# Patient Record
Sex: FEMALE | Race: WHITE | NOT HISPANIC OR LATINO
[De-identification: names, ages, dates, MRNs, and addresses within clinical notes are randomized per-mention and may not be internally consistent; named-entity substitution may affect disease eponyms.]

---

## 2018-04-11 ENCOUNTER — RESULT REVIEW (OUTPATIENT)
Age: 78
End: 2018-04-11

## 2018-04-11 ENCOUNTER — APPOINTMENT (OUTPATIENT)
Dept: ULTRASOUND IMAGING | Facility: IMAGING CENTER | Age: 78
End: 2018-04-11
Payer: MEDICARE

## 2018-04-11 ENCOUNTER — OUTPATIENT (OUTPATIENT)
Dept: OUTPATIENT SERVICES | Facility: HOSPITAL | Age: 78
LOS: 1 days | End: 2018-04-11
Payer: MEDICARE

## 2018-04-11 ENCOUNTER — APPOINTMENT (OUTPATIENT)
Dept: SURGERY | Facility: CLINIC | Age: 78
End: 2018-04-11
Payer: MEDICARE

## 2018-04-11 DIAGNOSIS — Z00.8 ENCOUNTER FOR OTHER GENERAL EXAMINATION: ICD-10-CM

## 2018-04-11 PROCEDURE — 88305 TISSUE EXAM BY PATHOLOGIST: CPT | Mod: 26

## 2018-04-11 PROCEDURE — A4648: CPT

## 2018-04-11 PROCEDURE — 99205K: CUSTOM

## 2018-04-11 PROCEDURE — 77065 DX MAMMO INCL CAD UNI: CPT

## 2018-04-11 PROCEDURE — 77065 DX MAMMO INCL CAD UNI: CPT | Mod: 26,LT

## 2018-04-11 PROCEDURE — 88305 TISSUE EXAM BY PATHOLOGIST: CPT

## 2018-04-11 PROCEDURE — 19084 BX BREAST ADD LESION US IMAG: CPT

## 2018-04-11 PROCEDURE — 19084 BX BREAST ADD LESION US IMAG: CPT | Mod: LT

## 2018-04-11 PROCEDURE — 88360 TUMOR IMMUNOHISTOCHEM/MANUAL: CPT | Mod: 26

## 2018-04-11 PROCEDURE — 19083 BX BREAST 1ST LESION US IMAG: CPT

## 2018-04-11 PROCEDURE — 19083 BX BREAST 1ST LESION US IMAG: CPT | Mod: LT

## 2018-04-11 PROCEDURE — 88360 TUMOR IMMUNOHISTOCHEM/MANUAL: CPT

## 2018-04-18 ENCOUNTER — OUTPATIENT (OUTPATIENT)
Dept: OUTPATIENT SERVICES | Facility: HOSPITAL | Age: 78
LOS: 1 days | End: 2018-04-18
Payer: MEDICARE

## 2018-04-18 ENCOUNTER — APPOINTMENT (OUTPATIENT)
Dept: MRI IMAGING | Facility: IMAGING CENTER | Age: 78
End: 2018-04-18
Payer: MEDICARE

## 2018-04-18 VITALS
WEIGHT: 173.94 LBS | HEIGHT: 59 IN | SYSTOLIC BLOOD PRESSURE: 144 MMHG | RESPIRATION RATE: 16 BRPM | TEMPERATURE: 98 F | HEART RATE: 79 BPM | DIASTOLIC BLOOD PRESSURE: 82 MMHG

## 2018-04-18 DIAGNOSIS — Z96.649 PRESENCE OF UNSPECIFIED ARTIFICIAL HIP JOINT: Chronic | ICD-10-CM

## 2018-04-18 DIAGNOSIS — C50.919 MALIGNANT NEOPLASM OF UNSPECIFIED SITE OF UNSPECIFIED FEMALE BREAST: ICD-10-CM

## 2018-04-18 DIAGNOSIS — C50.912 MALIGNANT NEOPLASM OF UNSPECIFIED SITE OF LEFT FEMALE BREAST: ICD-10-CM

## 2018-04-18 DIAGNOSIS — E03.9 HYPOTHYROIDISM, UNSPECIFIED: ICD-10-CM

## 2018-04-18 DIAGNOSIS — I10 ESSENTIAL (PRIMARY) HYPERTENSION: ICD-10-CM

## 2018-04-18 DIAGNOSIS — Z98.890 OTHER SPECIFIED POSTPROCEDURAL STATES: Chronic | ICD-10-CM

## 2018-04-18 DIAGNOSIS — Z00.8 ENCOUNTER FOR OTHER GENERAL EXAMINATION: ICD-10-CM

## 2018-04-18 LAB
ALBUMIN SERPL ELPH-MCNC: 4.3 G/DL — SIGNIFICANT CHANGE UP (ref 3.3–5)
ALP SERPL-CCNC: 72 U/L — SIGNIFICANT CHANGE UP (ref 40–120)
ALT FLD-CCNC: 14 U/L — SIGNIFICANT CHANGE UP (ref 4–33)
AST SERPL-CCNC: 14 U/L — SIGNIFICANT CHANGE UP (ref 4–32)
BILIRUB SERPL-MCNC: 0.3 MG/DL — SIGNIFICANT CHANGE UP (ref 0.2–1.2)
BUN SERPL-MCNC: 21 MG/DL — SIGNIFICANT CHANGE UP (ref 7–23)
CALCIUM SERPL-MCNC: 9.4 MG/DL — SIGNIFICANT CHANGE UP (ref 8.4–10.5)
CHLORIDE SERPL-SCNC: 100 MMOL/L — SIGNIFICANT CHANGE UP (ref 98–107)
CO2 SERPL-SCNC: 27 MMOL/L — SIGNIFICANT CHANGE UP (ref 22–31)
CREAT SERPL-MCNC: 1.04 MG/DL — SIGNIFICANT CHANGE UP (ref 0.5–1.3)
GLUCOSE SERPL-MCNC: 124 MG/DL — HIGH (ref 70–99)
HCT VFR BLD CALC: 42.3 % — SIGNIFICANT CHANGE UP (ref 34.5–45)
HGB BLD-MCNC: 13.3 G/DL — SIGNIFICANT CHANGE UP (ref 11.5–15.5)
MCHC RBC-ENTMCNC: 27.8 PG — SIGNIFICANT CHANGE UP (ref 27–34)
MCHC RBC-ENTMCNC: 31.4 % — LOW (ref 32–36)
MCV RBC AUTO: 88.3 FL — SIGNIFICANT CHANGE UP (ref 80–100)
NRBC # FLD: 0 — SIGNIFICANT CHANGE UP
PLATELET # BLD AUTO: 334 K/UL — SIGNIFICANT CHANGE UP (ref 150–400)
PMV BLD: 9.9 FL — SIGNIFICANT CHANGE UP (ref 7–13)
POTASSIUM SERPL-MCNC: 4 MMOL/L — SIGNIFICANT CHANGE UP (ref 3.5–5.3)
POTASSIUM SERPL-SCNC: 4 MMOL/L — SIGNIFICANT CHANGE UP (ref 3.5–5.3)
PROT SERPL-MCNC: 7.2 G/DL — SIGNIFICANT CHANGE UP (ref 6–8.3)
RBC # BLD: 4.79 M/UL — SIGNIFICANT CHANGE UP (ref 3.8–5.2)
RBC # FLD: 12.9 % — SIGNIFICANT CHANGE UP (ref 10.3–14.5)
SODIUM SERPL-SCNC: 141 MMOL/L — SIGNIFICANT CHANGE UP (ref 135–145)
WBC # BLD: 8.62 K/UL — SIGNIFICANT CHANGE UP (ref 3.8–10.5)
WBC # FLD AUTO: 8.62 K/UL — SIGNIFICANT CHANGE UP (ref 3.8–10.5)

## 2018-04-18 PROCEDURE — A9585: CPT

## 2018-04-18 PROCEDURE — 71046 X-RAY EXAM CHEST 2 VIEWS: CPT | Mod: 26

## 2018-04-18 PROCEDURE — 77059 MRI BREAST BILATERAL: CPT | Mod: 26

## 2018-04-18 PROCEDURE — C8937: CPT

## 2018-04-18 PROCEDURE — 93010 ELECTROCARDIOGRAM REPORT: CPT

## 2018-04-18 PROCEDURE — 0159T: CPT | Mod: 26

## 2018-04-18 PROCEDURE — 82565 ASSAY OF CREATININE: CPT

## 2018-04-18 PROCEDURE — C8908: CPT

## 2018-04-18 NOTE — H&P PST ADULT - PSH
Breast Cancer- s/p left Breast Lumpectomy  with Radiation therapy( 18 years ago)    Cataract surgery- both eyes ( 2009)    H/O melanoma excision  right forearm 2011  History of hip replacement  2015 - right

## 2018-04-18 NOTE — H&P PST ADULT - RS GEN PE MLT RESP DETAILS PC
breath sounds equal/airway patent/clear to auscultation bilaterally/respirations non-labored/good air movement

## 2018-04-18 NOTE — H&P PST ADULT - HISTORY OF PRESENT ILLNESS
Pt is a 77 yr old female scheduled for Left Partial Mastectomy with Axillary Dissection with Dr Pike 5/1/18 - pt hx of right breast ca and had recent abnormal mammo 3/18 - pt now to have surgery. Pt denies palpation of cyst or pain . Hx of HTN, Hypothyroidism and HLD

## 2018-04-18 NOTE — H&P PST ADULT - NSANTHOSAYNRD_GEN_A_CORE
No. DESI screening performed.  STOP BANG Legend: 0-2 = LOW Risk; 3-4 = INTERMEDIATE Risk; 5-8 = HIGH Risk

## 2018-04-18 NOTE — H&P PST ADULT - NEUROLOGICAL DETAILS
alert and oriented x 3/responds to pain/responds to verbal commands/sensation intact/normal strength

## 2018-04-18 NOTE — H&P PST ADULT - PMH
Breast Cancer    Cataract    HTN (Hypertension)    Hyperlipidemia     (Normal Spontaneous Vaginal Delivery) x 2    Obesity    Osteoporosis    Radiation

## 2018-04-23 ENCOUNTER — APPOINTMENT (OUTPATIENT)
Dept: NUCLEAR MEDICINE | Facility: IMAGING CENTER | Age: 78
End: 2018-04-23
Payer: MEDICARE

## 2018-04-23 ENCOUNTER — OUTPATIENT (OUTPATIENT)
Dept: OUTPATIENT SERVICES | Facility: HOSPITAL | Age: 78
LOS: 1 days | End: 2018-04-23
Payer: MEDICARE

## 2018-04-23 DIAGNOSIS — Z00.8 ENCOUNTER FOR OTHER GENERAL EXAMINATION: ICD-10-CM

## 2018-04-23 DIAGNOSIS — Z98.890 OTHER SPECIFIED POSTPROCEDURAL STATES: Chronic | ICD-10-CM

## 2018-04-23 DIAGNOSIS — Z96.649 PRESENCE OF UNSPECIFIED ARTIFICIAL HIP JOINT: Chronic | ICD-10-CM

## 2018-04-23 PROCEDURE — 78815 PET IMAGE W/CT SKULL-THIGH: CPT | Mod: 26,PI

## 2018-04-23 PROCEDURE — 78815 PET IMAGE W/CT SKULL-THIGH: CPT

## 2018-04-23 PROCEDURE — A9552: CPT

## 2018-04-26 ENCOUNTER — APPOINTMENT (OUTPATIENT)
Dept: MRI IMAGING | Facility: IMAGING CENTER | Age: 78
End: 2018-04-26

## 2018-05-01 ENCOUNTER — RESULT REVIEW (OUTPATIENT)
Age: 78
End: 2018-05-01

## 2018-05-01 ENCOUNTER — INPATIENT (INPATIENT)
Facility: HOSPITAL | Age: 78
LOS: 0 days | Discharge: HOME | End: 2018-05-02
Attending: SURGERY | Admitting: SURGERY
Payer: MEDICARE

## 2018-05-01 ENCOUNTER — APPOINTMENT (OUTPATIENT)
Dept: SURGERY | Facility: HOSPITAL | Age: 78
End: 2018-05-01

## 2018-05-01 VITALS
SYSTOLIC BLOOD PRESSURE: 147 MMHG | RESPIRATION RATE: 16 BRPM | OXYGEN SATURATION: 96 % | TEMPERATURE: 98 F | HEIGHT: 59 IN | DIASTOLIC BLOOD PRESSURE: 64 MMHG | WEIGHT: 173.94 LBS | HEART RATE: 65 BPM

## 2018-05-01 DIAGNOSIS — Z96.649 PRESENCE OF UNSPECIFIED ARTIFICIAL HIP JOINT: Chronic | ICD-10-CM

## 2018-05-01 DIAGNOSIS — C50.912 MALIGNANT NEOPLASM OF UNSPECIFIED SITE OF LEFT FEMALE BREAST: ICD-10-CM

## 2018-05-01 DIAGNOSIS — Z98.890 OTHER SPECIFIED POSTPROCEDURAL STATES: Chronic | ICD-10-CM

## 2018-05-01 PROCEDURE — 19303K: CUSTOM | Mod: RT

## 2018-05-01 PROCEDURE — 88307 TISSUE EXAM BY PATHOLOGIST: CPT | Mod: 26

## 2018-05-01 PROCEDURE — 19307K: CUSTOM | Mod: LT

## 2018-05-01 PROCEDURE — 88309 TISSUE EXAM BY PATHOLOGIST: CPT | Mod: 26

## 2018-05-01 RX ORDER — LOSARTAN POTASSIUM 100 MG/1
100 TABLET, FILM COATED ORAL DAILY
Qty: 0 | Refills: 0 | Status: DISCONTINUED | OUTPATIENT
Start: 2018-05-01 | End: 2018-05-02

## 2018-05-01 RX ORDER — ONDANSETRON 8 MG/1
4 TABLET, FILM COATED ORAL ONCE
Qty: 0 | Refills: 0 | Status: COMPLETED | OUTPATIENT
Start: 2018-05-01 | End: 2018-05-01

## 2018-05-01 RX ORDER — OXYCODONE HYDROCHLORIDE 5 MG/1
5 TABLET ORAL EVERY 6 HOURS
Qty: 0 | Refills: 0 | Status: DISCONTINUED | OUTPATIENT
Start: 2018-05-01 | End: 2018-05-02

## 2018-05-01 RX ORDER — OXYCODONE HYDROCHLORIDE 5 MG/1
10 TABLET ORAL EVERY 6 HOURS
Qty: 0 | Refills: 0 | Status: DISCONTINUED | OUTPATIENT
Start: 2018-05-01 | End: 2018-05-02

## 2018-05-01 RX ORDER — TRIAMTERENE/HYDROCHLOROTHIAZID 75 MG-50MG
1 TABLET ORAL DAILY
Qty: 0 | Refills: 0 | Status: DISCONTINUED | OUTPATIENT
Start: 2018-05-01 | End: 2018-05-02

## 2018-05-01 RX ORDER — SODIUM CHLORIDE 9 MG/ML
1000 INJECTION, SOLUTION INTRAVENOUS
Qty: 0 | Refills: 0 | Status: DISCONTINUED | OUTPATIENT
Start: 2018-05-01 | End: 2018-05-02

## 2018-05-01 RX ORDER — MORPHINE SULFATE 50 MG/1
4 CAPSULE, EXTENDED RELEASE ORAL ONCE
Qty: 0 | Refills: 0 | Status: DISCONTINUED | OUTPATIENT
Start: 2018-05-01 | End: 2018-05-01

## 2018-05-01 RX ORDER — ACETAMINOPHEN 500 MG
650 TABLET ORAL EVERY 6 HOURS
Qty: 0 | Refills: 0 | Status: DISCONTINUED | OUTPATIENT
Start: 2018-05-01 | End: 2018-05-02

## 2018-05-01 RX ORDER — SIMVASTATIN 20 MG/1
20 TABLET, FILM COATED ORAL AT BEDTIME
Qty: 0 | Refills: 0 | Status: DISCONTINUED | OUTPATIENT
Start: 2018-05-01 | End: 2018-05-02

## 2018-05-01 RX ORDER — MORPHINE SULFATE 50 MG/1
2 CAPSULE, EXTENDED RELEASE ORAL
Qty: 0 | Refills: 0 | Status: DISCONTINUED | OUTPATIENT
Start: 2018-05-01 | End: 2018-05-02

## 2018-05-01 RX ORDER — ZOLPIDEM TARTRATE 10 MG/1
5 TABLET ORAL AT BEDTIME
Qty: 0 | Refills: 0 | Status: DISCONTINUED | OUTPATIENT
Start: 2018-05-01 | End: 2018-05-02

## 2018-05-01 RX ORDER — MORPHINE SULFATE 50 MG/1
4 CAPSULE, EXTENDED RELEASE ORAL
Qty: 0 | Refills: 0 | Status: DISCONTINUED | OUTPATIENT
Start: 2018-05-01 | End: 2018-05-01

## 2018-05-01 RX ORDER — BISOPROLOL FUMARATE 10 MG/1
10 TABLET, FILM COATED ORAL DAILY
Qty: 0 | Refills: 0 | Status: DISCONTINUED | OUTPATIENT
Start: 2018-05-01 | End: 2018-05-02

## 2018-05-01 RX ORDER — LEVOTHYROXINE SODIUM 125 MCG
75 TABLET ORAL DAILY
Qty: 0 | Refills: 0 | Status: DISCONTINUED | OUTPATIENT
Start: 2018-05-01 | End: 2018-05-02

## 2018-05-01 RX ORDER — ACETAMINOPHEN 500 MG
1000 TABLET ORAL ONCE
Qty: 0 | Refills: 0 | Status: COMPLETED | OUTPATIENT
Start: 2018-05-01 | End: 2018-05-01

## 2018-05-01 RX ADMIN — ONDANSETRON 4 MILLIGRAM(S): 8 TABLET, FILM COATED ORAL at 20:21

## 2018-05-01 RX ADMIN — MORPHINE SULFATE 2 MILLIGRAM(S): 50 CAPSULE, EXTENDED RELEASE ORAL at 16:45

## 2018-05-01 RX ADMIN — Medication 1000 MILLIGRAM(S): at 16:30

## 2018-05-01 RX ADMIN — MORPHINE SULFATE 4 MILLIGRAM(S): 50 CAPSULE, EXTENDED RELEASE ORAL at 16:15

## 2018-05-01 RX ADMIN — MORPHINE SULFATE 2 MILLIGRAM(S): 50 CAPSULE, EXTENDED RELEASE ORAL at 16:15

## 2018-05-01 RX ADMIN — MORPHINE SULFATE 2 MILLIGRAM(S): 50 CAPSULE, EXTENDED RELEASE ORAL at 16:30

## 2018-05-01 RX ADMIN — MORPHINE SULFATE 4 MILLIGRAM(S): 50 CAPSULE, EXTENDED RELEASE ORAL at 16:00

## 2018-05-01 RX ADMIN — Medication 400 MILLIGRAM(S): at 16:15

## 2018-05-01 NOTE — CHART NOTE - NSCHARTNOTEFT_GEN_A_CORE
Post-operative Check    SUBJECTIVE: Some nausea and vomiting in the immediate post-operative period. Now resolved. Pain well controlled.     OBJECTIVE:  T(C): 36.4 (05-01-18 @ 21:42), Max: 37 (05-01-18 @ 19:35)  HR: 67 (05-01-18 @ 21:42) (58 - 73)  BP: 123/65 (05-01-18 @ 21:42) (123/65 - 168/71)  RR: 18 (05-01-18 @ 21:42) (12 - 20)  SpO2: 97% (05-01-18 @ 21:42) (95% - 99%)      05-01-18 @ 07:01  -  05-01-18 @ 21:46  --------------------------------------------------------  IN: 270 mL / OUT: 335 mL / NET: -65 mL        Physical Exam:   - Constitutional: AOx3, NAD  - CV: normotensive, regular rate   - Respiratory: nonlabored  - Chest: mastectomy incisions covered in clean bandage with no sign of bleeding, no axillary hematoma, JPx2 serosanguinous (sanguinous > serous)   - Abdomen: soft, nontender, nondistended    ASSESSMENT:   SHIN DE LEÓN is a 77y Female POD#0 from right mastectomy and left modified-radical mastectomy. Recovering well. Post-op nausea resolved.     PLAN:  - Pain management  - Follow UOP  - Monitor LEN output quality and quantity.

## 2018-05-01 NOTE — BRIEF OPERATIVE NOTE - OPERATION/FINDINGS
Right breast tissue removed, surgical drain left. Left breast tissue and axillary contents removed, surgical drain left in place.

## 2018-05-01 NOTE — BRIEF OPERATIVE NOTE - PROCEDURE
<<-----Click on this checkbox to enter Procedure Simple mastectomy of right breast  05/01/2018    Active  KATH  Modified radical mastectomy of left breast  05/01/2018    Active  KATH

## 2018-05-02 ENCOUNTER — TRANSCRIPTION ENCOUNTER (OUTPATIENT)
Age: 78
End: 2018-05-02

## 2018-05-02 VITALS — TEMPERATURE: 98 F

## 2018-05-02 RX ORDER — ACETAMINOPHEN 500 MG
2 TABLET ORAL
Qty: 0 | Refills: 0 | COMMUNITY
Start: 2018-05-02

## 2018-05-02 RX ADMIN — Medication 75 MICROGRAM(S): at 05:35

## 2018-05-02 NOTE — DISCHARGE NOTE ADULT - PATIENT PORTAL LINK FT
You can access the [facility] Patient Portal, offered by [facility], by registering with the following website: [email or web address]

## 2018-05-02 NOTE — DISCHARGE NOTE ADULT - CARE PLAN
Principal Discharge DX:	Malignant neoplasm of female breast, unspecified estrogen receptor status, unspecified laterality, unspecified site of breast  Goal:	s/p L modified radical mastectomy, R simple mastectomy, f/u with Dr. Pike in 7-10 days  Assessment and plan of treatment:	Please follow up with Dr. Pike within 1-2 weeks after discharge from the hospital. You may call [phone] to schedule an appointment.      Call [phone] and return to the ED for chest pain, shortness of breath, significant increase in pain, fever >101F, erythema of incision site or drainage, see pus in LEN drain, or significant change in color of surgical sites.    Please call your primary care physician to make a follow up appointment regarding this hospitalization approximately one week after discharge.    You will be discharged with LEN drains. You will need to empty them and record outputs accurately. This will be taught to you by the nursing staff. Please do not remove the LEN drains. They will be removed in the office. Please bring to the office accurate records of output. You will have VNS to aide in this.

## 2018-05-02 NOTE — DISCHARGE NOTE ADULT - MEDICATION SUMMARY - MEDICATIONS TO TAKE
I will START or STAY ON the medications listed below when I get home from the hospital:    acetaminophen 325 mg oral tablet  -- 2 tab(s) by mouth every 6 hours, As needed, Mild Pain (1 - 3)  -- Indication: For pain    losartan 100 mg oral tablet  -- 1 tab(s) by mouth once a day  -- Indication: For HTN    simvastatin 20 mg oral tablet  -- 1 tab(s) by mouth once a day (at bedtime)  -- Indication: For HLD    triamterene-hydrochlorothiazide 37.5 mg-25 mg oral tablet  -- 1 tab(s) by mouth once a day  -- Indication: For HTN    bisoprolol 10 mg oral tablet  -- 1 tab(s) by mouth once a day  -- Indication: For HTN    levothyroxine 75 mcg (0.075 mg) oral tablet  -- 1 tab(s) by mouth once a day  -- Indication: For thyroid

## 2018-05-02 NOTE — DISCHARGE NOTE ADULT - HOSPITAL COURSE
77F presented on 5/1 for scheduled L modified radical mastectomy, R simple mastectomy. Pt has a hx of right breast ca and had recent abnormal mammo 3/18. Pt went to the OR where an uncomplicated L modified radical mastectomy, R simple mastectomy were performed. 2x LEN drains were left in place. Pt tolerated the procedure well and was transferred to PACU then floor. Her diet was advanced as tolerated, labs and vitals were followed, and she was given adequate analgesia. At the time of discharge, the patient was hemodynamically stable, was tolerating PO diet, was voiding urine and passing stool, was ambulating, and was comfortable with adequate pain control. The patient was instructed to follow up with Dr. Pike within 1-2 weeks after discharge from the hospital. The patient felt comfortable with discharge. The patient was discharged to home with VNS for LEN drain care. The patient had no other issues. 77F presented on 5/1 for scheduled L modified radical mastectomy, R simple mastectomy. Pt has a hx of right breast ca and had recent abnormal mammo 3/18. Pt went to the OR where an uncomplicated L modified radical mastectomy, R simple mastectomy were performed. 2x LEN drains were left in place. Pt tolerated the procedure well and was transferred to PACU then floor. Her diet was advanced as tolerated, labs and vitals were followed, and she was given adequate analgesia. At the time of discharge, the patient was hemodynamically stable, was tolerating PO diet, was voiding urine and passing stool, was ambulating, and was comfortable with adequate pain control. The patient was instructed to follow up with Dr. Pike within 1-2 weeks after discharge from the hospital. The patient felt comfortable with discharge. The patient was discharged to home. The patient had no other issues.

## 2018-05-02 NOTE — PROGRESS NOTE ADULT - ASSESSMENT
ASSESSMENT:   SHIN DE LEÓN is a 77y Female POD#1 from right mastectomy and left modified-radical mastectomy. Recovering well. Post-op nausea resolved. Patient is afebrile overnight with vital signs stable and no event.     PLAN:  - Regular diet   - Pain management  - Follow UOP  - Monitor LEN output quality and quantity.  - Need VNS for discharge, likely today SHIN DE LEÓN is a 77y Female POD#1 from right mastectomy and left modified-radical mastectomy. Recovering well. Post-op nausea resolved. Patient is afebrile overnight with vital signs stable and no event.     PLAN:  - Regular diet   - Pain management  - Follow UOP  - Monitor LEN output quality and quantity.  - Need VNS for discharge, likely today

## 2018-05-02 NOTE — DISCHARGE NOTE ADULT - CARE PROVIDER_API CALL
Dennise Pike (MD), [facility] Breast [facility]  [address]  [place], [place] [id number]  Phone: [phone]  Fax: [phone]

## 2018-05-02 NOTE — DISCHARGE NOTE ADULT - PLAN OF CARE
Please follow up with Dr. Pike within 1-2 weeks after discharge from the hospital. You may call [phone] to schedule an appointment.      Call [phone] and return to the ED for chest pain, shortness of breath, significant increase in pain, fever >101F, erythema of incision site or drainage, see pus in LEN drain, or significant change in color of surgical sites.    Please call your primary care physician to make a follow up appointment regarding this hospitalization approximately one week after discharge.    You will be discharged with LEN drains. You will need to empty them and record outputs accurately. This will be taught to you by the nursing staff. Please do not remove the LEN drains. They will be removed in the office. Please bring to the office accurate records of output. You will have VNS to aide in this. s/p L modified radical mastectomy, R simple mastectomy, f/u with Dr. Pike in 7-10 days

## 2018-05-02 NOTE — DISCHARGE NOTE ADULT - CONDITIONS AT DISCHARGE
stable Pt vital signs stable, tolerated diet well, voids without difficulty. LEN teaching given to pt and daughter, pt verbalized understanding, return demonstration given, supplies provided.

## 2018-05-02 NOTE — DISCHARGE NOTE ADULT - NS AS ACTIVITY OBS
Do not drive or operate machinery/No Heavy lifting/straining/do not drive while taking narcotic pain meds, no heavy lifting with arms/Do not make important decisions No Heavy lifting/straining/no heavy lifting with arms/Do not make important decisions/Do not drive or operate machinery

## 2018-05-02 NOTE — PROGRESS NOTE ADULT - SUBJECTIVE AND OBJECTIVE BOX
Deirdre GUTIÉRREZ Team Daily Progress Note    SUBJECTIVE: Pt seen this morning, no over night event, no acute complaint.     OBJECTIVE:   Vital Signs Last 24 Hrs  T(C): 36.6 (02 May 2018 01:52), Max: 37 (01 May 2018 19:35)  T(F): 97.8 (02 May 2018 01:52), Max: 98.6 (01 May 2018 19:35)  HR: 65 (02 May 2018 01:52) (58 - 73)  BP: 108/61 (02 May 2018 01:52) (108/61 - 168/71)  BP(mean): --  RR: 19 (02 May 2018 01:52) (12 - 20)  SpO2: 96% (02 May 2018 01:52) (95% - 99%)    PHYSICAL EXAM:  - Constitutional: AOx3, NAD  - Respiratory: nonlabored breathing on room air   - Chest: mastectomy incisions covered in clean bandage with no sign of bleeding, no axillary hematoma, JPx2 serosanguinous (sanguinous > serous)   - Abdomen: soft, nontender, nondistended

## 2018-05-02 NOTE — DISCHARGE NOTE ADULT - INSTRUCTIONS
diet as tolerated Call MD for fever greater than 101, nausea, vomiting, increased pain, pus drainage from incision, or go to ER.

## 2018-05-03 LAB — SURGICAL PATHOLOGY STUDY: SIGNIFICANT CHANGE UP

## 2018-05-07 ENCOUNTER — APPOINTMENT (OUTPATIENT)
Dept: SURGERY | Facility: CLINIC | Age: 78
End: 2018-05-07
Payer: MEDICARE

## 2018-05-07 PROCEDURE — 99024 POSTOP FOLLOW-UP VISIT: CPT

## 2018-05-10 ENCOUNTER — OUTPATIENT (OUTPATIENT)
Dept: OUTPATIENT SERVICES | Facility: HOSPITAL | Age: 78
LOS: 1 days | Discharge: HOME | End: 2018-05-10

## 2018-05-10 ENCOUNTER — APPOINTMENT (OUTPATIENT)
Dept: HEMATOLOGY ONCOLOGY | Facility: CLINIC | Age: 78
End: 2018-05-10
Payer: MEDICARE

## 2018-05-10 ENCOUNTER — OTHER (OUTPATIENT)
Age: 78
End: 2018-05-10

## 2018-05-10 VITALS
WEIGHT: 170.86 LBS | RESPIRATION RATE: 18 BRPM | DIASTOLIC BLOOD PRESSURE: 72 MMHG | SYSTOLIC BLOOD PRESSURE: 128 MMHG | HEIGHT: 62.01 IN | OXYGEN SATURATION: 98 % | TEMPERATURE: 98.4 F | BODY MASS INDEX: 31.04 KG/M2 | HEART RATE: 91 BPM

## 2018-05-10 DIAGNOSIS — Z86.39 PERSONAL HISTORY OF OTHER ENDOCRINE, NUTRITIONAL AND METABOLIC DISEASE: ICD-10-CM

## 2018-05-10 DIAGNOSIS — Z78.9 OTHER SPECIFIED HEALTH STATUS: ICD-10-CM

## 2018-05-10 DIAGNOSIS — Z96.649 PRESENCE OF UNSPECIFIED ARTIFICIAL HIP JOINT: Chronic | ICD-10-CM

## 2018-05-10 DIAGNOSIS — Z85.820 PERSONAL HISTORY OF MALIGNANT MELANOMA OF SKIN: ICD-10-CM

## 2018-05-10 DIAGNOSIS — Z80.1 FAMILY HISTORY OF MALIGNANT NEOPLASM OF TRACHEA, BRONCHUS AND LUNG: ICD-10-CM

## 2018-05-10 DIAGNOSIS — Z98.890 OTHER SPECIFIED POSTPROCEDURAL STATES: Chronic | ICD-10-CM

## 2018-05-10 DIAGNOSIS — C50.919 MALIGNANT NEOPLASM OF UNSPECIFIED SITE OF UNSPECIFIED FEMALE BREAST: ICD-10-CM

## 2018-05-10 PROCEDURE — 99205 OFFICE O/P NEW HI 60 MIN: CPT

## 2018-05-14 ENCOUNTER — APPOINTMENT (OUTPATIENT)
Dept: SURGERY | Facility: CLINIC | Age: 78
End: 2018-05-14
Payer: MEDICARE

## 2018-05-14 PROCEDURE — 99024 POSTOP FOLLOW-UP VISIT: CPT

## 2018-05-15 ENCOUNTER — OTHER (OUTPATIENT)
Age: 78
End: 2018-05-15

## 2018-05-16 ENCOUNTER — APPOINTMENT (OUTPATIENT)
Dept: SURGERY | Facility: CLINIC | Age: 78
End: 2018-05-16
Payer: MEDICARE

## 2018-05-16 ENCOUNTER — FORM ENCOUNTER (OUTPATIENT)
Age: 78
End: 2018-05-16

## 2018-05-16 PROCEDURE — 99024 POSTOP FOLLOW-UP VISIT: CPT

## 2018-05-17 ENCOUNTER — APPOINTMENT (OUTPATIENT)
Dept: ENDOVASCULAR SURGERY | Facility: CLINIC | Age: 78
End: 2018-05-17
Payer: MEDICARE

## 2018-05-17 PROCEDURE — 36561 INSERT TUNNELED CV CATH: CPT

## 2018-05-17 PROCEDURE — 76937 US GUIDE VASCULAR ACCESS: CPT

## 2018-05-17 PROCEDURE — 77001 FLUOROGUIDE FOR VEIN DEVICE: CPT

## 2018-05-21 ENCOUNTER — APPOINTMENT (OUTPATIENT)
Dept: SURGERY | Facility: CLINIC | Age: 78
End: 2018-05-21
Payer: MEDICARE

## 2018-05-21 PROCEDURE — 99024 POSTOP FOLLOW-UP VISIT: CPT

## 2018-05-22 ENCOUNTER — APPOINTMENT (OUTPATIENT)
Dept: INFUSION THERAPY | Facility: HOSPITAL | Age: 78
End: 2018-05-22

## 2018-05-24 ENCOUNTER — OTHER (OUTPATIENT)
Age: 78
End: 2018-05-24

## 2018-05-30 ENCOUNTER — APPOINTMENT (OUTPATIENT)
Dept: HEMATOLOGY ONCOLOGY | Facility: CLINIC | Age: 78
End: 2018-05-30

## 2018-06-01 ENCOUNTER — OTHER (OUTPATIENT)
Age: 78
End: 2018-06-01

## 2018-06-04 ENCOUNTER — OTHER (OUTPATIENT)
Age: 78
End: 2018-06-04

## 2018-06-04 ENCOUNTER — APPOINTMENT (OUTPATIENT)
Dept: HEMATOLOGY ONCOLOGY | Facility: CLINIC | Age: 78
End: 2018-06-04
Payer: MEDICARE

## 2018-06-04 VITALS
BODY MASS INDEX: 31.24 KG/M2 | SYSTOLIC BLOOD PRESSURE: 140 MMHG | WEIGHT: 170.86 LBS | DIASTOLIC BLOOD PRESSURE: 80 MMHG | TEMPERATURE: 98 F | OXYGEN SATURATION: 99 % | RESPIRATION RATE: 16 BRPM | HEART RATE: 91 BPM

## 2018-06-04 DIAGNOSIS — L03.90 CELLULITIS, UNSPECIFIED: ICD-10-CM

## 2018-06-04 PROCEDURE — 99213 OFFICE O/P EST LOW 20 MIN: CPT

## 2018-06-06 ENCOUNTER — APPOINTMENT (OUTPATIENT)
Dept: SURGERY | Facility: CLINIC | Age: 78
End: 2018-06-06
Payer: MEDICARE

## 2018-06-06 PROCEDURE — 99024 POSTOP FOLLOW-UP VISIT: CPT

## 2018-06-08 ENCOUNTER — RESULT REVIEW (OUTPATIENT)
Age: 78
End: 2018-06-08

## 2018-06-08 ENCOUNTER — LABORATORY RESULT (OUTPATIENT)
Age: 78
End: 2018-06-08

## 2018-06-08 ENCOUNTER — OTHER (OUTPATIENT)
Age: 78
End: 2018-06-08

## 2018-06-08 ENCOUNTER — APPOINTMENT (OUTPATIENT)
Dept: INFUSION THERAPY | Facility: HOSPITAL | Age: 78
End: 2018-06-08

## 2018-06-08 LAB
BASOPHILS # BLD AUTO: 0 K/UL — SIGNIFICANT CHANGE UP (ref 0–0.2)
BASOPHILS NFR BLD AUTO: 0.1 % — SIGNIFICANT CHANGE UP (ref 0–2)
EOSINOPHIL # BLD AUTO: 0.2 K/UL — SIGNIFICANT CHANGE UP (ref 0–0.5)
EOSINOPHIL NFR BLD AUTO: 2.4 % — SIGNIFICANT CHANGE UP (ref 0–6)
HCT VFR BLD CALC: 38.5 % — SIGNIFICANT CHANGE UP (ref 34.5–45)
HGB BLD-MCNC: 13.1 G/DL — SIGNIFICANT CHANGE UP (ref 11.5–15.5)
LYMPHOCYTES # BLD AUTO: 1.8 K/UL — SIGNIFICANT CHANGE UP (ref 1–3.3)
LYMPHOCYTES # BLD AUTO: 18.8 % — SIGNIFICANT CHANGE UP (ref 13–44)
MCHC RBC-ENTMCNC: 28.5 PG — SIGNIFICANT CHANGE UP (ref 27–34)
MCHC RBC-ENTMCNC: 34 G/DL — SIGNIFICANT CHANGE UP (ref 32–36)
MCV RBC AUTO: 84 FL — SIGNIFICANT CHANGE UP (ref 80–100)
MONOCYTES # BLD AUTO: 0.7 K/UL — SIGNIFICANT CHANGE UP (ref 0–0.9)
MONOCYTES NFR BLD AUTO: 7.5 % — SIGNIFICANT CHANGE UP (ref 2–14)
NEUTROPHILS # BLD AUTO: 7 K/UL — SIGNIFICANT CHANGE UP (ref 1.8–7.4)
NEUTROPHILS NFR BLD AUTO: 71.1 % — SIGNIFICANT CHANGE UP (ref 43–77)
PLATELET # BLD AUTO: 350 K/UL — SIGNIFICANT CHANGE UP (ref 150–400)
RBC # BLD: 4.59 M/UL — SIGNIFICANT CHANGE UP (ref 3.8–5.2)
RBC # FLD: 11.8 % — SIGNIFICANT CHANGE UP (ref 10.3–14.5)
WBC # BLD: 9.8 K/UL — SIGNIFICANT CHANGE UP (ref 3.8–10.5)
WBC # FLD AUTO: 9.8 K/UL — SIGNIFICANT CHANGE UP (ref 3.8–10.5)

## 2018-06-11 DIAGNOSIS — Z51.89 ENCOUNTER FOR OTHER SPECIFIED AFTERCARE: ICD-10-CM

## 2018-06-11 DIAGNOSIS — R11.2 NAUSEA WITH VOMITING, UNSPECIFIED: ICD-10-CM

## 2018-06-11 DIAGNOSIS — Z51.11 ENCOUNTER FOR ANTINEOPLASTIC CHEMOTHERAPY: ICD-10-CM

## 2018-06-15 ENCOUNTER — APPOINTMENT (OUTPATIENT)
Dept: INFUSION THERAPY | Facility: HOSPITAL | Age: 78
End: 2018-06-15

## 2018-06-22 ENCOUNTER — OUTPATIENT (OUTPATIENT)
Dept: OUTPATIENT SERVICES | Facility: HOSPITAL | Age: 78
LOS: 1 days | Discharge: HOME | End: 2018-06-22

## 2018-06-22 ENCOUNTER — APPOINTMENT (OUTPATIENT)
Dept: INFUSION THERAPY | Facility: HOSPITAL | Age: 78
End: 2018-06-22

## 2018-06-22 DIAGNOSIS — C50.919 MALIGNANT NEOPLASM OF UNSPECIFIED SITE OF UNSPECIFIED FEMALE BREAST: ICD-10-CM

## 2018-06-22 DIAGNOSIS — Z98.890 OTHER SPECIFIED POSTPROCEDURAL STATES: Chronic | ICD-10-CM

## 2018-06-22 DIAGNOSIS — Z96.649 PRESENCE OF UNSPECIFIED ARTIFICIAL HIP JOINT: Chronic | ICD-10-CM

## 2018-06-25 ENCOUNTER — OTHER (OUTPATIENT)
Age: 78
End: 2018-06-25

## 2018-06-26 ENCOUNTER — RESULT REVIEW (OUTPATIENT)
Age: 78
End: 2018-06-26

## 2018-06-26 ENCOUNTER — APPOINTMENT (OUTPATIENT)
Dept: HEMATOLOGY ONCOLOGY | Facility: CLINIC | Age: 78
End: 2018-06-26
Payer: MEDICARE

## 2018-06-26 VITALS
TEMPERATURE: 97.7 F | SYSTOLIC BLOOD PRESSURE: 148 MMHG | BODY MASS INDEX: 30.72 KG/M2 | OXYGEN SATURATION: 98 % | DIASTOLIC BLOOD PRESSURE: 71 MMHG | RESPIRATION RATE: 16 BRPM | HEART RATE: 53 BPM | WEIGHT: 167.99 LBS

## 2018-06-26 LAB
BASOPHILS # BLD AUTO: 0 K/UL — SIGNIFICANT CHANGE UP (ref 0–0.2)
BASOPHILS NFR BLD AUTO: 0.4 % — SIGNIFICANT CHANGE UP (ref 0–2)
EOSINOPHIL # BLD AUTO: 0 K/UL — SIGNIFICANT CHANGE UP (ref 0–0.5)
EOSINOPHIL NFR BLD AUTO: 0 % — SIGNIFICANT CHANGE UP (ref 0–6)
HCT VFR BLD CALC: 36 % — SIGNIFICANT CHANGE UP (ref 34.5–45)
HGB BLD-MCNC: 12.2 G/DL — SIGNIFICANT CHANGE UP (ref 11.5–15.5)
LYMPHOCYTES # BLD AUTO: 15 % — SIGNIFICANT CHANGE UP (ref 13–44)
LYMPHOCYTES # BLD AUTO: 2 K/UL — SIGNIFICANT CHANGE UP (ref 1–3.3)
MCHC RBC-ENTMCNC: 28.3 PG — SIGNIFICANT CHANGE UP (ref 27–34)
MCHC RBC-ENTMCNC: 33.7 G/DL — SIGNIFICANT CHANGE UP (ref 32–36)
MCV RBC AUTO: 83.8 FL — SIGNIFICANT CHANGE UP (ref 80–100)
MONOCYTES # BLD AUTO: 0.9 K/UL — SIGNIFICANT CHANGE UP (ref 0–0.9)
MONOCYTES NFR BLD AUTO: 6.8 % — SIGNIFICANT CHANGE UP (ref 2–14)
NEUTROPHILS # BLD AUTO: 10.1 K/UL — HIGH (ref 1.8–7.4)
NEUTROPHILS NFR BLD AUTO: 77.8 % — HIGH (ref 43–77)
PLATELET # BLD AUTO: 235 K/UL — SIGNIFICANT CHANGE UP (ref 150–400)
RBC # BLD: 4.3 M/UL — SIGNIFICANT CHANGE UP (ref 3.8–5.2)
RBC # FLD: 12.8 % — SIGNIFICANT CHANGE UP (ref 10.3–14.5)
WBC # BLD: 13 K/UL — HIGH (ref 3.8–10.5)
WBC # FLD AUTO: 13 K/UL — HIGH (ref 3.8–10.5)

## 2018-06-26 PROCEDURE — 99214 OFFICE O/P EST MOD 30 MIN: CPT

## 2018-06-29 ENCOUNTER — LABORATORY RESULT (OUTPATIENT)
Age: 78
End: 2018-06-29

## 2018-06-29 ENCOUNTER — APPOINTMENT (OUTPATIENT)
Dept: INFUSION THERAPY | Facility: HOSPITAL | Age: 78
End: 2018-06-29

## 2018-07-02 DIAGNOSIS — Z51.11 ENCOUNTER FOR ANTINEOPLASTIC CHEMOTHERAPY: ICD-10-CM

## 2018-07-02 DIAGNOSIS — R11.2 NAUSEA WITH VOMITING, UNSPECIFIED: ICD-10-CM

## 2018-07-02 DIAGNOSIS — Z51.89 ENCOUNTER FOR OTHER SPECIFIED AFTERCARE: ICD-10-CM

## 2018-07-03 ENCOUNTER — APPOINTMENT (OUTPATIENT)
Dept: INFUSION THERAPY | Facility: HOSPITAL | Age: 78
End: 2018-07-03

## 2018-07-05 ENCOUNTER — OTHER (OUTPATIENT)
Age: 78
End: 2018-07-05

## 2018-07-06 ENCOUNTER — APPOINTMENT (OUTPATIENT)
Dept: INFUSION THERAPY | Facility: HOSPITAL | Age: 78
End: 2018-07-06

## 2018-07-06 ENCOUNTER — OTHER (OUTPATIENT)
Age: 78
End: 2018-07-06

## 2018-07-07 ENCOUNTER — LABORATORY RESULT (OUTPATIENT)
Age: 78
End: 2018-07-07

## 2018-07-07 ENCOUNTER — APPOINTMENT (OUTPATIENT)
Dept: INFUSION THERAPY | Facility: HOSPITAL | Age: 78
End: 2018-07-07

## 2018-07-07 ENCOUNTER — RESULT REVIEW (OUTPATIENT)
Age: 78
End: 2018-07-07

## 2018-07-07 LAB
ANISOCYTOSIS BLD QL: SLIGHT — SIGNIFICANT CHANGE UP
BASOPHILS # BLD AUTO: 0 K/UL — SIGNIFICANT CHANGE UP (ref 0–0.2)
BASOPHILS NFR BLD AUTO: 2 % — SIGNIFICANT CHANGE UP (ref 0–2)
EOSINOPHIL # BLD AUTO: 0 K/UL — SIGNIFICANT CHANGE UP (ref 0–0.5)
EOSINOPHIL NFR BLD AUTO: 4 % — SIGNIFICANT CHANGE UP (ref 0–6)
HCT VFR BLD CALC: 30.5 % — LOW (ref 34.5–45)
HGB BLD-MCNC: 10.4 G/DL — LOW (ref 11.5–15.5)
LG PLATELETS BLD QL AUTO: SLIGHT — SIGNIFICANT CHANGE UP
LYMPHOCYTES # BLD AUTO: 0.7 K/UL — LOW (ref 1–3.3)
LYMPHOCYTES # BLD AUTO: 52 % — HIGH (ref 13–44)
MCHC RBC-ENTMCNC: 28.5 PG — SIGNIFICANT CHANGE UP (ref 27–34)
MCHC RBC-ENTMCNC: 34.2 G/DL — SIGNIFICANT CHANGE UP (ref 32–36)
MCV RBC AUTO: 83.4 FL — SIGNIFICANT CHANGE UP (ref 80–100)
MONOCYTES # BLD AUTO: 0.2 K/UL — SIGNIFICANT CHANGE UP (ref 0–0.9)
MONOCYTES NFR BLD AUTO: 19 % — HIGH (ref 2–14)
NEUTROPHILS # BLD AUTO: 0.2 K/UL — LOW (ref 1.8–7.4)
NEUTROPHILS NFR BLD AUTO: 23 % — LOW (ref 43–77)
PLAT MORPH BLD: NORMAL — SIGNIFICANT CHANGE UP
PLATELET # BLD AUTO: 201 K/UL — SIGNIFICANT CHANGE UP (ref 150–400)
POIKILOCYTOSIS BLD QL AUTO: SLIGHT — SIGNIFICANT CHANGE UP
RBC # BLD: 3.66 M/UL — LOW (ref 3.8–5.2)
RBC # FLD: 12.7 % — SIGNIFICANT CHANGE UP (ref 10.3–14.5)
RBC BLD AUTO: SIGNIFICANT CHANGE UP
TOXIC GRANULES BLD QL SMEAR: PRESENT — SIGNIFICANT CHANGE UP
WBC # BLD: 1.2 K/UL — LOW (ref 3.8–10.5)
WBC # FLD AUTO: 1.2 K/UL — LOW (ref 3.8–10.5)

## 2018-07-09 ENCOUNTER — OTHER (OUTPATIENT)
Age: 78
End: 2018-07-09

## 2018-07-09 ENCOUNTER — APPOINTMENT (OUTPATIENT)
Dept: HEMATOLOGY ONCOLOGY | Facility: CLINIC | Age: 78
End: 2018-07-09

## 2018-07-09 ENCOUNTER — RESULT REVIEW (OUTPATIENT)
Age: 78
End: 2018-07-09

## 2018-07-09 ENCOUNTER — APPOINTMENT (OUTPATIENT)
Dept: INFUSION THERAPY | Facility: HOSPITAL | Age: 78
End: 2018-07-09

## 2018-07-09 ENCOUNTER — INPATIENT (INPATIENT)
Facility: HOSPITAL | Age: 78
LOS: 2 days | Discharge: HOME | DRG: 871 | End: 2018-07-12
Attending: INTERNAL MEDICINE | Admitting: HOSPITALIST
Payer: MEDICARE

## 2018-07-09 VITALS
TEMPERATURE: 99 F | DIASTOLIC BLOOD PRESSURE: 74 MMHG | HEART RATE: 95 BPM | HEIGHT: 61 IN | SYSTOLIC BLOOD PRESSURE: 114 MMHG | WEIGHT: 169.98 LBS | RESPIRATION RATE: 16 BRPM | OXYGEN SATURATION: 97 %

## 2018-07-09 DIAGNOSIS — Z98.890 OTHER SPECIFIED POSTPROCEDURAL STATES: Chronic | ICD-10-CM

## 2018-07-09 DIAGNOSIS — D70.9 NEUTROPENIA, UNSPECIFIED: ICD-10-CM

## 2018-07-09 DIAGNOSIS — E78.5 HYPERLIPIDEMIA, UNSPECIFIED: ICD-10-CM

## 2018-07-09 DIAGNOSIS — E03.9 HYPOTHYROIDISM, UNSPECIFIED: ICD-10-CM

## 2018-07-09 DIAGNOSIS — C50.919 MALIGNANT NEOPLASM OF UNSPECIFIED SITE OF UNSPECIFIED FEMALE BREAST: ICD-10-CM

## 2018-07-09 DIAGNOSIS — Z96.649 PRESENCE OF UNSPECIFIED ARTIFICIAL HIP JOINT: Chronic | ICD-10-CM

## 2018-07-09 DIAGNOSIS — N17.9 ACUTE KIDNEY FAILURE, UNSPECIFIED: ICD-10-CM

## 2018-07-09 DIAGNOSIS — I10 ESSENTIAL (PRIMARY) HYPERTENSION: ICD-10-CM

## 2018-07-09 DIAGNOSIS — D64.9 ANEMIA, UNSPECIFIED: ICD-10-CM

## 2018-07-09 DIAGNOSIS — Z29.9 ENCOUNTER FOR PROPHYLACTIC MEASURES, UNSPECIFIED: ICD-10-CM

## 2018-07-09 LAB
ALBUMIN SERPL ELPH-MCNC: 4.2 G/DL — SIGNIFICANT CHANGE UP (ref 3.3–5)
ALP SERPL-CCNC: 72 U/L — SIGNIFICANT CHANGE UP (ref 40–120)
ALT FLD-CCNC: 18 U/L — SIGNIFICANT CHANGE UP (ref 10–45)
ANION GAP SERPL CALC-SCNC: 15 MMOL/L — SIGNIFICANT CHANGE UP (ref 5–17)
APPEARANCE UR: CLEAR — SIGNIFICANT CHANGE UP
AST SERPL-CCNC: 19 U/L — SIGNIFICANT CHANGE UP (ref 10–40)
BACTERIA # UR AUTO: ABNORMAL /HPF
BASOPHILS # BLD AUTO: 0 K/UL — SIGNIFICANT CHANGE UP (ref 0–0.2)
BASOPHILS # BLD AUTO: 0 K/UL — SIGNIFICANT CHANGE UP (ref 0–0.2)
BASOPHILS NFR BLD AUTO: 3 % — HIGH (ref 0–2)
BILIRUB SERPL-MCNC: 0.5 MG/DL — SIGNIFICANT CHANGE UP (ref 0.2–1.2)
BILIRUB UR-MCNC: NEGATIVE — SIGNIFICANT CHANGE UP
BUN SERPL-MCNC: 38 MG/DL — HIGH (ref 7–23)
CALCIUM SERPL-MCNC: 9.2 MG/DL — SIGNIFICANT CHANGE UP (ref 8.4–10.5)
CHLORIDE SERPL-SCNC: 98 MMOL/L — SIGNIFICANT CHANGE UP (ref 96–108)
CO2 SERPL-SCNC: 25 MMOL/L — SIGNIFICANT CHANGE UP (ref 22–31)
COLOR SPEC: YELLOW — SIGNIFICANT CHANGE UP
COMMENT - URINE: SIGNIFICANT CHANGE UP
CREAT SERPL-MCNC: 2.49 MG/DL — HIGH (ref 0.5–1.3)
DIFF PNL FLD: NEGATIVE — SIGNIFICANT CHANGE UP
EOSINOPHIL # BLD AUTO: 0 K/UL — SIGNIFICANT CHANGE UP (ref 0–0.5)
EOSINOPHIL # BLD AUTO: 0.1 K/UL — SIGNIFICANT CHANGE UP (ref 0–0.5)
EOSINOPHIL NFR BLD AUTO: 2 % — SIGNIFICANT CHANGE UP (ref 0–6)
EOSINOPHIL NFR BLD AUTO: 3 % — SIGNIFICANT CHANGE UP (ref 0–6)
EPI CELLS # UR: SIGNIFICANT CHANGE UP /HPF
GLUCOSE SERPL-MCNC: 116 MG/DL — HIGH (ref 70–99)
GLUCOSE UR QL: NEGATIVE — SIGNIFICANT CHANGE UP
HCT VFR BLD CALC: 28.5 % — LOW (ref 34.5–45)
HCT VFR BLD CALC: 31 % — LOW (ref 34.5–45)
HGB BLD-MCNC: 10.9 G/DL — LOW (ref 11.5–15.5)
HGB BLD-MCNC: 9.6 G/DL — LOW (ref 11.5–15.5)
KETONES UR-MCNC: NEGATIVE — SIGNIFICANT CHANGE UP
LEUKOCYTE ESTERASE UR-ACNC: NEGATIVE — SIGNIFICANT CHANGE UP
LYMPHOCYTES # BLD AUTO: 0.7 K/UL — LOW (ref 1–3.3)
LYMPHOCYTES # BLD AUTO: 0.9 K/UL — LOW (ref 1–3.3)
LYMPHOCYTES # BLD AUTO: 59 % — HIGH (ref 13–44)
LYMPHOCYTES # BLD AUTO: 70 % — HIGH (ref 13–44)
MCHC RBC-ENTMCNC: 28.6 PG — SIGNIFICANT CHANGE UP (ref 27–34)
MCHC RBC-ENTMCNC: 29.1 PG — SIGNIFICANT CHANGE UP (ref 27–34)
MCHC RBC-ENTMCNC: 33.8 GM/DL — SIGNIFICANT CHANGE UP (ref 32–36)
MCHC RBC-ENTMCNC: 35.2 G/DL — SIGNIFICANT CHANGE UP (ref 32–36)
MCV RBC AUTO: 82.9 FL — SIGNIFICANT CHANGE UP (ref 80–100)
MCV RBC AUTO: 84.6 FL — SIGNIFICANT CHANGE UP (ref 80–100)
MONOCYTES # BLD AUTO: 0.2 K/UL — SIGNIFICANT CHANGE UP (ref 0–0.9)
MONOCYTES # BLD AUTO: 0.4 K/UL — SIGNIFICANT CHANGE UP (ref 0–0.9)
MONOCYTES NFR BLD AUTO: 25 % — HIGH (ref 2–14)
MONOCYTES NFR BLD AUTO: 25 % — HIGH (ref 2–14)
NEUTROPHILS # BLD AUTO: 0 K/UL — LOW (ref 1.8–7.4)
NEUTROPHILS # BLD AUTO: 0.3 K/UL — LOW (ref 1.8–7.4)
NEUTROPHILS NFR BLD AUTO: 2 % — LOW (ref 43–77)
NEUTROPHILS NFR BLD AUTO: 8 % — LOW (ref 43–77)
NITRITE UR-MCNC: NEGATIVE — SIGNIFICANT CHANGE UP
PH UR: 6 — SIGNIFICANT CHANGE UP (ref 5–8)
PLAT MORPH BLD: NORMAL — SIGNIFICANT CHANGE UP
PLATELET # BLD AUTO: 174 K/UL — SIGNIFICANT CHANGE UP (ref 150–400)
PLATELET # BLD AUTO: 179 K/UL — SIGNIFICANT CHANGE UP (ref 150–400)
POTASSIUM SERPL-MCNC: 4.6 MMOL/L — SIGNIFICANT CHANGE UP (ref 3.5–5.3)
POTASSIUM SERPL-SCNC: 4.6 MMOL/L — SIGNIFICANT CHANGE UP (ref 3.5–5.3)
PROT SERPL-MCNC: 6.8 G/DL — SIGNIFICANT CHANGE UP (ref 6–8.3)
PROT UR-MCNC: NEGATIVE — SIGNIFICANT CHANGE UP
RBC # BLD: 3.37 M/UL — LOW (ref 3.8–5.2)
RBC # BLD: 3.74 M/UL — LOW (ref 3.8–5.2)
RBC # FLD: 12.7 % — SIGNIFICANT CHANGE UP (ref 10.3–14.5)
RBC # FLD: 12.8 % — SIGNIFICANT CHANGE UP (ref 10.3–14.5)
RBC BLD AUTO: SIGNIFICANT CHANGE UP
RBC CASTS # UR COMP ASSIST: SIGNIFICANT CHANGE UP /HPF (ref 0–2)
SODIUM SERPL-SCNC: 138 MMOL/L — SIGNIFICANT CHANGE UP (ref 135–145)
SP GR SPEC: 1.01 — SIGNIFICANT CHANGE UP (ref 1.01–1.02)
UROBILINOGEN FLD QL: NEGATIVE — SIGNIFICANT CHANGE UP
WBC # BLD: 1.2 K/UL — LOW (ref 3.8–10.5)
WBC # BLD: 1.5 K/UL — LOW (ref 3.8–10.5)
WBC # FLD AUTO: 1.2 K/UL — LOW (ref 3.8–10.5)
WBC # FLD AUTO: 1.5 K/UL — LOW (ref 3.8–10.5)
WBC UR QL: SIGNIFICANT CHANGE UP /HPF (ref 0–5)

## 2018-07-09 PROCEDURE — 93010 ELECTROCARDIOGRAM REPORT: CPT

## 2018-07-09 PROCEDURE — 71045 X-RAY EXAM CHEST 1 VIEW: CPT | Mod: 26

## 2018-07-09 PROCEDURE — 99285 EMERGENCY DEPT VISIT HI MDM: CPT

## 2018-07-09 RX ORDER — CEFEPIME 1 G/1
1000 INJECTION, POWDER, FOR SOLUTION INTRAMUSCULAR; INTRAVENOUS ONCE
Qty: 0 | Refills: 0 | Status: COMPLETED | OUTPATIENT
Start: 2018-07-09 | End: 2018-07-09

## 2018-07-09 RX ORDER — HEPARIN SODIUM 5000 [USP'U]/ML
5000 INJECTION INTRAVENOUS; SUBCUTANEOUS EVERY 8 HOURS
Qty: 0 | Refills: 0 | Status: DISCONTINUED | OUTPATIENT
Start: 2018-07-09 | End: 2018-07-12

## 2018-07-09 RX ORDER — SIMVASTATIN 20 MG/1
20 TABLET, FILM COATED ORAL AT BEDTIME
Qty: 0 | Refills: 0 | Status: DISCONTINUED | OUTPATIENT
Start: 2018-07-09 | End: 2018-07-12

## 2018-07-09 RX ORDER — CEFEPIME 1 G/1
2000 INJECTION, POWDER, FOR SOLUTION INTRAMUSCULAR; INTRAVENOUS EVERY 24 HOURS
Qty: 0 | Refills: 0 | Status: DISCONTINUED | OUTPATIENT
Start: 2018-07-10 | End: 2018-07-12

## 2018-07-09 RX ORDER — LEVOTHYROXINE SODIUM 125 MCG
75 TABLET ORAL DAILY
Qty: 0 | Refills: 0 | Status: DISCONTINUED | OUTPATIENT
Start: 2018-07-09 | End: 2018-07-12

## 2018-07-09 RX ORDER — SODIUM CHLORIDE 9 MG/ML
1000 INJECTION INTRAMUSCULAR; INTRAVENOUS; SUBCUTANEOUS
Qty: 0 | Refills: 0 | Status: DISCONTINUED | OUTPATIENT
Start: 2018-07-09 | End: 2018-07-11

## 2018-07-09 RX ORDER — VANCOMYCIN HCL 1 G
1000 VIAL (EA) INTRAVENOUS ONCE
Qty: 0 | Refills: 0 | Status: COMPLETED | OUTPATIENT
Start: 2018-07-09 | End: 2018-07-09

## 2018-07-09 RX ADMIN — HEPARIN SODIUM 5000 UNIT(S): 5000 INJECTION INTRAVENOUS; SUBCUTANEOUS at 21:04

## 2018-07-09 RX ADMIN — CEFEPIME 100 MILLIGRAM(S): 1 INJECTION, POWDER, FOR SOLUTION INTRAMUSCULAR; INTRAVENOUS at 18:26

## 2018-07-09 RX ADMIN — SODIUM CHLORIDE 100 MILLILITER(S): 9 INJECTION INTRAMUSCULAR; INTRAVENOUS; SUBCUTANEOUS at 21:12

## 2018-07-09 RX ADMIN — Medication 250 MILLIGRAM(S): at 18:55

## 2018-07-09 RX ADMIN — CEFEPIME 100 MILLIGRAM(S): 1 INJECTION, POWDER, FOR SOLUTION INTRAMUSCULAR; INTRAVENOUS at 21:04

## 2018-07-09 NOTE — H&P ADULT - PROBLEM SELECTOR PLAN 3
Hgb 9.6, had been 12 in June prior to iniating chemotherapy  - no active signs of bleeding  - continue to monitor Hgb 9.6, had been 12 in June prior to initiating chemotherapy  - no active signs of bleeding  - continue to monitor

## 2018-07-09 NOTE — ED ADULT NURSE NOTE - OBJECTIVE STATEMENT
77 yr old female amb to ed c/o low WBC and low grade fever this am Has had bilat breast mastectomy 4/18 Has had x2 chemo tx last one admin x 1 week ago Pt awake alert and orientedx3 Resp even and nonlab Denies chest pain or sob Pt states, "I feel wiped out."

## 2018-07-09 NOTE — H&P ADULT - PROBLEM SELECTOR PLAN 2
Creatinine elevated significantly from baseline ~1  - Likely in setting of chemotherapy and decreased PO intake  - patient urinating well  - will start IV maintenance fluids   - avoid nephrotoxic agents   - continue to monitor kidney function Creatinine elevated significantly from baseline ~1  - Likely in setting of chemotherapy, arb/diuretic use, and decreased PO intake  - patient urinating well, will get renal ultrasound to rule out obstruction   - will start IV maintenance fluids   - avoid nephrotoxic agents   - continue to monitor kidney function

## 2018-07-09 NOTE — H&P ADULT - HISTORY OF PRESENT ILLNESS
77 year old female with breast carcinoma (s/p resection in 1992, now with invasive ductal carcinoma s/p bilateral mastectomy 05/2018, on TCHP, last treatment 3 weeks ago), HLD, HTN, Hypothyroidism, coming to the hospital after she was told by her oncologist to come in due to low WBC count. Patient endorsing that she had a fever of 100.2-100.4 today, associated with chills. Patient says that since her last chemotherapy treatment 3 weeks ago, she barely has has had an appetite, has not been eating or drinking much. Patient with nausea but no vomiting. Patient also with diarrhea a couple times a day, has been taking imodium. Last episode of diarrhea was a couple of days ago. Patient also endorses some dysuria, as well as a  "rash" in the groin area. Patient denies sick contacts. Patient denies sore throat, cough, chest pain,  shortness of breath or melena. On ROS, patient notes clear rhinorrhea sometimes with pink/red hue since starting chemotherapy. 77 year old female with bilateral breast carcinoma (s/p right resection in 1992, now with invasive ductal carcinoma, ER and TN nrg, Her 3+ , s/p bilateral mastectomy 05/2018, on TCHP, last treatment 3 weeks ago), HLD, HTN, Hypothyroidism, coming to the hospital after she was told by her oncologist to come in due to low WBC count. Patient endorsing that she had a fever of 100.2-100.4 today, associated with chills. Patient says that since her last chemotherapy treatment 3 weeks ago, she barely has has had an appetite, has not been eating or drinking much. Patient with nausea but no vomiting. Patient also with diarrhea a couple times a day, has been taking imodium. Last episode of diarrhea was a couple of days ago. Patient also endorses some dysuria, as well as a  "rash" in the groin area. Patient denies sick contacts. Patient denies sore throat, cough, chest pain,  shortness of breath or melena. On ROS, patient notes clear rhinorrhea sometimes with pink/red hue since starting chemotherapy.

## 2018-07-09 NOTE — H&P ADULT - PROBLEM SELECTOR PLAN 4
S/p resection in 1992, now with invasive ductal carcinoma s/p bilateral mastectomy 05/2018  - currently on TCHP, last treatment 3 weeks ago  - Follows with Dr. Sun, Oncology team will see patient in the morning S/p resection in 1992, now with invasive ductal carcinoma s/p bilateral mastectomy 05/2018  - currently on TCHP (taxotere and carboplatin, herceptin), last treatment 3 weeks ago  - Follows with Dr. Sun, Oncology team will see patient in the morning

## 2018-07-09 NOTE — H&P ADULT - ATTENDING COMMENTS
NIGHT HOSPITALIST:  Patient UNKNOWN to me previously, assigned to me at this point via the ER to admit this 78 y/o F--followed at [facility] by Dr. Sun-- patient with a history of past RIGHT tylectomy in 1992 for breast CA but with apparent recurrence in May 2018 with invasive ductal with ER and CO negative, Her 3+ with B/L MRM in May 2018 on chemotherapy, HTN, hypothyroidism with fever.  No cough.  NO HA, no focal weakness.  No chest pain/pressure.  No dyspnea.  No abdominal pain, no red blood per rectum or melena.  No back pain, no tearing back pain.  Loose BM but with formed elements.  Mild dysuria.  Patient with anorexia and unspecified weight loss.  Remaining review of systems not contributory.  NO tobacco or ethanol history. NIGHT HOSPITALIST:  Patient UNKNOWN to me previously, assigned to me at this point via the ER to admit this 78 y/o F--followed at [facility] by Dr. Sun-- patient with a history of past RIGHT tylectomy in 1992 for breast CA but with apparent recurrence in May 2018 with invasive ductal with ER and OR negative, Her 3+ with B/L MRM in May 2018 on chemotherapy, HTN, hypothyroidism with fever.  No cough.  NO HA, no focal weakness.  No chest pain/pressure.  No dyspnea.  No abdominal pain, no red blood per rectum or melena.  No back pain, no tearing back pain.  Loose BM but with formed elements.  Mild dysuria.  Patient with anorexia and unspecified weight loss.  Remaining review of systems not contributory.  NO tobacco or ethanol history.  Physical exam with an elderly, chronically ill appearing F, diffuse sarcopenia.  TM 99.  HR  79  RR 14.  BP  100/61  98% on RA.  HEENT< PERRL< EOMI, neck supple, chest clear, cor s1 s2 RIGHT chest wall Mediport c/d/i.  Breast exam as above.  abdomen soft nontender, no rebound.  Ext no c/c/e.  Neurologic exam AxOx3.  Speech fluent.  Cognition intact.  Ue/LE 5/5.  WBC 1.2.  .  Hgb 9.6.  Platelets of 174K.  Cr 1.0>>2.4.  UA with 3 WBC.  Chest radiograph reviewed with no infiltrate or effusion-- Mediport noted.  EKG tracing reviewed with  NSR at 77 with RBBB.  Admitted to medicine.  Would continue with broad spectrum IV antibiotics as above.  Needs to be seen by her oncologist.  Patient with acute kidney injury presumably prerenal azotemia would repeat Cr with ARB and diuretic held for now.  May need to resume beta blocker in the AM as tolerated to avoid withdrawal.  Patient aware of course and agrees with plan/care as above.  Given patient's comorbidities, patient's long term prognosis is guarded.  Emotional support provided to patient.  Care reviewed with Dr. Alejandro.  Care assumed by the DAY HOSPITALIST.    Modesto Renae MD  [phone]

## 2018-07-09 NOTE — H&P ADULT - NSHPREVIEWOFSYSTEMS_GEN_ALL_CORE
Review of Systems:  Constitutional: + fever, + chills,  No weight loss, poor appetite/po intake  Eyes: No blurry vision, No diplopia  Neuro: No tremors, No muscle weakness   Cardiovascular: No chest pain, No palpitations  Respiratory: No SOB, No cough  GI: + nausea, No vomiting, + diarrhea  : + dysuria, No hematuria  Skin: No rash

## 2018-07-09 NOTE — H&P ADULT - NSHPSOCIALHISTORY_GEN_ALL_CORE
Patient lives alone with her dog. Patient's   20 years ago, she has 2 daughters and 5 grandchildren, who all live in [place]. 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 Patient denies smoking, alcohol or drug use.

## 2018-07-09 NOTE — H&P ADULT - PROBLEM SELECTOR PLAN 1
Patient with breast cancer, s/p b/l mastectomy, now on TCHP therapy  - Presenting with leukopenia WC 1.2, neutropenia  and reported fever to 100.5 at home  - CXR without consolidation  - UA only with few bacteria, but patient endorsing dysuria   - patient endorsing episodes of diarrhea, currently without active diarrhea, will order stool studies if patient with recurrent diarrhea   - will order RVP   - s/p Vancomycin 1 g and Cefepime 1 g in ED, will continue Cefepime 2 g q 24hrs for Neutropenic fever (renally dosed)  - Oncology consulted, recommending to treat for neutropenic fever, will come see patient tomorrow and monitor clinical status, will consider Neupogen if no improvement   - will f/u blood and urine cultures Patient with breast cancer, s/p b/l mastectomy, now on TCHP therapy  - Presenting with leukopenia WC 1.2, neutropenia  and reported fever to 100.2-100.4 at home  - CXR without consolidation  - UA only with few bacteria, but patient endorsing dysuria   - patient endorsing episodes of diarrhea, currently without active diarrhea, will order stool studies if patient with recurrent diarrhea   - will order RVP   - s/p Vancomycin 1 g and Cefepime 1 g in ED, will continue Cefepime 2 g q 24hrs for Neutropenic fever (renally dosed), will need to dose Vancomycin tomorrow based on renal function   - Oncology consulted, recommending to treat for neutropenic fever, will come see patient tomorrow and monitor clinical status, will consider Neupogen if no improvement   - will f/u blood and urine cultures

## 2018-07-09 NOTE — H&P ADULT - PROBLEM SELECTOR PLAN 8
-Heparin sub q -Heparin sub q      Bee Alejandro M.D.   PGY-2 | Internal Medicine   [phone] | [id number]

## 2018-07-09 NOTE — ED PROVIDER NOTE - PROGRESS NOTE DETAILS
"Requested Prescriptions   Pending Prescriptions Disp Refills     hydrochlorothiazide (HYDRODIURIL) 25 MG tablet [Pharmacy Med Name: HYDROCHLOROTHIAZIDE 25 MG TAB]  Last Written Prescription Date:  12/24/2017  Last Fill Quantity: ,  # refills: 3   Last Office Visit: 5/9/2017   Future Office Visit:      90 tablet 2     Sig: TAKE 1 TABLET (25 MG) BY MOUTH DAILY    Diuretics (Including Combos) Protocol Failed    3/20/2018  1:54 AM       Failed - Blood pressure under 140/90 in past 12 months    BP Readings from Last 3 Encounters:   02/01/18 162/89   01/25/18 178/80   12/15/17 (P) 173/87          Passed - Recent (12 mo) or future (30 days) visit within the authorizing provider's specialty    Patient had office visit in the last 12 months or has a visit in the next 30 days with authorizing provider or within the authorizing provider's specialty.  See \"Patient Info\" tab in inbasket, or \"Choose Columns\" in Meds & Orders section of the refill encounter.           Passed - Patient is age 18 or older       Passed - Normal serum creatinine on file in past 12 months    Recent Labs   Lab Test  03/14/18   0749   CR  0.73           Passed - Normal serum potassium on file in past 12 months    Recent Labs   Lab Test  03/14/18   0749   POTASSIUM  3.5           Passed - Normal serum sodium on file in past 12 months    Recent Labs   Lab Test  03/14/18   0749   NA  140                " Called placed to PCP. Spoke with answering service. Awaiting return of my call. Spoke with Onc fellow early. Asked to be called back with lab results. Spoke with him again. Instructed to call different Onc fellow. Will attempt to reach her personal Oncology attending physician. Spoke with PCP. Okay to admit to NS Hospitalist. Spoke with Onc fellow on call tonight.

## 2018-07-09 NOTE — ED PROVIDER NOTE - MEDICAL DECISION MAKING DETAILS
Sent to the ED due to neutropenia. Had T to 100.4F today. No obvious focus of infection with burning on urination and non-bloody diarrhea reportedly ongoing symptoms. Screening studies ordered. Onc consulted. Will follow her results, reassess and treat/dispo accordingly.

## 2018-07-09 NOTE — H&P ADULT - ASSESSMENT
77 year old female with breast carcinoma (s/p resection in 1992, now with invasive ductal carcinoma s/p bilateral mastectomy 05/2018, on TCHP, last treatment 3 weeks ago), HLD, HTN, Hypothyroidism admitted for neutropenic fever.

## 2018-07-09 NOTE — H&P ADULT - PROBLEM SELECTOR PLAN 5
Patient takes bisoprolol 10 mg, Losartan 100 mg and HCTZ 25 mg daily  - Patient with soft blood pressures, will hold antihypertensives at this time  - continue to monitor BP and consider adding on as necessary Patient takes bisoprolol 10 mg, Losartan 100 mg and HCTZ 25 mg daily  - Patient with soft blood pressures, will hold antihypertensives at this time  - continue to monitor BP and consider adding Bisoprolol in the morning, would hold ARB and diuretic in setting of renal failure

## 2018-07-09 NOTE — H&P ADULT - NSHPPHYSICALEXAM_GEN_ALL_CORE
Vital Signs Last 24 Hrs  T(C): 36.7 (09 Jul 2018 19:15), Max: 37.2 (09 Jul 2018 14:49)  T(F): 98.1 (09 Jul 2018 19:15), Max: 99 (09 Jul 2018 14:49)  HR: 79 (09 Jul 2018 19:15) (76 - 95)  BP: 100/61 (09 Jul 2018 19:15) (99/63 - 116/74)  BP(mean): --  RR: 18 (09 Jul 2018 19:15) (16 - 18)  SpO2: 98% (09 Jul 2018 19:15) (97% - 98%)      PHYSICAL EXAM  GENERAL: NAD, laying comfortably in bed   HEENT: EOMI b/l, PERRLA b/l, conjunctiva and sclera clear, no erythema, no pharyngeal exudate   NECK: Supple, No JVD, No LAD   CHEST/LUNG: Clear to auscultation bilaterally; No wheeze or ronchi  HEART: Regular rate and rhythm; S1 and S2 present, No murmurs, rubs, or gallops  ABDOMEN: Soft, Nontender, Nondistended; Bowel sounds present  EXTREMITIES:  2+ Peripheral Pulses, No clubbing, cyanosis, or edema  NEURO: AAOx3, non-focal, 5/5 strength UE and LE b/l   SKIN: Skin peeling on fingers noted, slight white scaly skin noted in groin area b/l, no erythema Vital Signs Last 24 Hrs  T(C): 36.7 (09 Jul 2018 19:15), Max: 37.2 (09 Jul 2018 14:49)  T(F): 98.1 (09 Jul 2018 19:15), Max: 99 (09 Jul 2018 14:49)  HR: 79 (09 Jul 2018 19:15) (76 - 95)  BP: 100/61 (09 Jul 2018 19:15) (99/63 - 116/74)  BP(mean): --  RR: 18 (09 Jul 2018 19:15) (16 - 18)  SpO2: 98% (09 Jul 2018 19:15) (97% - 98%)      PHYSICAL EXAM  GENERAL: NAD, laying comfortably in bed   HEENT: EOMI b/l, PERRLA b/l, conjunctiva and sclera clear, no erythema, no pharyngeal exudate   NECK: Supple, No JVD, No LAD   BREAST: with scars from b/l mastectomy, no massess appreciated   CHEST/LUNG: right mediport c/d/i, Clear to auscultation bilaterally; No wheeze or ronchi  HEART: Regular rate and rhythm; S1 and S2 present, No murmurs, rubs, or gallops  ABDOMEN: Soft, Nontender, Nondistended; Bowel sounds present  EXTREMITIES:  2+ Peripheral Pulses, No clubbing, cyanosis, or edema  NEURO: AAOx3, non-focal, 5/5 strength UE and LE b/l   SKIN: Skin peeling on fingers noted, slight white scaly skin noted in groin area b/l, no erythema

## 2018-07-09 NOTE — H&P ADULT - NSHPLABSRESULTS_GEN_ALL_CORE
LABS AND IMAGING PERSONALLY REVIEWED:                            9.6    1.2   )-----------( 174      ( 2018 16:50 )             28.5           138  |  98  |  38<H>  ----------------------------<  116<H>  4.6   |  25  |  2.49<H>    Ca    9.2      2018 16:50    TPro  6.8  /  Alb  4.2  /  TBili  0.5  /  DBili  x   /  AST  19  /  ALT  18  /  AlkPhos  72            Prelim CXR without consolidation     Urinalysis Basic - ( 2018 16:50 )    Color: Yellow / Appearance: Clear / S.010 / pH: x  Gluc: x / Ketone: Negative  / Bili: Negative / Urobili: Negative   Blood: x / Protein: Negative / Nitrite: Negative   Leuk Esterase: Negative / RBC: 0-2 /HPF / WBC 3-5 /HPF   Sq Epi: x / Non Sq Epi: OCC /HPF / Bacteria: Few /HPF

## 2018-07-09 NOTE — ED PROVIDER NOTE - OBJECTIVE STATEMENT
78 y/o F on chemotherapy with pmhx of obesity, , osteoporosis, breast cancer, hyperlipidemia, HTN and pshx of hip replacement, cataract surgery, and cancer w/left breast lumpectomy presents s/p abnormal lab findings of low WBC count.  Per daughter, pt had 100.4 fever today, associated chills, diarrhea, decreased appetite, and general fatigue. Also notes dysuria since being on the chemo. Denies any productive cough, sore throat, vomiting, no cp.  No other complaints.   PMD: Dr. Hernandez   Oncologist: Dr. Sun 78 y/o F on chemotherapy with pmhx of obesity, , osteoporosis, breast cancer, hyperlipidemia, HTN and pshx of hip replacement, cataract surgery, and breast cancer s/p bilateral mastectomy presents s/p abnormal lab findings of low WBC count.  Per daughter, pt had 100.4 fever today, associated chills, diarrhea, decreased appetite, and general fatigue. Also notes her diarrhea and dysuria since being on the chemo. Denies any productive cough, sore throat, vomiting, no cp.  No other complaints.   PMD: Dr. Hernandez   Oncologist: Dr. Sun

## 2018-07-10 ENCOUNTER — APPOINTMENT (OUTPATIENT)
Dept: HEMATOLOGY ONCOLOGY | Facility: CLINIC | Age: 78
End: 2018-07-10

## 2018-07-10 DIAGNOSIS — E86.0 DEHYDRATION: ICD-10-CM

## 2018-07-10 LAB
ALBUMIN SERPL ELPH-MCNC: 3.4 G/DL — SIGNIFICANT CHANGE UP (ref 3.3–5)
ALP SERPL-CCNC: 59 U/L — SIGNIFICANT CHANGE UP (ref 40–120)
ALT FLD-CCNC: 14 U/L — SIGNIFICANT CHANGE UP (ref 10–45)
ANION GAP SERPL CALC-SCNC: 14 MMOL/L — SIGNIFICANT CHANGE UP (ref 5–17)
AST SERPL-CCNC: 15 U/L — SIGNIFICANT CHANGE UP (ref 10–40)
BASOPHILS # BLD AUTO: 0.01 K/UL — SIGNIFICANT CHANGE UP (ref 0–0.2)
BASOPHILS NFR BLD AUTO: 0.9 % — SIGNIFICANT CHANGE UP (ref 0–2)
BILIRUB SERPL-MCNC: 0.4 MG/DL — SIGNIFICANT CHANGE UP (ref 0.2–1.2)
BUN SERPL-MCNC: 32 MG/DL — HIGH (ref 7–23)
CALCIUM SERPL-MCNC: 8.4 MG/DL — SIGNIFICANT CHANGE UP (ref 8.4–10.5)
CHLORIDE SERPL-SCNC: 102 MMOL/L — SIGNIFICANT CHANGE UP (ref 96–108)
CO2 SERPL-SCNC: 24 MMOL/L — SIGNIFICANT CHANGE UP (ref 22–31)
CREAT SERPL-MCNC: 2.18 MG/DL — HIGH (ref 0.5–1.3)
CULTURE RESULTS: SIGNIFICANT CHANGE UP
EOSINOPHIL # BLD AUTO: 0 K/UL — SIGNIFICANT CHANGE UP (ref 0–0.5)
EOSINOPHIL NFR BLD AUTO: 0 % — SIGNIFICANT CHANGE UP (ref 0–6)
GLUCOSE SERPL-MCNC: 98 MG/DL — SIGNIFICANT CHANGE UP (ref 70–99)
HCT VFR BLD CALC: 26.3 % — LOW (ref 34.5–45)
HGB BLD-MCNC: 8.5 G/DL — LOW (ref 11.5–15.5)
LYMPHOCYTES # BLD AUTO: 0.93 K/UL — LOW (ref 1–3.3)
LYMPHOCYTES # BLD AUTO: 69.1 % — HIGH (ref 13–44)
MAGNESIUM SERPL-MCNC: 1.6 MG/DL — SIGNIFICANT CHANGE UP (ref 1.6–2.6)
MCHC RBC-ENTMCNC: 27.2 PG — SIGNIFICANT CHANGE UP (ref 27–34)
MCHC RBC-ENTMCNC: 32.3 GM/DL — SIGNIFICANT CHANGE UP (ref 32–36)
MCV RBC AUTO: 84.3 FL — SIGNIFICANT CHANGE UP (ref 80–100)
MONOCYTES # BLD AUTO: 0.3 K/UL — SIGNIFICANT CHANGE UP (ref 0–0.9)
MONOCYTES NFR BLD AUTO: 22.7 % — HIGH (ref 2–14)
NEUTROPHILS # BLD AUTO: 0.1 K/UL — LOW (ref 1.8–7.4)
NEUTROPHILS NFR BLD AUTO: 7.3 % — LOW (ref 43–77)
PHOSPHATE SERPL-MCNC: 2.5 MG/DL — SIGNIFICANT CHANGE UP (ref 2.5–4.5)
PLATELET # BLD AUTO: 152 K/UL — SIGNIFICANT CHANGE UP (ref 150–400)
POTASSIUM SERPL-MCNC: 3.8 MMOL/L — SIGNIFICANT CHANGE UP (ref 3.5–5.3)
POTASSIUM SERPL-SCNC: 3.8 MMOL/L — SIGNIFICANT CHANGE UP (ref 3.5–5.3)
PROT SERPL-MCNC: 5.8 G/DL — LOW (ref 6–8.3)
RAPID RVP RESULT: SIGNIFICANT CHANGE UP
RBC # BLD: 3.12 M/UL — LOW (ref 3.8–5.2)
RBC # FLD: 14.4 % — SIGNIFICANT CHANGE UP (ref 10.3–14.5)
SODIUM SERPL-SCNC: 140 MMOL/L — SIGNIFICANT CHANGE UP (ref 135–145)
SPECIMEN SOURCE: SIGNIFICANT CHANGE UP
TSH SERPL-MCNC: 5.42 UIU/ML — HIGH (ref 0.27–4.2)
WBC # BLD: 1.34 K/UL — LOW (ref 3.8–10.5)
WBC # FLD AUTO: 1.34 K/UL — LOW (ref 3.8–10.5)

## 2018-07-10 PROCEDURE — 12345: CPT | Mod: NC

## 2018-07-10 PROCEDURE — 99223 1ST HOSP IP/OBS HIGH 75: CPT | Mod: GC

## 2018-07-10 PROCEDURE — 99222 1ST HOSP IP/OBS MODERATE 55: CPT | Mod: GC

## 2018-07-10 RX ORDER — VANCOMYCIN HCL 1 G
1000 VIAL (EA) INTRAVENOUS ONCE
Qty: 0 | Refills: 0 | Status: COMPLETED | OUTPATIENT
Start: 2018-07-10 | End: 2018-07-10

## 2018-07-10 RX ORDER — FILGRASTIM 480MCG/1.6
300 VIAL (ML) INJECTION DAILY
Qty: 0 | Refills: 0 | Status: COMPLETED | OUTPATIENT
Start: 2018-07-10 | End: 2018-07-12

## 2018-07-10 RX ADMIN — SODIUM CHLORIDE 100 MILLILITER(S): 9 INJECTION INTRAMUSCULAR; INTRAVENOUS; SUBCUTANEOUS at 18:32

## 2018-07-10 RX ADMIN — SIMVASTATIN 20 MILLIGRAM(S): 20 TABLET, FILM COATED ORAL at 21:39

## 2018-07-10 RX ADMIN — HEPARIN SODIUM 5000 UNIT(S): 5000 INJECTION INTRAVENOUS; SUBCUTANEOUS at 14:07

## 2018-07-10 RX ADMIN — SODIUM CHLORIDE 100 MILLILITER(S): 9 INJECTION INTRAMUSCULAR; INTRAVENOUS; SUBCUTANEOUS at 07:11

## 2018-07-10 RX ADMIN — CEFEPIME 100 MILLIGRAM(S): 1 INJECTION, POWDER, FOR SOLUTION INTRAMUSCULAR; INTRAVENOUS at 17:31

## 2018-07-10 RX ADMIN — Medication 250 MILLIGRAM(S): at 14:07

## 2018-07-10 RX ADMIN — Medication 300 MICROGRAM(S): at 16:46

## 2018-07-10 RX ADMIN — Medication 75 MICROGRAM(S): at 05:20

## 2018-07-10 RX ADMIN — HEPARIN SODIUM 5000 UNIT(S): 5000 INJECTION INTRAVENOUS; SUBCUTANEOUS at 21:39

## 2018-07-10 RX ADMIN — HEPARIN SODIUM 5000 UNIT(S): 5000 INJECTION INTRAVENOUS; SUBCUTANEOUS at 05:20

## 2018-07-10 NOTE — CONSULT NOTE ADULT - SUBJECTIVE AND OBJECTIVE BOX
HPI:  76 y/o F with history of R breast carcinoma 26 years ago resected no adjuvant therapy, lost to follow up and noticed L breast mass in 3/2018 and biopsy showed +IDC, ER/MO-, HER2+ disease. MRI on 18 showed multicentric disease on the left with 4 mm focus in R breast and axillary LN around 3 x 1.5 cm. On 18 patient underwent b/l mastectomy with L axillary dissection. Path showed L breast IDC which was moderately differentiated, 5.1 cm, and tariq  with  LN + for disease and extranodal extension of 1.5 mm, T3N1a stage IIIA. Also found was R sided DCIS. On 18 she was started on adjuvant TCHP. Her course has been c/b diarrhea treated with imodium.     She is admitted to the hospital after she was told by her oncologist to come in due to low WBC count. Patient endorsing that she had a fever of 100.2-100.4 today, associated with chills. Patient says that since her last chemotherapy treatment 3 weeks ago, she barely has has had an appetite, has not been eating or drinking much. Patient with nausea but no vomiting. Patient also with diarrhea a couple times a day, has been taking imodium. Last episode of diarrhea was a couple of days ago. Patient also endorses some dysuria, as well as a  "rash" in the groin area. Patient denies sick contacts. Patient denies sore throat, cough, chest pain,  shortness of breath or melena. On ROS, patient notes clear rhinorrhea sometimes with pink/red hue since starting chemotherapy.      PAST MEDICAL & SURGICAL HISTORY:  Obesity  Radiation  Cataract   (Normal Spontaneous Vaginal Delivery) x 2  Osteoporosis  Breast Cancer  Hyperlipidemia  HTN (Hypertension)  History of hip replacement:  - right  H/O melanoma excision: right forearm 2011  Cataract surgery- both eyes ( )  Breast Cancer- s/p left Breast Lumpectomy  with Radiation therapy( 18 years ago)      Allergies    No Known Allergies    Intolerances        MEDICATIONS  (STANDING):  cefepime   IVPB 2000 milliGRAM(s) IV Intermittent every 24 hours  filgrastim-sndz Injectable 300 MICROGram(s) SubCutaneous daily  heparin  Injectable 5000 Unit(s) SubCutaneous every 8 hours  levothyroxine 75 MICROGram(s) Oral daily  simvastatin 20 milliGRAM(s) Oral at bedtime  sodium chloride 0.9%. 1000 milliLiter(s) (100 mL/Hr) IV Continuous <Continuous>    MEDICATIONS  (PRN):      FAMILY HISTORY:  Family history of lung cancer (Father)      SOCIAL HISTORY: No EtOH, no tobacco    REVIEW OF SYSTEMS:    CONSTITUTIONAL: No weakness, fevers or chills  EYES/ENT: No visual changes;  No vertigo or throat pain   NECK: No pain or stiffness  RESPIRATORY: No cough, wheezing, hemoptysis; No shortness of breath  CARDIOVASCULAR: No chest pain or palpitations  GASTROINTESTINAL: No abdominal or epigastric pain. No nausea, vomiting, or hematemesis; No diarrhea or constipation. No melena or hematochezia.  GENITOURINARY: No dysuria, frequency or hematuria  NEUROLOGICAL: No numbness or weakness  SKIN: No itching, burning, rashes, or lesions   All other review of systems is negative unless indicated above.    Height (cm): 154.94 ( @ :15)  Weight (kg): 74.8 (:15)  BMI (kg/m2): 31.2 (:15)  BSA (m2): 1.74 (:15)    T(F): 98.1 (07-10-18 @ 16:42), Max: 98.2 (18 @ 18:47)  HR: 981 (07-10-18 @ 16:42)  BP: 118/65 (07-10-18 @ 16:42)  RR: 18 (07-10-18 @ 16:42)  SpO2: 99% (07-10-18 @ 16:42)  Wt(kg): --    GENERAL: NAD, well-developed  HEAD:  Atraumatic, Normocephalic  EYES: EOMI, PERRLA, conjunctiva and sclera clear  NECK: Supple, No JVD  CHEST/LUNG: Clear to auscultation bilaterally; No wheeze  HEART: Regular rate and rhythm; No murmurs, rubs, or gallops  ABDOMEN: Soft, Nontender, Nondistended; Bowel sounds present  EXTREMITIES:  2+ Peripheral Pulses, No clubbing, cyanosis, or edema  NEUROLOGY: non-focal  SKIN: No rashes or lesions                          8.5    1.34  )-----------( 152      ( 10 Jul 2018 08:11 )             26.3       07-10    140  |  102  |  32<H>  ----------------------------<  98  3.8   |  24  |  2.18<H>    Ca    8.4      10 Jul 2018 07:08  Phos  2.5     07-10  Mg     1.6     07-10    TPro  5.8<L>  /  Alb  3.4  /  TBili  0.4  /  DBili  x   /  AST  15  /  ALT  14  /  AlkPhos  59  07-10      Magnesium, Serum: 1.6 mg/dL (07-10 @ 07:08)  Phosphorus Level, Serum: 2.5 mg/dL (07-10 @ 07:08)    < from: Xray Chest 1 View- PORTABLE-Urgent (18 @ 16:43) >  IMPRESSION:   Clear lungs.      < end of copied text >

## 2018-07-10 NOTE — CONSULT NOTE ADULT - ASSESSMENT
78 y/o F with stage IIIA ER/RI- HER2+ L sided breast Ca s/p b/l mastectomy and now s/p 2 cycles of adjuvant TCHP and being admitted with neutropenic sepsis.     #Neutropenic Sepsis  Continue with cefepime for now while neutropenic.   Follow up cultures.   Patient symptomatically improved.   Dosed Neupogen 300 mcg x 3 days SQ.   Please check stool for C. diff if having watery diarrhea.     #Breast Ca  Will follow up with Dr. Sun on discharge.   May need therapy to be de-escalated, but will discuss on discharge.

## 2018-07-10 NOTE — CHART NOTE - NSCHARTNOTEFT_GEN_A_CORE
Pt seen and examined. 2 episodes of diarrhea overnight, now resolved. Afebrile overnight.     C/o poor appetite. Reports improvement in urine output.     A/P- 77 year old female with breast carcinoma (s/p resection in 1992, now with invasive ductal carcinoma s/p bilateral mastectomy 05/2018, on TCHP, last treatment 3 weeks ago) aw neutropenic fever.     1. Neutropenic fever- Currently on renally dosed vanco and cefepime. F/u blood and urine cx. Afebrile overnight.     2. CHARLOTTE- Likely prerenal. Monitor Cr and output. Continue fluids

## 2018-07-10 NOTE — CONSULT NOTE ADULT - ATTENDING COMMENTS
Agree with above.   Breast cancer s/p b/l mastectomy now receiving adjuvant TCHP, a/w neutropenic fever, diarrhea. Diarrhea now improved. Pt did receive Neulasta 10 days ago but given severe neutropenia will give 2-3 doses of neupogen.   Supportive care.   f/u with Dr. Sun, likely will need dose adjustment of meds.

## 2018-07-11 ENCOUNTER — APPOINTMENT (OUTPATIENT)
Dept: HEMATOLOGY ONCOLOGY | Facility: CLINIC | Age: 78
End: 2018-07-11

## 2018-07-11 DIAGNOSIS — R19.7 DIARRHEA, UNSPECIFIED: ICD-10-CM

## 2018-07-11 LAB
ANION GAP SERPL CALC-SCNC: 12 MMOL/L — SIGNIFICANT CHANGE UP (ref 5–17)
BASOPHILS # BLD AUTO: 0 K/UL — SIGNIFICANT CHANGE UP (ref 0–0.2)
BASOPHILS NFR BLD AUTO: 0 % — SIGNIFICANT CHANGE UP (ref 0–2)
BUN SERPL-MCNC: 21 MG/DL — SIGNIFICANT CHANGE UP (ref 7–23)
C DIFF GDH STL QL: NEGATIVE — SIGNIFICANT CHANGE UP
C DIFF GDH STL QL: SIGNIFICANT CHANGE UP
CALCIUM SERPL-MCNC: 7.9 MG/DL — LOW (ref 8.4–10.5)
CHLORIDE SERPL-SCNC: 107 MMOL/L — SIGNIFICANT CHANGE UP (ref 96–108)
CO2 SERPL-SCNC: 22 MMOL/L — SIGNIFICANT CHANGE UP (ref 22–31)
CREAT SERPL-MCNC: 1.62 MG/DL — HIGH (ref 0.5–1.3)
EOSINOPHIL # BLD AUTO: 0 K/UL — SIGNIFICANT CHANGE UP (ref 0–0.5)
EOSINOPHIL NFR BLD AUTO: 0 % — SIGNIFICANT CHANGE UP (ref 0–6)
GLUCOSE SERPL-MCNC: 91 MG/DL — SIGNIFICANT CHANGE UP (ref 70–99)
HCT VFR BLD CALC: 26 % — LOW (ref 34.5–45)
HGB BLD-MCNC: 8.8 G/DL — LOW (ref 11.5–15.5)
LYMPHOCYTES # BLD AUTO: 0.7 K/UL — LOW (ref 1–3.3)
LYMPHOCYTES # BLD AUTO: 26 % — SIGNIFICANT CHANGE UP (ref 13–44)
MCHC RBC-ENTMCNC: 28.9 PG — SIGNIFICANT CHANGE UP (ref 27–34)
MCHC RBC-ENTMCNC: 33.9 GM/DL — SIGNIFICANT CHANGE UP (ref 32–36)
MCV RBC AUTO: 85.1 FL — SIGNIFICANT CHANGE UP (ref 80–100)
MONOCYTES # BLD AUTO: 0.6 K/UL — SIGNIFICANT CHANGE UP (ref 0–0.9)
MONOCYTES NFR BLD AUTO: 21 % — HIGH (ref 2–14)
NEUTROPHILS # BLD AUTO: 1.5 K/UL — LOW (ref 1.8–7.4)
NEUTROPHILS NFR BLD AUTO: 46 % — SIGNIFICANT CHANGE UP (ref 43–77)
PLATELET # BLD AUTO: 138 K/UL — LOW (ref 150–400)
POTASSIUM SERPL-MCNC: 3.7 MMOL/L — SIGNIFICANT CHANGE UP (ref 3.5–5.3)
POTASSIUM SERPL-SCNC: 3.7 MMOL/L — SIGNIFICANT CHANGE UP (ref 3.5–5.3)
RBC # BLD: 3.05 M/UL — LOW (ref 3.8–5.2)
RBC # FLD: 12.7 % — SIGNIFICANT CHANGE UP (ref 10.3–14.5)
SODIUM SERPL-SCNC: 141 MMOL/L — SIGNIFICANT CHANGE UP (ref 135–145)
VANCOMYCIN FLD-MCNC: 12.6 UG/ML — SIGNIFICANT CHANGE UP
WBC # BLD: 2.8 K/UL — LOW (ref 3.8–10.5)
WBC # FLD AUTO: 2.8 K/UL — LOW (ref 3.8–10.5)

## 2018-07-11 PROCEDURE — 76775 US EXAM ABDO BACK WALL LIM: CPT | Mod: 26

## 2018-07-11 PROCEDURE — 99233 SBSQ HOSP IP/OBS HIGH 50: CPT

## 2018-07-11 RX ORDER — SODIUM CHLORIDE 9 MG/ML
1000 INJECTION INTRAMUSCULAR; INTRAVENOUS; SUBCUTANEOUS
Qty: 0 | Refills: 0 | Status: DISCONTINUED | OUTPATIENT
Start: 2018-07-11 | End: 2018-07-12

## 2018-07-11 RX ORDER — LOPERAMIDE HCL 2 MG
2 TABLET ORAL ONCE
Qty: 0 | Refills: 0 | Status: COMPLETED | OUTPATIENT
Start: 2018-07-11 | End: 2018-07-12

## 2018-07-11 RX ADMIN — SIMVASTATIN 20 MILLIGRAM(S): 20 TABLET, FILM COATED ORAL at 21:07

## 2018-07-11 RX ADMIN — SODIUM CHLORIDE 70 MILLILITER(S): 9 INJECTION INTRAMUSCULAR; INTRAVENOUS; SUBCUTANEOUS at 21:07

## 2018-07-11 RX ADMIN — CEFEPIME 100 MILLIGRAM(S): 1 INJECTION, POWDER, FOR SOLUTION INTRAMUSCULAR; INTRAVENOUS at 18:18

## 2018-07-11 RX ADMIN — HEPARIN SODIUM 5000 UNIT(S): 5000 INJECTION INTRAVENOUS; SUBCUTANEOUS at 13:43

## 2018-07-11 RX ADMIN — SODIUM CHLORIDE 70 MILLILITER(S): 9 INJECTION INTRAMUSCULAR; INTRAVENOUS; SUBCUTANEOUS at 18:18

## 2018-07-11 RX ADMIN — SODIUM CHLORIDE 100 MILLILITER(S): 9 INJECTION INTRAMUSCULAR; INTRAVENOUS; SUBCUTANEOUS at 10:12

## 2018-07-11 RX ADMIN — Medication 75 MICROGRAM(S): at 06:32

## 2018-07-11 RX ADMIN — HEPARIN SODIUM 5000 UNIT(S): 5000 INJECTION INTRAVENOUS; SUBCUTANEOUS at 21:07

## 2018-07-11 RX ADMIN — Medication 300 MICROGRAM(S): at 13:42

## 2018-07-11 RX ADMIN — HEPARIN SODIUM 5000 UNIT(S): 5000 INJECTION INTRAVENOUS; SUBCUTANEOUS at 06:32

## 2018-07-11 NOTE — PROGRESS NOTE ADULT - SUBJECTIVE AND OBJECTIVE BOX
Patient is a 77y old  Female who presents with a chief complaint of neutropenia (09 Jul 2018 20:29)    HPI: 2 more episodes of diarrhea overnight with some cramping. Pt requesting Imodium.    Vital Signs Last 24 Hrs  T(C): 36.8 (11 Jul 2018 07:33), Max: 36.8 (11 Jul 2018 07:33)  T(F): 98.2 (11 Jul 2018 07:33), Max: 98.2 (11 Jul 2018 07:33)  HR: 76 (11 Jul 2018 07:33) (73 - 81)  BP: 123/79 (11 Jul 2018 07:33) (117/68 - 123/79)  BP(mean): --  RR: 18 (11 Jul 2018 07:33) (18 - 18)  SpO2: 96% (11 Jul 2018 07:33) (96% - 99%)                          8.8    2.8   )-----------( 138      ( 11 Jul 2018 07:31 )             26.0     07-11    141  |  107  |  21  ----------------------------<  91  3.7   |  22  |  1.62<H>    Ca    7.9<L>      11 Jul 2018 07:30  Phos  2.5     07-10  Mg     1.6     07-10    TPro  5.8<L>  /  Alb  3.4  /  TBili  0.4  /  DBili  x   /  AST  15  /  ALT  14  /  AlkPhos  59  07-10    MEDICATIONS  (STANDING):  cefepime   IVPB 2000 milliGRAM(s) IV Intermittent every 24 hours  filgrastim-sndz Injectable 300 MICROGram(s) SubCutaneous daily  heparin  Injectable 5000 Unit(s) SubCutaneous every 8 hours  levothyroxine 75 MICROGram(s) Oral daily  loperamide 2 milliGRAM(s) Oral once  simvastatin 20 milliGRAM(s) Oral at bedtime  sodium chloride 0.9%. 1000 milliLiter(s) (100 mL/Hr) IV Continuous <Continuous>    MEDICATIONS  (PRN):

## 2018-07-11 NOTE — PROGRESS NOTE ADULT - PROBLEM SELECTOR PLAN 4
S/p resection in 1992, now with invasive ductal carcinoma s/p bilateral mastectomy 05/2018  - currently on TCHP (taxotere and carboplatin, herceptin), last treatment 3 weeks ago  - Follows with Dr. Sun, Oncology

## 2018-07-11 NOTE — PROGRESS NOTE ADULT - ASSESSMENT
77 year old female with breast carcinoma (s/p resection in 1992, now with invasive ductal carcinoma s/p bilateral mastectomy 05/2018, on TCHP, last treatment 3 weeks ago) aw neutropenic fever.

## 2018-07-11 NOTE — PROGRESS NOTE ADULT - PROBLEM SELECTOR PLAN 5
Patient takes bisoprolol 10 mg, Losartan 100 mg and HCTZ 25 mg daily- held on admission.     Will resume low dose Lopressor.

## 2018-07-12 ENCOUNTER — TRANSCRIPTION ENCOUNTER (OUTPATIENT)
Age: 78
End: 2018-07-12

## 2018-07-12 VITALS
OXYGEN SATURATION: 97 % | RESPIRATION RATE: 18 BRPM | SYSTOLIC BLOOD PRESSURE: 145 MMHG | HEART RATE: 91 BPM | TEMPERATURE: 98 F | DIASTOLIC BLOOD PRESSURE: 81 MMHG

## 2018-07-12 LAB
ANION GAP SERPL CALC-SCNC: 14 MMOL/L — SIGNIFICANT CHANGE UP (ref 5–17)
BASOPHILS # BLD AUTO: 0 K/UL — SIGNIFICANT CHANGE UP (ref 0–0.2)
BUN SERPL-MCNC: 12 MG/DL — SIGNIFICANT CHANGE UP (ref 7–23)
CALCIUM SERPL-MCNC: 8.2 MG/DL — LOW (ref 8.4–10.5)
CHLORIDE SERPL-SCNC: 106 MMOL/L — SIGNIFICANT CHANGE UP (ref 96–108)
CO2 SERPL-SCNC: 21 MMOL/L — LOW (ref 22–31)
CREAT SERPL-MCNC: 1.51 MG/DL — HIGH (ref 0.5–1.3)
CULTURE RESULTS: SIGNIFICANT CHANGE UP
EOSINOPHIL # BLD AUTO: 0 K/UL — SIGNIFICANT CHANGE UP (ref 0–0.5)
GLUCOSE SERPL-MCNC: 98 MG/DL — SIGNIFICANT CHANGE UP (ref 70–99)
HCT VFR BLD CALC: 29 % — LOW (ref 34.5–45)
HGB BLD-MCNC: 9.7 G/DL — LOW (ref 11.5–15.5)
LYMPHOCYTES # BLD AUTO: 1.4 K/UL — SIGNIFICANT CHANGE UP (ref 1–3.3)
LYMPHOCYTES # BLD AUTO: 4 % — LOW (ref 13–44)
MCHC RBC-ENTMCNC: 28.6 PG — SIGNIFICANT CHANGE UP (ref 27–34)
MCHC RBC-ENTMCNC: 33.5 GM/DL — SIGNIFICANT CHANGE UP (ref 32–36)
MCV RBC AUTO: 85.4 FL — SIGNIFICANT CHANGE UP (ref 80–100)
MONOCYTES # BLD AUTO: 1.1 K/UL — HIGH (ref 0–0.9)
MONOCYTES NFR BLD AUTO: 6 % — SIGNIFICANT CHANGE UP (ref 2–14)
NEUTROPHILS # BLD AUTO: 14.5 K/UL — HIGH (ref 1.8–7.4)
NEUTROPHILS NFR BLD AUTO: 85 % — HIGH (ref 43–77)
PLATELET # BLD AUTO: 161 K/UL — SIGNIFICANT CHANGE UP (ref 150–400)
POTASSIUM SERPL-MCNC: 3.6 MMOL/L — SIGNIFICANT CHANGE UP (ref 3.5–5.3)
POTASSIUM SERPL-SCNC: 3.6 MMOL/L — SIGNIFICANT CHANGE UP (ref 3.5–5.3)
RBC # BLD: 3.39 M/UL — LOW (ref 3.8–5.2)
RBC # FLD: 12.9 % — SIGNIFICANT CHANGE UP (ref 10.3–14.5)
SODIUM SERPL-SCNC: 141 MMOL/L — SIGNIFICANT CHANGE UP (ref 135–145)
SPECIMEN SOURCE: SIGNIFICANT CHANGE UP
WBC # BLD: 17 K/UL — HIGH (ref 3.8–10.5)
WBC # FLD AUTO: 17 K/UL — HIGH (ref 3.8–10.5)

## 2018-07-12 PROCEDURE — 80053 COMPREHEN METABOLIC PANEL: CPT

## 2018-07-12 PROCEDURE — 85027 COMPLETE CBC AUTOMATED: CPT

## 2018-07-12 PROCEDURE — 87086 URINE CULTURE/COLONY COUNT: CPT

## 2018-07-12 PROCEDURE — 87324 CLOSTRIDIUM AG IA: CPT

## 2018-07-12 PROCEDURE — 87449 NOS EACH ORGANISM AG IA: CPT

## 2018-07-12 PROCEDURE — 87798 DETECT AGENT NOS DNA AMP: CPT

## 2018-07-12 PROCEDURE — 87581 M.PNEUMON DNA AMP PROBE: CPT

## 2018-07-12 PROCEDURE — 84100 ASSAY OF PHOSPHORUS: CPT

## 2018-07-12 PROCEDURE — 87633 RESP VIRUS 12-25 TARGETS: CPT

## 2018-07-12 PROCEDURE — 87507 IADNA-DNA/RNA PROBE TQ 12-25: CPT

## 2018-07-12 PROCEDURE — 71045 X-RAY EXAM CHEST 1 VIEW: CPT

## 2018-07-12 PROCEDURE — 87040 BLOOD CULTURE FOR BACTERIA: CPT

## 2018-07-12 PROCEDURE — 99239 HOSP IP/OBS DSCHRG MGMT >30: CPT

## 2018-07-12 PROCEDURE — 84443 ASSAY THYROID STIM HORMONE: CPT

## 2018-07-12 PROCEDURE — 83735 ASSAY OF MAGNESIUM: CPT

## 2018-07-12 PROCEDURE — 80048 BASIC METABOLIC PNL TOTAL CA: CPT

## 2018-07-12 PROCEDURE — 93005 ELECTROCARDIOGRAM TRACING: CPT

## 2018-07-12 PROCEDURE — 81001 URINALYSIS AUTO W/SCOPE: CPT

## 2018-07-12 PROCEDURE — 87486 CHLMYD PNEUM DNA AMP PROBE: CPT

## 2018-07-12 PROCEDURE — 99285 EMERGENCY DEPT VISIT HI MDM: CPT

## 2018-07-12 PROCEDURE — 76775 US EXAM ABDO BACK WALL LIM: CPT

## 2018-07-12 RX ORDER — TRIAMTERENE/HYDROCHLOROTHIAZID 75 MG-50MG
1 TABLET ORAL
Qty: 0 | Refills: 0 | COMMUNITY

## 2018-07-12 RX ADMIN — Medication 2 MILLIGRAM(S): at 06:34

## 2018-07-12 RX ADMIN — Medication 75 MICROGRAM(S): at 05:50

## 2018-07-12 RX ADMIN — HEPARIN SODIUM 5000 UNIT(S): 5000 INJECTION INTRAVENOUS; SUBCUTANEOUS at 05:50

## 2018-07-12 RX ADMIN — Medication 300 MICROGRAM(S): at 11:28

## 2018-07-12 NOTE — CHART NOTE - NSCHARTNOTEFT_GEN_A_CORE
Pt seen and examined. Remains afebrile. Diarrhea resolved.     D/c home today.    1. Neutropenic fever    2. Breast cancer.    D/c time 35 minutes.

## 2018-07-12 NOTE — DISCHARGE NOTE ADULT - MEDICATION SUMMARY - MEDICATIONS TO STOP TAKING
I will STOP taking the medications listed below when I get home from the hospital:    triamterene-hydrochlorothiazide 37.5 mg-25 mg oral tablet  -- 1 tab(s) by mouth once a day

## 2018-07-12 NOTE — DISCHARGE NOTE ADULT - HOSPITAL COURSE
Pt with breast ca on chemo aw neutropenic fever. Cx negative, fever resolved.     Neutropenia resolved on Neupogen. Pt with breast ca on chemo aw fever, diarrhea.     Found to be neutropenic. Started on iv vanco and cefepime to treat presumed MRSA and gram negative bacterial infection.     Neupogen started for neutropenia.     Diarrhea and fever resolved. C diff and blood cx negative.     Neutropenia resolved on Neupogen. Discharged on above meds with follow up.     Diagnoses: Neutropenic fever; Pancytopenia due to chemotherapy; CHARLOTTE; Hypotension; Breast cancer; Diarrhea; Hypothyroidism Pt with breast ca on chemo aw fever, diarrhea.     Found to be neutropenic. Sepsis present on admission.     Started on iv vanco and cefepime to treat presumed MRSA and gram negative bacterial infection. Neupogen started for neutropenia.     Diarrhea and fever resolved. C diff and blood cx negative.     Neutropenia resolved on Neupogen. Discharged on above meds with follow up.     Diagnoses: Neutropenic fever; Pancytopenia due to chemotherapy; CHARLOTTE; Sepsis; Hypotension; Breast cancer; Diarrhea; Hypothyroidism

## 2018-07-12 NOTE — DISCHARGE NOTE ADULT - CARE PLAN
Principal Discharge DX:	Neutropenic fever  Goal:	Resolved  Assessment and plan of treatment:	Your were admitted with fever and low white blood cell counts. Your counts have no normalized, fever has resolved.  Secondary Diagnosis:	Breast Cancer  Assessment and plan of treatment:	Follow up with Dr. MEDEL to discuss ongoing chemotherapy  Secondary Diagnosis:	HTN (Hypertension)  Assessment and plan of treatment:	Please discontinue Triamterene-HCTZ.  Secondary Diagnosis:	CHARLOTTE (acute kidney injury)  Assessment and plan of treatment:	Improving. Please drink at least 2 L of water at home.

## 2018-07-12 NOTE — DISCHARGE NOTE ADULT - MEDICATION SUMMARY - MEDICATIONS TO TAKE
I will START or STAY ON the medications listed below when I get home from the hospital:    losartan 100 mg oral tablet  -- 1 tab(s) by mouth once a day  -- Indication: For HTN (Hypertension)    simvastatin 20 mg oral tablet  -- 1 tab(s) by mouth once a day (at bedtime)  -- Indication: For Hyperlipidemia    bisoprolol 10 mg oral tablet  -- 1 tab(s) by mouth once a day  -- Indication: For HTN (Hypertension)    levothyroxine 75 mcg (0.075 mg) oral tablet  -- 1 tab(s) by mouth once a day  -- Indication: For Hypothyroidism

## 2018-07-12 NOTE — DISCHARGE NOTE ADULT - CARE PROVIDER_API CALL
Bryon Sun), Hematology; Internal Medicine; Medical Oncology  [address] [place] [address]  Phone: [phone]  Fax: [phone]

## 2018-07-12 NOTE — DISCHARGE NOTE ADULT - PATIENT PORTAL LINK FT
You can access the [facility] Patient Portal, offered by [facility], by registering with the following website: [email or web address]

## 2018-07-12 NOTE — DISCHARGE NOTE ADULT - PLAN OF CARE
Resolved Your were admitted with fever and low white blood cell counts. Your counts have no normalized, fever has resolved. Follow up with Dr. MEDEL to discuss ongoing chemotherapy Please discontinue Triamterene-HCTZ. Improving. Please drink at least 2 L of water at home.

## 2018-07-13 ENCOUNTER — APPOINTMENT (OUTPATIENT)
Dept: INFUSION THERAPY | Facility: HOSPITAL | Age: 78
End: 2018-07-13

## 2018-07-13 ENCOUNTER — OTHER (OUTPATIENT)
Age: 78
End: 2018-07-13

## 2018-07-14 LAB
CULTURE RESULTS: SIGNIFICANT CHANGE UP
SPECIMEN SOURCE: SIGNIFICANT CHANGE UP

## 2018-07-18 ENCOUNTER — OTHER (OUTPATIENT)
Age: 78
End: 2018-07-18

## 2018-07-19 ENCOUNTER — OUTPATIENT (OUTPATIENT)
Dept: OUTPATIENT SERVICES | Facility: HOSPITAL | Age: 78
LOS: 1 days | Discharge: HOME | End: 2018-07-19

## 2018-07-19 DIAGNOSIS — C50.919 MALIGNANT NEOPLASM OF UNSPECIFIED SITE OF UNSPECIFIED FEMALE BREAST: ICD-10-CM

## 2018-07-19 DIAGNOSIS — Z98.890 OTHER SPECIFIED POSTPROCEDURAL STATES: Chronic | ICD-10-CM

## 2018-07-19 DIAGNOSIS — Z96.649 PRESENCE OF UNSPECIFIED ARTIFICIAL HIP JOINT: Chronic | ICD-10-CM

## 2018-07-22 ENCOUNTER — FORM ENCOUNTER (OUTPATIENT)
Age: 78
End: 2018-07-22

## 2018-07-23 ENCOUNTER — RESULT REVIEW (OUTPATIENT)
Age: 78
End: 2018-07-23

## 2018-07-23 ENCOUNTER — APPOINTMENT (OUTPATIENT)
Dept: HEMATOLOGY ONCOLOGY | Facility: CLINIC | Age: 78
End: 2018-07-23
Payer: MEDICARE

## 2018-07-23 ENCOUNTER — APPOINTMENT (OUTPATIENT)
Dept: ULTRASOUND IMAGING | Facility: IMAGING CENTER | Age: 78
End: 2018-07-23
Payer: MEDICARE

## 2018-07-23 ENCOUNTER — OUTPATIENT (OUTPATIENT)
Dept: OUTPATIENT SERVICES | Facility: HOSPITAL | Age: 78
LOS: 1 days | End: 2018-07-23
Payer: MEDICARE

## 2018-07-23 VITALS
HEART RATE: 67 BPM | SYSTOLIC BLOOD PRESSURE: 152 MMHG | TEMPERATURE: 98.1 F | BODY MASS INDEX: 29.26 KG/M2 | RESPIRATION RATE: 16 BRPM | OXYGEN SATURATION: 96 % | DIASTOLIC BLOOD PRESSURE: 78 MMHG | WEIGHT: 159.99 LBS

## 2018-07-23 DIAGNOSIS — Z96.649 PRESENCE OF UNSPECIFIED ARTIFICIAL HIP JOINT: Chronic | ICD-10-CM

## 2018-07-23 DIAGNOSIS — Z98.890 OTHER SPECIFIED POSTPROCEDURAL STATES: Chronic | ICD-10-CM

## 2018-07-23 DIAGNOSIS — C50.919 MALIGNANT NEOPLASM OF UNSPECIFIED SITE OF UNSPECIFIED FEMALE BREAST: ICD-10-CM

## 2018-07-23 DIAGNOSIS — I82.409 ACUTE EMBOLISM AND THROMBOSIS OF UNSPECIFIED DEEP VEINS OF UNSPECIFIED LOWER EXTREMITY: ICD-10-CM

## 2018-07-23 LAB
BASOPHILS # BLD AUTO: 0 K/UL — SIGNIFICANT CHANGE UP (ref 0–0.2)
BASOPHILS NFR BLD AUTO: 0.4 % — SIGNIFICANT CHANGE UP (ref 0–2)
EOSINOPHIL # BLD AUTO: 0.1 K/UL — SIGNIFICANT CHANGE UP (ref 0–0.5)
EOSINOPHIL NFR BLD AUTO: 1.8 % — SIGNIFICANT CHANGE UP (ref 0–6)
HCT VFR BLD CALC: 31.4 % — LOW (ref 34.5–45)
HGB BLD-MCNC: 10.4 G/DL — LOW (ref 11.5–15.5)
LYMPHOCYTES # BLD AUTO: 1.6 K/UL — SIGNIFICANT CHANGE UP (ref 1–3.3)
LYMPHOCYTES # BLD AUTO: 22.6 % — SIGNIFICANT CHANGE UP (ref 13–44)
MCHC RBC-ENTMCNC: 28.7 PG — SIGNIFICANT CHANGE UP (ref 27–34)
MCHC RBC-ENTMCNC: 33.2 G/DL — SIGNIFICANT CHANGE UP (ref 32–36)
MCV RBC AUTO: 86.3 FL — SIGNIFICANT CHANGE UP (ref 80–100)
MONOCYTES # BLD AUTO: 0.5 K/UL — SIGNIFICANT CHANGE UP (ref 0–0.9)
MONOCYTES NFR BLD AUTO: 7.8 % — SIGNIFICANT CHANGE UP (ref 2–14)
NEUTROPHILS # BLD AUTO: 4.7 K/UL — SIGNIFICANT CHANGE UP (ref 1.8–7.4)
NEUTROPHILS NFR BLD AUTO: 67.4 % — SIGNIFICANT CHANGE UP (ref 43–77)
PLATELET # BLD AUTO: 421 K/UL — HIGH (ref 150–400)
RBC # BLD: 3.64 M/UL — LOW (ref 3.8–5.2)
RBC # FLD: 14.7 % — HIGH (ref 10.3–14.5)
WBC # BLD: 7 K/UL — SIGNIFICANT CHANGE UP (ref 3.8–10.5)
WBC # FLD AUTO: 7 K/UL — SIGNIFICANT CHANGE UP (ref 3.8–10.5)

## 2018-07-23 PROCEDURE — 93971 EXTREMITY STUDY: CPT | Mod: 26,RT

## 2018-07-23 PROCEDURE — 93971 EXTREMITY STUDY: CPT

## 2018-07-23 PROCEDURE — 99214 OFFICE O/P EST MOD 30 MIN: CPT

## 2018-07-25 LAB
ALBUMIN SERPL ELPH-MCNC: 4.4 G/DL
ALP BLD-CCNC: 78 U/L
ALT SERPL-CCNC: 14 U/L
ANION GAP SERPL CALC-SCNC: 14 MMOL/L
AST SERPL-CCNC: 15 U/L
BILIRUB SERPL-MCNC: 0.4 MG/DL
BUN SERPL-MCNC: 22 MG/DL
CALCIUM SERPL-MCNC: 9.4 MG/DL
CHLORIDE SERPL-SCNC: 103 MMOL/L
CO2 SERPL-SCNC: 26 MMOL/L
CREAT SERPL-MCNC: 1.07 MG/DL
GLUCOSE SERPL-MCNC: 103 MG/DL
POTASSIUM SERPL-SCNC: 4.8 MMOL/L
PROT SERPL-MCNC: 6.6 G/DL
SODIUM SERPL-SCNC: 143 MMOL/L

## 2018-07-26 ENCOUNTER — INBOUND DOCUMENT (OUTPATIENT)
Age: 78
End: 2018-07-26

## 2018-07-27 ENCOUNTER — RESULT REVIEW (OUTPATIENT)
Age: 78
End: 2018-07-27

## 2018-07-27 ENCOUNTER — LABORATORY RESULT (OUTPATIENT)
Age: 78
End: 2018-07-27

## 2018-07-27 ENCOUNTER — APPOINTMENT (OUTPATIENT)
Dept: INFUSION THERAPY | Facility: HOSPITAL | Age: 78
End: 2018-07-27

## 2018-07-27 LAB
BASOPHILS # BLD AUTO: 0 K/UL — SIGNIFICANT CHANGE UP (ref 0–0.2)
BASOPHILS NFR BLD AUTO: 0.4 % — SIGNIFICANT CHANGE UP (ref 0–2)
EOSINOPHIL # BLD AUTO: 0.4 K/UL — SIGNIFICANT CHANGE UP (ref 0–0.5)
EOSINOPHIL NFR BLD AUTO: 5 % — SIGNIFICANT CHANGE UP (ref 0–6)
HCT VFR BLD CALC: 29.5 % — LOW (ref 34.5–45)
HGB BLD-MCNC: 10.1 G/DL — LOW (ref 11.5–15.5)
LYMPHOCYTES # BLD AUTO: 1.5 K/UL — SIGNIFICANT CHANGE UP (ref 1–3.3)
LYMPHOCYTES # BLD AUTO: 19.8 % — SIGNIFICANT CHANGE UP (ref 13–44)
MCHC RBC-ENTMCNC: 29.2 PG — SIGNIFICANT CHANGE UP (ref 27–34)
MCHC RBC-ENTMCNC: 34.2 G/DL — SIGNIFICANT CHANGE UP (ref 32–36)
MCV RBC AUTO: 85.5 FL — SIGNIFICANT CHANGE UP (ref 80–100)
MONOCYTES # BLD AUTO: 0.6 K/UL — SIGNIFICANT CHANGE UP (ref 0–0.9)
MONOCYTES NFR BLD AUTO: 7.4 % — SIGNIFICANT CHANGE UP (ref 2–14)
NEUTROPHILS # BLD AUTO: 5.1 K/UL — SIGNIFICANT CHANGE UP (ref 1.8–7.4)
NEUTROPHILS NFR BLD AUTO: 67.3 % — SIGNIFICANT CHANGE UP (ref 43–77)
PLATELET # BLD AUTO: 425 K/UL — HIGH (ref 150–400)
RBC # BLD: 3.45 M/UL — LOW (ref 3.8–5.2)
RBC # FLD: 14.7 % — HIGH (ref 10.3–14.5)
WBC # BLD: 7.6 K/UL — SIGNIFICANT CHANGE UP (ref 3.8–10.5)
WBC # FLD AUTO: 7.6 K/UL — SIGNIFICANT CHANGE UP (ref 3.8–10.5)

## 2018-07-30 DIAGNOSIS — Z51.11 ENCOUNTER FOR ANTINEOPLASTIC CHEMOTHERAPY: ICD-10-CM

## 2018-07-30 DIAGNOSIS — Z51.89 ENCOUNTER FOR OTHER SPECIFIED AFTERCARE: ICD-10-CM

## 2018-07-30 DIAGNOSIS — R11.2 NAUSEA WITH VOMITING, UNSPECIFIED: ICD-10-CM

## 2018-08-09 ENCOUNTER — APPOINTMENT (OUTPATIENT)
Dept: CV DIAGNOSITCS | Facility: HOSPITAL | Age: 78
End: 2018-08-09

## 2018-08-10 ENCOUNTER — RESULT REVIEW (OUTPATIENT)
Age: 78
End: 2018-08-10

## 2018-08-10 ENCOUNTER — APPOINTMENT (OUTPATIENT)
Dept: HEMATOLOGY ONCOLOGY | Facility: CLINIC | Age: 78
End: 2018-08-10

## 2018-08-10 LAB
BASOPHILS # BLD AUTO: 0.1 K/UL — SIGNIFICANT CHANGE UP (ref 0–0.2)
BASOPHILS NFR BLD AUTO: 0.3 % — SIGNIFICANT CHANGE UP (ref 0–2)
EOSINOPHIL # BLD AUTO: 0.1 K/UL — SIGNIFICANT CHANGE UP (ref 0–0.5)
EOSINOPHIL NFR BLD AUTO: 0.4 % — SIGNIFICANT CHANGE UP (ref 0–6)
HCT VFR BLD CALC: 31.9 % — LOW (ref 34.5–45)
HGB BLD-MCNC: 10.6 G/DL — LOW (ref 11.5–15.5)
LYMPHOCYTES # BLD AUTO: 1.5 K/UL — SIGNIFICANT CHANGE UP (ref 1–3.3)
LYMPHOCYTES # BLD AUTO: 8.6 % — LOW (ref 13–44)
MCHC RBC-ENTMCNC: 28.5 PG — SIGNIFICANT CHANGE UP (ref 27–34)
MCHC RBC-ENTMCNC: 33.2 G/DL — SIGNIFICANT CHANGE UP (ref 32–36)
MCV RBC AUTO: 85.6 FL — SIGNIFICANT CHANGE UP (ref 80–100)
MONOCYTES # BLD AUTO: 0.6 K/UL — SIGNIFICANT CHANGE UP (ref 0–0.9)
MONOCYTES NFR BLD AUTO: 3.3 % — SIGNIFICANT CHANGE UP (ref 2–14)
NEUTROPHILS # BLD AUTO: 15.2 K/UL — HIGH (ref 1.8–7.4)
NEUTROPHILS NFR BLD AUTO: 87.4 % — HIGH (ref 43–77)
PLATELET # BLD AUTO: 176 K/UL — SIGNIFICANT CHANGE UP (ref 150–400)
RBC # BLD: 3.72 M/UL — LOW (ref 3.8–5.2)
RBC # FLD: 15.2 % — HIGH (ref 10.3–14.5)
WBC # BLD: 17.4 K/UL — HIGH (ref 3.8–10.5)
WBC # FLD AUTO: 17.4 K/UL — HIGH (ref 3.8–10.5)

## 2018-08-17 ENCOUNTER — APPOINTMENT (OUTPATIENT)
Dept: INFUSION THERAPY | Facility: HOSPITAL | Age: 78
End: 2018-08-17

## 2018-08-17 ENCOUNTER — LABORATORY RESULT (OUTPATIENT)
Age: 78
End: 2018-08-17

## 2018-08-17 ENCOUNTER — RESULT REVIEW (OUTPATIENT)
Age: 78
End: 2018-08-17

## 2018-08-17 LAB
BASOPHILS # BLD AUTO: 0 K/UL — SIGNIFICANT CHANGE UP (ref 0–0.2)
BASOPHILS NFR BLD AUTO: 0.1 % — SIGNIFICANT CHANGE UP (ref 0–2)
BUN SERPL-MCNC: 19 MG/DL — SIGNIFICANT CHANGE UP (ref 7–23)
CA-I BLDA-SCNC: 1.13 MMOL/L — SIGNIFICANT CHANGE UP (ref 1.12–1.3)
CHLORIDE SERPL-SCNC: 102 MMOL/L — SIGNIFICANT CHANGE UP (ref 96–108)
CO2 SERPL-SCNC: 24 MMOL/L — SIGNIFICANT CHANGE UP (ref 22–31)
CREAT SERPL-MCNC: 1.4 MG/DL — HIGH (ref 0.5–1.3)
EOSINOPHIL # BLD AUTO: 0.1 K/UL — SIGNIFICANT CHANGE UP (ref 0–0.5)
EOSINOPHIL NFR BLD AUTO: 0.8 % — SIGNIFICANT CHANGE UP (ref 0–6)
GLUCOSE SERPL-MCNC: 155 MG/DL — HIGH (ref 70–99)
HCT VFR BLD CALC: 29.6 % — LOW (ref 34.5–45)
HGB BLD-MCNC: 10 G/DL — LOW (ref 11.5–15.5)
LYMPHOCYTES # BLD AUTO: 0.8 K/UL — LOW (ref 1–3.3)
LYMPHOCYTES # BLD AUTO: 6.4 % — LOW (ref 13–44)
MCHC RBC-ENTMCNC: 28.8 PG — SIGNIFICANT CHANGE UP (ref 27–34)
MCHC RBC-ENTMCNC: 33.8 G/DL — SIGNIFICANT CHANGE UP (ref 32–36)
MCV RBC AUTO: 85.2 FL — SIGNIFICANT CHANGE UP (ref 80–100)
MONOCYTES # BLD AUTO: 0.1 K/UL — SIGNIFICANT CHANGE UP (ref 0–0.9)
MONOCYTES NFR BLD AUTO: 0.9 % — LOW (ref 2–14)
NEUTROPHILS # BLD AUTO: 11.3 K/UL — HIGH (ref 1.8–7.4)
NEUTROPHILS NFR BLD AUTO: 91.8 % — HIGH (ref 43–77)
PLATELET # BLD AUTO: 357 K/UL — SIGNIFICANT CHANGE UP (ref 150–400)
POTASSIUM SERPL-MCNC: 4.3 MMOL/L — SIGNIFICANT CHANGE UP (ref 3.5–5.3)
POTASSIUM SERPL-SCNC: 4.3 MMOL/L — SIGNIFICANT CHANGE UP (ref 3.5–5.3)
RBC # BLD: 3.47 M/UL — LOW (ref 3.8–5.2)
RBC # FLD: 15 % — HIGH (ref 10.3–14.5)
SODIUM SERPL-SCNC: 139 MMOL/L — SIGNIFICANT CHANGE UP (ref 135–145)
WBC # BLD: 12.3 K/UL — HIGH (ref 3.8–10.5)
WBC # FLD AUTO: 12.3 K/UL — HIGH (ref 3.8–10.5)

## 2018-08-18 ENCOUNTER — APPOINTMENT (OUTPATIENT)
Dept: INFUSION THERAPY | Facility: HOSPITAL | Age: 78
End: 2018-08-18

## 2018-08-20 ENCOUNTER — OUTPATIENT (OUTPATIENT)
Dept: OUTPATIENT SERVICES | Facility: HOSPITAL | Age: 78
LOS: 1 days | Discharge: HOME | End: 2018-08-20

## 2018-08-20 DIAGNOSIS — C50.919 MALIGNANT NEOPLASM OF UNSPECIFIED SITE OF UNSPECIFIED FEMALE BREAST: ICD-10-CM

## 2018-08-20 DIAGNOSIS — Z98.890 OTHER SPECIFIED POSTPROCEDURAL STATES: Chronic | ICD-10-CM

## 2018-08-20 DIAGNOSIS — E86.0 DEHYDRATION: ICD-10-CM

## 2018-08-20 DIAGNOSIS — Z96.649 PRESENCE OF UNSPECIFIED ARTIFICIAL HIP JOINT: Chronic | ICD-10-CM

## 2018-08-23 ENCOUNTER — RESULT REVIEW (OUTPATIENT)
Age: 78
End: 2018-08-23

## 2018-08-23 ENCOUNTER — APPOINTMENT (OUTPATIENT)
Dept: HEMATOLOGY ONCOLOGY | Facility: CLINIC | Age: 78
End: 2018-08-23
Payer: MEDICARE

## 2018-08-23 VITALS
DIASTOLIC BLOOD PRESSURE: 73 MMHG | OXYGEN SATURATION: 97 % | RESPIRATION RATE: 16 BRPM | WEIGHT: 156.75 LBS | TEMPERATURE: 98 F | HEART RATE: 82 BPM | BODY MASS INDEX: 28.66 KG/M2 | SYSTOLIC BLOOD PRESSURE: 116 MMHG

## 2018-08-23 LAB
BASOPHILS # BLD AUTO: 0 K/UL — SIGNIFICANT CHANGE UP (ref 0–0.2)
EOSINOPHIL # BLD AUTO: 0.1 K/UL — SIGNIFICANT CHANGE UP (ref 0–0.5)
EOSINOPHIL NFR BLD AUTO: 3 % — SIGNIFICANT CHANGE UP (ref 0–6)
HCT VFR BLD CALC: 27.5 % — LOW (ref 34.5–45)
HGB BLD-MCNC: 9.3 G/DL — LOW (ref 11.5–15.5)
LYMPHOCYTES # BLD AUTO: 1.6 K/UL — SIGNIFICANT CHANGE UP (ref 1–3.3)
LYMPHOCYTES # BLD AUTO: 20 % — SIGNIFICANT CHANGE UP (ref 13–44)
MCHC RBC-ENTMCNC: 29.7 PG — SIGNIFICANT CHANGE UP (ref 27–34)
MCHC RBC-ENTMCNC: 33.9 G/DL — SIGNIFICANT CHANGE UP (ref 32–36)
MCV RBC AUTO: 87.5 FL — SIGNIFICANT CHANGE UP (ref 80–100)
METAMYELOCYTES # FLD: 3 % — HIGH (ref 0–0)
MONOCYTES # BLD AUTO: 0.7 K/UL — SIGNIFICANT CHANGE UP (ref 0–0.9)
MONOCYTES NFR BLD AUTO: 7 % — SIGNIFICANT CHANGE UP (ref 2–14)
NEUTROPHILS # BLD AUTO: 7.3 K/UL — SIGNIFICANT CHANGE UP (ref 1.8–7.4)
NEUTROPHILS NFR BLD AUTO: 58 % — SIGNIFICANT CHANGE UP (ref 43–77)
NEUTS BAND # BLD: 9 % — HIGH (ref 0–8)
PLAT MORPH BLD: NORMAL — SIGNIFICANT CHANGE UP
PLATELET # BLD AUTO: 263 K/UL — SIGNIFICANT CHANGE UP (ref 150–400)
RBC # BLD: 3.15 M/UL — LOW (ref 3.8–5.2)
RBC # FLD: 15.2 % — HIGH (ref 10.3–14.5)
RBC BLD AUTO: SIGNIFICANT CHANGE UP
WBC # BLD: 9.8 K/UL — SIGNIFICANT CHANGE UP (ref 3.8–10.5)
WBC # FLD AUTO: 9.8 K/UL — SIGNIFICANT CHANGE UP (ref 3.8–10.5)

## 2018-08-23 PROCEDURE — 99214 OFFICE O/P EST MOD 30 MIN: CPT

## 2018-09-07 ENCOUNTER — LABORATORY RESULT (OUTPATIENT)
Age: 78
End: 2018-09-07

## 2018-09-07 ENCOUNTER — RESULT REVIEW (OUTPATIENT)
Age: 78
End: 2018-09-07

## 2018-09-07 ENCOUNTER — APPOINTMENT (OUTPATIENT)
Dept: INFUSION THERAPY | Facility: HOSPITAL | Age: 78
End: 2018-09-07

## 2018-09-07 LAB
BASOPHILS # BLD AUTO: 0.1 K/UL — SIGNIFICANT CHANGE UP (ref 0–0.2)
BASOPHILS NFR BLD AUTO: 0.6 % — SIGNIFICANT CHANGE UP (ref 0–2)
EOSINOPHIL # BLD AUTO: 0 K/UL — SIGNIFICANT CHANGE UP (ref 0–0.5)
EOSINOPHIL NFR BLD AUTO: 0 % — SIGNIFICANT CHANGE UP (ref 0–6)
HCT VFR BLD CALC: 27.7 % — LOW (ref 34.5–45)
HGB BLD-MCNC: 9.4 G/DL — LOW (ref 11.5–15.5)
LYMPHOCYTES # BLD AUTO: 1.5 K/UL — SIGNIFICANT CHANGE UP (ref 1–3.3)
LYMPHOCYTES # BLD AUTO: 16 % — SIGNIFICANT CHANGE UP (ref 13–44)
MCHC RBC-ENTMCNC: 29.6 PG — SIGNIFICANT CHANGE UP (ref 27–34)
MCHC RBC-ENTMCNC: 34 G/DL — SIGNIFICANT CHANGE UP (ref 32–36)
MCV RBC AUTO: 87 FL — SIGNIFICANT CHANGE UP (ref 80–100)
MONOCYTES # BLD AUTO: 0.7 K/UL — SIGNIFICANT CHANGE UP (ref 0–0.9)
MONOCYTES NFR BLD AUTO: 7.8 % — SIGNIFICANT CHANGE UP (ref 2–14)
NEUTROPHILS # BLD AUTO: 7.1 K/UL — SIGNIFICANT CHANGE UP (ref 1.8–7.4)
NEUTROPHILS NFR BLD AUTO: 75.6 % — SIGNIFICANT CHANGE UP (ref 43–77)
PLATELET # BLD AUTO: 340 K/UL — SIGNIFICANT CHANGE UP (ref 150–400)
RBC # BLD: 3.18 M/UL — LOW (ref 3.8–5.2)
RBC # FLD: 15.1 % — HIGH (ref 10.3–14.5)
WBC # BLD: 9.4 K/UL — SIGNIFICANT CHANGE UP (ref 3.8–10.5)
WBC # FLD AUTO: 9.4 K/UL — SIGNIFICANT CHANGE UP (ref 3.8–10.5)

## 2018-09-10 DIAGNOSIS — R11.2 NAUSEA WITH VOMITING, UNSPECIFIED: ICD-10-CM

## 2018-09-10 DIAGNOSIS — Z51.89 ENCOUNTER FOR OTHER SPECIFIED AFTERCARE: ICD-10-CM

## 2018-09-10 DIAGNOSIS — Z51.11 ENCOUNTER FOR ANTINEOPLASTIC CHEMOTHERAPY: ICD-10-CM

## 2018-09-17 ENCOUNTER — APPOINTMENT (OUTPATIENT)
Dept: HEMATOLOGY ONCOLOGY | Facility: CLINIC | Age: 78
End: 2018-09-17
Payer: MEDICARE

## 2018-09-17 VITALS
RESPIRATION RATE: 17 BRPM | BODY MASS INDEX: 27.79 KG/M2 | SYSTOLIC BLOOD PRESSURE: 133 MMHG | DIASTOLIC BLOOD PRESSURE: 78 MMHG | HEART RATE: 85 BPM | TEMPERATURE: 98.3 F | OXYGEN SATURATION: 97 % | WEIGHT: 152 LBS

## 2018-09-17 PROCEDURE — 99214 OFFICE O/P EST MOD 30 MIN: CPT

## 2018-09-20 ENCOUNTER — OUTPATIENT (OUTPATIENT)
Dept: OUTPATIENT SERVICES | Facility: HOSPITAL | Age: 78
LOS: 1 days | Discharge: HOME | End: 2018-09-20

## 2018-09-20 DIAGNOSIS — C50.919 MALIGNANT NEOPLASM OF UNSPECIFIED SITE OF UNSPECIFIED FEMALE BREAST: ICD-10-CM

## 2018-09-20 DIAGNOSIS — Z98.890 OTHER SPECIFIED POSTPROCEDURAL STATES: Chronic | ICD-10-CM

## 2018-09-20 DIAGNOSIS — Z96.649 PRESENCE OF UNSPECIFIED ARTIFICIAL HIP JOINT: Chronic | ICD-10-CM

## 2018-09-28 ENCOUNTER — APPOINTMENT (OUTPATIENT)
Dept: INFUSION THERAPY | Facility: HOSPITAL | Age: 78
End: 2018-09-28

## 2018-09-28 ENCOUNTER — LABORATORY RESULT (OUTPATIENT)
Age: 78
End: 2018-09-28

## 2018-10-01 DIAGNOSIS — Z51.11 ENCOUNTER FOR ANTINEOPLASTIC CHEMOTHERAPY: ICD-10-CM

## 2018-10-16 ENCOUNTER — APPOINTMENT (OUTPATIENT)
Dept: PHYSICAL MEDICINE AND REHAB | Facility: CLINIC | Age: 78
End: 2018-10-16
Payer: MEDICARE

## 2018-10-16 VITALS
SYSTOLIC BLOOD PRESSURE: 166 MMHG | OXYGEN SATURATION: 100 % | HEART RATE: 71 BPM | HEIGHT: 60 IN | DIASTOLIC BLOOD PRESSURE: 79 MMHG

## 2018-10-16 DIAGNOSIS — I89.0 LYMPHEDEMA, NOT ELSEWHERE CLASSIFIED: ICD-10-CM

## 2018-10-16 PROCEDURE — 99203 OFFICE O/P NEW LOW 30 MIN: CPT

## 2018-10-17 PROBLEM — I89.0 LYMPHEDEMA OF ARM: Status: ACTIVE | Noted: 2018-10-16

## 2018-10-17 RX ORDER — LIDOCAINE AND PRILOCAINE 25; 25 MG/G; MG/G
2.5-2.5 CREAM TOPICAL
Qty: 1 | Refills: 3 | Status: DISCONTINUED | COMMUNITY
Start: 2018-06-08 | End: 2018-10-17

## 2018-10-17 RX ORDER — PROCHLORPERAZINE MALEATE 10 MG/1
10 TABLET ORAL EVERY 8 HOURS
Qty: 90 | Refills: 1 | Status: DISCONTINUED | COMMUNITY
Start: 2018-06-08 | End: 2018-10-17

## 2018-10-17 RX ORDER — CEPHALEXIN 500 MG/1
500 CAPSULE ORAL
Qty: 14 | Refills: 1 | Status: DISCONTINUED | COMMUNITY
Start: 2018-06-04 | End: 2018-10-17

## 2018-10-19 ENCOUNTER — RESULT REVIEW (OUTPATIENT)
Age: 78
End: 2018-10-19

## 2018-10-19 ENCOUNTER — APPOINTMENT (OUTPATIENT)
Dept: INFUSION THERAPY | Facility: HOSPITAL | Age: 78
End: 2018-10-19
Payer: MEDICARE

## 2018-10-19 ENCOUNTER — LABORATORY RESULT (OUTPATIENT)
Age: 78
End: 2018-10-19

## 2018-10-19 LAB
BASOPHILS # BLD AUTO: 0 K/UL — SIGNIFICANT CHANGE UP (ref 0–0.2)
BASOPHILS NFR BLD AUTO: 0.4 % — SIGNIFICANT CHANGE UP (ref 0–2)
EOSINOPHIL # BLD AUTO: 0.1 K/UL — SIGNIFICANT CHANGE UP (ref 0–0.5)
EOSINOPHIL NFR BLD AUTO: 2 % — SIGNIFICANT CHANGE UP (ref 0–6)
HCT VFR BLD CALC: 30.2 % — LOW (ref 34.5–45)
HGB BLD-MCNC: 10.1 G/DL — LOW (ref 11.5–15.5)
LYMPHOCYTES # BLD AUTO: 1.6 K/UL — SIGNIFICANT CHANGE UP (ref 1–3.3)
LYMPHOCYTES # BLD AUTO: 23.3 % — SIGNIFICANT CHANGE UP (ref 13–44)
MCHC RBC-ENTMCNC: 29.7 PG — SIGNIFICANT CHANGE UP (ref 27–34)
MCHC RBC-ENTMCNC: 33.3 G/DL — SIGNIFICANT CHANGE UP (ref 32–36)
MCV RBC AUTO: 89.4 FL — SIGNIFICANT CHANGE UP (ref 80–100)
MONOCYTES # BLD AUTO: 0.3 K/UL — SIGNIFICANT CHANGE UP (ref 0–0.9)
MONOCYTES NFR BLD AUTO: 4.8 % — SIGNIFICANT CHANGE UP (ref 2–14)
NEUTROPHILS # BLD AUTO: 4.9 K/UL — SIGNIFICANT CHANGE UP (ref 1.8–7.4)
NEUTROPHILS NFR BLD AUTO: 69.6 % — SIGNIFICANT CHANGE UP (ref 43–77)
PLATELET # BLD AUTO: 255 K/UL — SIGNIFICANT CHANGE UP (ref 150–400)
RBC # BLD: 3.38 M/UL — LOW (ref 3.8–5.2)
RBC # FLD: 12.5 % — SIGNIFICANT CHANGE UP (ref 10.3–14.5)
WBC # BLD: 7 K/UL — SIGNIFICANT CHANGE UP (ref 3.8–10.5)
WBC # FLD AUTO: 7 K/UL — SIGNIFICANT CHANGE UP (ref 3.8–10.5)

## 2018-10-19 PROCEDURE — 99212 OFFICE O/P EST SF 10 MIN: CPT

## 2018-10-24 ENCOUNTER — OUTPATIENT (OUTPATIENT)
Dept: OUTPATIENT SERVICES | Facility: HOSPITAL | Age: 78
LOS: 1 days | Discharge: HOME | End: 2018-10-24

## 2018-10-24 DIAGNOSIS — C50.919 MALIGNANT NEOPLASM OF UNSPECIFIED SITE OF UNSPECIFIED FEMALE BREAST: ICD-10-CM

## 2018-10-24 DIAGNOSIS — Z96.649 PRESENCE OF UNSPECIFIED ARTIFICIAL HIP JOINT: Chronic | ICD-10-CM

## 2018-10-24 DIAGNOSIS — Z98.890 OTHER SPECIFIED POSTPROCEDURAL STATES: Chronic | ICD-10-CM

## 2018-10-31 ENCOUNTER — LABORATORY RESULT (OUTPATIENT)
Age: 78
End: 2018-10-31

## 2018-10-31 ENCOUNTER — RESULT REVIEW (OUTPATIENT)
Age: 78
End: 2018-10-31

## 2018-10-31 ENCOUNTER — APPOINTMENT (OUTPATIENT)
Dept: HEMATOLOGY ONCOLOGY | Facility: CLINIC | Age: 78
End: 2018-10-31
Payer: MEDICARE

## 2018-10-31 VITALS
RESPIRATION RATE: 16 BRPM | SYSTOLIC BLOOD PRESSURE: 147 MMHG | HEART RATE: 110 BPM | TEMPERATURE: 97.6 F | WEIGHT: 156.53 LBS | DIASTOLIC BLOOD PRESSURE: 75 MMHG | OXYGEN SATURATION: 100 % | BODY MASS INDEX: 30.57 KG/M2

## 2018-10-31 LAB
BASOPHILS # BLD AUTO: 0 K/UL — SIGNIFICANT CHANGE UP (ref 0–0.2)
BASOPHILS NFR BLD AUTO: 0.4 % — SIGNIFICANT CHANGE UP (ref 0–2)
EOSINOPHIL # BLD AUTO: 0.2 K/UL — SIGNIFICANT CHANGE UP (ref 0–0.5)
EOSINOPHIL NFR BLD AUTO: 2.6 % — SIGNIFICANT CHANGE UP (ref 0–6)
HCT VFR BLD CALC: 33.5 % — LOW (ref 34.5–45)
HGB BLD-MCNC: 11.2 G/DL — LOW (ref 11.5–15.5)
LYMPHOCYTES # BLD AUTO: 1.8 K/UL — SIGNIFICANT CHANGE UP (ref 1–3.3)
LYMPHOCYTES # BLD AUTO: 25.2 % — SIGNIFICANT CHANGE UP (ref 13–44)
MCHC RBC-ENTMCNC: 29.3 PG — SIGNIFICANT CHANGE UP (ref 27–34)
MCHC RBC-ENTMCNC: 33.4 G/DL — SIGNIFICANT CHANGE UP (ref 32–36)
MCV RBC AUTO: 87.6 FL — SIGNIFICANT CHANGE UP (ref 80–100)
MONOCYTES # BLD AUTO: 0.5 K/UL — SIGNIFICANT CHANGE UP (ref 0–0.9)
MONOCYTES NFR BLD AUTO: 6.6 % — SIGNIFICANT CHANGE UP (ref 2–14)
NEUTROPHILS # BLD AUTO: 4.8 K/UL — SIGNIFICANT CHANGE UP (ref 1.8–7.4)
NEUTROPHILS NFR BLD AUTO: 65.3 % — SIGNIFICANT CHANGE UP (ref 43–77)
PLATELET # BLD AUTO: 276 K/UL — SIGNIFICANT CHANGE UP (ref 150–400)
RBC # BLD: 3.82 M/UL — SIGNIFICANT CHANGE UP (ref 3.8–5.2)
RBC # FLD: 12.1 % — SIGNIFICANT CHANGE UP (ref 10.3–14.5)
WBC # BLD: 7.3 K/UL — SIGNIFICANT CHANGE UP (ref 3.8–10.5)
WBC # FLD AUTO: 7.3 K/UL — SIGNIFICANT CHANGE UP (ref 3.8–10.5)

## 2018-10-31 PROCEDURE — 99214 OFFICE O/P EST MOD 30 MIN: CPT

## 2018-11-09 ENCOUNTER — APPOINTMENT (OUTPATIENT)
Dept: INFUSION THERAPY | Facility: HOSPITAL | Age: 78
End: 2018-11-09

## 2018-11-12 DIAGNOSIS — Z51.11 ENCOUNTER FOR ANTINEOPLASTIC CHEMOTHERAPY: ICD-10-CM

## 2018-11-20 ENCOUNTER — OUTPATIENT (OUTPATIENT)
Dept: OUTPATIENT SERVICES | Facility: HOSPITAL | Age: 78
LOS: 1 days | Discharge: HOME | End: 2018-11-20

## 2018-11-20 DIAGNOSIS — Z98.890 OTHER SPECIFIED POSTPROCEDURAL STATES: Chronic | ICD-10-CM

## 2018-11-20 DIAGNOSIS — C50.919 MALIGNANT NEOPLASM OF UNSPECIFIED SITE OF UNSPECIFIED FEMALE BREAST: ICD-10-CM

## 2018-11-20 DIAGNOSIS — Z96.649 PRESENCE OF UNSPECIFIED ARTIFICIAL HIP JOINT: Chronic | ICD-10-CM

## 2018-11-28 ENCOUNTER — APPOINTMENT (OUTPATIENT)
Dept: HEMATOLOGY ONCOLOGY | Facility: CLINIC | Age: 78
End: 2018-11-28

## 2018-11-28 ENCOUNTER — APPOINTMENT (OUTPATIENT)
Dept: SURGERY | Facility: CLINIC | Age: 78
End: 2018-11-28
Payer: MEDICARE

## 2018-11-28 PROCEDURE — 99213K: CUSTOM

## 2018-11-30 ENCOUNTER — APPOINTMENT (OUTPATIENT)
Dept: INFUSION THERAPY | Facility: HOSPITAL | Age: 78
End: 2018-11-30

## 2018-12-03 DIAGNOSIS — Z51.11 ENCOUNTER FOR ANTINEOPLASTIC CHEMOTHERAPY: ICD-10-CM

## 2018-12-10 ENCOUNTER — APPOINTMENT (OUTPATIENT)
Dept: HEMATOLOGY ONCOLOGY | Facility: CLINIC | Age: 78
End: 2018-12-10

## 2018-12-12 ENCOUNTER — APPOINTMENT (OUTPATIENT)
Dept: HEMATOLOGY ONCOLOGY | Facility: CLINIC | Age: 78
End: 2018-12-12
Payer: MEDICARE

## 2018-12-12 VITALS
HEART RATE: 67 BPM | SYSTOLIC BLOOD PRESSURE: 143 MMHG | BODY MASS INDEX: 30.48 KG/M2 | TEMPERATURE: 98.1 F | OXYGEN SATURATION: 99 % | WEIGHT: 156.09 LBS | DIASTOLIC BLOOD PRESSURE: 77 MMHG | RESPIRATION RATE: 17 BRPM

## 2018-12-12 PROCEDURE — 99214 OFFICE O/P EST MOD 30 MIN: CPT

## 2018-12-12 NOTE — PHYSICAL EXAM
[Restricted in physically strenuous activity but ambulatory and able to carry out work of a light or sedentary nature] : Status 1- Restricted in physically strenuous activity but ambulatory and able to carry out work of a light or sedentary nature, e.g., light house work, office work [Normal] : affect appropriate [de-identified] : bilateral mastectomies

## 2018-12-12 NOTE — HISTORY OF PRESENT ILLNESS
[Disease: _____________________] : Disease: [unfilled] [T: ___] : T[unfilled] [N: ___] : N[unfilled] [M: ___] : M[unfilled] [AJCC Stage: ____] : AJCC Stage: [unfilled] [de-identified] : This is a 77-year-old woman with history of breast carcinoma. The patient states her history dates back 26 years ago when she was found to have a right breast carcinoma which was resected and did not require further therapy. She states she has been undergoing followup mammograms however her last one was 2 years ago. The patient states in 2018 she noted a mass in the left breast and subsequently had a mammogram and ultrasound revealing a left breast mass. This was biopsied on 2018 and the biopsy was positive for invasive ductal carcinoma. The ER and [place] were negative and HER-2/emelina 3+ positive. The patient underwent MRI of the breast on  which revealed  a spiculated mass in the left breast which was the biopsy-proven malignancy along with extensive multicentric disease. The right breast revealed a 4 mm focus. The left axilla revealed a 3 x 1.5 cm lymph node.\par \par On May 1 the patient underwent bilateral mastectomy and left axillary dissection The left breast revealed invasive moderately differentiated ductal carcinoma, apocrine type. The tumor measured 5.1 cm and the [place] score was 7/9. There was one out of 22 lymph nodes positive for metastases along with extranodal extension measuring 1.5 mm. Lymph-vascular invasion and Derm lympho-vascular invasion were not identified. The tumor was stage T3 N1a.Stage IIIA. 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 The patient chose not to have reconstruction.\par \par The patient has done well postoperatively but does not slight discomfort in the anterior chest in the area of the incisions. Her family history is negative for breast cancer. Father  of lung cancer. 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 [de-identified] : IDC, mod diff, ER and [place] nrg, Her 3+ [de-identified] : Pt continues to do better and has inc appetite. Pt has left arm lymphedema

## 2018-12-14 LAB
ALBUMIN SERPL ELPH-MCNC: 4.3 G/DL
ALBUMIN SERPL ELPH-MCNC: 4.4 G/DL
ALP BLD-CCNC: 102 U/L
ALP BLD-CCNC: 95 U/L
ALT SERPL-CCNC: 10 U/L
ALT SERPL-CCNC: 9 U/L
ANION GAP SERPL CALC-SCNC: 16 MMOL/L
ANION GAP SERPL CALC-SCNC: 18 MMOL/L
AST SERPL-CCNC: 15 U/L
AST SERPL-CCNC: 15 U/L
BILIRUB SERPL-MCNC: 0.3 MG/DL
BILIRUB SERPL-MCNC: 0.5 MG/DL
BUN SERPL-MCNC: 22 MG/DL
BUN SERPL-MCNC: 27 MG/DL
CALCIUM SERPL-MCNC: 9.1 MG/DL
CALCIUM SERPL-MCNC: 9.2 MG/DL
CHLORIDE SERPL-SCNC: 106 MMOL/L
CHLORIDE SERPL-SCNC: 99 MMOL/L
CO2 SERPL-SCNC: 21 MMOL/L
CO2 SERPL-SCNC: 26 MMOL/L
CREAT SERPL-MCNC: 1.37 MG/DL
CREAT SERPL-MCNC: 2.7 MG/DL
GLUCOSE SERPL-MCNC: 116 MG/DL
GLUCOSE SERPL-MCNC: 122 MG/DL
MAGNESIUM SERPL-MCNC: 1.7 MG/DL
POTASSIUM SERPL-SCNC: 4.2 MMOL/L
POTASSIUM SERPL-SCNC: 4.8 MMOL/L
PROT SERPL-MCNC: 6.4 G/DL
PROT SERPL-MCNC: 6.6 G/DL
SODIUM SERPL-SCNC: 141 MMOL/L
SODIUM SERPL-SCNC: 145 MMOL/L

## 2018-12-15 ENCOUNTER — OUTPATIENT (OUTPATIENT)
Dept: OUTPATIENT SERVICES | Facility: HOSPITAL | Age: 78
LOS: 1 days | Discharge: HOME | End: 2018-12-15

## 2018-12-15 DIAGNOSIS — Z96.649 PRESENCE OF UNSPECIFIED ARTIFICIAL HIP JOINT: Chronic | ICD-10-CM

## 2018-12-15 DIAGNOSIS — C50.911 MALIGNANT NEOPLASM OF UNSPECIFIED SITE OF RIGHT FEMALE BREAST: ICD-10-CM

## 2018-12-15 DIAGNOSIS — C50.919 MALIGNANT NEOPLASM OF UNSPECIFIED SITE OF UNSPECIFIED FEMALE BREAST: ICD-10-CM

## 2018-12-15 DIAGNOSIS — Z98.890 OTHER SPECIFIED POSTPROCEDURAL STATES: Chronic | ICD-10-CM

## 2018-12-21 ENCOUNTER — APPOINTMENT (OUTPATIENT)
Dept: INFUSION THERAPY | Facility: HOSPITAL | Age: 78
End: 2018-12-21

## 2018-12-24 DIAGNOSIS — Z51.11 ENCOUNTER FOR ANTINEOPLASTIC CHEMOTHERAPY: ICD-10-CM

## 2019-01-11 ENCOUNTER — APPOINTMENT (OUTPATIENT)
Dept: INFUSION THERAPY | Facility: HOSPITAL | Age: 79
End: 2019-01-11

## 2019-01-24 ENCOUNTER — OUTPATIENT (OUTPATIENT)
Dept: OUTPATIENT SERVICES | Facility: HOSPITAL | Age: 79
LOS: 1 days | Discharge: HOME | End: 2019-01-24

## 2019-01-24 DIAGNOSIS — C50.911 MALIGNANT NEOPLASM OF UNSPECIFIED SITE OF RIGHT FEMALE BREAST: ICD-10-CM

## 2019-01-24 DIAGNOSIS — Z98.890 OTHER SPECIFIED POSTPROCEDURAL STATES: Chronic | ICD-10-CM

## 2019-01-24 DIAGNOSIS — Z96.649 PRESENCE OF UNSPECIFIED ARTIFICIAL HIP JOINT: Chronic | ICD-10-CM

## 2019-02-01 ENCOUNTER — APPOINTMENT (OUTPATIENT)
Dept: INFUSION THERAPY | Facility: HOSPITAL | Age: 79
End: 2019-02-01

## 2019-02-04 DIAGNOSIS — Z51.11 ENCOUNTER FOR ANTINEOPLASTIC CHEMOTHERAPY: ICD-10-CM

## 2019-02-06 ENCOUNTER — RESULT REVIEW (OUTPATIENT)
Age: 79
End: 2019-02-06

## 2019-02-06 ENCOUNTER — APPOINTMENT (OUTPATIENT)
Dept: HEMATOLOGY ONCOLOGY | Facility: CLINIC | Age: 79
End: 2019-02-06
Payer: MEDICARE

## 2019-02-06 VITALS
TEMPERATURE: 97.8 F | WEIGHT: 157.98 LBS | SYSTOLIC BLOOD PRESSURE: 131 MMHG | OXYGEN SATURATION: 98 % | DIASTOLIC BLOOD PRESSURE: 84 MMHG | HEART RATE: 79 BPM | RESPIRATION RATE: 16 BRPM | BODY MASS INDEX: 30.86 KG/M2

## 2019-02-06 LAB
BASOPHILS # BLD AUTO: 0 K/UL — SIGNIFICANT CHANGE UP (ref 0–0.2)
BASOPHILS NFR BLD AUTO: 0.3 % — SIGNIFICANT CHANGE UP (ref 0–2)
EOSINOPHIL # BLD AUTO: 0.2 K/UL — SIGNIFICANT CHANGE UP (ref 0–0.5)
EOSINOPHIL NFR BLD AUTO: 2.2 % — SIGNIFICANT CHANGE UP (ref 0–6)
HCT VFR BLD CALC: 38.2 % — SIGNIFICANT CHANGE UP (ref 34.5–45)
HGB BLD-MCNC: 12.7 G/DL — SIGNIFICANT CHANGE UP (ref 11.5–15.5)
LYMPHOCYTES # BLD AUTO: 2.2 K/UL — SIGNIFICANT CHANGE UP (ref 1–3.3)
LYMPHOCYTES # BLD AUTO: 26.7 % — SIGNIFICANT CHANGE UP (ref 13–44)
MCHC RBC-ENTMCNC: 28.2 PG — SIGNIFICANT CHANGE UP (ref 27–34)
MCHC RBC-ENTMCNC: 33.3 G/DL — SIGNIFICANT CHANGE UP (ref 32–36)
MCV RBC AUTO: 84.5 FL — SIGNIFICANT CHANGE UP (ref 80–100)
MONOCYTES # BLD AUTO: 0.6 K/UL — SIGNIFICANT CHANGE UP (ref 0–0.9)
MONOCYTES NFR BLD AUTO: 7.1 % — SIGNIFICANT CHANGE UP (ref 2–14)
NEUTROPHILS # BLD AUTO: 5.2 K/UL — SIGNIFICANT CHANGE UP (ref 1.8–7.4)
NEUTROPHILS NFR BLD AUTO: 63.7 % — SIGNIFICANT CHANGE UP (ref 43–77)
PLATELET # BLD AUTO: 321 K/UL — SIGNIFICANT CHANGE UP (ref 150–400)
RBC # BLD: 4.52 M/UL — SIGNIFICANT CHANGE UP (ref 3.8–5.2)
RBC # FLD: 12.7 % — SIGNIFICANT CHANGE UP (ref 10.3–14.5)
WBC # BLD: 8.1 K/UL — SIGNIFICANT CHANGE UP (ref 3.8–10.5)
WBC # FLD AUTO: 8.1 K/UL — SIGNIFICANT CHANGE UP (ref 3.8–10.5)

## 2019-02-06 PROCEDURE — 99214 OFFICE O/P EST MOD 30 MIN: CPT

## 2019-02-06 NOTE — ASSESSMENT
[Curative] : Goals of care discussed with patient: Curative [Palliative Care Plan] : not applicable at this time [FreeTextEntry1] : Pt to continue on treatment with herceptin and be followed for side effects Pt to continue normal activities. Order  2D echo f/u.  Pt to RTO in 1 month or sooner if needed.

## 2019-02-06 NOTE — HISTORY OF PRESENT ILLNESS
[Disease: _____________________] : Disease: [unfilled] [T: ___] : T[unfilled] [N: ___] : N[unfilled] [M: ___] : M[unfilled] [AJCC Stage: ____] : AJCC Stage: [unfilled] [de-identified] : This is a 77-year-old woman with history of breast carcinoma. The patient states her history dates back 26 years ago when she was found to have a right breast carcinoma which was resected and did not require further therapy. She states she has been undergoing followup mammograms however her last one was 2 years ago. The patient states in 2018 she noted a mass in the left breast and subsequently had a mammogram and ultrasound revealing a left breast mass. This was biopsied on 2018 and the biopsy was positive for invasive ductal carcinoma. The ER and [place] were negative and HER-2/emelina 3+ positive. The patient underwent MRI of the breast on  which revealed  a spiculated mass in the left breast which was the biopsy-proven malignancy along with extensive multicentric disease. The right breast revealed a 4 mm focus. The left axilla revealed a 3 x 1.5 cm lymph node.\par \par On May 1 the patient underwent bilateral mastectomy and left axillary dissection The left breast revealed invasive moderately differentiated ductal carcinoma, apocrine type. The tumor measured 5.1 cm and the [place] score was 7/9. There was one out of 22 lymph nodes positive for metastases along with extranodal extension measuring 1.5 mm. Lymph-vascular invasion and Derm lympho-vascular invasion were not identified. The tumor was stage T3 N1a.Stage IIIA. 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 The patient chose not to have reconstruction.\par \par The patient has done well postoperatively but does not slight discomfort in the anterior chest in the area of the incisions. Her family history is negative for breast cancer. Father  of lung cancer. 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 [de-identified] : IDC, mod diff, ER and [place] nrg, Her 3+ [de-identified] : Patient is here for f/u. She received the Herceptin infusion on 02/01/19. Presently, she feels okay except the no improvement with the lymphedema to left arm.  Pain in arm is 5/10. She has a machine for therapy to left arm with little improvement.  Appetite is good.

## 2019-02-06 NOTE — PHYSICAL EXAM
[Restricted in physically strenuous activity but ambulatory and able to carry out work of a light or sedentary nature] : Status 1- Restricted in physically strenuous activity but ambulatory and able to carry out work of a light or sedentary nature, e.g., light house work, office work [Normal] : affect appropriate [de-identified] : bilateral mastectomies, + thicken to skin below incision line to left side of chest

## 2019-02-08 DIAGNOSIS — C50.919 MALIGNANT NEOPLASM OF UNSPECIFIED SITE OF UNSPECIFIED FEMALE BREAST: ICD-10-CM

## 2019-02-08 DIAGNOSIS — Z79.899 OTHER LONG TERM (CURRENT) DRUG THERAPY: ICD-10-CM

## 2019-02-11 LAB — CANCER AG27-29 SERPL-ACNC: 13.2 U/ML

## 2019-02-12 LAB
ALBUMIN SERPL ELPH-MCNC: 4.6 G/DL
ALP BLD-CCNC: 79 U/L
ALT SERPL-CCNC: 17 U/L
ANION GAP SERPL CALC-SCNC: 12 MMOL/L
AST SERPL-CCNC: 13 U/L
BILIRUB SERPL-MCNC: 0.3 MG/DL
BUN SERPL-MCNC: 30 MG/DL
CALCIUM SERPL-MCNC: 9.7 MG/DL
CHLORIDE SERPL-SCNC: 104 MMOL/L
CO2 SERPL-SCNC: 26 MMOL/L
CREAT SERPL-MCNC: 1.32 MG/DL
GLUCOSE SERPL-MCNC: 115 MG/DL
POTASSIUM SERPL-SCNC: 4.9 MMOL/L
PROT SERPL-MCNC: 6.8 G/DL
SODIUM SERPL-SCNC: 143 MMOL/L

## 2019-02-22 ENCOUNTER — APPOINTMENT (OUTPATIENT)
Dept: INFUSION THERAPY | Facility: HOSPITAL | Age: 79
End: 2019-02-22

## 2019-03-06 ENCOUNTER — OUTPATIENT (OUTPATIENT)
Dept: OUTPATIENT SERVICES | Facility: HOSPITAL | Age: 79
LOS: 1 days | Discharge: HOME | End: 2019-03-06

## 2019-03-06 DIAGNOSIS — C50.919 MALIGNANT NEOPLASM OF UNSPECIFIED SITE OF UNSPECIFIED FEMALE BREAST: ICD-10-CM

## 2019-03-06 DIAGNOSIS — Z79.899 OTHER LONG TERM (CURRENT) DRUG THERAPY: ICD-10-CM

## 2019-03-06 DIAGNOSIS — Z96.649 PRESENCE OF UNSPECIFIED ARTIFICIAL HIP JOINT: Chronic | ICD-10-CM

## 2019-03-06 DIAGNOSIS — Z98.890 OTHER SPECIFIED POSTPROCEDURAL STATES: Chronic | ICD-10-CM

## 2019-03-15 ENCOUNTER — APPOINTMENT (OUTPATIENT)
Dept: INFUSION THERAPY | Facility: HOSPITAL | Age: 79
End: 2019-03-15

## 2019-03-18 DIAGNOSIS — Z51.11 ENCOUNTER FOR ANTINEOPLASTIC CHEMOTHERAPY: ICD-10-CM

## 2019-04-05 ENCOUNTER — APPOINTMENT (OUTPATIENT)
Dept: INFUSION THERAPY | Facility: HOSPITAL | Age: 79
End: 2019-04-05

## 2019-04-22 ENCOUNTER — OUTPATIENT (OUTPATIENT)
Dept: OUTPATIENT SERVICES | Facility: HOSPITAL | Age: 79
LOS: 1 days | Discharge: HOME | End: 2019-04-22

## 2019-04-22 DIAGNOSIS — Z79.899 OTHER LONG TERM (CURRENT) DRUG THERAPY: ICD-10-CM

## 2019-04-22 DIAGNOSIS — C50.919 MALIGNANT NEOPLASM OF UNSPECIFIED SITE OF UNSPECIFIED FEMALE BREAST: ICD-10-CM

## 2019-04-22 DIAGNOSIS — Z98.890 OTHER SPECIFIED POSTPROCEDURAL STATES: Chronic | ICD-10-CM

## 2019-04-22 DIAGNOSIS — Z96.649 PRESENCE OF UNSPECIFIED ARTIFICIAL HIP JOINT: Chronic | ICD-10-CM

## 2019-04-26 ENCOUNTER — APPOINTMENT (OUTPATIENT)
Dept: INFUSION THERAPY | Facility: HOSPITAL | Age: 79
End: 2019-04-26

## 2019-04-29 DIAGNOSIS — Z51.11 ENCOUNTER FOR ANTINEOPLASTIC CHEMOTHERAPY: ICD-10-CM

## 2019-05-17 ENCOUNTER — APPOINTMENT (OUTPATIENT)
Dept: INFUSION THERAPY | Facility: HOSPITAL | Age: 79
End: 2019-05-17

## 2019-05-22 ENCOUNTER — APPOINTMENT (OUTPATIENT)
Dept: HEMATOLOGY ONCOLOGY | Facility: CLINIC | Age: 79
End: 2019-05-22
Payer: MEDICARE

## 2019-05-22 VITALS
SYSTOLIC BLOOD PRESSURE: 145 MMHG | HEART RATE: 78 BPM | RESPIRATION RATE: 18 BRPM | OXYGEN SATURATION: 99 % | TEMPERATURE: 98.1 F | WEIGHT: 171.96 LBS | BODY MASS INDEX: 33.58 KG/M2 | DIASTOLIC BLOOD PRESSURE: 65 MMHG

## 2019-05-22 PROCEDURE — 99214 OFFICE O/P EST MOD 30 MIN: CPT

## 2019-05-22 NOTE — PHYSICAL EXAM
[Restricted in physically strenuous activity but ambulatory and able to carry out work of a light or sedentary nature] : Status 1- Restricted in physically strenuous activity but ambulatory and able to carry out work of a light or sedentary nature, e.g., light house work, office work [de-identified] : bilateral mastectomies

## 2019-05-22 NOTE — ASSESSMENT
[FreeTextEntry1] : Pt to continue on herceptin therpay and be followed for side effects and toxicity.Pt to do lymphedema therapy for left arm. Pt had repeat Echo and EF remains in good range.  [Curative] : Goals of care discussed with patient: Curative [Palliative Care Plan] : not applicable at this time

## 2019-05-22 NOTE — HISTORY OF PRESENT ILLNESS
[Disease: _____________________] : Disease: [unfilled] [T: ___] : T[unfilled] [N: ___] : N[unfilled] [M: ___] : M[unfilled] [AJCC Stage: ____] : AJCC Stage: [unfilled] [de-identified] : This is a 77-year-old woman with history of breast carcinoma. The patient states her history dates back 26 years ago when she was found to have a right breast carcinoma which was resected and did not require further therapy. She states she has been undergoing followup mammograms however her last one was 2 years ago. The patient states in 2018 she noted a mass in the left breast and subsequently had a mammogram and ultrasound revealing a left breast mass. This was biopsied on 2018 and the biopsy was positive for invasive ductal carcinoma. The ER and [place] were negative and HER-2/emelina 3+ positive. The patient underwent MRI of the breast on  which revealed  a spiculated mass in the left breast which was the biopsy-proven malignancy along with extensive multicentric disease. The right breast revealed a 4 mm focus. The left axilla revealed a 3 x 1.5 cm lymph node.\par \par On May 1 the patient underwent bilateral mastectomy and left axillary dissection The left breast revealed invasive moderately differentiated ductal carcinoma, apocrine type. The tumor measured 5.1 cm and the [place] score was 7/9. There was one out of 22 lymph nodes positive for metastases along with extranodal extension measuring 1.5 mm. Lymph-vascular invasion and Derm lympho-vascular invasion were not identified. The tumor was stage T3 N1a.Stage IIIA. 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 The patient chose not to have reconstruction.\par \par The patient has done well postoperatively but does not slight discomfort in the anterior chest in the area of the incisions. Her family history is negative for breast cancer. Father  of lung cancer. 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 [de-identified] : IDC, mod diff, ER and [place] nrg, Her 3+ [de-identified] : Pt feels well and had fall at home but only had right knee tenderness

## 2019-06-03 ENCOUNTER — OTHER (OUTPATIENT)
Age: 79
End: 2019-06-03

## 2019-06-05 ENCOUNTER — OUTPATIENT (OUTPATIENT)
Dept: OUTPATIENT SERVICES | Facility: HOSPITAL | Age: 79
LOS: 1 days | Discharge: HOME | End: 2019-06-05

## 2019-06-05 DIAGNOSIS — Z98.890 OTHER SPECIFIED POSTPROCEDURAL STATES: Chronic | ICD-10-CM

## 2019-06-05 DIAGNOSIS — C50.919 MALIGNANT NEOPLASM OF UNSPECIFIED SITE OF UNSPECIFIED FEMALE BREAST: ICD-10-CM

## 2019-06-05 DIAGNOSIS — Z96.649 PRESENCE OF UNSPECIFIED ARTIFICIAL HIP JOINT: Chronic | ICD-10-CM

## 2019-06-05 DIAGNOSIS — Z79.899 OTHER LONG TERM (CURRENT) DRUG THERAPY: ICD-10-CM

## 2019-06-10 ENCOUNTER — APPOINTMENT (OUTPATIENT)
Dept: HEMATOLOGY ONCOLOGY | Facility: CLINIC | Age: 79
End: 2019-06-10
Payer: MEDICARE

## 2019-06-10 VITALS
OXYGEN SATURATION: 96 % | HEART RATE: 63 BPM | TEMPERATURE: 97.9 F | DIASTOLIC BLOOD PRESSURE: 81 MMHG | BODY MASS INDEX: 33.58 KG/M2 | RESPIRATION RATE: 16 BRPM | SYSTOLIC BLOOD PRESSURE: 152 MMHG | WEIGHT: 171.96 LBS

## 2019-06-10 PROCEDURE — 99214 OFFICE O/P EST MOD 30 MIN: CPT

## 2019-06-10 NOTE — HISTORY OF PRESENT ILLNESS
[Disease: _____________________] : Disease: [unfilled] [T: ___] : T[unfilled] [M: ___] : M[unfilled] [N: ___] : N[unfilled] [AJCC Stage: ____] : AJCC Stage: [unfilled] [de-identified] : This is a 77-year-old woman with history of breast carcinoma. The patient states her history dates back 26 years ago when she was found to have a right breast carcinoma which was resected and did not require further therapy. She states she has been undergoing followup mammograms however her last one was 2 years ago. The patient states in 2018 she noted a mass in the left breast and subsequently had a mammogram and ultrasound revealing a left breast mass. This was biopsied on 2018 and the biopsy was positive for invasive ductal carcinoma. The ER and [place] were negative and HER-2/emelina 3+ positive. The patient underwent MRI of the breast on  which revealed  a spiculated mass in the left breast which was the biopsy-proven malignancy along with extensive multicentric disease. The right breast revealed a 4 mm focus. The left axilla revealed a 3 x 1.5 cm lymph node.\par \par On May 1 the patient underwent bilateral mastectomy and left axillary dissection The left breast revealed invasive moderately differentiated ductal carcinoma, apocrine type. The tumor measured 5.1 cm and the [place] score was 7/9. There was one out of 22 lymph nodes positive for metastases along with extranodal extension measuring 1.5 mm. Lymph-vascular invasion and Derm lympho-vascular invasion were not identified. The tumor was stage T3 N1a.Stage IIIA. 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 The patient chose not to have reconstruction.\par \par The patient has done well postoperatively but does not slight discomfort in the anterior chest in the area of the incisions. Her family history is negative for breast cancer. Father  of lung cancer. 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 [de-identified] : IDC, mod diff, ER and [place] nrg, Her 3+ [de-identified] : Since the last visit the pt remains well and has no new complaints. Pt has completed herceptin for 1 year.

## 2019-06-10 NOTE — REVIEW OF SYSTEMS
[Negative] : Allergic/Immunologic [FreeTextEntry4] : has chronic nasal discharge. [FreeTextEntry9] : right hip replacement [de-identified] : has numbness in feet.

## 2019-06-10 NOTE — ASSESSMENT
[FreeTextEntry1] : The patient has completed one year chemotherapy with Taxol carboplatin Herceptin and progenitor. Since she is ER/TX negative hormonal therapy would not be needed. The patient will undergo repeat scans to assess for occurrence of her disease. She currently has no findings indicating recurrent disease. She'll return in 1 month for followup. [Curative] : Goals of care discussed with patient: Curative [Palliative Care Plan] : not applicable at this time

## 2019-06-27 ENCOUNTER — FORM ENCOUNTER (OUTPATIENT)
Age: 79
End: 2019-06-27

## 2019-06-28 ENCOUNTER — APPOINTMENT (OUTPATIENT)
Dept: NUCLEAR MEDICINE | Facility: IMAGING CENTER | Age: 79
End: 2019-06-28
Payer: MEDICARE

## 2019-06-28 ENCOUNTER — OUTPATIENT (OUTPATIENT)
Dept: OUTPATIENT SERVICES | Facility: HOSPITAL | Age: 79
LOS: 1 days | End: 2019-06-28
Payer: MEDICARE

## 2019-06-28 DIAGNOSIS — Z98.890 OTHER SPECIFIED POSTPROCEDURAL STATES: Chronic | ICD-10-CM

## 2019-06-28 DIAGNOSIS — Z96.649 PRESENCE OF UNSPECIFIED ARTIFICIAL HIP JOINT: Chronic | ICD-10-CM

## 2019-06-28 DIAGNOSIS — C50.919 MALIGNANT NEOPLASM OF UNSPECIFIED SITE OF UNSPECIFIED FEMALE BREAST: ICD-10-CM

## 2019-06-28 PROCEDURE — A9552: CPT

## 2019-06-28 PROCEDURE — 78815 PET IMAGE W/CT SKULL-THIGH: CPT | Mod: 26,PS

## 2019-06-28 PROCEDURE — 78815 PET IMAGE W/CT SKULL-THIGH: CPT

## 2019-07-05 ENCOUNTER — OUTPATIENT (OUTPATIENT)
Dept: OUTPATIENT SERVICES | Facility: HOSPITAL | Age: 79
LOS: 1 days | Discharge: HOME | End: 2019-07-05

## 2019-07-05 DIAGNOSIS — Z96.649 PRESENCE OF UNSPECIFIED ARTIFICIAL HIP JOINT: Chronic | ICD-10-CM

## 2019-07-05 DIAGNOSIS — C50.919 MALIGNANT NEOPLASM OF UNSPECIFIED SITE OF UNSPECIFIED FEMALE BREAST: ICD-10-CM

## 2019-07-05 DIAGNOSIS — Z98.890 OTHER SPECIFIED POSTPROCEDURAL STATES: Chronic | ICD-10-CM

## 2019-07-10 ENCOUNTER — APPOINTMENT (OUTPATIENT)
Dept: HEMATOLOGY ONCOLOGY | Facility: CLINIC | Age: 79
End: 2019-07-10
Payer: MEDICARE

## 2019-07-10 VITALS
RESPIRATION RATE: 16 BRPM | DIASTOLIC BLOOD PRESSURE: 75 MMHG | HEART RATE: 73 BPM | WEIGHT: 170.99 LBS | OXYGEN SATURATION: 96 % | SYSTOLIC BLOOD PRESSURE: 125 MMHG | TEMPERATURE: 98.2 F | BODY MASS INDEX: 33.4 KG/M2

## 2019-07-10 PROCEDURE — 99214 OFFICE O/P EST MOD 30 MIN: CPT

## 2019-07-12 NOTE — REVIEW OF SYSTEMS
[Negative] : Allergic/Immunologic [FreeTextEntry4] : has chronic nasal discharge. [FreeTextEntry9] : right hip replacement [de-identified] : has numbness in feet.

## 2019-07-12 NOTE — ASSESSMENT
[Curative] : Goals of care discussed with patient: Curative [Palliative Care Plan] : not applicable at this time [FreeTextEntry1] : The patient has completed one year chemotherapy with Taxol carboplatin Herceptin and progenitor. Since she is ER/AR negative hormonal therapy would not be needed.  Pet scan show activities in thyroid gland? nodule, will order thyroid ultrasound, f/u. with She'll return in 3 month for followup.?

## 2019-07-12 NOTE — HISTORY OF PRESENT ILLNESS
[Disease: _____________________] : Disease: [unfilled] [T: ___] : T[unfilled] [N: ___] : N[unfilled] [M: ___] : M[unfilled] [AJCC Stage: ____] : AJCC Stage: [unfilled] [de-identified] : This is a 77-year-old woman with history of breast carcinoma. The patient states her history dates back 26 years ago when she was found to have a right breast carcinoma which was resected and did not require further therapy. She states she has been undergoing followup mammograms however her last one was 2 years ago. The patient states in 2018 she noted a mass in the left breast and subsequently had a mammogram and ultrasound revealing a left breast mass. This was biopsied on 2018 and the biopsy was positive for invasive ductal carcinoma. The ER and [place] were negative and HER-2/emelina 3+ positive. The patient underwent MRI of the breast on  which revealed  a spiculated mass in the left breast which was the biopsy-proven malignancy along with extensive multicentric disease. The right breast revealed a 4 mm focus. The left axilla revealed a 3 x 1.5 cm lymph node.\par \par On May 1 the patient underwent bilateral mastectomy and left axillary dissection The left breast revealed invasive moderately differentiated ductal carcinoma, apocrine type. The tumor measured 5.1 cm and the [place] score was 7/9. There was one out of 22 lymph nodes positive for metastases along with extranodal extension measuring 1.5 mm. Lymph-vascular invasion and Derm lympho-vascular invasion were not identified. The tumor was stage T3 N1a.Stage IIIA. 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 The patient chose not to have reconstruction.\par \par The patient has done well postoperatively but does not slight discomfort in the anterior chest in the area of the incisions. Her family history is negative for breast cancer. Father  of lung cancer. 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 [de-identified] : IDC, mod diff, ER and [place] nrg, Her 3+ [de-identified] :  Pt has completed Herceptin 5/2019, after 1 year treatment. Presently, she feels fine. Denies any pain. Appetite is good.

## 2019-07-16 ENCOUNTER — FORM ENCOUNTER (OUTPATIENT)
Age: 79
End: 2019-07-16

## 2019-07-17 ENCOUNTER — OUTPATIENT (OUTPATIENT)
Dept: OUTPATIENT SERVICES | Facility: HOSPITAL | Age: 79
LOS: 1 days | End: 2019-07-17
Payer: MEDICARE

## 2019-07-17 ENCOUNTER — APPOINTMENT (OUTPATIENT)
Dept: ULTRASOUND IMAGING | Facility: IMAGING CENTER | Age: 79
End: 2019-07-17
Payer: MEDICARE

## 2019-07-17 DIAGNOSIS — C50.919 MALIGNANT NEOPLASM OF UNSPECIFIED SITE OF UNSPECIFIED FEMALE BREAST: ICD-10-CM

## 2019-07-17 DIAGNOSIS — Z96.649 PRESENCE OF UNSPECIFIED ARTIFICIAL HIP JOINT: Chronic | ICD-10-CM

## 2019-07-17 DIAGNOSIS — Z98.890 OTHER SPECIFIED POSTPROCEDURAL STATES: Chronic | ICD-10-CM

## 2019-07-17 PROCEDURE — 76536 US EXAM OF HEAD AND NECK: CPT | Mod: 26

## 2019-07-17 PROCEDURE — 76536 US EXAM OF HEAD AND NECK: CPT

## 2019-08-07 ENCOUNTER — OUTPATIENT (OUTPATIENT)
Dept: OUTPATIENT SERVICES | Facility: HOSPITAL | Age: 79
LOS: 1 days | Discharge: HOME | End: 2019-08-07

## 2019-08-07 DIAGNOSIS — Z98.890 OTHER SPECIFIED POSTPROCEDURAL STATES: Chronic | ICD-10-CM

## 2019-08-07 DIAGNOSIS — Z96.649 PRESENCE OF UNSPECIFIED ARTIFICIAL HIP JOINT: Chronic | ICD-10-CM

## 2019-08-07 DIAGNOSIS — Z79.899 OTHER LONG TERM (CURRENT) DRUG THERAPY: ICD-10-CM

## 2019-08-07 DIAGNOSIS — C50.919 MALIGNANT NEOPLASM OF UNSPECIFIED SITE OF UNSPECIFIED FEMALE BREAST: ICD-10-CM

## 2019-08-09 ENCOUNTER — OTHER (OUTPATIENT)
Age: 79
End: 2019-08-09

## 2019-08-09 ENCOUNTER — APPOINTMENT (OUTPATIENT)
Dept: HEMATOLOGY ONCOLOGY | Facility: CLINIC | Age: 79
End: 2019-08-09
Payer: MEDICARE

## 2019-08-09 VITALS
HEART RATE: 77 BPM | RESPIRATION RATE: 18 BRPM | TEMPERATURE: 98.7 F | DIASTOLIC BLOOD PRESSURE: 73 MMHG | WEIGHT: 173.06 LBS | BODY MASS INDEX: 33.8 KG/M2 | SYSTOLIC BLOOD PRESSURE: 114 MMHG | OXYGEN SATURATION: 97 %

## 2019-08-09 PROCEDURE — 99213 OFFICE O/P EST LOW 20 MIN: CPT

## 2019-08-09 NOTE — DISCUSSION/SUMMARY
[Radiation] : Radiation: No [Persistent symptoms or side effects at completion of treatment?  If Yes, list types ___] : Persistent symptoms or side effects at completion of treatment? No [Genetic Counseling] : Genetic Counseling: No [FreeTextEntry1] : \par Docetaxel  x 5 cycles  started 06/08/2018- ended 09/07/2018\par Carboplatin  x  2 cycles;  discontinued due to sepsis related hospitalization started 06/08/2018- ended 06/29/2018\par Pertuzumab  x 6 cycles  started  06/08/2018- ended 09/28/2018 \par trastuzumab  x 17 cycles  started 06/08/2018-  ended 05/17/2019\par \par \par \par \par \par \par \par \par \par  [Need for onging (adjuvant) treatment for cancer?] : Need for onging (adjuvant) treatment for cancer? No [de-identified] : Ms. Mco to continue echocardiagrams q 6 mos. x 1 year following completion of trastuzumab 5/2019.

## 2019-08-09 NOTE — ASSESSMENT
[Curative] : Goals of care discussed with patient: Curative [Palliative Care Plan] : not applicable at this time [FreeTextEntry1] : Pt is s/p completion of Taxol carboplatin/Perjeta/Herceptin. Since she is ER/ME negative hormonal therapy would not be needed.  Survivorship plan given today. Pt is under surveillance for recurrence. RTO in 2 months.

## 2019-08-09 NOTE — HISTORY OF PRESENT ILLNESS
[Disease: _____________________] : Disease: [unfilled] [T: ___] : T[unfilled] [N: ___] : N[unfilled] [M: ___] : M[unfilled] [AJCC Stage: ____] : AJCC Stage: [unfilled] [de-identified] : This is a 77-year-old woman with history of breast carcinoma. The patient states her history dates back 26 years ago when she was found to have a right breast carcinoma which was resected and did not require further therapy. She states she has been undergoing followup mammograms however her last one was 2 years ago. The patient states in 2018 she noted a mass in the left breast and subsequently had a mammogram and ultrasound revealing a left breast mass. This was biopsied on 2018 and the biopsy was positive for invasive ductal carcinoma. The ER and GA were negative and HER-2/emelina 3+ positive. The patient underwent MRI of the breast on  which revealed  a spiculated mass in the left breast which was the biopsy-proven malignancy along with extensive multicentric disease. The right breast revealed a 4 mm focus. The left axilla revealed a 3 x 1.5 cm lymph node.\par \par On May 1 the patient underwent bilateral mastectomy and left axillary dissection The left breast revealed invasive moderately differentiated ductal carcinoma, apocrine type. The tumor measured 5.1 cm and the [place] score was 7/9. There was one out of 22 lymph nodes positive for metastases along with extranodal extension measuring 1.5 mm. Lymph-vascular invasion and Derm lympho-vascular invasion were not identified. The tumor was stage T3 N1a.Stage IIIA. 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 The patient chose not to have reconstruction.\par \par The patient has done well postoperatively but does not slight discomfort in the anterior chest in the area of the incisions. Her family history is negative for breast cancer. Father  of lung cancer. 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 [de-identified] : IDC, mod diff, ER and [place] nrg, Her 3+ [de-identified] : Patient is here for Survivorship plan. She feel fine without any medical complaints. 07/18/19, thyroid ultrasound was done which showed: no discrete thyroid nodule found. \par

## 2019-08-09 NOTE — REVIEW OF SYSTEMS
[Negative] : Allergic/Immunologic [FreeTextEntry9] : right hip replacement [de-identified] : has numbness in feet.

## 2019-08-28 ENCOUNTER — APPOINTMENT (OUTPATIENT)
Dept: HEMATOLOGY ONCOLOGY | Facility: CLINIC | Age: 79
End: 2019-08-28

## 2019-09-16 ENCOUNTER — APPOINTMENT (OUTPATIENT)
Dept: ENDOVASCULAR SURGERY | Facility: CLINIC | Age: 79
End: 2019-09-16

## 2019-09-18 NOTE — PAST MEDICAL HISTORY
[Increasing age ( >40 years old)] : Increasing age ( >40 years old) [Malignancy] : Malignancy [Major surgery] : Major surgery [Previous history of DVT or PE] : Previous history of DVT or PE [No therapy indicated for cases scheduled for less than one hour] : No therapy indicated for cases scheduled for less than one hour. [FreeTextEntry1] : \par \par Malignant Hyperthermis (MH) Screening Tool and Risk of Bleeding Assessement\par Ms. SHIN DE LEÓN  denies family history of unexpected death following Anesthesia or Exercise.\par Denies Family history of Malignant Hyperthermia, Muscle or Neuromuscular disorder and High Temperature following exercise.\par \par Ms. SHIN DE LEÓN denies history of Muscle Spasm, Dark or Chocolate - Colored urine and Unanticipated fever immediately following anesthesia or serious exercise. \par Ms. DE LEÓN  also denies bleeding tendencies/ Risks of Bleeding\par

## 2019-09-18 NOTE — PROCEDURE
[Resume diet] : resume diet [D/C IV on discharge] : D/C IV on discharge [FreeTextEntry1] : mediport removal

## 2019-09-25 ENCOUNTER — OUTPATIENT (OUTPATIENT)
Dept: OUTPATIENT SERVICES | Facility: HOSPITAL | Age: 79
LOS: 1 days | Discharge: HOME | End: 2019-09-25

## 2019-09-25 DIAGNOSIS — Z96.649 PRESENCE OF UNSPECIFIED ARTIFICIAL HIP JOINT: Chronic | ICD-10-CM

## 2019-09-25 DIAGNOSIS — Z98.890 OTHER SPECIFIED POSTPROCEDURAL STATES: Chronic | ICD-10-CM

## 2019-09-25 DIAGNOSIS — Z79.899 OTHER LONG TERM (CURRENT) DRUG THERAPY: ICD-10-CM

## 2019-09-25 DIAGNOSIS — C50.919 MALIGNANT NEOPLASM OF UNSPECIFIED SITE OF UNSPECIFIED FEMALE BREAST: ICD-10-CM

## 2019-09-26 ENCOUNTER — APPOINTMENT (OUTPATIENT)
Dept: HEMATOLOGY ONCOLOGY | Facility: CLINIC | Age: 79
End: 2019-09-26

## 2019-09-26 ENCOUNTER — APPOINTMENT (OUTPATIENT)
Dept: ENDOVASCULAR SURGERY | Facility: CLINIC | Age: 79
End: 2019-09-26

## 2019-09-26 ENCOUNTER — RESULT REVIEW (OUTPATIENT)
Age: 79
End: 2019-09-26

## 2019-09-26 LAB
BASOPHILS # BLD AUTO: 0 K/UL — SIGNIFICANT CHANGE UP (ref 0–0.2)
BASOPHILS NFR BLD AUTO: 0.3 % — SIGNIFICANT CHANGE UP (ref 0–2)
EOSINOPHIL # BLD AUTO: 0.1 K/UL — SIGNIFICANT CHANGE UP (ref 0–0.5)
EOSINOPHIL NFR BLD AUTO: 1.8 % — SIGNIFICANT CHANGE UP (ref 0–6)
HCT VFR BLD CALC: 40.2 % — SIGNIFICANT CHANGE UP (ref 34.5–45)
HGB BLD-MCNC: 13.5 G/DL — SIGNIFICANT CHANGE UP (ref 11.5–15.5)
LYMPHOCYTES # BLD AUTO: 1.7 K/UL — SIGNIFICANT CHANGE UP (ref 1–3.3)
LYMPHOCYTES # BLD AUTO: 24.9 % — SIGNIFICANT CHANGE UP (ref 13–44)
MCHC RBC-ENTMCNC: 29.5 PG — SIGNIFICANT CHANGE UP (ref 27–34)
MCHC RBC-ENTMCNC: 33.7 G/DL — SIGNIFICANT CHANGE UP (ref 32–36)
MCV RBC AUTO: 87.5 FL — SIGNIFICANT CHANGE UP (ref 80–100)
MONOCYTES # BLD AUTO: 0.5 K/UL — SIGNIFICANT CHANGE UP (ref 0–0.9)
MONOCYTES NFR BLD AUTO: 8 % — SIGNIFICANT CHANGE UP (ref 2–14)
NEUTROPHILS # BLD AUTO: 4.5 K/UL — SIGNIFICANT CHANGE UP (ref 1.8–7.4)
NEUTROPHILS NFR BLD AUTO: 65.1 % — SIGNIFICANT CHANGE UP (ref 43–77)
PLATELET # BLD AUTO: 287 K/UL — SIGNIFICANT CHANGE UP (ref 150–400)
RBC # BLD: 4.59 M/UL — SIGNIFICANT CHANGE UP (ref 3.8–5.2)
RBC # FLD: 13.2 % — SIGNIFICANT CHANGE UP (ref 10.3–14.5)
WBC # BLD: 6.9 K/UL — SIGNIFICANT CHANGE UP (ref 3.8–10.5)
WBC # FLD AUTO: 6.9 K/UL — SIGNIFICANT CHANGE UP (ref 3.8–10.5)

## 2019-09-29 LAB
APTT BLD: 25.5 SEC
INR PPP: 0.89 RATIO
PT BLD: 10 SEC

## 2019-11-25 ENCOUNTER — APPOINTMENT (OUTPATIENT)
Dept: SURGERY | Facility: CLINIC | Age: 79
End: 2019-11-25
Payer: MEDICARE

## 2019-11-25 PROCEDURE — 99213K: CUSTOM

## 2019-12-05 ENCOUNTER — OUTPATIENT (OUTPATIENT)
Dept: OUTPATIENT SERVICES | Facility: HOSPITAL | Age: 79
LOS: 1 days | Discharge: HOME | End: 2019-12-05

## 2019-12-05 DIAGNOSIS — Z79.899 OTHER LONG TERM (CURRENT) DRUG THERAPY: ICD-10-CM

## 2019-12-05 DIAGNOSIS — Z98.890 OTHER SPECIFIED POSTPROCEDURAL STATES: Chronic | ICD-10-CM

## 2019-12-05 DIAGNOSIS — C50.919 MALIGNANT NEOPLASM OF UNSPECIFIED SITE OF UNSPECIFIED FEMALE BREAST: ICD-10-CM

## 2019-12-05 DIAGNOSIS — Z96.649 PRESENCE OF UNSPECIFIED ARTIFICIAL HIP JOINT: Chronic | ICD-10-CM

## 2019-12-20 ENCOUNTER — APPOINTMENT (OUTPATIENT)
Dept: HEMATOLOGY ONCOLOGY | Facility: CLINIC | Age: 79
End: 2019-12-20

## 2019-12-20 ENCOUNTER — RESULT REVIEW (OUTPATIENT)
Age: 79
End: 2019-12-20

## 2019-12-20 LAB
BASOPHILS # BLD AUTO: 0 K/UL — SIGNIFICANT CHANGE UP (ref 0–0.2)
BASOPHILS NFR BLD AUTO: 0.3 % — SIGNIFICANT CHANGE UP (ref 0–2)
EOSINOPHIL # BLD AUTO: 0.2 K/UL — SIGNIFICANT CHANGE UP (ref 0–0.5)
EOSINOPHIL NFR BLD AUTO: 2.9 % — SIGNIFICANT CHANGE UP (ref 0–6)
HCT VFR BLD CALC: 39.5 % — SIGNIFICANT CHANGE UP (ref 34.5–45)
HGB BLD-MCNC: 13.6 G/DL — SIGNIFICANT CHANGE UP (ref 11.5–15.5)
LYMPHOCYTES # BLD AUTO: 1.7 K/UL — SIGNIFICANT CHANGE UP (ref 1–3.3)
LYMPHOCYTES # BLD AUTO: 21.8 % — SIGNIFICANT CHANGE UP (ref 13–44)
MCHC RBC-ENTMCNC: 29.7 PG — SIGNIFICANT CHANGE UP (ref 27–34)
MCHC RBC-ENTMCNC: 34.4 G/DL — SIGNIFICANT CHANGE UP (ref 32–36)
MCV RBC AUTO: 86.2 FL — SIGNIFICANT CHANGE UP (ref 80–100)
MONOCYTES # BLD AUTO: 0.6 K/UL — SIGNIFICANT CHANGE UP (ref 0–0.9)
MONOCYTES NFR BLD AUTO: 7.2 % — SIGNIFICANT CHANGE UP (ref 2–14)
NEUTROPHILS # BLD AUTO: 5.3 K/UL — SIGNIFICANT CHANGE UP (ref 1.8–7.4)
NEUTROPHILS NFR BLD AUTO: 67.8 % — SIGNIFICANT CHANGE UP (ref 43–77)
PLATELET # BLD AUTO: 257 K/UL — SIGNIFICANT CHANGE UP (ref 150–400)
RBC # BLD: 4.58 M/UL — SIGNIFICANT CHANGE UP (ref 3.8–5.2)
RBC # FLD: 12 % — SIGNIFICANT CHANGE UP (ref 10.3–14.5)
WBC # BLD: 7.8 K/UL — SIGNIFICANT CHANGE UP (ref 3.8–10.5)
WBC # FLD AUTO: 7.8 K/UL — SIGNIFICANT CHANGE UP (ref 3.8–10.5)

## 2019-12-26 LAB
APTT BLD: 26.3 SEC
INR PPP: 0.86 RATIO
PT BLD: 9.9 SEC

## 2020-01-14 ENCOUNTER — FORM ENCOUNTER (OUTPATIENT)
Age: 80
End: 2020-01-14

## 2020-01-15 ENCOUNTER — RESULT REVIEW (OUTPATIENT)
Age: 80
End: 2020-01-15

## 2020-01-15 ENCOUNTER — OUTPATIENT (OUTPATIENT)
Dept: OUTPATIENT SERVICES | Facility: HOSPITAL | Age: 80
LOS: 1 days | End: 2020-01-15
Payer: MEDICARE

## 2020-01-15 ENCOUNTER — OUTPATIENT (OUTPATIENT)
Dept: OUTPATIENT SERVICES | Facility: HOSPITAL | Age: 80
LOS: 1 days | Discharge: HOME | End: 2020-01-15

## 2020-01-15 ENCOUNTER — APPOINTMENT (OUTPATIENT)
Dept: HEMATOLOGY ONCOLOGY | Facility: CLINIC | Age: 80
End: 2020-01-15

## 2020-01-15 VITALS
RESPIRATION RATE: 18 BRPM | DIASTOLIC BLOOD PRESSURE: 69 MMHG | TEMPERATURE: 97 F | SYSTOLIC BLOOD PRESSURE: 148 MMHG | HEART RATE: 67 BPM | OXYGEN SATURATION: 97 %

## 2020-01-15 VITALS
SYSTOLIC BLOOD PRESSURE: 148 MMHG | HEART RATE: 69 BPM | OXYGEN SATURATION: 97 % | RESPIRATION RATE: 16 BRPM | TEMPERATURE: 97 F | DIASTOLIC BLOOD PRESSURE: 67 MMHG

## 2020-01-15 DIAGNOSIS — Z96.649 PRESENCE OF UNSPECIFIED ARTIFICIAL HIP JOINT: Chronic | ICD-10-CM

## 2020-01-15 DIAGNOSIS — Z98.890 OTHER SPECIFIED POSTPROCEDURAL STATES: Chronic | ICD-10-CM

## 2020-01-15 DIAGNOSIS — C50.919 MALIGNANT NEOPLASM OF UNSPECIFIED SITE OF UNSPECIFIED FEMALE BREAST: ICD-10-CM

## 2020-01-15 LAB
BASOPHILS # BLD AUTO: 0 K/UL — SIGNIFICANT CHANGE UP (ref 0–0.2)
BASOPHILS NFR BLD AUTO: 0.1 % — SIGNIFICANT CHANGE UP (ref 0–2)
EOSINOPHIL # BLD AUTO: 0.2 K/UL — SIGNIFICANT CHANGE UP (ref 0–0.5)
EOSINOPHIL NFR BLD AUTO: 2.3 % — SIGNIFICANT CHANGE UP (ref 0–6)
HCT VFR BLD CALC: 41.2 % — SIGNIFICANT CHANGE UP (ref 34.5–45)
HGB BLD-MCNC: 13.7 G/DL — SIGNIFICANT CHANGE UP (ref 11.5–15.5)
LYMPHOCYTES # BLD AUTO: 1.5 K/UL — SIGNIFICANT CHANGE UP (ref 1–3.3)
LYMPHOCYTES # BLD AUTO: 22.1 % — SIGNIFICANT CHANGE UP (ref 13–44)
MCHC RBC-ENTMCNC: 28.7 PG — SIGNIFICANT CHANGE UP (ref 27–34)
MCHC RBC-ENTMCNC: 33.2 G/DL — SIGNIFICANT CHANGE UP (ref 32–36)
MCV RBC AUTO: 86.5 FL — SIGNIFICANT CHANGE UP (ref 80–100)
MONOCYTES # BLD AUTO: 0.5 K/UL — SIGNIFICANT CHANGE UP (ref 0–0.9)
MONOCYTES NFR BLD AUTO: 7.8 % — SIGNIFICANT CHANGE UP (ref 2–14)
NEUTROPHILS # BLD AUTO: 4.7 K/UL — SIGNIFICANT CHANGE UP (ref 1.8–7.4)
NEUTROPHILS NFR BLD AUTO: 67.7 % — SIGNIFICANT CHANGE UP (ref 43–77)
PLATELET # BLD AUTO: 246 K/UL — SIGNIFICANT CHANGE UP (ref 150–400)
RBC # BLD: 4.76 M/UL — SIGNIFICANT CHANGE UP (ref 3.8–5.2)
RBC # FLD: 12.2 % — SIGNIFICANT CHANGE UP (ref 10.3–14.5)
WBC # BLD: 6.9 K/UL — SIGNIFICANT CHANGE UP (ref 3.8–10.5)
WBC # FLD AUTO: 6.9 K/UL — SIGNIFICANT CHANGE UP (ref 3.8–10.5)

## 2020-01-15 PROCEDURE — 77001 FLUOROGUIDE FOR VEIN DEVICE: CPT | Mod: 26

## 2020-01-15 PROCEDURE — 36590 REMOVAL TUNNELED CV CATH: CPT

## 2020-01-15 PROCEDURE — 77001 FLUOROGUIDE FOR VEIN DEVICE: CPT

## 2020-01-21 DIAGNOSIS — Z45.2 ENCOUNTER FOR ADJUSTMENT AND MANAGEMENT OF VASCULAR ACCESS DEVICE: ICD-10-CM

## 2020-01-24 LAB
APTT BLD: 26.2 SEC
INR PPP: 0.91 RATIO
PT BLD: 10.3 SEC

## 2020-02-12 ENCOUNTER — APPOINTMENT (OUTPATIENT)
Dept: HEMATOLOGY ONCOLOGY | Facility: CLINIC | Age: 80
End: 2020-02-12
Payer: MEDICARE

## 2020-02-12 ENCOUNTER — OUTPATIENT (OUTPATIENT)
Dept: OUTPATIENT SERVICES | Facility: HOSPITAL | Age: 80
LOS: 1 days | Discharge: HOME | End: 2020-02-12

## 2020-02-12 VITALS
HEART RATE: 77 BPM | TEMPERATURE: 97.7 F | DIASTOLIC BLOOD PRESSURE: 92 MMHG | SYSTOLIC BLOOD PRESSURE: 175 MMHG | RESPIRATION RATE: 16 BRPM | OXYGEN SATURATION: 98 % | WEIGHT: 177.25 LBS | BODY MASS INDEX: 34.62 KG/M2

## 2020-02-12 DIAGNOSIS — Z96.649 PRESENCE OF UNSPECIFIED ARTIFICIAL HIP JOINT: Chronic | ICD-10-CM

## 2020-02-12 DIAGNOSIS — Z98.890 OTHER SPECIFIED POSTPROCEDURAL STATES: Chronic | ICD-10-CM

## 2020-02-12 DIAGNOSIS — Z79.899 OTHER LONG TERM (CURRENT) DRUG THERAPY: ICD-10-CM

## 2020-02-12 DIAGNOSIS — C50.919 MALIGNANT NEOPLASM OF UNSPECIFIED SITE OF UNSPECIFIED FEMALE BREAST: ICD-10-CM

## 2020-02-12 PROCEDURE — 99214 OFFICE O/P EST MOD 30 MIN: CPT

## 2020-02-23 NOTE — HISTORY OF PRESENT ILLNESS
[Disease: _____________________] : Disease: [unfilled] [T: ___] : T[unfilled] [N: ___] : N[unfilled] [M: ___] : M[unfilled] [AJCC Stage: ____] : AJCC Stage: [unfilled] [de-identified] : This is a 77-year-old woman with history of breast carcinoma. The patient states her history dates back 26 years ago when she was found to have a right breast carcinoma which was resected and did not require further therapy. She states she has been undergoing followup mammograms however her last one was 2 years ago. The patient states in 2018 she noted a mass in the left breast and subsequently had a mammogram and ultrasound revealing a left breast mass. This was biopsied on 2018 and the biopsy was positive for invasive ductal carcinoma. The ER and [place] were negative and HER-2/emelina 3+ positive. The patient underwent MRI of the breast on  which revealed  a spiculated mass in the left breast which was the biopsy-proven malignancy along with extensive multicentric disease. The right breast revealed a 4 mm focus. The left axilla revealed a 3 x 1.5 cm lymph node.\par \par On May 1 the patient underwent bilateral mastectomy and left axillary dissection The left breast revealed invasive moderately differentiated ductal carcinoma, apocrine type. The tumor measured 5.1 cm and the [place] score was 7/9. There was one out of 22 lymph nodes positive for metastases along with extranodal extension measuring 1.5 mm. Lymph-vascular invasion and Derm lympho-vascular invasion were not identified. The tumor was stage T3 N1a.Stage IIIA. 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 The patient chose not to have reconstruction.\par \par The patient has done well postoperatively but does not slight discomfort in the anterior chest in the area of the incisions. Her family history is negative for breast cancer. Father  of lung cancer. 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 [de-identified] : IDC, mod diff, ER and [place] nrg, Her 3+ [de-identified] : Since the last visit the pt has been well and has no new symptoms indicating recurrent disease.

## 2020-02-23 NOTE — REVIEW OF SYSTEMS
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

## 2020-02-23 NOTE — ASSESSMENT
[Curative] : Goals of care discussed with patient: Curative [Palliative Care Plan] : not applicable at this time [FreeTextEntry1] : Pt to continue on followup for ca breast s/p mastectomy, her pos disease s/p TCHP. Pt to be followed for symptoms indicating relapsed disease. Pt to continue on Medical, GI GYN and Surgical followup. Pt to RTO in 6 months or sooner.

## 2020-08-19 ENCOUNTER — APPOINTMENT (OUTPATIENT)
Dept: HEMATOLOGY ONCOLOGY | Facility: CLINIC | Age: 80
End: 2020-08-19

## 2020-09-12 ENCOUNTER — OUTPATIENT (OUTPATIENT)
Dept: OUTPATIENT SERVICES | Facility: HOSPITAL | Age: 80
LOS: 1 days | Discharge: HOME | End: 2020-09-12

## 2020-09-12 DIAGNOSIS — Z96.649 PRESENCE OF UNSPECIFIED ARTIFICIAL HIP JOINT: Chronic | ICD-10-CM

## 2020-09-12 DIAGNOSIS — Z98.890 OTHER SPECIFIED POSTPROCEDURAL STATES: Chronic | ICD-10-CM

## 2020-09-12 DIAGNOSIS — C50.919 MALIGNANT NEOPLASM OF UNSPECIFIED SITE OF UNSPECIFIED FEMALE BREAST: ICD-10-CM

## 2020-09-16 ENCOUNTER — RESULT REVIEW (OUTPATIENT)
Age: 80
End: 2020-09-16

## 2020-09-16 ENCOUNTER — APPOINTMENT (OUTPATIENT)
Dept: HEMATOLOGY ONCOLOGY | Facility: CLINIC | Age: 80
End: 2020-09-16
Payer: MEDICARE

## 2020-09-16 VITALS
HEIGHT: 59.45 IN | RESPIRATION RATE: 16 BRPM | HEART RATE: 91 BPM | OXYGEN SATURATION: 96 % | DIASTOLIC BLOOD PRESSURE: 87 MMHG | SYSTOLIC BLOOD PRESSURE: 160 MMHG | TEMPERATURE: 98.5 F | WEIGHT: 178.57 LBS | BODY MASS INDEX: 35.52 KG/M2

## 2020-09-16 LAB
BASOPHILS # BLD AUTO: 0.03 K/UL — SIGNIFICANT CHANGE UP (ref 0–0.2)
BASOPHILS NFR BLD AUTO: 0.4 % — SIGNIFICANT CHANGE UP (ref 0–2)
EOSINOPHIL # BLD AUTO: 0.13 K/UL — SIGNIFICANT CHANGE UP (ref 0–0.5)
EOSINOPHIL NFR BLD AUTO: 1.7 % — SIGNIFICANT CHANGE UP (ref 0–6)
HCT VFR BLD CALC: 43.4 % — SIGNIFICANT CHANGE UP (ref 34.5–45)
HGB BLD-MCNC: 13.8 G/DL — SIGNIFICANT CHANGE UP (ref 11.5–15.5)
IMM GRANULOCYTES NFR BLD AUTO: 0.7 % — SIGNIFICANT CHANGE UP (ref 0–1.5)
LYMPHOCYTES # BLD AUTO: 1.61 K/UL — SIGNIFICANT CHANGE UP (ref 1–3.3)
LYMPHOCYTES # BLD AUTO: 21.4 % — SIGNIFICANT CHANGE UP (ref 13–44)
MCHC RBC-ENTMCNC: 27.5 PG — SIGNIFICANT CHANGE UP (ref 27–34)
MCHC RBC-ENTMCNC: 31.8 G/DL — LOW (ref 32–36)
MCV RBC AUTO: 86.6 FL — SIGNIFICANT CHANGE UP (ref 80–100)
MONOCYTES # BLD AUTO: 0.56 K/UL — SIGNIFICANT CHANGE UP (ref 0–0.9)
MONOCYTES NFR BLD AUTO: 7.4 % — SIGNIFICANT CHANGE UP (ref 2–14)
NEUTROPHILS # BLD AUTO: 5.15 K/UL — SIGNIFICANT CHANGE UP (ref 1.8–7.4)
NEUTROPHILS NFR BLD AUTO: 68.4 % — SIGNIFICANT CHANGE UP (ref 43–77)
NRBC # BLD: 0 /100 WBCS — SIGNIFICANT CHANGE UP (ref 0–0)
PLATELET # BLD AUTO: 281 K/UL — SIGNIFICANT CHANGE UP (ref 150–400)
RBC # BLD: 5.01 M/UL — SIGNIFICANT CHANGE UP (ref 3.8–5.2)
RBC # FLD: 13.4 % — SIGNIFICANT CHANGE UP (ref 10.3–14.5)
WBC # BLD: 7.53 K/UL — SIGNIFICANT CHANGE UP (ref 3.8–10.5)
WBC # FLD AUTO: 7.53 K/UL — SIGNIFICANT CHANGE UP (ref 3.8–10.5)

## 2020-09-16 PROCEDURE — 99214 OFFICE O/P EST MOD 30 MIN: CPT

## 2020-09-16 NOTE — ASSESSMENT
[FreeTextEntry1] : P t to continue surveillance s/p mastectomy for ER CA neg breast ca her pos s/p chemo TCH.Pt is s/p bilateral mastectomies and no need for imaging.Pt to do Medical and GYN followup and treatment.Pt to see Dr. Pike for surgical followup. Pt has small area of erythema in right chest wall incision.

## 2020-09-16 NOTE — HISTORY OF PRESENT ILLNESS
[Disease: _____________________] : Disease: [unfilled] [T: ___] : T[unfilled] [N: ___] : N[unfilled] [AJCC Stage: ____] : AJCC Stage: [unfilled] [M: ___] : M[unfilled] [de-identified] : This is a 77-year-old woman with history of breast carcinoma. The patient states her history dates back 26 years ago when she was found to have a right breast carcinoma which was resected and did not require further therapy. She states she has been undergoing followup mammograms however her last one was 2 years ago. The patient states in 2018 she noted a mass in the left breast and subsequently had a mammogram and ultrasound revealing a left breast mass. This was biopsied on 2018 and the biopsy was positive for invasive ductal carcinoma. The ER and [place] were negative and HER-2/emelina 3+ positive. The patient underwent MRI of the breast on  which revealed  a spiculated mass in the left breast which was the biopsy-proven malignancy along with extensive multicentric disease. The right breast revealed a 4 mm focus. The left axilla revealed a 3 x 1.5 cm lymph node.\par \par On May 1 the patient underwent bilateral mastectomy and left axillary dissection The left breast revealed invasive moderately differentiated ductal carcinoma, apocrine type. The tumor measured 5.1 cm and the [place] score was 7/9. There was one out of 22 lymph nodes positive for metastases along with extranodal extension measuring 1.5 mm. Lymph-vascular invasion and Derm lympho-vascular invasion were not identified. The tumor was stage T3 N1a.Stage IIIA. 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 The patient chose not to have reconstruction.\par \par The patient has done well postoperatively but does not slight discomfort in the anterior chest in the area of the incisions. Her family history is negative for breast cancer. Father  of lung cancer. 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 [de-identified] : IDC, mod diff, ER and [place] nrg, Her 3+ [de-identified] : Since the last visit the pt remains well and has no new symptoms.

## 2020-09-21 LAB
ALBUMIN SERPL ELPH-MCNC: 4.8 G/DL
ALP BLD-CCNC: 82 U/L
ALT SERPL-CCNC: 16 U/L
ANION GAP SERPL CALC-SCNC: 13 MMOL/L
AST SERPL-CCNC: 18 U/L
BILIRUB SERPL-MCNC: 0.3 MG/DL
BUN SERPL-MCNC: 14 MG/DL
CALCIUM SERPL-MCNC: 9.7 MG/DL
CANCER AG27-29 SERPL-ACNC: 12.5 U/ML
CHLORIDE SERPL-SCNC: 105 MMOL/L
CO2 SERPL-SCNC: 25 MMOL/L
CREAT SERPL-MCNC: 0.89 MG/DL
GLUCOSE SERPL-MCNC: 118 MG/DL
POTASSIUM SERPL-SCNC: 4.5 MMOL/L
PROT SERPL-MCNC: 7 G/DL
SODIUM SERPL-SCNC: 143 MMOL/L

## 2020-11-02 ENCOUNTER — APPOINTMENT (OUTPATIENT)
Dept: SURGERY | Facility: CLINIC | Age: 80
End: 2020-11-02
Payer: MEDICARE

## 2020-11-02 PROCEDURE — 99213K: CUSTOM

## 2021-03-09 ENCOUNTER — OUTPATIENT (OUTPATIENT)
Dept: OUTPATIENT SERVICES | Facility: HOSPITAL | Age: 81
LOS: 1 days | Discharge: HOME | End: 2021-03-09

## 2021-03-09 DIAGNOSIS — Z98.890 OTHER SPECIFIED POSTPROCEDURAL STATES: Chronic | ICD-10-CM

## 2021-03-09 DIAGNOSIS — C50.919 MALIGNANT NEOPLASM OF UNSPECIFIED SITE OF UNSPECIFIED FEMALE BREAST: ICD-10-CM

## 2021-03-09 DIAGNOSIS — Z96.649 PRESENCE OF UNSPECIFIED ARTIFICIAL HIP JOINT: Chronic | ICD-10-CM

## 2021-03-09 NOTE — PACU DISCHARGE NOTE - HYDRATION STATUS:
Please see last mammo report from [facility], done 7-26-19. Please order mammo needed. Pt will go to [facility]. Satisfactory

## 2021-03-10 ENCOUNTER — APPOINTMENT (OUTPATIENT)
Dept: HEMATOLOGY ONCOLOGY | Facility: CLINIC | Age: 81
End: 2021-03-10
Payer: MEDICARE

## 2021-03-10 VITALS
OXYGEN SATURATION: 98 % | TEMPERATURE: 97.5 F | HEART RATE: 84 BPM | BODY MASS INDEX: 35.96 KG/M2 | SYSTOLIC BLOOD PRESSURE: 176 MMHG | HEIGHT: 59.45 IN | RESPIRATION RATE: 18 BRPM | DIASTOLIC BLOOD PRESSURE: 95 MMHG | WEIGHT: 180.78 LBS

## 2021-03-10 PROCEDURE — 99214 OFFICE O/P EST MOD 30 MIN: CPT

## 2021-03-11 NOTE — PHYSICAL EXAM
[Restricted in physically strenuous activity but ambulatory and able to carry out work of a light or sedentary nature] : Status 1- Restricted in physically strenuous activity but ambulatory and able to carry out work of a light or sedentary nature, e.g., light house work, office work [Normal] : affect appropriate [de-identified] : bilateral mastedtomies

## 2021-03-11 NOTE — HISTORY OF PRESENT ILLNESS
[Disease: _____________________] : Disease: [unfilled] [T: ___] : T[unfilled] [N: ___] : N[unfilled] [M: ___] : M[unfilled] [AJCC Stage: ____] : AJCC Stage: [unfilled] [de-identified] : This is a 77-year-old woman with history of breast carcinoma. The patient states her history dates back 26 years ago when she was found to have a right breast carcinoma which was resected and did not require further therapy. She states she has been undergoing followup mammograms however her last one was 2 years ago. The patient states in 2018 she noted a mass in the left breast and subsequently had a mammogram and ultrasound revealing a left breast mass. This was biopsied on 2018 and the biopsy was positive for invasive ductal carcinoma. The ER and [place] were negative and HER-2/emelina 3+ positive. The patient underwent MRI of the breast on  which revealed  a spiculated mass in the left breast which was the biopsy-proven malignancy along with extensive multicentric disease. The right breast revealed a 4 mm focus. The left axilla revealed a 3 x 1.5 cm lymph node.\par \par On May 1 the patient underwent bilateral mastectomy and left axillary dissection The left breast revealed invasive moderately differentiated ductal carcinoma, apocrine type. The tumor measured 5.1 cm and the [place] score was 7/9. There was one out of 22 lymph nodes positive for metastases along with extranodal extension measuring 1.5 mm. Lymph-vascular invasion and Derm lympho-vascular invasion were not identified. The tumor was stage T3 N1a.Stage IIIA. 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 The patient chose not to have reconstruction.\par \par The patient has done well postoperatively but does not slight discomfort in the anterior chest in the area of the incisions. Her family history is negative for breast cancer. Father  of lung cancer. 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 [de-identified] : IDC, mod diff, ER and [place] neg, Her 3+ [de-identified] : Since the last visit the pt remains well and has no symptoms indicating .

## 2021-03-11 NOTE — ASSESSMENT
[FreeTextEntry1] : The patient will continue surveillance for her stage III breast carcinoma which is ER/[place] negative HER-2 positive status post treatment with Taxol carboplatin Herceptin and Perjeta 3 years ago.  She completed her therapy 2 years ago.  She continues to be followed and undergoes surveillance for recurrence of her disease.  Currently there is no evidence of recurrent disease.  She will continue medical, GI and GYN follow-up and testing.  She will also undergo surgical follow-up.  The patient is a candidate for colonoscopy and bone density testing.  She will return in 6 months or sooner as needed. [Curative] : Goals of care discussed with patient: Curative [Palliative Care Plan] : not applicable at this time

## 2021-08-06 ENCOUNTER — APPOINTMENT (OUTPATIENT)
Dept: GASTROENTEROLOGY | Facility: CLINIC | Age: 81
End: 2021-08-06
Payer: MEDICARE

## 2021-08-06 VITALS
DIASTOLIC BLOOD PRESSURE: 80 MMHG | WEIGHT: 175 LBS | SYSTOLIC BLOOD PRESSURE: 160 MMHG | TEMPERATURE: 97.8 F | HEIGHT: 59 IN | BODY MASS INDEX: 35.28 KG/M2

## 2021-08-06 DIAGNOSIS — Z63.4 DISAPPEARANCE AND DEATH OF FAMILY MEMBER: ICD-10-CM

## 2021-08-06 PROCEDURE — 99215 OFFICE O/P EST HI 40 MIN: CPT

## 2021-08-06 RX ORDER — AMLODIPINE BESYLATE 5 MG/1
5 TABLET ORAL
Refills: 0 | Status: ACTIVE | COMMUNITY

## 2021-08-06 RX ORDER — BISOPROLOL FUMARATE 10 MG/1
10 TABLET, FILM COATED ORAL
Refills: 0 | Status: ACTIVE | COMMUNITY

## 2021-08-06 RX ORDER — SIMVASTATIN 20 MG/1
20 TABLET, FILM COATED ORAL
Refills: 0 | Status: ACTIVE | COMMUNITY

## 2021-08-06 SDOH — SOCIAL STABILITY - SOCIAL INSECURITY: DISSAPEARANCE AND DEATH OF FAMILY MEMBER: Z63.4

## 2021-08-06 NOTE — CONSULT LETTER
[Dear  ___] : Dear  [unfilled], [Consult Letter:] : I had the pleasure of evaluating your patient, [unfilled]. [( Thank you for referring [unfilled] for consultation for _____ )] : Thank you for referring [unfilled] for consultation for [unfilled] [Please see my note below.] : Please see my note below. [Consult Closing:] : Thank you very much for allowing me to participate in the care of this patient.  If you have any questions, please do not hesitate to contact me. [Sincerely,] : Sincerely, [FreeTextEntry3] : Don\par \par Jamari Holt MD\par

## 2021-08-06 NOTE — REASON FOR VISIT
[FreeTextEntry1] : Colon cancer screening.  Difficulty swallowing.  Intestinal metaplasia.  History of breast cancer.

## 2021-08-06 NOTE — ASSESSMENT
[FreeTextEntry1] : SHIN DE LEÓN was advised to undergo colonoscopy to which she agreed. The procedure will be performed in [facility] with the assistance of an anesthesiologist. The patient was given a Suprep preparation prescription and understood the \par procedure as it was explained to her. She was given a booklet distributed by the [employer]\par  Endoscopy explaining the procedure in detail and she understood the risks of the procedure not limited to infection, bleeding,\par perforation or non- diagnosis of colorectal cancer. She was advised that she could not drive home, if she chooses to\par  receive sedation.\par \par Further diagnostic and treatment recommendations will be based upon the procedure and any biopsies, if they are taken.\par \par Thank you for allowing me to participate in this patients health care.\par \par , Best personal regards -- Don\par \par SHIN DE LEÓN was advised to undergo endoscopy to which she agreed. The procedure will be performed in [facility] with the assistance of an anesthesiologist. She was given a booklet distributed by the [employer] G[employer] End[employer] explaining the procedure in detail and she understood the risks of the procedure not limited to infection, bleeding, perforation or non- diagnosis of gastric or esophageal cancer.  She was advised that she could not drive home, if she chooses to receive sedation. Further diagnostic and treatment recommendations will be based upon the procedure and any biopsies, if they are taken. Thank you for allowing me to participate in this patients health care.\par

## 2021-08-06 NOTE — HISTORY OF PRESENT ILLNESS
[FreeTextEntry1] : He is a 80-year-old female referred for a screening colonoscopy.  Her last colonoscopy was 6/11/2011 which was normal.  She  also admits to occasional difficulty in swallowing solid food.  She has a past history of intestinal metaplasia.  She also has a history of breast cancer.

## 2021-08-31 ENCOUNTER — APPOINTMENT (OUTPATIENT)
Dept: GASTROENTEROLOGY | Facility: AMBULATORY SURGERY CENTER | Age: 81
End: 2021-08-31
Payer: MEDICARE

## 2021-08-31 PROCEDURE — 43239 EGD BIOPSY SINGLE/MULTIPLE: CPT

## 2021-08-31 PROCEDURE — 45382 COLONOSCOPY W/CONTROL BLEED: CPT | Mod: 59

## 2021-08-31 PROCEDURE — 45380 COLONOSCOPY AND BIOPSY: CPT | Mod: 59

## 2021-08-31 PROCEDURE — 45385 COLONOSCOPY W/LESION REMOVAL: CPT

## 2021-09-23 ENCOUNTER — APPOINTMENT (OUTPATIENT)
Dept: GASTROENTEROLOGY | Facility: CLINIC | Age: 81
End: 2021-09-23

## 2021-09-28 ENCOUNTER — OUTPATIENT (OUTPATIENT)
Dept: OUTPATIENT SERVICES | Facility: HOSPITAL | Age: 81
LOS: 1 days | Discharge: HOME | End: 2021-09-28

## 2021-09-28 DIAGNOSIS — Z96.649 PRESENCE OF UNSPECIFIED ARTIFICIAL HIP JOINT: Chronic | ICD-10-CM

## 2021-09-28 DIAGNOSIS — Z98.890 OTHER SPECIFIED POSTPROCEDURAL STATES: Chronic | ICD-10-CM

## 2021-09-28 DIAGNOSIS — C50.919 MALIGNANT NEOPLASM OF UNSPECIFIED SITE OF UNSPECIFIED FEMALE BREAST: ICD-10-CM

## 2021-09-29 ENCOUNTER — RESULT REVIEW (OUTPATIENT)
Age: 81
End: 2021-09-29

## 2021-09-29 ENCOUNTER — APPOINTMENT (OUTPATIENT)
Dept: HEMATOLOGY ONCOLOGY | Facility: CLINIC | Age: 81
End: 2021-09-29
Payer: MEDICARE

## 2021-09-29 VITALS
HEART RATE: 69 BPM | WEIGHT: 175.02 LBS | TEMPERATURE: 97.2 F | HEIGHT: 59 IN | DIASTOLIC BLOOD PRESSURE: 84 MMHG | RESPIRATION RATE: 18 BRPM | SYSTOLIC BLOOD PRESSURE: 169 MMHG | OXYGEN SATURATION: 99 % | BODY MASS INDEX: 35.28 KG/M2

## 2021-09-29 LAB
BASOPHILS # BLD AUTO: 0.04 K/UL — SIGNIFICANT CHANGE UP (ref 0–0.2)
BASOPHILS NFR BLD AUTO: 0.5 % — SIGNIFICANT CHANGE UP (ref 0–2)
EOSINOPHIL # BLD AUTO: 0.21 K/UL — SIGNIFICANT CHANGE UP (ref 0–0.5)
EOSINOPHIL NFR BLD AUTO: 2.5 % — SIGNIFICANT CHANGE UP (ref 0–6)
HCT VFR BLD CALC: 44.2 % — SIGNIFICANT CHANGE UP (ref 34.5–45)
HGB BLD-MCNC: 14.1 G/DL — SIGNIFICANT CHANGE UP (ref 11.5–15.5)
IMM GRANULOCYTES NFR BLD AUTO: 1 % — SIGNIFICANT CHANGE UP (ref 0–1.5)
LYMPHOCYTES # BLD AUTO: 1.98 K/UL — SIGNIFICANT CHANGE UP (ref 1–3.3)
LYMPHOCYTES # BLD AUTO: 23.9 % — SIGNIFICANT CHANGE UP (ref 13–44)
MCHC RBC-ENTMCNC: 27.3 PG — SIGNIFICANT CHANGE UP (ref 27–34)
MCHC RBC-ENTMCNC: 31.9 G/DL — LOW (ref 32–36)
MCV RBC AUTO: 85.5 FL — SIGNIFICANT CHANGE UP (ref 80–100)
MONOCYTES # BLD AUTO: 0.59 K/UL — SIGNIFICANT CHANGE UP (ref 0–0.9)
MONOCYTES NFR BLD AUTO: 7.1 % — SIGNIFICANT CHANGE UP (ref 2–14)
NEUTROPHILS # BLD AUTO: 5.38 K/UL — SIGNIFICANT CHANGE UP (ref 1.8–7.4)
NEUTROPHILS NFR BLD AUTO: 65 % — SIGNIFICANT CHANGE UP (ref 43–77)
NRBC # BLD: 0 /100 WBCS — SIGNIFICANT CHANGE UP (ref 0–0)
PLATELET # BLD AUTO: 276 K/UL — SIGNIFICANT CHANGE UP (ref 150–400)
RBC # BLD: 5.17 M/UL — SIGNIFICANT CHANGE UP (ref 3.8–5.2)
RBC # FLD: 13.4 % — SIGNIFICANT CHANGE UP (ref 10.3–14.5)
WBC # BLD: 8.28 K/UL — SIGNIFICANT CHANGE UP (ref 3.8–10.5)
WBC # FLD AUTO: 8.28 K/UL — SIGNIFICANT CHANGE UP (ref 3.8–10.5)

## 2021-09-29 PROCEDURE — 99214 OFFICE O/P EST MOD 30 MIN: CPT

## 2021-09-29 NOTE — ASSESSMENT
[Curative] : Goals of care discussed with patient: Curative [Palliative Care Plan] : not applicable at this time [FreeTextEntry1] : The patient will continue surveillance for her stage III breast carcinoma which is ER/[place] negative HER-2 positive status post treatment with Taxol carboplatin Herceptin and Perjeta completed 5/2019. She continues to be Currently there is no evidence of recurrent disease. Ordered repeat labs, f/u. Reviewed labs CBC, CMP, CA 27-29.  She will continue medical, GI and GYN follow-up and testing. Pt has depression RX Lexapro 10 lmg daily given. Patient is advised to follow up with PCP or Psychiatrist referral given. She will return in 6 months or sooner as needed.

## 2021-09-29 NOTE — PHYSICAL EXAM
[Restricted in physically strenuous activity but ambulatory and able to carry out work of a light or sedentary nature] : Status 1- Restricted in physically strenuous activity but ambulatory and able to carry out work of a light or sedentary nature, e.g., light house work, office work [Normal] : affect appropriate [de-identified] : bilateral mastectomies, well healed scars to chest wall, no masses

## 2021-09-29 NOTE — ASSESSMENT
[Curative] : Goals of care discussed with patient: Curative [Palliative Care Plan] : not applicable at this time [FreeTextEntry1] : The patient will continue surveillance for her stage III breast carcinoma which is ER/[place] negative HER-2 positive status post treatment with Taxol carboplatin Herceptin and Perjeta completed 5/2019. She continues to be Currently there is no evidence of recurrent disease. Ordered repeat labs, f/u. Reviewed labs CBC, CMP, CA 27-29.  She will continue medical, GI and GYN follow-up and testing. Pt has depression RX Lexapro 10 lmg daily given. Patient is advised to follow up with PCP or Psychiatrist referral given. She will return in 6 months or sooner as needed.

## 2021-09-29 NOTE — HISTORY OF PRESENT ILLNESS
[Disease: _____________________] : Disease: [unfilled] [T: ___] : T[unfilled] [N: ___] : N[unfilled] [M: ___] : M[unfilled] [AJCC Stage: ____] : AJCC Stage: [unfilled] [de-identified] : This is a 77-year-old woman with history of breast carcinoma. The patient states her history dates back 26 years ago when she was found to have a right breast carcinoma which was resected and did not require further therapy. She states she has been undergoing followup mammograms however her last one was 2 years ago. The patient states in 2018 she noted a mass in the left breast and subsequently had a mammogram and ultrasound revealing a left breast mass. This was biopsied on 2018 and the biopsy was positive for invasive ductal carcinoma. The ER and [place] were negative and HER-2/emelina 3+ positive. The patient underwent MRI of the breast on  which revealed  a spiculated mass in the left breast which was the biopsy-proven malignancy along with extensive multicentric disease. The right breast revealed a 4 mm focus. The left axilla revealed a 3 x 1.5 cm lymph node.\par \par On May 1 the patient underwent bilateral mastectomy and left axillary dissection The left breast revealed invasive moderately differentiated ductal carcinoma, apocrine type. The tumor measured 5.1 cm and the [place] score was 7/9. There was one out of 22 lymph nodes positive for metastases along with extranodal extension measuring 1.5 mm. Lymph-vascular invasion and Derm lympho-vascular invasion were not identified. The tumor was stage T3 N1a.Stage IIIA. 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 The patient chose not to have reconstruction.\par \par The patient has done well postoperatively but does not slight discomfort in the anterior chest in the area of the incisions. Her family history is negative for breast cancer. Father  of lung cancer. 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 [de-identified] : IDC, mod diff, ER and ME neg, Her 3+ [de-identified] : Patient is here for 6 months f/u. She feels okay except some + depression and crying often. Recently, she had endoscopy/colonoscopy on 08/31/2021 with + polyps removed. Patient is scheduled for f/u with GI MD in 2 weeks. Patient has frequent BM's, but no bloody or melena stools. Denies any pain.

## 2021-09-29 NOTE — HISTORY OF PRESENT ILLNESS
[Disease: _____________________] : Disease: [unfilled] [T: ___] : T[unfilled] [N: ___] : N[unfilled] [M: ___] : M[unfilled] [AJCC Stage: ____] : AJCC Stage: [unfilled] [de-identified] : This is a 77-year-old woman with history of breast carcinoma. The patient states her history dates back 26 years ago when she was found to have a right breast carcinoma which was resected and did not require further therapy. She states she has been undergoing followup mammograms however her last one was 2 years ago. The patient states in 2018 she noted a mass in the left breast and subsequently had a mammogram and ultrasound revealing a left breast mass. This was biopsied on 2018 and the biopsy was positive for invasive ductal carcinoma. The ER and [place] were negative and HER-2/emelina 3+ positive. The patient underwent MRI of the breast on  which revealed  a spiculated mass in the left breast which was the biopsy-proven malignancy along with extensive multicentric disease. The right breast revealed a 4 mm focus. The left axilla revealed a 3 x 1.5 cm lymph node.\par \par On May 1 the patient underwent bilateral mastectomy and left axillary dissection The left breast revealed invasive moderately differentiated ductal carcinoma, apocrine type. The tumor measured 5.1 cm and the [place] score was 7/9. There was one out of 22 lymph nodes positive for metastases along with extranodal extension measuring 1.5 mm. Lymph-vascular invasion and Derm lympho-vascular invasion were not identified. The tumor was stage T3 N1a.Stage IIIA. 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 The patient chose not to have reconstruction.\par \par The patient has done well postoperatively but does not slight discomfort in the anterior chest in the area of the incisions. Her family history is negative for breast cancer. Father  of lung cancer. 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 [de-identified] : IDC, mod diff, ER and CT neg, Her 3+ [de-identified] : Patient is here for 6 months f/u. She feels okay except some + depression and crying often. Recently, she had endoscopy/colonoscopy on 08/31/2021 with + polyps removed. Patient is scheduled for f/u with GI MD in 2 weeks. Patient has frequent BM's, but no bloody or melena stools. Denies any pain.

## 2021-09-29 NOTE — PHYSICAL EXAM
[Restricted in physically strenuous activity but ambulatory and able to carry out work of a light or sedentary nature] : Status 1- Restricted in physically strenuous activity but ambulatory and able to carry out work of a light or sedentary nature, e.g., light house work, office work [Normal] : affect appropriate [de-identified] : bilateral mastectomies, well healed scars to chest wall, no masses

## 2021-09-30 LAB
ALBUMIN SERPL ELPH-MCNC: 4.4 G/DL
ALP BLD-CCNC: 93 U/L
ALT SERPL-CCNC: 14 U/L
ANION GAP SERPL CALC-SCNC: 16 MMOL/L
AST SERPL-CCNC: 18 U/L
BILIRUB SERPL-MCNC: 0.3 MG/DL
BUN SERPL-MCNC: 16 MG/DL
CALCIUM SERPL-MCNC: 10 MG/DL
CHLORIDE SERPL-SCNC: 103 MMOL/L
CO2 SERPL-SCNC: 23 MMOL/L
CREAT SERPL-MCNC: 0.85 MG/DL
GLUCOSE SERPL-MCNC: 117 MG/DL
POTASSIUM SERPL-SCNC: 5 MMOL/L
PROT SERPL-MCNC: 6.8 G/DL
SODIUM SERPL-SCNC: 142 MMOL/L

## 2021-10-03 LAB — CANCER AG27-29 SERPL-ACNC: 16.5 U/ML

## 2021-10-12 ENCOUNTER — APPOINTMENT (OUTPATIENT)
Dept: GASTROENTEROLOGY | Facility: CLINIC | Age: 81
End: 2021-10-12
Payer: MEDICARE

## 2021-10-12 VITALS
TEMPERATURE: 97.7 F | DIASTOLIC BLOOD PRESSURE: 82 MMHG | SYSTOLIC BLOOD PRESSURE: 132 MMHG | WEIGHT: 172 LBS | BODY MASS INDEX: 34.68 KG/M2 | HEIGHT: 59 IN

## 2021-10-12 DIAGNOSIS — K31.A0 GASTRIC INTESTINAL METAPLASIA, UNSPECIFIED: ICD-10-CM

## 2021-10-12 DIAGNOSIS — R13.19 OTHER DYSPHAGIA: ICD-10-CM

## 2021-10-12 PROCEDURE — 99215 OFFICE O/P EST HI 40 MIN: CPT

## 2021-10-12 NOTE — HISTORY OF PRESENT ILLNESS
[FreeTextEntry1] : Colonoscopy revealed 4 polyps with one on the larger  side.  The pathology revealed 3 were tubular adenomas and one was a tubulovillous adenoma.  Endoscopy revealed a few small gastric polyps and a small hiatal hernia.  Biopsies were negative for Helicobacter pylori but positive for intestinal metaplasia.  She was started on pantoprazole but did not take it.  She is feeling well and has no complaints at this time

## 2021-10-12 NOTE — PHYSICAL EXAM
[General Appearance - Alert] : alert [General Appearance - In No Acute Distress] : in no acute distress [Sclera] : the sclera and conjunctiva were normal [PERRL With Normal Accommodation] : pupils were equal in size, round, and reactive to light [Extraocular Movements] : extraocular movements were intact [Neck Appearance] : the appearance of the neck was normal [Neck Cervical Mass (___cm)] : no neck mass was observed [Jugular Venous Distention Increased] : there was no jugular-venous distention [Thyroid Nodule] : there were no palpable thyroid nodules [Thyroid Diffuse Enlargement] : the thyroid was not enlarged [Auscultation Breath Sounds / Voice Sounds] : lungs were clear to auscultation bilaterally [Heart Rate And Rhythm] : heart rate was normal and rhythm regular [Heart Sounds Gallop] : no gallops [Heart Sounds] : normal S1 and S2 [Murmurs] : no murmurs [Heart Sounds Pericardial Friction Rub] : no pericardial rub [Bowel Sounds] : normal bowel sounds [Abdomen Soft] : soft [Abdomen Tenderness] : non-tender [] : no hepato-splenomegaly [Oriented To Time, Place, And Person] : oriented to person, place, and time [Abdomen Mass (___ Cm)] : no abdominal mass palpated [Impaired Insight] : insight and judgment were intact [Affect] : the affect was normal

## 2021-10-12 NOTE — ASSESSMENT
[FreeTextEntry1] : Start pantoprazole 40 mg daily 1 hour before breakfast\par [facility] endoscopy in 3 to 4 months to confirm normalization of her gastric mucosa\par P colonoscopy 1 year due to villous components of her large colonic polyp\par \par SHIN DE LEÓN was advised to undergo endoscopy to which she agreed. The procedure will be performed in [facility] with the assistance of an anesthesiologist. She was given a booklet distributed by the American Society of Gastrointestinal Endoscopy explaining the procedure in detail and she understood the risks of the procedure not limited to infection, bleeding, perforation or non- diagnosis of gastric or esophageal cancer.  She was advised that she could not drive home, if she chooses to receive sedation. Further diagnostic and treatment recommendations will be based upon the procedure and any biopsies, if they are taken. Thank you for allowing me to participate in this [facility] health care.\par

## 2021-11-15 ENCOUNTER — APPOINTMENT (OUTPATIENT)
Dept: SURGERY | Facility: CLINIC | Age: 81
End: 2021-11-15
Payer: MEDICARE

## 2021-11-15 PROCEDURE — 99213K: CUSTOM

## 2022-01-20 ENCOUNTER — APPOINTMENT (OUTPATIENT)
Dept: GASTROENTEROLOGY | Facility: AMBULATORY SURGERY CENTER | Age: 82
End: 2022-01-20

## 2022-02-14 NOTE — INPATIENT CERTIFICATION FOR MEDICARE PATIENTS - RISKS OF ADVERSE EVENTS
Patient Education     Reducing Knee Pain and Swelling    Many treatments can help reduce pain and swelling in your knee. Your healthcare provider or physical therapist may suggest one or more of the following treatments:  · Icing your knee helps reduce swelling. You may be asked to ice your knee once a day or more. Apply ice for about 15 to 20 minutes at a time, with at least 40 minutes between sessions. Always keep a towel between the ice and your skin.   · Keeping your leg raised above your heart helps excess fluid flow out of your knee joint. This reduces swelling.  · Compression means wrapping an elastic bandage or neoprene sleeve snugly around your knees. This keeps fluid from collecting in your knee joint.  · Electrical stimulation, done by a physical therapist or , can help reduce excess fluid in your knee joint.  · Anti-inflammatory medicines may be prescribed by your healthcare provider. You may take pills or receive injections in your knee.  · Isometric (heather) exercises strengthen the muscles that support your knee joint. They also help reduce excess fluid in your knee.  · Massage helps fluid drain away from your knee.  Date Last Reviewed: 10/13/2015  © 5067-9955 [employer]. [address] [place] [address], [place] [address]. All rights reserved. This information is not intended as a substitute for professional medical care. Always follow your healthcare professional's instructions.           
Concern for cardiopulmonary deterioration/Concern for delay in diagnosis and treatment

## 2022-02-28 ENCOUNTER — APPOINTMENT (OUTPATIENT)
Dept: GASTROENTEROLOGY | Facility: CLINIC | Age: 82
End: 2022-02-28
Payer: MEDICARE

## 2022-02-28 VITALS
BODY MASS INDEX: 31.72 KG/M2 | WEIGHT: 168 LBS | HEIGHT: 61 IN | DIASTOLIC BLOOD PRESSURE: 90 MMHG | SYSTOLIC BLOOD PRESSURE: 140 MMHG

## 2022-02-28 DIAGNOSIS — R15.2 FECAL URGENCY: ICD-10-CM

## 2022-02-28 DIAGNOSIS — K21.9 GASTRO-ESOPHAGEAL REFLUX DISEASE W/OUT ESOPHAGITIS: ICD-10-CM

## 2022-02-28 DIAGNOSIS — Z12.11 ENCOUNTER FOR SCREENING FOR MALIGNANT NEOPLASM OF COLON: ICD-10-CM

## 2022-02-28 DIAGNOSIS — K31.A0 GASTRIC INTESTINAL METAPLASIA, UNSPECIFIED: ICD-10-CM

## 2022-02-28 DIAGNOSIS — D37.4 NEOPLASM OF UNCERTAIN BEHAVIOR OF COLON: ICD-10-CM

## 2022-02-28 PROCEDURE — 99072 ADDL SUPL MATRL&STAF TM PHE: CPT

## 2022-02-28 PROCEDURE — 99214 OFFICE O/P EST MOD 30 MIN: CPT

## 2022-02-28 NOTE — ASSESSMENT
[FreeTextEntry1] : High-fiber diet\par Start Metamucil 1 tablespoon in 8 ounces of water after dinner\par Colonoscopy in August of this year\par SHIN DE LEÓN was advised to undergo endoscopy to which she agreed. The procedure will be performed in [facility] with the assistance of an anesthesiologist. She was given a booklet distributed by the American Society of Gastrointestinal Endoscopy explaining the procedure in detail and she understood the risks of the procedure not limited to infection, bleeding, perforation or non- diagnosis of gastric or esophageal cancer.  She was advised that she could not drive home, if she chooses to receive sedation. Further diagnostic and treatment recommendations will be based upon the procedure and any biopsies, if they are taken. Thank you for allowing me to participate in this [facility] health care.\par

## 2022-02-28 NOTE — HISTORY OF PRESENT ILLNESS
[FreeTextEntry1] : She has been taking pantoprazole with resolution of her heartburn.  Her last endoscopy last August revealed intestinal metaplasia of the gastric mucosa.  She is in need of a follow-up endoscopy to confirm  normalization of her gastric mucosa.  She also admits to postprandial bowel movements.  Her colonoscopy last August revealed 4 polyps with one on the larger side which was a tubulovillous adenoma . The other  3 were tubular adenomas

## 2022-02-28 NOTE — REASON FOR VISIT
[FreeTextEntry1] : Denies any metaplasia.  Heartburn.  Postprandial urgency to have a bowel movement history of colon polyps

## 2022-02-28 NOTE — PHYSICAL EXAM
[General Appearance - Alert] : alert [General Appearance - In No Acute Distress] : in no acute distress [Sclera] : the sclera and conjunctiva were normal [PERRL With Normal Accommodation] : pupils were equal in size, round, and reactive to light [Extraocular Movements] : extraocular movements were intact [Neck Appearance] : the appearance of the neck was normal [Neck Cervical Mass (___cm)] : no neck mass was observed [Jugular Venous Distention Increased] : there was no jugular-venous distention [Thyroid Diffuse Enlargement] : the thyroid was not enlarged [Thyroid Nodule] : there were no palpable thyroid nodules [Auscultation Breath Sounds / Voice Sounds] : lungs were clear to auscultation bilaterally [Heart Rate And Rhythm] : heart rate was normal and rhythm regular [Heart Sounds] : normal S1 and S2 [Heart Sounds Gallop] : no gallops [Murmurs] : no murmurs [Heart Sounds Pericardial Friction Rub] : no pericardial rub [Bowel Sounds] : normal bowel sounds [Abdomen Soft] : soft [Abdomen Tenderness] : non-tender [Abdomen Mass (___ Cm)] : no abdominal mass palpated [No CVA Tenderness] : no ~M costovertebral angle tenderness [No Spinal Tenderness] : no spinal tenderness [Abnormal Walk] : normal gait [Nail Clubbing] : no clubbing  or cyanosis of the fingernails [Musculoskeletal - Swelling] : no joint swelling seen [Motor Tone] : muscle strength and tone were normal [Skin Color & Pigmentation] : normal skin color and pigmentation [Skin Turgor] : normal skin turgor [] : no rash [Deep Tendon Reflexes (DTR)] : deep tendon reflexes were 2+ and symmetric [Sensation] : the sensory exam was normal to light touch and pinprick [No Focal Deficits] : no focal deficits [Oriented To Time, Place, And Person] : oriented to person, place, and time [Impaired Insight] : insight and judgment were intact [Affect] : the affect was normal

## 2022-02-28 NOTE — CONSULT LETTER
[Dear  ___] : Dear  [unfilled], [Consult Letter:] : I had the pleasure of evaluating your patient, [unfilled]. [( Thank you for referring [unfilled] for consultation for _____ )] : Thank you for referring [unfilled] for consultation for [unfilled] [Please see my note below.] : Please see my note below. [Consult Closing:] : Thank you very much for allowing me to participate in the care of this patient.  If you have any questions, please do not hesitate to contact me. [Sincerely,] : Sincerely, [FreeTextEntry3] : Don]\par \par Jamari Holt MD\par \par Gastroenterology\par [facility]\par [facility]\par \par

## 2022-03-28 ENCOUNTER — OUTPATIENT (OUTPATIENT)
Dept: OUTPATIENT SERVICES | Facility: HOSPITAL | Age: 82
LOS: 1 days | Discharge: HOME | End: 2022-03-28

## 2022-03-28 DIAGNOSIS — Z98.890 OTHER SPECIFIED POSTPROCEDURAL STATES: Chronic | ICD-10-CM

## 2022-03-28 DIAGNOSIS — Z96.649 PRESENCE OF UNSPECIFIED ARTIFICIAL HIP JOINT: Chronic | ICD-10-CM

## 2022-03-28 DIAGNOSIS — C50.919 MALIGNANT NEOPLASM OF UNSPECIFIED SITE OF UNSPECIFIED FEMALE BREAST: ICD-10-CM

## 2022-03-30 ENCOUNTER — APPOINTMENT (OUTPATIENT)
Dept: HEMATOLOGY ONCOLOGY | Facility: CLINIC | Age: 82
End: 2022-03-30
Payer: MEDICARE

## 2022-03-30 VITALS
WEIGHT: 167.55 LBS | HEART RATE: 97 BPM | SYSTOLIC BLOOD PRESSURE: 169 MMHG | RESPIRATION RATE: 16 BRPM | DIASTOLIC BLOOD PRESSURE: 90 MMHG | HEIGHT: 60.98 IN | OXYGEN SATURATION: 96 % | BODY MASS INDEX: 31.63 KG/M2 | TEMPERATURE: 97.4 F

## 2022-03-30 DIAGNOSIS — K63.5 POLYP OF COLON: ICD-10-CM

## 2022-03-30 PROCEDURE — 99213 OFFICE O/P EST LOW 20 MIN: CPT

## 2022-03-30 RX ORDER — OMEPRAZOLE 40 MG/1
40 CAPSULE, DELAYED RELEASE ORAL
Qty: 60 | Refills: 3 | Status: DISCONTINUED | COMMUNITY
Start: 2018-06-04 | End: 2022-03-30

## 2022-03-30 NOTE — PHYSICAL EXAM
[Restricted in physically strenuous activity but ambulatory and able to carry out work of a light or sedentary nature] : Status 1- Restricted in physically strenuous activity but ambulatory and able to carry out work of a light or sedentary nature, e.g., light house work, office work [Normal] : affect appropriate [de-identified] : bilateral mastectomies, well healed scars to chest wall, no masses

## 2022-03-30 NOTE — HISTORY OF PRESENT ILLNESS
[Disease: _____________________] : Disease: [unfilled] [T: ___] : T[unfilled] [N: ___] : N[unfilled] [M: ___] : M[unfilled] [AJCC Stage: ____] : AJCC Stage: [unfilled] [de-identified] : This is a 77-year-old woman with history of breast carcinoma. The patient states her history dates back 26 years ago when she was found to have a right breast carcinoma which was resected and did not require further therapy. She states she has been undergoing followup mammograms however her last one was 2 years ago. The patient states in 2018 she noted a mass in the left breast and subsequently had a mammogram and ultrasound revealing a left breast mass. This was biopsied on 2018 and the biopsy was positive for invasive ductal carcinoma. The ER and [place] were negative and HER-2/emelina 3+ positive. The patient underwent MRI of the breast on  which revealed  a spiculated mass in the left breast which was the biopsy-proven malignancy along with extensive multicentric disease. The right breast revealed a 4 mm focus. The left axilla revealed a 3 x 1.5 cm lymph node.\par \par On May 1 the patient underwent bilateral mastectomy and left axillary dissection The left breast revealed invasive moderately differentiated ductal carcinoma, apocrine type. The tumor measured 5.1 cm and the [place] score was 7/9. There was one out of 22 lymph nodes positive for metastases along with extranodal extension measuring 1.5 mm. Lymph-vascular invasion and Derm lympho-vascular invasion were not identified. The tumor was stage T3 N1a.Stage IIIA. 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 The patient chose not to have reconstruction.\par \par The patient has done well postoperatively but does not slight discomfort in the anterior chest in the area of the incisions. Her family history is negative for breast cancer. Father  of lung cancer. 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 [de-identified] : IDC, mod diff, ER and [place] neg, Her 3+ [de-identified] : Patient comes for f/u. She feels okay. Denies any pain to chest wall.  She has no abdominal pain, nausea, vomiting, constipation, bloody or melena stools. Patient has frequent BM's, but no bloody or melena stools. She had a endoscopy and colonoscopy on 08/31/2021, Pathology report showed: Stomach greater curvature biopsy was positive for intestinal metaplasia, and colon polypectomy showed: Tubular adenoma.

## 2022-03-30 NOTE — REVIEW OF SYSTEMS
[Anxiety] : anxiety [Depression] : depression [Negative] : Allergic/Immunologic [Diarrhea] : diarrhea [Abdominal Pain] : no abdominal pain [Vomiting] : no vomiting [FreeTextEntry7] : frequent loose BM, no melena or bloody stools.

## 2022-03-30 NOTE — ASSESSMENT
[Curative] : Goals of care discussed with patient: Curative [Palliative Care Plan] : not applicable at this time [FreeTextEntry1] : The patient will continue surveillance for her stage III breast carcinoma which is ER/[place] negative HER-2 positive status post treatment with Taxol carboplatin Herceptin and Perjeta completed 5/2019. Currently there is no evidence of recurrent disease. Ordered repeat labs, f/u. Reviewed labs CBC, CMP, CA 27-29.  She will continue medical, GI and GYN follow-up and testing. Patient is schedule to repeat endoscopy in June, f/u, and she is advised to repeat colonoscopy in 8/2022. RTO in 6 months.

## 2022-05-26 ENCOUNTER — NON-APPOINTMENT (OUTPATIENT)
Age: 82
End: 2022-05-26

## 2022-06-07 ENCOUNTER — APPOINTMENT (OUTPATIENT)
Dept: GASTROENTEROLOGY | Facility: AMBULATORY SURGERY CENTER | Age: 82
End: 2022-06-07
Payer: MEDICARE

## 2022-06-07 ENCOUNTER — RESULT REVIEW (OUTPATIENT)
Age: 82
End: 2022-06-07

## 2022-06-07 PROCEDURE — 43239 EGD BIOPSY SINGLE/MULTIPLE: CPT

## 2022-06-20 ENCOUNTER — APPOINTMENT (OUTPATIENT)
Dept: DERMATOLOGY | Facility: CLINIC | Age: 82
End: 2022-06-20
Payer: MEDICARE

## 2022-06-20 PROCEDURE — 99204 OFFICE O/P NEW MOD 45 MIN: CPT

## 2022-07-12 ENCOUNTER — APPOINTMENT (OUTPATIENT)
Dept: DERMATOLOGY | Facility: CLINIC | Age: 82
End: 2022-07-12

## 2022-07-12 ENCOUNTER — NON-APPOINTMENT (OUTPATIENT)
Age: 82
End: 2022-07-12

## 2022-07-12 DIAGNOSIS — L85.8 OTHER SPECIFIED EPIDERMAL THICKENING: ICD-10-CM

## 2022-07-12 PROCEDURE — 13132 CMPLX RPR F/C/C/M/N/AX/G/H/F: CPT

## 2022-07-12 PROCEDURE — 17311 MOHS 1 STAGE H/N/HF/G: CPT

## 2022-08-04 ENCOUNTER — NON-APPOINTMENT (OUTPATIENT)
Age: 82
End: 2022-08-04

## 2022-08-30 NOTE — ASU PATIENT PROFILE, ADULT - NS TRANSFER PATIENT BELONGINGS
Clothing Stelara Pregnancy And Lactation Text: This medication is Pregnancy Category B and is considered safe during pregnancy. It is unknown if this medication is excreted in breast milk.

## 2022-09-22 ENCOUNTER — OUTPATIENT (OUTPATIENT)
Dept: OUTPATIENT SERVICES | Facility: HOSPITAL | Age: 82
LOS: 1 days | Discharge: HOME | End: 2022-09-22

## 2022-09-22 DIAGNOSIS — Z96.649 PRESENCE OF UNSPECIFIED ARTIFICIAL HIP JOINT: Chronic | ICD-10-CM

## 2022-09-22 DIAGNOSIS — C50.919 MALIGNANT NEOPLASM OF UNSPECIFIED SITE OF UNSPECIFIED FEMALE BREAST: ICD-10-CM

## 2022-09-22 DIAGNOSIS — Z98.890 OTHER SPECIFIED POSTPROCEDURAL STATES: Chronic | ICD-10-CM

## 2022-09-27 ENCOUNTER — APPOINTMENT (OUTPATIENT)
Dept: HEMATOLOGY ONCOLOGY | Facility: CLINIC | Age: 82
End: 2022-09-27

## 2022-09-27 ENCOUNTER — RESULT REVIEW (OUTPATIENT)
Age: 82
End: 2022-09-27

## 2022-09-27 VITALS
DIASTOLIC BLOOD PRESSURE: 94 MMHG | SYSTOLIC BLOOD PRESSURE: 158 MMHG | TEMPERATURE: 97.3 F | WEIGHT: 163.14 LBS | OXYGEN SATURATION: 95 % | HEART RATE: 96 BPM | RESPIRATION RATE: 16 BRPM | BODY MASS INDEX: 32.03 KG/M2 | HEIGHT: 59.84 IN

## 2022-09-27 LAB
BASOPHILS # BLD AUTO: 0.03 K/UL — SIGNIFICANT CHANGE UP (ref 0–0.2)
BASOPHILS NFR BLD AUTO: 0.4 % — SIGNIFICANT CHANGE UP (ref 0–2)
EOSINOPHIL # BLD AUTO: 0.13 K/UL — SIGNIFICANT CHANGE UP (ref 0–0.5)
EOSINOPHIL NFR BLD AUTO: 1.6 % — SIGNIFICANT CHANGE UP (ref 0–6)
HCT VFR BLD CALC: 46.7 % — HIGH (ref 34.5–45)
HGB BLD-MCNC: 14.7 G/DL — SIGNIFICANT CHANGE UP (ref 11.5–15.5)
IMM GRANULOCYTES NFR BLD AUTO: 0.4 % — SIGNIFICANT CHANGE UP (ref 0–0.9)
LYMPHOCYTES # BLD AUTO: 1.8 K/UL — SIGNIFICANT CHANGE UP (ref 1–3.3)
LYMPHOCYTES # BLD AUTO: 22.7 % — SIGNIFICANT CHANGE UP (ref 13–44)
MCHC RBC-ENTMCNC: 27.2 PG — SIGNIFICANT CHANGE UP (ref 27–34)
MCHC RBC-ENTMCNC: 31.5 G/DL — LOW (ref 32–36)
MCV RBC AUTO: 86.3 FL — SIGNIFICANT CHANGE UP (ref 80–100)
MONOCYTES # BLD AUTO: 0.55 K/UL — SIGNIFICANT CHANGE UP (ref 0–0.9)
MONOCYTES NFR BLD AUTO: 6.9 % — SIGNIFICANT CHANGE UP (ref 2–14)
NEUTROPHILS # BLD AUTO: 5.38 K/UL — SIGNIFICANT CHANGE UP (ref 1.8–7.4)
NEUTROPHILS NFR BLD AUTO: 68 % — SIGNIFICANT CHANGE UP (ref 43–77)
NRBC # BLD: 0 /100 WBCS — SIGNIFICANT CHANGE UP (ref 0–0)
PLATELET # BLD AUTO: 306 K/UL — SIGNIFICANT CHANGE UP (ref 150–400)
RBC # BLD: 5.41 M/UL — HIGH (ref 3.8–5.2)
RBC # FLD: 13.7 % — SIGNIFICANT CHANGE UP (ref 10.3–14.5)
WBC # BLD: 7.92 K/UL — SIGNIFICANT CHANGE UP (ref 3.8–10.5)
WBC # FLD AUTO: 7.92 K/UL — SIGNIFICANT CHANGE UP (ref 3.8–10.5)

## 2022-09-27 PROCEDURE — 99213 OFFICE O/P EST LOW 20 MIN: CPT

## 2022-09-27 NOTE — PHYSICAL EXAM
[Ambulatory and capable of all self care but unable to carry out any work activities] : Status 2- Ambulatory and capable of all self care but unable to carry out any work activities. Up and about more than 50% of waking hours [Normal] : affect appropriate [de-identified] : bilateral mastectomies no reconstruction.

## 2022-09-27 NOTE — ASSESSMENT
[FreeTextEntry1] : The patient will continue surveillance for her stage III locally advanced breast carcinoma involving the left breast and past showing moderately differentiated invasive ductal carcinoma ER and [place] negative HER2 3+ with T3 lesion measuring 5.1 cm and 1 out of 22 lymph nodes involved.  Patient was treated with TCH and completion Herceptin for 1 year.  She has done well and has had no signs or symptoms indicating recurrent disease.  The patient will continue medical, GI and GYN follow-up and testing.  She has also been treated for melanoma and recurrent right facial skin lesion.  The patient is planning a trip to [place] with her family.  She will return to the office in 6 months or sooner as needed. [Curative] : Goals of care discussed with patient: Curative [Palliative Care Plan] : not applicable at this time

## 2022-09-27 NOTE — HISTORY OF PRESENT ILLNESS
[Disease: _____________________] : Disease: [unfilled] [N: ___] : N[unfilled] [M: ___] : M[unfilled] [AJCC Stage: ____] : AJCC Stage: [unfilled] [de-identified] : This is a 77-year-old woman with history of breast carcinoma. The patient states her history dates back 26 years ago when she was found to have a right breast carcinoma which was resected and did not require further therapy. She states she has been undergoing followup mammograms however her last one was 2 years ago. The patient states in 2018 she noted a mass in the left breast and subsequently had a mammogram and ultrasound revealing a left breast mass. This was biopsied on 2018 and the biopsy was positive for invasive ductal carcinoma. The ER and [place] were negative and HER-2/emelina 3+ positive. The patient underwent MRI of the breast on  which revealed  a spiculated mass in the left breast which was the biopsy-proven malignancy along with extensive multicentric disease. The right breast revealed a 4 mm focus. The left axilla revealed a 3 x 1.5 cm lymph node.\par \par On May 1 the patient underwent bilateral mastectomy and left axillary dissection The left breast revealed invasive moderately differentiated ductal carcinoma, apocrine type. The tumor measured 5.1 cm and the [place] score was 7/9. There was one out of 22 lymph nodes positive for metastases along with extranodal extension measuring 1.5 mm. Lymph-vascular invasion and Derm lympho-vascular invasion were not identified. The tumor was stage T3 N1a.Stage IIIA. 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 The patient chose not to have reconstruction.\par \par The patient has done well postoperatively but does not slight discomfort in the anterior chest in the area of the incisions. Her family history is negative for breast cancer. Father  of lung cancer. 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 [de-identified] : IDC, mod diff, ER and [place] neg, Her 3+,1/22 nodes pos. [de-identified] : 5/18-left breast- mod diff IDC, 5.1 cm, T3, 1/22 nodes pos, ER and [place] neg, Her2-3+, rx bilat mastectomies no recon. TCHP\par         right breast DCIS [de-identified] : Since the last visit pt had ECHO and doing well with no symptoms indicating recurrent disease.Pt had colonoscopy and upper endoscopy and neg.

## 2022-10-17 ENCOUNTER — INPATIENT (INPATIENT)
Facility: HOSPITAL | Age: 82
LOS: 0 days | Discharge: NURSING, REHAB OR LONG-TERM CARE | DRG: 536 | End: 2022-10-18
Attending: INTERNAL MEDICINE | Admitting: INTERNAL MEDICINE
Payer: MEDICARE

## 2022-10-17 VITALS
HEART RATE: 95 BPM | RESPIRATION RATE: 18 BRPM | WEIGHT: 143.08 LBS | SYSTOLIC BLOOD PRESSURE: 138 MMHG | TEMPERATURE: 98 F | HEIGHT: 65 IN | OXYGEN SATURATION: 94 % | DIASTOLIC BLOOD PRESSURE: 86 MMHG

## 2022-10-17 DIAGNOSIS — S72.002A FRACTURE OF UNSPECIFIED PART OF NECK OF LEFT FEMUR, INITIAL ENCOUNTER FOR CLOSED FRACTURE: ICD-10-CM

## 2022-10-17 DIAGNOSIS — Z96.649 PRESENCE OF UNSPECIFIED ARTIFICIAL HIP JOINT: Chronic | ICD-10-CM

## 2022-10-17 DIAGNOSIS — Z96.641 PRESENCE OF RIGHT ARTIFICIAL HIP JOINT: Chronic | ICD-10-CM

## 2022-10-17 DIAGNOSIS — Z98.890 OTHER SPECIFIED POSTPROCEDURAL STATES: Chronic | ICD-10-CM

## 2022-10-17 DIAGNOSIS — Z90.10 ACQUIRED ABSENCE OF UNSPECIFIED BREAST AND NIPPLE: Chronic | ICD-10-CM

## 2022-10-17 LAB
ALBUMIN SERPL ELPH-MCNC: 2.8 G/DL — LOW (ref 3.3–5)
ALP SERPL-CCNC: 86 U/L — SIGNIFICANT CHANGE UP (ref 40–120)
ALT FLD-CCNC: 30 U/L — SIGNIFICANT CHANGE UP (ref 10–45)
ANION GAP SERPL CALC-SCNC: 8 MMOL/L — SIGNIFICANT CHANGE UP (ref 5–17)
AST SERPL-CCNC: 35 U/L — SIGNIFICANT CHANGE UP (ref 10–40)
BASOPHILS # BLD AUTO: 0.04 K/UL — SIGNIFICANT CHANGE UP (ref 0–0.2)
BASOPHILS NFR BLD AUTO: 0.3 % — SIGNIFICANT CHANGE UP (ref 0–2)
BILIRUB SERPL-MCNC: 0.8 MG/DL — SIGNIFICANT CHANGE UP (ref 0.2–1.2)
BUN SERPL-MCNC: 34 MG/DL — HIGH (ref 7–23)
CALCIUM SERPL-MCNC: 9 MG/DL — SIGNIFICANT CHANGE UP (ref 8.4–10.5)
CHLORIDE SERPL-SCNC: 104 MMOL/L — SIGNIFICANT CHANGE UP (ref 96–108)
CO2 SERPL-SCNC: 25 MMOL/L — SIGNIFICANT CHANGE UP (ref 22–31)
CREAT SERPL-MCNC: 0.84 MG/DL — SIGNIFICANT CHANGE UP (ref 0.5–1.3)
EGFR: 69 ML/MIN/1.73M2 — SIGNIFICANT CHANGE UP
EOSINOPHIL # BLD AUTO: 0.4 K/UL — SIGNIFICANT CHANGE UP (ref 0–0.5)
EOSINOPHIL NFR BLD AUTO: 3.5 % — SIGNIFICANT CHANGE UP (ref 0–6)
GLUCOSE SERPL-MCNC: 128 MG/DL — HIGH (ref 70–99)
HCT VFR BLD CALC: 35.4 % — SIGNIFICANT CHANGE UP (ref 34.5–45)
HGB BLD-MCNC: 11.5 G/DL — SIGNIFICANT CHANGE UP (ref 11.5–15.5)
IMM GRANULOCYTES NFR BLD AUTO: 3 % — HIGH (ref 0–0.9)
LYMPHOCYTES # BLD AUTO: 17.5 % — SIGNIFICANT CHANGE UP (ref 13–44)
LYMPHOCYTES # BLD AUTO: 2.01 K/UL — SIGNIFICANT CHANGE UP (ref 1–3.3)
MCHC RBC-ENTMCNC: 27.8 PG — SIGNIFICANT CHANGE UP (ref 27–34)
MCHC RBC-ENTMCNC: 32.5 GM/DL — SIGNIFICANT CHANGE UP (ref 32–36)
MCV RBC AUTO: 85.7 FL — SIGNIFICANT CHANGE UP (ref 80–100)
MONOCYTES # BLD AUTO: 0.86 K/UL — SIGNIFICANT CHANGE UP (ref 0–0.9)
MONOCYTES NFR BLD AUTO: 7.5 % — SIGNIFICANT CHANGE UP (ref 2–14)
NEUTROPHILS # BLD AUTO: 7.82 K/UL — HIGH (ref 1.8–7.4)
NEUTROPHILS NFR BLD AUTO: 68.2 % — SIGNIFICANT CHANGE UP (ref 43–77)
NRBC # BLD: 0 /100 WBCS — SIGNIFICANT CHANGE UP (ref 0–0)
PLATELET # BLD AUTO: 379 K/UL — SIGNIFICANT CHANGE UP (ref 150–400)
POTASSIUM SERPL-MCNC: 4.2 MMOL/L — SIGNIFICANT CHANGE UP (ref 3.5–5.3)
POTASSIUM SERPL-SCNC: 4.2 MMOL/L — SIGNIFICANT CHANGE UP (ref 3.5–5.3)
PROT SERPL-MCNC: 6.2 G/DL — SIGNIFICANT CHANGE UP (ref 6–8.3)
RBC # BLD: 4.13 M/UL — SIGNIFICANT CHANGE UP (ref 3.8–5.2)
RBC # FLD: 13.8 % — SIGNIFICANT CHANGE UP (ref 10.3–14.5)
SARS-COV-2 RNA SPEC QL NAA+PROBE: SIGNIFICANT CHANGE UP
SODIUM SERPL-SCNC: 137 MMOL/L — SIGNIFICANT CHANGE UP (ref 135–145)
WBC # BLD: 11.48 K/UL — HIGH (ref 3.8–10.5)
WBC # FLD AUTO: 11.48 K/UL — HIGH (ref 3.8–10.5)

## 2022-10-17 PROCEDURE — 99223 1ST HOSP IP/OBS HIGH 75: CPT

## 2022-10-17 PROCEDURE — 71045 X-RAY EXAM CHEST 1 VIEW: CPT | Mod: 26

## 2022-10-17 PROCEDURE — 73502 X-RAY EXAM HIP UNI 2-3 VIEWS: CPT | Mod: 26,LT

## 2022-10-17 PROCEDURE — 93010 ELECTROCARDIOGRAM REPORT: CPT

## 2022-10-17 PROCEDURE — 93970 EXTREMITY STUDY: CPT | Mod: 26

## 2022-10-17 PROCEDURE — 99285 EMERGENCY DEPT VISIT HI MDM: CPT

## 2022-10-17 PROCEDURE — 73552 X-RAY EXAM OF FEMUR 2/>: CPT | Mod: 26,LT

## 2022-10-17 RX ORDER — SIMVASTATIN 20 MG/1
20 TABLET, FILM COATED ORAL AT BEDTIME
Refills: 0 | Status: DISCONTINUED | OUTPATIENT
Start: 2022-10-17 | End: 2022-10-18

## 2022-10-17 RX ORDER — ENOXAPARIN SODIUM 100 MG/ML
40 INJECTION SUBCUTANEOUS EVERY 24 HOURS
Refills: 0 | Status: DISCONTINUED | OUTPATIENT
Start: 2022-10-17 | End: 2022-10-17

## 2022-10-17 RX ORDER — LOSARTAN POTASSIUM 100 MG/1
100 TABLET, FILM COATED ORAL DAILY
Refills: 0 | Status: DISCONTINUED | OUTPATIENT
Start: 2022-10-18 | End: 2022-10-18

## 2022-10-17 RX ORDER — METOPROLOL TARTRATE 50 MG
200 TABLET ORAL DAILY
Refills: 0 | Status: DISCONTINUED | OUTPATIENT
Start: 2022-10-18 | End: 2022-10-18

## 2022-10-17 RX ORDER — ACETAMINOPHEN 500 MG
650 TABLET ORAL EVERY 6 HOURS
Refills: 0 | Status: DISCONTINUED | OUTPATIENT
Start: 2022-10-17 | End: 2022-10-18

## 2022-10-17 RX ORDER — LEVOTHYROXINE SODIUM 125 MCG
75 TABLET ORAL DAILY
Refills: 0 | Status: DISCONTINUED | OUTPATIENT
Start: 2022-10-18 | End: 2022-10-18

## 2022-10-17 RX ORDER — OXYCODONE HYDROCHLORIDE 5 MG/1
5 TABLET ORAL EVERY 6 HOURS
Refills: 0 | Status: DISCONTINUED | OUTPATIENT
Start: 2022-10-17 | End: 2022-10-18

## 2022-10-17 RX ORDER — ENOXAPARIN SODIUM 100 MG/ML
30 INJECTION SUBCUTANEOUS ONCE
Refills: 0 | Status: COMPLETED | OUTPATIENT
Start: 2022-10-17 | End: 2022-10-17

## 2022-10-17 RX ORDER — AMLODIPINE BESYLATE 2.5 MG/1
5 TABLET ORAL
Refills: 0 | Status: DISCONTINUED | OUTPATIENT
Start: 2022-10-17 | End: 2022-10-18

## 2022-10-17 RX ORDER — PANTOPRAZOLE SODIUM 20 MG/1
40 TABLET, DELAYED RELEASE ORAL
Refills: 0 | Status: DISCONTINUED | OUTPATIENT
Start: 2022-10-17 | End: 2022-10-18

## 2022-10-17 RX ORDER — TRAMADOL HYDROCHLORIDE 50 MG/1
25 TABLET ORAL EVERY 6 HOURS
Refills: 0 | Status: DISCONTINUED | OUTPATIENT
Start: 2022-10-17 | End: 2022-10-18

## 2022-10-17 RX ORDER — ONDANSETRON 8 MG/1
4 TABLET, FILM COATED ORAL ONCE
Refills: 0 | Status: COMPLETED | OUTPATIENT
Start: 2022-10-17 | End: 2022-10-17

## 2022-10-17 RX ADMIN — ENOXAPARIN SODIUM 40 MILLIGRAM(S): 100 INJECTION SUBCUTANEOUS at 18:53

## 2022-10-17 RX ADMIN — ENOXAPARIN SODIUM 30 MILLIGRAM(S): 100 INJECTION SUBCUTANEOUS at 22:53

## 2022-10-17 RX ADMIN — ONDANSETRON 4 MILLIGRAM(S): 8 TABLET, FILM COATED ORAL at 16:12

## 2022-10-17 RX ADMIN — SIMVASTATIN 20 MILLIGRAM(S): 20 TABLET, FILM COATED ORAL at 22:08

## 2022-10-17 NOTE — H&P ADULT - ASSESSMENT
82 year old female, PMHx of HTN, HLD, hypothyroidism, breast ca (last chemo 10/2019); presents to the ED for left hip pain. Patient was in [place], fell and fractured her "left hip". According to her and her daughters "a margy and two screws were placed" on Tuesday 10/11 in [facility]. She was transferred home (to daughters and bedbound since surgery. she came back to [place] yesterday with her daughter. patient was unable idalmis bear weight and was  having pain on her left hip. so came to ER. She lives alone. Additionally, she states she was not given any pain medication. No f/c/n/v/d, cp, sob, cough or other complaints. in ER, Xray VS stable. labs showed mild leucocytosis. medical admission was called for rehab and pain control    Fall, left hip fracture s/p ORIF in [place] on 10/11  - admit to med/surg  - pain control: tyelnol/Tramadol/oxy  - doppler to r/o DVT  - seen by ortho: suggested WBAT  - PT/OT/PMR eval  - fall precaution      HTN  - c/w home meds: losartan, amlodipine, bisoprolol 10--> metoprolol .   - DASH/TLC  - check orthostasis once and periodically    HLD  - on simvastatin    DVT-P: Lovenox    updated daughter Maryam at bedside

## 2022-10-17 NOTE — H&P ADULT - NSHPPHYSICALEXAM_GEN_ALL_CORE
Vital Signs Last 24 Hrs  T(C): 36.6 (17 Oct 2022 14:37), Max: 36.6 (17 Oct 2022 14:37)  T(F): 97.9 (17 Oct 2022 14:37), Max: 97.9 (17 Oct 2022 14:37)  HR: 95 (17 Oct 2022 14:37) (95 - 95)  BP: 138/86 (17 Oct 2022 14:37) (138/86 - 138/86)  BP(mean): --  RR: 18 (17 Oct 2022 14:37) (18 - 18)  SpO2: 94% (17 Oct 2022 14:37) (94% - 94%)    Parameters below as of 17 Oct 2022 14:37  Patient On (Oxygen Delivery Method): room air    GENERAL: Not in distress. Alert    HEENT: AT/NC. clear conjuctiva, MMM.   no pallor or icterus  CARDIOVASCULAR: RRR S1, S2. No murmur/rubs/gallop  LUNGS: BLAE+, no rales, no wheezing, no rhonchi.    ABDOMEN: ND. Soft,  NT, no guarding / rebound / rigidity. BS normoactive. No CVA tenderness.    BACK: No spine tenderness.  EXTREMITIES: LLE edema and TP.   SKIN: no rash. or skin break or ulcer on exposed skin. No cellulitis. left hip incision c/d/i  NEUROLOGIC: AAO*3.strength is symmetric, sensation intact, speech fluent.    PSYCHIATRIC: Calm.  No agitation.

## 2022-10-17 NOTE — H&P ADULT - NSHPLABSRESULTS_GEN_ALL_CORE
11.5   11.48 )-----------( 379      ( 17 Oct 2022 16:10 )             35.4       10-17    137  |  104  |  34<H>  ----------------------------<  128<H>  4.2   |  25  |  0.84    Ca    9.0      17 Oct 2022 16:10    TPro  6.2  /  Alb  2.8<L>  /  TBili  0.8  /  DBili  x   /  AST  35  /  ALT  30  /  AlkPhos  86  10-17    left hip xray [ prelim]: nail and screw.

## 2022-10-17 NOTE — ED PROVIDER NOTE - NSICDXPASTMEDICALHX_GEN_ALL_CORE_FT
PAST MEDICAL HISTORY:  Breast cancer     H/O hypotension     HTN (hypertension)     
The patient is a 26y Female complaining of allergic reaction.

## 2022-10-17 NOTE — PATIENT PROFILE ADULT - STATED REASON FOR ADMISSION
" We went to [place] and I fell onto the floor. I had a hip replacement in [place] a couple of days ago. I still have a lot of pain"

## 2022-10-17 NOTE — PATIENT PROFILE ADULT - FALL HARM RISK - HARM RISK INTERVENTIONS

## 2022-10-17 NOTE — CHART NOTE - NSCHARTNOTEFT_GEN_A_CORE
82 year old female, PMHx of HTN, hypothyroidism, breast ca (last tx 10/2019); presents to the ED for left hip pain. Patient was in [place], fell and fractured her "left hip". According to her and her daughters "a magry and two screws were placed" on Tuesday 10/11 in [facility]. She was transferred home (to daughters) yesterday, Orthopedic consulted for recent fracture and PT recommendation. pt admits to severe pain and difficulty moving LLE. She also states she has not had any physical therapy since the surgery and has not been ambulating. She denies chest pain, sob,n,v,dizziness, calf pain.    PAST MEDICAL & SURGICAL HISTORY:  HTN (hypertension)      H/O hypotension      Breast cancer      MEDICATIONS  (STANDING):    MEDICATIONS  (PRN):      PE: Vital Signs Last 24 Hrs  T(C): 36.6 (17 Oct 2022 14:37), Max: 36.6 (17 Oct 2022 14:37)  T(F): 97.9 (17 Oct 2022 14:37), Max: 97.9 (17 Oct 2022 14:37)  HR: 95 (17 Oct 2022 14:37) (95 - 95)  BP: 138/86 (17 Oct 2022 14:37) (138/86 - 138/86)  BP(mean): --  RR: 18 (17 Oct 2022 14:37) (18 - 18)  SpO2: 94% (17 Oct 2022 14:37) (94% - 94%)    Parameters below as of 17 Oct 2022 14:37  Patient On (Oxygen Delivery Method): room air    Gen: A&O x3 nad  abd: soft, nt, nd, ng  : voiding   LLE: hip lateral with sutures noted, incision site clean dry, no hematoma noted  DP/PT pusles intact. ankle/toes with full range of motion, planter/dorsi flexion intact, calfs soft, non tender, no swelling     Plan:   s/p LLE intramedullary nail ORIF 10/11 in [place]   PT: WBAT   will discuss and review case/xray with Dr. Win 82 year old female, PMHx of HTN, hypothyroidism, breast ca (last tx 10/2019); presents to the ED for left hip pain. Patient was in [place], fell and fractured her "left hip". According to her and her daughters "a margy and two screws were placed" on Tuesday 10/11 in [facility]. She was transferred home (to daughters) yesterday, Orthopedic consulted for recent fracture and PT recommendation. pt admits to severe pain and difficulty moving LLE. She also states she has not had any physical therapy since the surgery and has not been ambulating. She denies chest pain, sob,n,v,dizziness, calf pain.    PAST MEDICAL & SURGICAL HISTORY:  HTN (hypertension)      H/O hypotension      Breast cancer      MEDICATIONS  (STANDING):    MEDICATIONS  (PRN):      PE: Vital Signs Last 24 Hrs  T(C): 36.6 (17 Oct 2022 14:37), Max: 36.6 (17 Oct 2022 14:37)  T(F): 97.9 (17 Oct 2022 14:37), Max: 97.9 (17 Oct 2022 14:37)  HR: 95 (17 Oct 2022 14:37) (95 - 95)  BP: 138/86 (17 Oct 2022 14:37) (138/86 - 138/86)  BP(mean): --  RR: 18 (17 Oct 2022 14:37) (18 - 18)  SpO2: 94% (17 Oct 2022 14:37) (94% - 94%)    Parameters below as of 17 Oct 2022 14:37  Patient On (Oxygen Delivery Method): room air    Gen: A&O x3 nad  abd: soft, nt, nd, ng  : voiding   LLE: hip lateral with sutures noted, incision site clean dry, no hematoma noted  DP/PT pusles intact. ankle/toes with full range of motion, planter/dorsi flexion intact, calfs soft, non tender, no swelling     Plan:   s/p LLE intramedullary nail ORIF 10/11 in [place]   PT: WBAT   Bilateral US of LE to R/O DVT  discussed with Dr. Win 82 year old female, PMHx of HTN, hypothyroidism, breast ca (last tx 10/2019); presents to the ED for left hip pain. Patient was in [place], fell and fractured her "left hip". According to her and her daughters "a margy and two screws were placed" on Tuesday 10/11 in [facility]. She was transferred home (to daughters) yesterday, Orthopedic consulted for recent fracture and PT recommendation. pt admits to severe pain and difficulty moving LLE. She also states she has not had any physical therapy since the surgery and has not been ambulating. She denies chest pain, sob,n,v,dizziness, calf pain.    PAST MEDICAL & SURGICAL HISTORY:  HTN (hypertension)      H/O hypotension      Breast cancer      MEDICATIONS  (STANDING):    MEDICATIONS  (PRN):      PE: Vital Signs Last 24 Hrs  T(C): 36.6 (17 Oct 2022 14:37), Max: 36.6 (17 Oct 2022 14:37)  T(F): 97.9 (17 Oct 2022 14:37), Max: 97.9 (17 Oct 2022 14:37)  HR: 95 (17 Oct 2022 14:37) (95 - 95)  BP: 138/86 (17 Oct 2022 14:37) (138/86 - 138/86)  BP(mean): --  RR: 18 (17 Oct 2022 14:37) (18 - 18)  SpO2: 94% (17 Oct 2022 14:37) (94% - 94%)    Parameters below as of 17 Oct 2022 14:37  Patient On (Oxygen Delivery Method): room air    Gen: A&O x3 nad  abd: soft, nt, nd, ng  : voiding   LLE: hip lateral with sutures noted, incision site clean dry, no hematoma noted  DP/PT pusles intact. ankle/toes with full range of motion, planter/dorsi flexion intact, calfs soft, non tender, no swelling     Plan:   s/p LLE intramedullary nail ORIF 10/11 in [place]   PT: WBAT   Bilateral US of LE to R/O DVT  discussed with Dr. Win      attending:  agree with above   surgical site clean and dry  will follow in office and monitor the healing 82 year old female, PMHx of HTN, hypothyroidism, breast ca (last tx 10/2019); presents to the ED for left hip pain. Patient was in [place], fell and fractured her "left hip". According to her and her daughters "a margy and two screws were placed" on Tuesday 10/11 in [facility]. She was transferred home (to daughters) yesterday, Orthopedic consulted for recent fracture and PT recommendation. pt admits to severe pain and difficulty moving LLE. She also states she has not had any physical therapy since the surgery and has not been ambulating. She denies chest pain, sob,n,v,dizziness, calf pain.    PAST MEDICAL & SURGICAL HISTORY:  HTN (hypertension)      H/O hypotension      Breast cancer      MEDICATIONS  (STANDING):    MEDICATIONS  (PRN):      PE: Vital Signs Last 24 Hrs  T(C): 36.6 (17 Oct 2022 14:37), Max: 36.6 (17 Oct 2022 14:37)  T(F): 97.9 (17 Oct 2022 14:37), Max: 97.9 (17 Oct 2022 14:37)  HR: 95 (17 Oct 2022 14:37) (95 - 95)  BP: 138/86 (17 Oct 2022 14:37) (138/86 - 138/86)  BP(mean): --  RR: 18 (17 Oct 2022 14:37) (18 - 18)  SpO2: 94% (17 Oct 2022 14:37) (94% - 94%)    Parameters below as of 17 Oct 2022 14:37  Patient On (Oxygen Delivery Method): room air    Gen: A&O x3 nad  abd: soft, nt, nd, ng  : voiding   LLE: hip lateral with sutures noted, incision site clean dry, no hematoma noted  DP/PT pusles intact. ankle/toes with full range of motion, planter/dorsi flexion intact, calfs soft, non tender, no swelling     Plan:   s/p LLE intramedullary nail ORIF 10/11 in [place]   PT: WBAT   Bilateral US of LE to R/O DVT  discussed with Dr. Win      attending:  agree with above   surgical site mild erythema proximal incision   rec IV abx  dvt prophylaxis  wbat with assistance and AD  will follow in office and monitor the healing

## 2022-10-17 NOTE — H&P ADULT - NSICDXFAMILYHX_GEN_ALL_CORE_FT
FAMILY HISTORY:  Father  Still living? No  FH: lung cancer, Age at diagnosis: Age Unknown    Mother  Still living? No  FH: HTN (hypertension), Age at diagnosis: Age Unknown

## 2022-10-17 NOTE — ED PROVIDER NOTE - CLINICAL SUMMARY MEDICAL DECISION MAKING FREE TEXT BOX
83 yo f pw l hip pain s/p orif?? for L hip fx- pt needs rehab. will lyla stafford. basics, ekg, cxray, xray hip/femur to r/o additional fx or displacement of hardware.

## 2022-10-17 NOTE — ED PROVIDER NOTE - NS ED ATTENDING STATEMENT MOD
This was a shared visit with the [facility]. I reviewed and verified the documentation and independently performed the documented:

## 2022-10-17 NOTE — ED PROVIDER NOTE - OBJECTIVE STATEMENT
82 year old female, PMHx of HTN, hypothyroidism, breast ca (last tx 10/2019); presents to the ED for left hip pain. Patient was in [place], fell and fractured her "left hip". According to her and her daughters "a margy and two screws were placed" on Tuesday 10/11 in [facility]. She was transferred home (to daughters) yesterday. She lives alone. Additionally, she states she was not given any pain medication. Patient is nonambulatory. Patient and family are requesting placement in [facility] for rehab. 82 year old female, PMHx of HTN, hypothyroidism, breast ca (last tx 10/2019); presents to the ED for left hip pain. Patient was in [place], fell and fractured her "left hip". According to her and her daughters "a margy and two screws were placed" on Tuesday 10/11 in [facility]. She was transferred home (to daughters) yesterday. She lives alone. Additionally, she states she was not given any pain medication. Patient is nonambulatory. Patient and family are requesting placement in [facility] for rehab. No f/c/n/v/d, cp, sob, cough or other complaints.

## 2022-10-17 NOTE — H&P ADULT - NSHPREVIEWOFSYSTEMS_GEN_ALL_CORE
no fever/chills/CP/sob/palpitation/dizziness/ abd pain/nausea/vomiting/diarrhea/constipation/headaches. all other ROS neg

## 2022-10-17 NOTE — ED PROVIDER NOTE - ATTENDING APP SHARED VISIT CONTRIBUTION OF CARE
Sara with [facility]. 82 year old female, PMHx of HTN, hypothyroidism, breast ca (last tx 10/2019); presents to the ED for left hip pain. Patient was in [place], fell and fractured her "left hip". According to her and her daughters "a margy and two screws were placed" on Tuesday 10/11 in [facility]. She was transferred home (to daughters) yesterday. She lives alone. Additionally, she states she was not given any pain medication. Patient is nonambulatory. Patient and family are requesting placement in [facility] for rehab. No f/c/n/v/d, cp, sob, cough or other complaints.    I performed a face to face bedside interview with patient regarding history of present illness, review of symptoms and past medical history. I completed an independent physical exam.  I have discussed the patient's plan of care with Physician Assistant (PA). I agree with note as stated above, having amended the EMR as needed to reflect my findings.   This includes History of Present Illness, HIV, Past Medical/Surgical/Family/Social History, Allergies and Home Medications, Review of Systems, Physical Exam, and any Progress Notes during the time I functioned as the attending physician for this patient.

## 2022-10-17 NOTE — ED ADULT TRIAGE NOTE - CHIEF COMPLAINT QUOTE
BIB EMS from home for c/o left hip pain. pt reports she had sx on 10/11/22 in [place] s/p fall for left hip fx but was not given any pain medications and returned home yesterday but cannot take the pain. pt given 5mg of morphine IM by EMS.

## 2022-10-17 NOTE — H&P ADULT - NSHPSOCIALHISTORY_GEN_ALL_CORE
occasionally smoked cig when young. occasional ETOH use. lives alone, independent of ADL/IADL until fall and surgery

## 2022-10-17 NOTE — ED ADULT TRIAGE NOTE - CADM TRG TX PRIOR TO ARRIVAL
Called provider we were able to get  The form filled out. Provider is calling pt to discuss a return to work date for me to fill in on the form then I can send the Short Term Disability Form back to [place].   Waiting to hear back from provider first. 5mg morphine IM/medications

## 2022-10-17 NOTE — ED PROVIDER NOTE - HIV OFFER
Daryl is here with her mother in the exam room for follow-up on skin tag treatment in her left axillary area. Multiple skin tags were frozen with liquid nitrogen. Wound healing was as expected. The patient is concerned that there are still little red marks where the skin takes have fallen off and healed. I explained that this is a normal process of healing and over time the redness should diminished. 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 I did recommend some leg makeup that would be waterproof that she could use over the red marks under her arm. The patient agreed with this and felt satisfied. She is discharged in stable condition and there is no charge for the office visit today.   History  Chief Complaint   Patient presents with    Chest Pain     Mid chest pain, constant since last night  No other symptoms  17y  o female with no significant PMH presents to the ER for chest pain since last night  She denies taking any medication for symptoms  She describes her pain as sharp and non-radiating  Pain is constant  Her grandfather has a history of cardiac disease but she denies any other family history of cardiac disease or a personal history of cardiac disease  She does take birth control but denies any other PE risk factors  She denies fever, chills, URI symptoms, dyspnea, N/V/D, abdominal pain, weakness or paresthesias  History provided by:  Patient   used: No        Prior to Admission Medications   Prescriptions Last Dose Informant Patient Reported? Taking? Sronyx 0 1-20 MG-MCG per tablet   No No   Sig: TAKE 1 TABLET BY MOUTH EVERY DAY   benzoyl peroxide-erythromycin (BENZAMYCIN) gel  Mother No No   Sig: Apply to affected area 2 times daily      Facility-Administered Medications: None       History reviewed  No pertinent past medical history  Past Surgical History:   Procedure Laterality Date    NO PAST SURGERIES         Family History   Problem Relation Age of Onset    No Known Problems Mother     No Known Problems Father     Hypertension Maternal Grandmother     Hypertension Maternal Grandfather     Prostate cancer Paternal Grandfather     Mental illness Neg Hx     Substance Abuse Neg Hx      I have reviewed and agree with the history as documented      E-Cigarette/Vaping    E-Cigarette Use Never User      E-Cigarette/Vaping Substances    Nicotine No     THC No     CBD No     Flavoring No     Other No     Unknown No      Social History     Tobacco Use    Smoking status: Never Smoker    Smokeless tobacco: Never Used   Substance Use Topics    Alcohol use: Never     Frequency: Never    Drug use: Never       Review of Systems   Constitutional: Negative for activity change, appetite change, chills and fever  HENT: Negative for congestion, drooling, ear discharge, ear pain, facial swelling, rhinorrhea and sore throat  Eyes: Negative for redness  Respiratory: Negative for cough and shortness of breath  Cardiovascular: Positive for chest pain  Gastrointestinal: Negative for abdominal pain, diarrhea, nausea and vomiting  Musculoskeletal: Negative for neck stiffness  Skin: Negative for rash  Allergic/Immunologic: Negative for food allergies  Neurological: Negative for weakness and numbness  Physical Exam  Physical Exam  Vitals signs and nursing note reviewed  Constitutional:       General: She is not in acute distress  Appearance: She is not toxic-appearing  HENT:      Head: Normocephalic and atraumatic  Eyes:      Conjunctiva/sclera: Conjunctivae normal    Neck:      Musculoskeletal: Normal range of motion and neck supple  Trachea: No tracheal deviation  Cardiovascular:      Rate and Rhythm: Normal rate and regular rhythm  Heart sounds: Normal heart sounds, S1 normal and S2 normal  No murmur  No friction rub  No gallop  Pulmonary:      Effort: Pulmonary effort is normal  No respiratory distress  Breath sounds: Normal breath sounds  No decreased breath sounds, wheezing, rhonchi or rales  Chest:      Chest wall: No tenderness  Abdominal:      General: There is no distension  Skin:     General: Skin is warm and dry  Findings: No rash  Neurological:      Mental Status: She is alert  GCS: GCS eye subscore is 4  GCS verbal subscore is 5  GCS motor subscore is 6     Psychiatric:         Mood and Affect: Mood normal          Vital Signs  ED Triage Vitals [06/05/21 1102]   Temperature Pulse Respirations Blood Pressure SpO2   98 °F (36 7 °C) (!) 116 16 (!) 140/85 98 %      Temp src Heart Rate Source Patient Position - Orthostatic VS BP Location FiO2 (%)   Oral Monitor Sitting Right arm --      Pain Score       5           Vitals:    06/05/21 1102   BP: (!) 140/85   Pulse: (!) 116   Patient Position - Orthostatic VS: Sitting         Visual Acuity      ED Medications  Medications - No data to display    Diagnostic Studies  Results Reviewed     None                 No orders to display              Procedures  ECG 12 Lead Documentation Only    Date/Time: 6/5/2021 11:09 AM  Performed by: Yuko Ramirez PA-C  Authorized by: Yuko Ramirez PA-C     Indications / Diagnosis:  Chest pain  ECG reviewed by me, the ED Provider: yes (Also reviewed by Dr Latesha Bey)    Patient location:  ED  Interpretation:     Interpretation: normal    Rate:     ECG rate:  92    ECG rate assessment: normal    Rhythm:     Rhythm: sinus rhythm    Ectopy:     Ectopy: none    QRS:     QRS intervals:  Normal  Conduction:     Conduction: normal    ST segments:     ST segments:  Normal  T waves:     T waves: inverted      Inverted:  AVR and V1             ED Course                                           MDM  Number of Diagnoses or Management Options  Chest pain, unspecified: new and requires workup  Diagnosis management comments: DDX consists of but not limited to: PE, muscle strain, cardiac causes    Will check labs and imaging  Will give Toradol for pain  Informed patient and mother of lab and imaging findings  Patient reports improvement in symptoms  Will discharge  The management plan was discussed in detail with the patient at bedside and all questions were answered  Prior to discharge, we provided both verbal and written instructions  We discussed with the patient the signs and symptoms for which to return to the emergency department  All questions were answered and patient was comfortable with the plan of care and discharged to home  Instructed the patient to follow up with the primary care provider and/or specialist provided and their written instructions    The patient verbalized understanding of our discussion and plan of care, and agrees to return to the Emergency Department for concerns and progression of illness  At discharge, I instructed the patient to:  -follow up with pcp  -take Motrin for pain  -rest and drink plenty of fluids  -return to the ER if symptoms worsened or new symptoms arose  Patient agreed to this plan and was stable at time of discharge  Amount and/or Complexity of Data Reviewed  Clinical lab tests: ordered and reviewed  Tests in the radiology section of CPT®: ordered and reviewed  Obtain history from someone other than the patient: yes  Independent visualization of images, tracings, or specimens: yes    Patient Progress  Patient progress: stable      Disposition  Final diagnoses:   None     ED Disposition     None      Follow-up Information    None         Patient's Medications   Discharge Prescriptions    No medications on file     No discharge procedures on file      PDMP Review     None          ED Provider  Electronically Signed by           Josiah Strange PA-C  06/05/21 2021 Opt out

## 2022-10-18 ENCOUNTER — TRANSCRIPTION ENCOUNTER (OUTPATIENT)
Age: 82
End: 2022-10-18

## 2022-10-18 ENCOUNTER — INPATIENT (INPATIENT)
Facility: HOSPITAL | Age: 82
LOS: 10 days | Discharge: HOME HEALTH | DRG: 949 | End: 2022-10-29
Attending: PHYSICAL MEDICINE & REHABILITATION | Admitting: PHYSICAL MEDICINE & REHABILITATION
Payer: MEDICARE

## 2022-10-18 VITALS
WEIGHT: 162.04 LBS | HEART RATE: 79 BPM | TEMPERATURE: 98 F | HEIGHT: 61 IN | RESPIRATION RATE: 15 BRPM | SYSTOLIC BLOOD PRESSURE: 142 MMHG | OXYGEN SATURATION: 93 % | DIASTOLIC BLOOD PRESSURE: 72 MMHG

## 2022-10-18 VITALS
TEMPERATURE: 98 F | SYSTOLIC BLOOD PRESSURE: 97 MMHG | DIASTOLIC BLOOD PRESSURE: 60 MMHG | HEART RATE: 78 BPM | OXYGEN SATURATION: 93 % | RESPIRATION RATE: 17 BRPM

## 2022-10-18 DIAGNOSIS — Z96.641 PRESENCE OF RIGHT ARTIFICIAL HIP JOINT: Chronic | ICD-10-CM

## 2022-10-18 DIAGNOSIS — Z98.890 OTHER SPECIFIED POSTPROCEDURAL STATES: Chronic | ICD-10-CM

## 2022-10-18 DIAGNOSIS — Z96.649 PRESENCE OF UNSPECIFIED ARTIFICIAL HIP JOINT: Chronic | ICD-10-CM

## 2022-10-18 DIAGNOSIS — S72.146A NONDISPLACED INTERTROCHANTERIC FRACTURE OF UNSPECIFIED FEMUR, INITIAL ENCOUNTER FOR CLOSED FRACTURE: ICD-10-CM

## 2022-10-18 DIAGNOSIS — Z90.10 ACQUIRED ABSENCE OF UNSPECIFIED BREAST AND NIPPLE: Chronic | ICD-10-CM

## 2022-10-18 LAB
ANION GAP SERPL CALC-SCNC: 7 MMOL/L — SIGNIFICANT CHANGE UP (ref 5–17)
BUN SERPL-MCNC: 36 MG/DL — HIGH (ref 7–23)
CALCIUM SERPL-MCNC: 9.1 MG/DL — SIGNIFICANT CHANGE UP (ref 8.4–10.5)
CHLORIDE SERPL-SCNC: 106 MMOL/L — SIGNIFICANT CHANGE UP (ref 96–108)
CO2 SERPL-SCNC: 29 MMOL/L — SIGNIFICANT CHANGE UP (ref 22–31)
CREAT SERPL-MCNC: 1 MG/DL — SIGNIFICANT CHANGE UP (ref 0.5–1.3)
EGFR: 57 ML/MIN/1.73M2 — LOW
GLUCOSE SERPL-MCNC: 111 MG/DL — HIGH (ref 70–99)
HCT VFR BLD CALC: 33.3 % — LOW (ref 34.5–45)
HGB BLD-MCNC: 10.8 G/DL — LOW (ref 11.5–15.5)
MCHC RBC-ENTMCNC: 28 PG — SIGNIFICANT CHANGE UP (ref 27–34)
MCHC RBC-ENTMCNC: 32.4 GM/DL — SIGNIFICANT CHANGE UP (ref 32–36)
MCV RBC AUTO: 86.3 FL — SIGNIFICANT CHANGE UP (ref 80–100)
NRBC # BLD: 0 /100 WBCS — SIGNIFICANT CHANGE UP (ref 0–0)
PLATELET # BLD AUTO: 328 K/UL — SIGNIFICANT CHANGE UP (ref 150–400)
POTASSIUM SERPL-MCNC: 4.9 MMOL/L — SIGNIFICANT CHANGE UP (ref 3.5–5.3)
POTASSIUM SERPL-SCNC: 4.9 MMOL/L — SIGNIFICANT CHANGE UP (ref 3.5–5.3)
RBC # BLD: 3.86 M/UL — SIGNIFICANT CHANGE UP (ref 3.8–5.2)
RBC # FLD: 14.3 % — SIGNIFICANT CHANGE UP (ref 10.3–14.5)
SARS-COV-2 RNA SPEC QL NAA+PROBE: SIGNIFICANT CHANGE UP
SODIUM SERPL-SCNC: 142 MMOL/L — SIGNIFICANT CHANGE UP (ref 135–145)
WBC # BLD: 9.67 K/UL — SIGNIFICANT CHANGE UP (ref 3.8–10.5)
WBC # FLD AUTO: 9.67 K/UL — SIGNIFICANT CHANGE UP (ref 3.8–10.5)

## 2022-10-18 PROCEDURE — 99222 1ST HOSP IP/OBS MODERATE 55: CPT

## 2022-10-18 PROCEDURE — 99233 SBSQ HOSP IP/OBS HIGH 50: CPT

## 2022-10-18 RX ORDER — OXYCODONE HYDROCHLORIDE 5 MG/1
1 TABLET ORAL
Qty: 0 | Refills: 0 | DISCHARGE
Start: 2022-10-18

## 2022-10-18 RX ORDER — RIVAROXABAN 15 MG-20MG
15 KIT ORAL
Refills: 0 | Status: DISCONTINUED | OUTPATIENT
Start: 2022-10-18 | End: 2022-10-18

## 2022-10-18 RX ORDER — AMLODIPINE BESYLATE 2.5 MG/1
5 TABLET ORAL
Refills: 0 | Status: DISCONTINUED | OUTPATIENT
Start: 2022-10-19 | End: 2022-10-20

## 2022-10-18 RX ORDER — LEVOTHYROXINE SODIUM 125 MCG
75 TABLET ORAL DAILY
Refills: 0 | Status: DISCONTINUED | OUTPATIENT
Start: 2022-10-19 | End: 2022-10-29

## 2022-10-18 RX ORDER — OXYCODONE HYDROCHLORIDE 5 MG/1
5 TABLET ORAL EVERY 6 HOURS
Refills: 0 | Status: DISCONTINUED | OUTPATIENT
Start: 2022-10-18 | End: 2022-10-20

## 2022-10-18 RX ORDER — RIVAROXABAN 15 MG-20MG
20 KIT ORAL
Refills: 0 | Status: CANCELLED | OUTPATIENT
Start: 2022-11-08 | End: 2022-10-29

## 2022-10-18 RX ORDER — SIMVASTATIN 20 MG/1
20 TABLET, FILM COATED ORAL AT BEDTIME
Refills: 0 | Status: DISCONTINUED | OUTPATIENT
Start: 2022-10-18 | End: 2022-10-24

## 2022-10-18 RX ORDER — RIVAROXABAN 15 MG-20MG
1 KIT ORAL
Qty: 0 | Refills: 0 | DISCHARGE
Start: 2022-10-18

## 2022-10-18 RX ORDER — LOSARTAN POTASSIUM 100 MG/1
100 TABLET, FILM COATED ORAL DAILY
Refills: 0 | Status: DISCONTINUED | OUTPATIENT
Start: 2022-10-19 | End: 2022-10-29

## 2022-10-18 RX ORDER — TRAMADOL HYDROCHLORIDE 50 MG/1
25 TABLET ORAL EVERY 6 HOURS
Refills: 0 | Status: DISCONTINUED | OUTPATIENT
Start: 2022-10-18 | End: 2022-10-19

## 2022-10-18 RX ORDER — ACETAMINOPHEN 500 MG
650 TABLET ORAL EVERY 6 HOURS
Refills: 0 | Status: DISCONTINUED | OUTPATIENT
Start: 2022-10-18 | End: 2022-10-29

## 2022-10-18 RX ORDER — METOPROLOL TARTRATE 50 MG
200 TABLET ORAL DAILY
Refills: 0 | Status: DISCONTINUED | OUTPATIENT
Start: 2022-10-19 | End: 2022-10-29

## 2022-10-18 RX ORDER — RIVAROXABAN 15 MG-20MG
15 KIT ORAL
Refills: 0 | Status: DISCONTINUED | OUTPATIENT
Start: 2022-10-18 | End: 2022-10-29

## 2022-10-18 RX ORDER — ACETAMINOPHEN 500 MG
2 TABLET ORAL
Qty: 0 | Refills: 0 | DISCHARGE
Start: 2022-10-18

## 2022-10-18 RX ORDER — PANTOPRAZOLE SODIUM 20 MG/1
40 TABLET, DELAYED RELEASE ORAL
Refills: 0 | Status: DISCONTINUED | OUTPATIENT
Start: 2022-10-19 | End: 2022-10-29

## 2022-10-18 RX ADMIN — Medication 75 MICROGRAM(S): at 05:56

## 2022-10-18 RX ADMIN — Medication 200 MILLIGRAM(S): at 05:56

## 2022-10-18 RX ADMIN — AMLODIPINE BESYLATE 5 MILLIGRAM(S): 2.5 TABLET ORAL at 08:44

## 2022-10-18 RX ADMIN — LOSARTAN POTASSIUM 100 MILLIGRAM(S): 100 TABLET, FILM COATED ORAL at 05:56

## 2022-10-18 RX ADMIN — RIVAROXABAN 15 MILLIGRAM(S): KIT at 05:56

## 2022-10-18 RX ADMIN — RIVAROXABAN 15 MILLIGRAM(S): KIT at 18:14

## 2022-10-18 RX ADMIN — OXYCODONE HYDROCHLORIDE 5 MILLIGRAM(S): 5 TABLET ORAL at 11:00

## 2022-10-18 RX ADMIN — SIMVASTATIN 20 MILLIGRAM(S): 20 TABLET, FILM COATED ORAL at 21:05

## 2022-10-18 RX ADMIN — OXYCODONE HYDROCHLORIDE 5 MILLIGRAM(S): 5 TABLET ORAL at 10:12

## 2022-10-18 NOTE — H&P ADULT - NSHPREVIEWOFSYSTEMS_GEN_ALL_CORE
Constitutional: No fever, No Chills, No fatigue  HEENT: No eye pain, No visual disturbances, No difficulty hearing  Pulm: No cough,  No shortness of breath  Cardio: No chest pain, No palpitations  GI:  No abdominal pain, No nausea, No vomiting, No diarrhea, No constipation  : No dysuria, No frequency, No hematuria  Neuro: No headaches, No memory loss, + loss of strength, no numbness, No tremors  Skin: No itching, No rashes, No lesions   Endo: No temperature intolerance  MSK: + joint pain, + joint swelling, + muscle pain, No Neck or back pain  Psych:  No depression, No anxiety

## 2022-10-18 NOTE — H&P ADULT - ASSESSMENT
ASSESSMENT/PLAN  This is a 83 YO female with PMH  of HTN, HLD, hypothyroidism, Breast CA ( s/p double mastectomy, last chemo 10/2019), right hip replacement 2012, left femur fracture at age 5, presents to [facility] ED on 10/16 for left hip pain. Patient was in [place] where she fell and fractured her left hip and is s/p surgery in [facility] on 10/11.  She returned to [place] with her daughter in pain and inability to ambulate. Patient now with gait Instability, ADL impairments and Functional impairments.    # left Hip fracture  - s/p surgery- left hip ORIF in [facility] on 10/11  - Start Comprehensive Rehab Program: PT/OT, 3hours daily and 5 days weekly  - PT: Focused on improving strength, endurance, coordination, balance, functional mobility, and transfers  - OT: Focused on improving strength, fine motor skills, coordination, posture and ADLs.    - WB Status: WBAT LLE    #HTN  - Losartan 100mg daily  - Metoprolol 200mg daily  - Norvasc 5mg daily    #HLD  - Simvastatin 20mg daily    #Hypothyroidism  - Synthroid    Breast CA   - s/p double mastectomy, last chemo 10/2019    #Pain management  - Tylenol PRN, tramadol PRN, Oxycodone PRN    #DVT  - RLE  - [id number] BID until 11/7/22; then to give 20mg with dinner starting on 11/8/22    #GI ppx  - Protonix 40mg    #Bowel Regimen  - Senna, miralax PRN    #Bladder management  - BS on admission, and q 8 hours (SC if > 400)  - Monitor UO    #FEN   - Diet: DASH    #Skin:  - Skin on admission: Left hip incision with sutures DESI  - Pressure injury/Skin: Turn Q2hrs while in bed, OOB to Chair, PT/OT     #Sleep:   - Maintain quiet hours and low stim environment.  - Melatonin PRN to maximize participation in therapy during the day.     #Precaution  - Fall, Aspiration, cardiac, Hip     #GOC  CODE STATUS: FULL CODE     Outpatient Follow-up (Specialty/Name of physician):  Arnoldo Win)  Orthopaedic Surgery  [address] [place] [address]  Phone: [phone]  Fax: [phone]    MEDICAL PROGNOSIS: GOOD            REHAB POTENTIAL: GOOD             ESTIMATED DISPOSITION: HOME WITH HOME CARE            ELOS: 10-14 Days   EXPECTED THERAPY:     P.T. 1hr/day       O.T. 1hr/day        P&O Unnecessary     EXP FREQUENCY: 5 days per 7 day period     PRESCREEN COMPARISON:   I have reviewed the prescreen information and I have found no relevant changes between the preadmission screening and my post admission evaluation     RATIONALE FOR INPATIENT ADMISSION - Patient demonstrates the following: (check all that apply)  [X] Medically appropriate for rehabilitation admission  [X] Has attainable rehab goals with an appropriate initial discharge plan  [X] Has rehabilitation potential (expected to make a significant improvement within a reasonable period of time)   [X] Requires close medical management by a rehab physician, rehab nursing care, Hospitalist and comprehensive interdisciplinary team (including PT, OT, & or SLP, Prosthetics and Orthotics)

## 2022-10-18 NOTE — CONSULT NOTE ADULT - SUBJECTIVE AND OBJECTIVE BOX
Patient is a 82y old  Female who presents with a chief complaint of left hip pain, left hip ORIF recently, ambulatory dsfunction (18 Oct 2022 10:54)      HPI:  This is a 81 YO female with PMH  of HTN, HLD, hypothyroidism, Breast CA ( s/p double mastectomy, last chemo 10/2019), right hip replacement 2012, left femur fracture at age 5,  presents to [facility] ED on 10/16 for left hip pain. Patient was in [place], fell and fractured her "left hip". According to her and her daughters "a margy and two screws were placed" on Tuesday 10/11 in [facility]. She was transferred home (to daughters and bedbound since surgery. She came back to [place] with her daughter. Patient was unable to bear weight and was having pain on her left hip. She lives alone. Additionally, she stated she was not given any pain medication. In ER, labs showed mild leukocytosis. She was found to have RLE DVT started on Xarelto.        Patient was seen in room this afternoon, she verbalized having left hip pain. She has just received her pain medication. She denies HA, CP, cough, fever, chills. She is moving her bowels and passing urine independently.       REVIEW OF SYSTEMS  Constitutional: No fever, No Chills, No fatigue  HEENT: No eye pain, No visual disturbances, No difficulty hearing  Pulm: No cough,  No shortness of breath  Cardio: No chest pain, No palpitations  GI:  No abdominal pain, No nausea, No vomiting, No diarrhea, No constipation  : No dysuria, No frequency, No hematuria  Neuro: No headaches, No memory loss, + loss of strength, no numbness, No tremors  Skin: No itching, No rashes, No lesions   Endo: No temperature intolerance  MSK: + joint pain, + joint swelling, + muscle pain, No Neck or back pain  Psych:  No depression, No anxiety      PAST MEDICAL & SURGICAL HISTORY  No pertinent past medical history    HTN (hypertension)  H/O hypotension  Breast cancer  History of right hip replacement  H/O mastectomy    FAMILY HISTORY   FH: lung cancer (Father)  FH: HTN (hypertension) (Mother)    SOCIAL HISTORY  Smoking - Denies  EtOH - Denies  Drugs - Denies    Marital status:     FUNCTIONAL HISTORY:   Patient lives alone in an apartment alone. 4th floor , no ANNY.  PTA: Independent in ADLs and ambulation     CURRENT FUNCTIONAL STATUS  Date: 10/18  Bed Mobility:  Mod A, 2 person  Transfers:  Min A, 2 person  Gait:  Min A, 1 + 1 person  Lower Body Dressing: Max A, 1 person      RECENT LABS/IMAGING  CBC Full  -  ( 18 Oct 2022 06:00 )  WBC Count : 9.67 K/uL  RBC Count : 3.86 M/uL  Hemoglobin : 10.8 g/dL  Hematocrit : 33.3 %  Platelet Count - Automated : 328 K/uL  Mean Cell Volume : 86.3 fl  Mean Cell Hemoglobin : 28.0 pg  Mean Cell Hemoglobin Concentration : 32.4 gm/dL  Auto Neutrophil # : x  Auto Lymphocyte # : x  Auto Monocyte # : x  Auto Eosinophil # : x  Auto Basophil # : x  Auto Neutrophil % : x  Auto Lymphocyte % : x  Auto Monocyte % : x  Auto Eosinophil % : x  Auto Basophil % : x    10-18    142  |  106  |  36<H>  ----------------------------<  111<H>  4.9   |  29  |  1.00    Ca    9.1      18 Oct 2022 06:00    TPro  6.2  /  Alb  2.8<L>  /  TBili  0.8  /  DBili  x   /  AST  35  /  ALT  30  /  AlkPhos  86  10-17    US Duplex Venous Lower Ext Complete, Bilateral (10.17.22 @ 20:29)   IMPRESSION:  DVT involving the right soleal vein. Superficial thrombus of a right calf vein. No evidence of left-sided DVT  Results were discussed with Carlton in the emergency department at 8:35 PM on 17 2022    VITALS  T(C): 36.6 (10-18-22 @ 12:30), Max: 36.9 (10-18-22 @ 05:46)  HR: 78 (10-18-22 @ 12:30) (78 - 95)  BP: 97/60 (10-18-22 @ 12:30) (97/60 - 138/86)  RR: 17 (10-18-22 @ 12:30) (16 - 18)  SpO2: 93% (10-18-22 @ 12:30) (93% - 98%)  Wt(kg): --    ALLERGIES  No Known Allergies      MEDICATIONS   acetaminophen     Tablet .. 650 milliGRAM(s) Oral every 6 hours PRN  amLODIPine   Tablet 5 milliGRAM(s) Oral <User Schedule>  levothyroxine 75 MICROGram(s) Oral daily  losartan 100 milliGRAM(s) Oral daily  metoprolol succinate  milliGRAM(s) Oral daily  oxyCODONE    IR 5 milliGRAM(s) Oral every 6 hours PRN  pantoprazole    Tablet 40 milliGRAM(s) Oral before breakfast  rivaroxaban 15 milliGRAM(s) Oral two times a day  simvastatin 20 milliGRAM(s) Oral at bedtime  traMADol 25 milliGRAM(s) Oral every 6 hours PRN      ----------------------------------------------------------------------------------------  PHYSICAL EXAM  Gen - NAD, Comfortable  HEENT - NCAT, EOMI, MMM  Neck - Supple, No limited ROM  Pulm - CTAB, No wheeze, No rhonchi, No crackles  Cardiovascular - RRR, S1S2, No murmurs  Abdomen - Soft, NT/ND, +BS  Extremities - No clubbing, no cyanosis, + edema to left hip and thigh, no calf tenderness  Neuro-     Cognitive - Awake, Alert, Oriented  to self, place, date, year, situation. Able to follow command     Communication - Fluent, Comprehensible     Attention: Intact      Judgement: Good evidence of judgement     Memory: Recall 3 objects immediate and 3 min later         Cranial Nerves - CN 2-12 intact.      Motor -                     LEFT    UE - ShAB 5/5, EF 5/5, EE 5/5,  5/5                    RIGHT UE - ShAB 5/5, EF 5/5, EE 5/5,   5/5                    LEFT    LE - HF 3/5, KE 3/5, DF 5/5, PF 5/5- limited 2/2 pain                    RIGHT LE - HF 5/5, KE 5/5, DF 5/5, PF 5/5        Sensory - Intact to LT     Reflexes - DTR Intact, No primitive reflexive     Coordination - FTN intact     Tone - normal  Psychiatric - Mood stable, Affect WNL  Skin:   left hip incision with sutures                Patient is a 82y old  Female who presents with a chief complaint of left hip pain, left hip ORIF recently, ambulatory dsfunction (18 Oct 2022 10:54)      HPI:  This is a 83 YO female with PMH  of HTN, HLD, hypothyroidism, Breast CA ( s/p double mastectomy, last chemo 10/2019), right hip replacement 2012, left femur fracture at age 5,  presents to [facility] ED on 10/16 for left hip pain. Patient was in [place], fell and fractured her "left hip". According to her and her daughters "a margy and two screws were placed" on Tuesday 10/11 in [facility]. She was transferred home (to daughters and bedbound since surgery. She came back to [place] with her daughter. Patient was unable to bear weight and was having pain on her left hip. She lives alone. Additionally, she stated she was not given any pain medication. In ER, labs showed mild leukocytosis. She was found to have RLE DVT started on Xarelto.        Patient was seen in room this afternoon, she verbalized having left hip pain. She has just received her pain medication- starting to feel some relief. She denies HA, CP, cough, fever, chills. She is moving her bowels and passing urine independently.       REVIEW OF SYSTEMS  Constitutional: No fever, No Chills, No fatigue  HEENT: No eye pain, No visual disturbances, No difficulty hearing  Pulm: No cough,  No shortness of breath  Cardio: No chest pain, No palpitations  GI:  No abdominal pain, No nausea, No vomiting, No diarrhea, No constipation  : No dysuria, No frequency, No hematuria  Neuro: No headaches, No memory loss, + loss of strength, no numbness, No tremors  Skin: No itching, No rashes, No lesions   Endo: No temperature intolerance  MSK: + joint pain, + joint swelling, + muscle pain, No Neck or back pain  Psych:  No depression, No anxiety      PAST MEDICAL & SURGICAL HISTORY  HTN (hypertension)  H/O hypotension  Breast cancer  History of right hip replacement  H/O b/l mastectomy    FAMILY HISTORY   FH: lung cancer (Father)  FH: HTN (hypertension) (Mother)    SOCIAL HISTORY  Smoking - Denies  EtOH - Denies  Drugs - Denies    Marital status:     FUNCTIONAL HISTORY:   Patient lives alone in an apartment. 4th floor, no ANNY.  PTA: Independent in ADLs and ambulation     CURRENT FUNCTIONAL STATUS  Date: 10/18  Bed Mobility:  Mod A, 2 person  Transfers:  Min A, 2 person  Gait:  Min A, 1 + 1 person  Lower Body Dressing: Max A, 1 person      RECENT LABS/IMAGING  CBC Full  -  ( 18 Oct 2022 06:00 )  WBC Count : 9.67 K/uL  RBC Count : 3.86 M/uL  Hemoglobin : 10.8 g/dL  Hematocrit : 33.3 %  Platelet Count - Automated : 328 K/uL  Mean Cell Volume : 86.3 fl  Mean Cell Hemoglobin : 28.0 pg  Mean Cell Hemoglobin Concentration : 32.4 gm/dL  Auto Neutrophil # : x  Auto Lymphocyte # : x  Auto Monocyte # : x  Auto Eosinophil # : x  Auto Basophil # : x  Auto Neutrophil % : x  Auto Lymphocyte % : x  Auto Monocyte % : x  Auto Eosinophil % : x  Auto Basophil % : x    10-18    142  |  106  |  36<H>  ----------------------------<  111<H>  4.9   |  29  |  1.00    Ca    9.1      18 Oct 2022 06:00    TPro  6.2  /  Alb  2.8<L>  /  TBili  0.8  /  DBili  x   /  AST  35  /  ALT  30  /  AlkPhos  86  10-17    US Duplex Venous Lower Ext Complete, Bilateral (10.17.22 @ 20:29)   IMPRESSION:  DVT involving the right soleal vein. Superficial thrombus of a right calf vein. No evidence of left-sided DVT  Results were discussed with Carlton in the emergency department at 8:35 PM on 17 2022    VITALS  T(C): 36.6 (10-18-22 @ 12:30), Max: 36.9 (10-18-22 @ 05:46)  HR: 78 (10-18-22 @ 12:30) (78 - 95)  BP: 97/60 (10-18-22 @ 12:30) (97/60 - 138/86)  RR: 17 (10-18-22 @ 12:30) (16 - 18)  SpO2: 93% (10-18-22 @ 12:30) (93% - 98%)  Wt(kg): --    ALLERGIES  No Known Allergies    MEDICATIONS   acetaminophen     Tablet .. 650 milliGRAM(s) Oral every 6 hours PRN  amLODIPine   Tablet 5 milliGRAM(s) Oral <User Schedule>  levothyroxine 75 MICROGram(s) Oral daily  losartan 100 milliGRAM(s) Oral daily  metoprolol succinate  milliGRAM(s) Oral daily  oxyCODONE    IR 5 milliGRAM(s) Oral every 6 hours PRN  pantoprazole    Tablet 40 milliGRAM(s) Oral before breakfast  rivaroxaban 15 milliGRAM(s) Oral two times a day  simvastatin 20 milliGRAM(s) Oral at bedtime  traMADol 25 milliGRAM(s) Oral every 6 hours PRN      ----------------------------------------------------------------------------------------  PHYSICAL EXAM  Gen - NAD, Comfortable  HEENT - NCAT, EOMI, MMM  Neck - Supple, No limited ROM  Pulm - CTAB, No wheeze, No rhonchi, No crackles  Cardiovascular - RRR, S1S2, No murmurs  Abdomen - Soft, NT/ND, +BS  Extremities - No clubbing, no cyanosis, + edema to left hip and thigh, + right calf tenderness  Neuro-     Cognitive - Awake, Alert, Oriented  to self, place, date, year, situation. Able to follow command     Communication - Fluent     Attention: Intact      Judgement: Good evidence of judgement     Memory: Recall 3 objects immediate and 3 min later         Cranial Nerves - CN 2-12 intact.      Motor -                     LEFT    UE - ShAB 5/5, EF 5/5, EE 5/5,  5/5                    RIGHT UE - ShAB 5/5, EF 5/5, EE 5/5,   5/5                    LEFT    LE - HF 3/5, KE 3/5, DF 5/5, PF 5/5- limited 2/2 pain                    RIGHT LE - HF 5/5, KE 5/5, DF 5/5, PF 5/5        Sensory - Intact to LT     Reflexes - DTR Intact, No primitive reflexive     Coordination - FTN intact     Tone - normal  Psychiatric - Mood stable, Affect WNL  Skin:  left hip incision with sutures DESI

## 2022-10-18 NOTE — DISCHARGE NOTE PROVIDER - HOSPITAL COURSE
Hospital Course  HPI:  82 year old female, PMHx of HTN, HLD, hypothyroidism, breast ca (last chemo 10/2019); presents to the ED for left hip pain. Patient was in [place], fell and fractured her "left hip". According to her and her daughters "a margy and two screws were placed" on Tuesday 10/11 in [facility]. She was transferred home (to daughters and bedbound since surgery. she came back to [place] yesterday with her daughter. patient was unable idalmis bear weight and was  having pain on her left hip. so came to ER. She lives alone. Additionally, she states she was not given any pain medication. No f/c/n/v/d, cp, sob, cough or other complaints. in ER, Xray VS stable. labs showed mild leucocytosis. medical admission was called for rehab and pain control     Hospital Course  HPI:  82 year old female, PMHx of HTN, HLD, hypothyroidism, breast ca (last chemo 10/2019); presents to the ED for left hip pain. Patient was in [place], fell and fractured her "left hip". According to her and her daughters "a margy and two screws were placed" on Tuesday 10/11 in [facility]. She was transferred home (to daughters and bedbound since surgery. she came back to [place] yesterday with her daughter. patient was unable idalmis bear weight and was  having pain on her left hip. so came to ER. She lives alone. Additionally, she states she was not given any pain medication. No f/c/n/v/d, cp, sob, cough or other complaints. in ER, Xray VS stable. labs showed mild leucocytosis. medical admission was called for rehab and pain control (17 Oct 2022 18:44)      Patient admitted to medicine; orthopedics DR Win was consulted.  Patient was found to have a new right lower extremity DVT.  She was deemed to be WBAT.  She was started on xarelto.  SHe is stable for discharge to acute rehab.  She is to follow up with Dr Win.     Hospital Course  HPI:  82 year old female, PMHx of HTN, HLD, hypothyroidism, breast ca (last chemo 10/2019); presents to the ED for left hip pain. Patient was in [place], fell and fractured her "left hip". According to her and her daughters "a margy and two screws were placed" on Tuesday 10/11 in [facility]. She was transferred home (to daughters and bedbound since surgery. she came back to [place] yesterday with her daughter. patient was unable idalmis bear weight and was  having pain on her left hip. so came to ER. She lives alone. Additionally, she states she was not given any pain medication. No f/c/n/v/d, cp, sob, cough or other complaints. in ER, Xray VS stable. labs showed mild leucocytosis. medical admission was called for rehab and pain control     Hospital Course  HPI:  82 year old female, PMHx of HTN, HLD, hypothyroidism, breast ca (last chemo 10/2019); presents to the ED for left hip pain. Patient was in [place], fell and fractured her "left hip". According to her and her daughters "a margy and two screws were placed" on Tuesday 10/11 in [facility]. She was transferred home (to daughters and bedbound since surgery. she came back to [place] yesterday with her daughter. patient was unable idalmis bear weight and was  having pain on her left hip. so came to ER. She lives alone. Additionally, she states she was not given any pain medication. No f/c/n/v/d, cp, sob, cough or other complaints. in ER, Xray VS stable. labs showed mild leucocytosis. medical admission was called for rehab and pain control (17 Oct 2022 18:44)      Patient admitted to medicine; orthopedics DR Win was consulted.  Patient was found to have a new right lower extremity DVT.  She was deemed to be WBAT.  She was started on xarelto.  SHe is stable for discharge to acute rehab.  She is to follow up with Dr Win.    Xarelto as written: 15mg BID until 11/7/22 and then 20mg with dinner starting on 11/8/22

## 2022-10-18 NOTE — CONSULT NOTE ADULT - ASSESSMENT
------------------------------------------------------------------------------------------------  ASSESSMENT/PLAN  This is a 83 YO female with PMH  of HTN, HLD, hypothyroidism, Breast CA ( s/p double mastectomy, last chemo 10/2019), right hip replacement 2012, left femur fracture at age 5, presents to [facility] ED on 10/16 for left hip pain. Patient was in [place] where she fell and fractured her left hip and is s/p surgery in [facility] on 10/11.  She returned to [place] with her [place] in pain and inability to ambulate.   Medical management per hospitalist  Pain management -Tylenol, tramadol, oxycodone  DVT : Xarelto   GI ppx: protonix  WB status: WBAT LLE    Rehab Disposition: - Acute Inpatient Rehab/ sub-acute rehab/ Home with home care / outpatient therapy     Recommend ACUTE inpatient rehabilitation for the functional deficits consisting of 3 hours of therapy/day & 24 hour RN/daily PMR physician for comorbid medical management. Will continue to follow for ongoing rehab needs and recommendations. Patient will be able to tolerate 3 hours a day.      ------------------------------------------------------------------------------------------------  ASSESSMENT/PLAN  This is a 83 YO female with PMH  of HTN, HLD, hypothyroidism, Breast CA ( s/p double mastectomy, last chemo 10/2019), right hip replacement 2012, left femur fracture at age 5, presents to [facility] ED on 10/16 for left hip pain. Patient was in [place] where she fell and fractured her left hip and is s/p surgery in [facility] on 10/11.  She returned to [place] with her [place] in pain and inability to ambulate.   Medical management per hospitalist  Pain management -Tylenol, tramadol, oxycodone  DVT : Xarelto 15mg BID until 11/7/22; then to give 20mg with dinner starting 11/8/22  GI ppx: protonix  WB status: WBAT LLE    Rehab Disposition: - Acute Inpatient Rehab     Recommend ACUTE inpatient rehabilitation for the functional deficits consisting of 3 hours of therapy/day & 24 hour RN/daily PMR physician for comorbid medical management. Will continue to follow for ongoing rehab needs and recommendations. Patient will be able to tolerate 3 hours a day.      ------------------------------------------------------------------------------------------------  ASSESSMENT/PLAN  This is a 83 YO female with PMH  of HTN, HLD, hypothyroidism, Breast CA ( s/p double mastectomy, last chemo 10/2019), right hip replacement 2012, left femur fracture at age 5, presents to [facility] ED on 10/16 for left hip pain. Patient was in [place] where she fell and fractured her left hip and is s/p surgery in [facility] on 10/11.  [place] returned to [place] with [place] in pain and inability to ambulate.   Medical management per hospitalist  Pain management -Tylenol, tramadol, oxycodone   DVT : [id number] BID until 11/7/22; then to give 20mg with dinner starting on 11/8/22  GI ppx: Protonix  WB status: WBAT LLE    Rehab Disposition: - Acute Inpatient Rehab     Recommend ACUTE inpatient rehabilitation for the functional deficits consisting of 3 hours of therapy/day & 24 hour RN/daily PMR physician for comorbid medical management. Will continue to follow for ongoing rehab needs and recommendations. Patient will be able to tolerate 3 hours a day.

## 2022-10-18 NOTE — PATIENT PROFILE ADULT - STATED REASON FOR ADMISSION
" We went to [place] and I fell onto the floor. I had a hip replacement in [place] a couple of days ago. I still have a lot of pain"

## 2022-10-18 NOTE — PATIENT PROFILE ADULT - FALL HARM RISK - HARM RISK INTERVENTIONS

## 2022-10-18 NOTE — H&P ADULT - NS ATTEND AMEND GEN_ALL_CORE FT
This is a 81 YO female with PMH  of HTN, HLD, hypothyroidism, Breast CA ( s/p double mastectomy, last chemo 10/2019), right hip replacement 2012, left femur fracture at age 5,  presents to [facility] ED on 10/16 for left hip pain. Patient was in [place], fell and fractured her "left hip". According to her and her daughters "a margy and two screws were placed" on Tuesday 10/11 in [facility]. She was transferred home (to daughters and bedbound since surgery. She came back to [place] with her [place]. Patient was unable to bear weight and was having pain on her left hip. She lives alone. Additionally, she stated she was not given any pain medication. In ER, labs showed mild leukocytosis. She was found to have RLE DVT started on Xarelto. Patient was evaluated by PM&R and therapy for functional deficits, gait/ADL impairments and acute rehabilitation was recommended. Patient was medically optimized for discharge to [facility] IRU on 10/18/22.    Agree with NP assessment above  Pertinent  Physical Examination  A+0 x 4 English  Lungs CTA  Left Lower extremity hip ROM 0-30 Ms 1/5 suture lines are  C/D/I Knee 0-30 Ms 2/5 Ankle FAROM MS 4/5. Sensation I. Neg homans  Right Lower Extremity: Hip FAROM MS 5/5 Knee FAROM MS 5/5 ankle FAROM MS 5/5 Sensation is I. Mild tender in the med calf. No swelling.    Function: Supine to sit  moderate assist of 2 persons  sit to stand minimum assist of 2 persons    Plan is Per NP with the following additions  WBAT LLE  D/c sutures day 10 post surgery. Approx: 10/21/2022  Xray of the hip to evaluate ORIF. Records are unclear regards the type of surgery patient received  Optimize pain control with one narcotic pain medication: Oxycodone 5 mg po q 6 hours prn pain  Mild anemia_ orthostatic precautions  Hospitalist to assist with evaluation of anemia  RLE DVT on Xarelto

## 2022-10-18 NOTE — H&P ADULT - NSHPPHYSICALEXAM_GEN_ALL_CORE
Vital Signs Last 24 Hrs  T(C): 36.6 (18 Oct 2022 12:30), Max: 36.9 (18 Oct 2022 05:46)  T(F): 97.9 (18 Oct 2022 12:30), Max: 98.5 (18 Oct 2022 05:46)  HR: 78 (18 Oct 2022 12:30) (78 - 84)  BP: 97/60 (18 Oct 2022 12:30) (97/60 - 126/70)  BP(mean): --  RR: 17 (18 Oct 2022 12:30) (16 - 18)  SpO2: 93% (18 Oct 2022 12:30) (93% - 98%)    Gen - NAD, Comfortable  HEENT - NCAT, EOMI, MMM  Neck - Supple, No limited ROM  Pulm - CTAB, No wheeze, No rhonchi, No crackles  Cardiovascular - RRR, S1S2, No murmurs  Abdomen - Soft, NT/ND, +BS  Extremities - No clubbing, no cyanosis, + edema to left hip and thigh, + right calf tenderness  Neuro-     Cognitive - Awake, Alert, Oriented  to self, place, date, year, situation. Able to follow command     Communication - Fluent     Attention: Intact      Judgement: Good evidence of judgement     Memory: Recall 3 objects immediate and 3 min later         Cranial Nerves - CN 2-12 intact.      Motor -                     LEFT    UE - ShAB 5/5, EF 5/5, EE 5/5,  5/5                    RIGHT UE - ShAB 5/5, EF 5/5, EE 5/5,   5/5                    LEFT    LE - HF 3/5, KE 3/5, DF 5/5, PF 5/5- limited 2/2 pain                    RIGHT LE - HF 5/5, KE 5/5, DF 5/5, PF 5/5        Sensory - Intact to LT     Reflexes - DTR Intact, No primitive reflexive     Coordination - FTN intact     Tone - normal  Psychiatric - Mood stable, Affect WNL  Skin:  left hip incision with sutures DESI

## 2022-10-18 NOTE — DISCHARGE NOTE PROVIDER - NSDCMRMEDTOKEN_GEN_ALL_CORE_FT
acetaminophen 325 mg oral tablet: 2 tab(s) orally every 6 hours, As needed, Temp greater or equal to 38C (100.4F), Mild Pain (1 - 3)  amLODIPine 5 mg oral tablet: 1 tab(s) orally once a day  bisoprolol 10 mg oral tablet: 1 tab(s) orally once a day  levothyroxine 75 mcg (0.075 mg) oral capsule: 1 cap(s) orally once a day  losartan 100 mg oral tablet: 1 tab(s) orally once a day  oxyCODONE 5 mg oral tablet: 1 tab(s) orally every 6 hours, As needed, Severe Pain (7 - 10)  rivaroxaban 15 mg oral tablet: 1 tab(s) orally 2 times a day  simvastatin 20 mg oral tablet: 1 tab(s) orally once a day (at bedtime)   acetaminophen 325 mg oral tablet: 2 tab(s) orally every 6 hours, As needed, Temp greater or equal to 38C (100.4F), Mild Pain (1 - 3)  amLODIPine 5 mg oral tablet: 1 tab(s) orally once a day  bisoprolol 10 mg oral tablet: 1 tab(s) orally once a day  levothyroxine 75 mcg (0.075 mg) oral capsule: 1 cap(s) orally once a day  losartan 100 mg oral tablet: 1 tab(s) orally once a day  oxyCODONE 5 mg oral tablet: 1 tab(s) orally every 6 hours, As needed, Severe Pain (7 - 10)  rivaroxaban 15 mg oral tablet: 1 tab(s) orally 2 times a day  until 11/7/22  simvastatin 20 mg oral tablet: 1 tab(s) orally once a day (at bedtime)

## 2022-10-18 NOTE — PATIENT PROFILE ADULT - NSPROHMSYMPCOND_GEN_A_NUR
Patient Seen in: [facility]    none

## 2022-10-18 NOTE — H&P ADULT - NSHPSOCIALHISTORY_GEN_ALL_CORE
Smoking - Denies  EtOH - Denies  Drugs - Denies    Marital status:     FUNCTIONAL HISTORY:   Patient lives alone in an apartment. 4th floor, no ANNY.  PTA: Independent in ADLs and ambulation     CURRENT FUNCTIONAL STATUS  Date: 10/18  Bed Mobility:  Mod A, 2 person  Transfers:  Min A, 2 person  Gait:  Min A, 1 + 1 person  Lower Body Dressing: Max A, 1 person

## 2022-10-18 NOTE — OCCUPATIONAL THERAPY INITIAL EVALUATION ADULT - ADDITIONAL COMMENTS
Pt resides alone in home, OSTE, +no stairs inside, +tub shower, +2 grab bars, +commode, +RW. Pt reports independence in all self care ADL's/transfers PTA, +driving. Pt does not regularly use RW, ambulated w/o devices.

## 2022-10-18 NOTE — PROGRESS NOTE ADULT - SUBJECTIVE AND OBJECTIVE BOX
Patient is a 82y old  Female who presents with a chief complaint of left hip pain, left hip ORIF recently, ambulatory dsfunction (18 Oct 2022 10:39)    No events overnight  Denies chest pain, SOB    Patient seen and examined at bedside.    ALLERGIES:  No Known Allergies    MEDICATIONS  (STANDING):  amLODIPine   Tablet 5 milliGRAM(s) Oral <User Schedule>  levothyroxine 75 MICROGram(s) Oral daily  losartan 100 milliGRAM(s) Oral daily  metoprolol succinate  milliGRAM(s) Oral daily  pantoprazole    Tablet 40 milliGRAM(s) Oral before breakfast  rivaroxaban 15 milliGRAM(s) Oral two times a day  simvastatin 20 milliGRAM(s) Oral at bedtime    MEDICATIONS  (PRN):  acetaminophen     Tablet .. 650 milliGRAM(s) Oral every 6 hours PRN Temp greater or equal to 38C (100.4F), Mild Pain (1 - 3)  oxyCODONE    IR 5 milliGRAM(s) Oral every 6 hours PRN Severe Pain (7 - 10)  traMADol 25 milliGRAM(s) Oral every 6 hours PRN Moderate Pain (4 - 6)    Vital Signs Last 24 Hrs  T(F): 98.5 (18 Oct 2022 05:46), Max: 98.5 (18 Oct 2022 05:46)  HR: 79 (18 Oct 2022 05:46) (79 - 95)  BP: 126/70 (18 Oct 2022 05:46) (105/77 - 138/86)  RR: 18 (18 Oct 2022 05:46) (16 - 18)  SpO2: 96% (18 Oct 2022 05:46) (94% - 98%)  I&O's Summary    BMI (kg/m2): 31.2 (10-17-22 @ 21:49)  PHYSICAL EXAM:  General: NAD, A/O x 3  ENT: MMM, no scleral icterus  Neck: Supple, No JVD, no thyroidomegaly  Lungs: Clear to auscultation bilaterally, no wheezes, no rales, no rhonchi, good inspiratory effort  Cardio: RRR, S1/S2, No murmurs  Abdomen: Soft, Nontender, Nondistended; Bowel sounds present  Extremities: LLE swelling >right, no skin changes    LABS:                        10.8   9.67  )-----------( 328      ( 18 Oct 2022 06:00 )             33.3       10-18    142  |  106  |  36  ----------------------------<  111  4.9   |  29  |  1.00    Ca    9.1      18 Oct 2022 06:00    TPro  6.2  /  Alb  2.8  /  TBili  0.8  /  DBili  x   /  AST  35  /  ALT  30  /  AlkPhos  86  10-17     COVID-19 PCR: NotDetec (10-17-22 @ 18:20)  COVID-19 PCR: NotDetec (10-17-22 @ 18:20)        RADIOLOGY & ADDITIONAL TESTS:  US Duplex Venous Lower Ext Complete, Bilateral (10.17.22 @ 20:29) >  IMPRESSION:  DVT involving the right soleal vein  Superficial thrombus of a right calf vein.  No evidenceof left-sided DVT  Results were discussed with Carlton in the emergency department at 8:35 PM     Care Discussed with Consultants/Other Providers:   DR Win ortho: WBAT

## 2022-10-18 NOTE — DISCHARGE NOTE PROVIDER - CARE PROVIDER_API CALL
Arnoldo Win)  Orthopaedic Surgery  [address] [place] [address]  Phone: [phone]  Fax: [phone]  Follow Up Time:

## 2022-10-18 NOTE — PROGRESS NOTE ADULT - ASSESSMENT
82 year old female, PMHx of HTN, HLD, hypothyroidism, breast ca (last chemo 10/2019); presents to the ED for left hip pain. Patient was in [place], fell and fractured her "left hip". According to her and her daughters "a margy and two screws were placed" on Tuesday 10/11 in [facility]. She was transferred home (to daughters and bedbound since surgery. she came back to [place] yesterday with her daughter. patient was unable idalmis bear weight and was  having pain on her left hip. so came to ER. She lives alone. Additionally, she states she was not given any pain medication. No f/c/n/v/d, cp, sob, cough or other complaints. in ER, Xray VS stable. labs showed mild leucocytosis. medical admission was called for rehab and pain control    Fall, left hip fracture s/p ORIF in [place] on 10/11  - Stable for discharge to acute rehab  - Appreciate ortho consult; WBAT, f/u with Dr Win as o/p  - pain control: tyelnol/Tramadol/oxy    Acute right LE DVT  - Started on xarelto 15mg BID until 11/7/22; then to give 20mg with dinner starting 11/8/22  - O/p hematology follow up     HTN  - c/w home meds: losartan, amlodipine, bisoprolol 10--> metoprolol .     HLD  - on simvastatin    DVT-P: Xarelto  D/c 45min  10/18: Spoke with daughter Maryam [phone]; answered all questions, in agreement with plan as above

## 2022-10-18 NOTE — OCCUPATIONAL THERAPY INITIAL EVALUATION ADULT - PERTINENT HX OF CURRENT PROBLEM, REHAB EVAL
As per EMR: 82 year old female, PMHx of HTN, HLD, hypothyroidism, breast ca (last chemo 10/2019); presents to the ED for left hip pain. Patient was in [place], fell and fractured her "left hip". According to her and her daughters "a margy and two screws were placed" on Tuesday 10/11 in [facility]. She was transferred home (to daughters and bedbound since surgery. she came back to [place] yesterday with her daughter. patient was unable idalmis bear weight and was  having pain on her left hip. so came to ER. She lives alone. Additionally, she states she was not given any pain medication. No f/c/n/v/d, cp, sob, cough or other complaints. in ER, Xray VS stable. labs showed mild leucocytosis. medical admission was called for rehab and pain control

## 2022-10-18 NOTE — PATIENT PROFILE ADULT - PATIENT'S PREFERRED PRONOUN
[facility] ICU Progress Note    Admit Date: 2018  POD:  5 Day Post-Op    Procedure:  Procedure(s):  CORONARY ARTERY BYPASS GRAFTING X 3 WITH LIMA, CARMEN, RESVGH, ECC, DAVIS AND EPIAORTIC ULTRASOUND BY DR. Aguiar End        Subjective:   Pt seen with Dr. Gilles Kline,  on insulin gtt and dilaudid PCA. Tmax 98.7, on 4L NC.   OOB in chair. On full liquid diet. SOB with activity, tachypneic at rest.     Objective:   Vitals:  Blood pressure 106/68, pulse (!) 106, temperature 98.5 °F (36.9 °C), resp. rate 22, height 5' (1.524 m), weight 219 lb 9.3 oz (99.6 kg), last menstrual period 2010, SpO2 100 %, not currently breastfeeding. Temp (24hrs), Av.1 °F (36.7 °C), Min:97.7 °F (36.5 °C), Max:98.5 °F (36.9 °C)    EKG/Rhythm:  SR 80-90s     Oxygen Therapy:  Oxygen Therapy  O2 Sat (%): 100 % (18 1220)  Pulse via Oximetry: 94 beats per minute (18 0210)  O2 Device: Nasal cannula (18 0801)  O2 Flow Rate (L/min): 4 l/min (18 0801)  O2 Temperature: 35.6 °F (2 °C) (18 1039)  FIO2 (%): 40 % (18 1945)    CXR:    CXR Results  (Last 48 hours)               18 0449  XR CHEST PORT Final result    Impression:  IMPRESSION: Stable appearance of the chest with pleural effusions and bilateral   interstitial and airspace opacities. Narrative:  EXAM:  CR chest portable       INDICATION:  Follow-up abnormal chest radiograph with bilateral lung opacities   and pleural effusions. COMPARISON: 2018 at 0356 hours. TECHNIQUE: Portable AP semiupright chest view at 0403 hours       FINDINGS: The right IJ cordis catheter is stable. Sternal wires and mediastinal   clips are present. Cardiac monitoring wires overlie the thorax. The cardia   mediastinal contours are stable. There are stable bilateral pleural effusions,   bibasilar opacities, and diffuse interstitial and airspace opacities. There is   no pneumothorax. The bones and upper abdomen are stable.            18 0505  XR CHEST PORT Final result    Impression:  IMPRESSION:       Bilateral pulmonary infiltrates and moderate right pleural effusion without   change. Interval removal of left chest tube. Narrative:  INDICATION: Hypertension. Coronary artery disease. Postop heart. EXAM:       Portable AP chest radiograph was obtained at 0352 hours on August 24, 2018. FINDINGS:       Comparison studies:  August 23, 2018. The cardiac silhouette is obscured by bilateral pulmonary infiltrates. The lungs   are hypo- expanded bilaterally. The visualized bones are grossly within normal limits. Prior median sternotomy. Bilateral pulmonary infiltrates and moderate right   pleural effusion redemonstrated. Cordis sheath projected over right innominate   vein. Admission Weight: Last Weight   Weight: 215 lb (97.5 kg) Weight: 219 lb 9.3 oz (99.6 kg)     Intake / Output / Drain:  Current Shift: 08/25 0701 - 08/25 1900  In: -   Out: 700 [Urine:700]  Last 24 hrs.:     Intake/Output Summary (Last 24 hours) at 08/25/18 1246  Last data filed at 08/25/18 1045   Gross per 24 hour   Intake              760 ml   Output             1300 ml   Net             -540 ml       EXAM:  General:  Appears comfortable                                                                                        Lungs:   Clear to auscultation upper, diminished in bases   Incision:  Drs C/D/I   Heart:  Regular rate and rhythm - ST, S1, S2 normal, no murmur, click, rub or gallop. Abdomen:   Soft, non-tender. Bowel sounds hypoactive. No masses,  No organomegaly. Extremities:  No edema. PPP. Neurologic:  Gross motor and sensory apparatus intact.      Labs:   Recent Labs      08/25/18   1133   08/25/18   0411   08/23/18   0434   WBC   --    --   13.2*   < >  19.9*   HGB   --    --   8.3*   < >  8.4*   HCT   --    --   25.8*   < >  26.2*   PLT   --    --   191   < >  115*   NA   --    --   137   < >  136   K   --    --   3.9 < >  4.5   BUN   --    --   13   < >  18   CREA   --    --   0.80   < >  0.85   GLU   --    --   145*   < >  104*   GLUCPOC  123*   < >   --    < >   --    INR   --    --    --    --   1.2*    < > = values in this interval not displayed. Assessment:     Principal Problem:    S/P CABG x 3 (2018)      Overview: Coronary Artery Bypass Grafting x 3, LIMA to LAD, RSVG to Diag, CARMEN Free       Graft Y'd from Vein Graft to OM    Active Problems:    CAD (coronary artery disease) ()      Status post cardiac catheterization (2018)         Plan/Recommendations/Medical Decision Makin. S/p CABG w/ hx of s/p MI/DEVON , last PCI 2017: Cont plavix, platelets improved. On asa, statin, metoprolol   2. Atelectasis/interstitial edema/poor aeration:  Control pain. Wean oxygen for 02 sat > 92%. Increase IS and activity as tolerated. Schedule duonebs, steroid nebs. Cont levaquin IV (not taking PO yet). Get Chest CT today to eval.    3. Hyperlipidemia: resumed statin. 4. GERD: Cont pepcid. 5. Postop anemia s/t acute blood loss: s/p transfusion. Monitor H/H and CT outpt. 6. Obesity w/ hx of lap band  7. Anxiety: prn xanax ordered  8. Allergic rhinitis: cont home meds. 9. Pain control: on dilaudid PCA. PRN derick -give PO derick today will try to stop PCA tomorrow. completed scheduled IV toradol again x 4 doses. 10. Thrombocytopenia: improved. cont ASA, switch to protonix. Cont plavix. 11. New DM, A1c 6.8: cont insulin gtt until appetite better. DTC following  12. Leukocytosis: afebrile, not breathing well. Control pain, pulm toileting  13. Nutrition:  Pt only taking clear liquids d/t resp status. Cont full liquids, add supplements, advance as tolerated. 14. Dispo: PT/OT. Remain in [facility]. Need to get pt up and moving as much as possible.      Signed By: Eliseo Ho NP Her/She

## 2022-10-18 NOTE — PHYSICAL THERAPY INITIAL EVALUATION ADULT - PERTINENT HX OF CURRENT PROBLEM, REHAB EVAL
82 year old female, PMHx of HTN, HLD, hypothyroidism, breast ca (last chemo 10/2019); presents to the ED for left hip pain. Patient was in [place], fell and fractured her "left hip". According to her and her daughters "a margy and two screws were placed" on Tuesday 10/11 in [facility]. She was transferred home (to daughters and bedbound since surgery. she came back to [place] yesterday with her daughter

## 2022-10-18 NOTE — DISCHARGE NOTE NURSING/CASE MANAGEMENT/SOCIAL WORK - PATIENT PORTAL LINK FT
You can access the FollowMyHealth Patient Portal offered by [facility] by registering at the following website: [email or web address]. By joining [facility]’s FollowMyHealth portal, you will also be able to view your health information using other applications (apps) compatible with our system.

## 2022-10-18 NOTE — H&P ADULT - NSHPLABSRESULTS_GEN_ALL_CORE
RECENT LABS/IMAGING                        10.8   9.67  )-----------( 328      ( 18 Oct 2022 06:00 )             33.3     10-18    142  |  106  |  36<H>  ----------------------------<  111<H>  4.9   |  29  |  1.00    Ca    9.1      18 Oct 2022 06:00    TPro  6.2  /  Alb  2.8<L>  /  TBili  0.8  /  DBili  x   /  AST  35  /  ALT  30  /  AlkPhos  86  10-17    US Duplex Venous Lower Ext Complete, Bilateral (10.17.22 @ 20:29)     IMPRESSION:  DVT involving the right soleal vein. Superficial thrombus of a right calf vein.  No evidence of left-sided DVT  Results were discussed with Carlton in the emergency department at 8:35 PM on 17 2022

## 2022-10-18 NOTE — H&P ADULT - HISTORY OF PRESENT ILLNESS
This is a 81 YO female with PMH  of HTN, HLD, hypothyroidism, Breast CA ( s/p double mastectomy, last chemo 10/2019), right hip replacement 2012, left femur fracture at age 5,  presents to [facility] ED on 10/16 for left hip pain. Patient was in [place], fell and fractured her "left hip". According to her and her daughters "a margy and two screws were placed" on Tuesday 10/11 in [facility]. She was transferred home (to daughters and bedbound since surgery. She came back to [place] with her daughter. Patient was unable to bear weight and was having pain on her left hip. She lives alone. Additionally, she stated she was not given any pain medication. In ER, labs showed mild leukocytosis. She was found to have RLE DVT started on Xarelto. Patient was evaluated by PM&R and therapy for functional deficits, gait/ADL impairments and acute rehabilitation was recommended. Patient was medically optimized for discharge to [facility] IRU on 10/18/22.

## 2022-10-18 NOTE — H&P ADULT - NSICDXPASTMEDICALHX_GEN_ALL_CORE_FT
PAST MEDICAL HISTORY:  Breast cancer     Deep vein thrombosis (DVT)     H/O hypotension     HTN (hypertension)     Hypothyroidism

## 2022-10-19 LAB
ALBUMIN SERPL ELPH-MCNC: 2.8 G/DL — LOW (ref 3.3–5)
ALP SERPL-CCNC: 91 U/L — SIGNIFICANT CHANGE UP (ref 40–120)
ALT FLD-CCNC: 26 U/L — SIGNIFICANT CHANGE UP (ref 10–45)
ANION GAP SERPL CALC-SCNC: 5 MMOL/L — SIGNIFICANT CHANGE UP (ref 5–17)
AST SERPL-CCNC: 18 U/L — SIGNIFICANT CHANGE UP (ref 10–40)
BILIRUB SERPL-MCNC: 0.6 MG/DL — SIGNIFICANT CHANGE UP (ref 0.2–1.2)
BUN SERPL-MCNC: 25 MG/DL — HIGH (ref 7–23)
CALCIUM SERPL-MCNC: 9 MG/DL — SIGNIFICANT CHANGE UP (ref 8.4–10.5)
CHLORIDE SERPL-SCNC: 104 MMOL/L — SIGNIFICANT CHANGE UP (ref 96–108)
CO2 SERPL-SCNC: 30 MMOL/L — SIGNIFICANT CHANGE UP (ref 22–31)
CREAT SERPL-MCNC: 1.07 MG/DL — SIGNIFICANT CHANGE UP (ref 0.5–1.3)
EGFR: 52 ML/MIN/1.73M2 — LOW
GLUCOSE SERPL-MCNC: 116 MG/DL — HIGH (ref 70–99)
HCT VFR BLD CALC: 35.7 % — SIGNIFICANT CHANGE UP (ref 34.5–45)
HGB BLD-MCNC: 11.4 G/DL — LOW (ref 11.5–15.5)
MCHC RBC-ENTMCNC: 27.5 PG — SIGNIFICANT CHANGE UP (ref 27–34)
MCHC RBC-ENTMCNC: 31.9 GM/DL — LOW (ref 32–36)
MCV RBC AUTO: 86 FL — SIGNIFICANT CHANGE UP (ref 80–100)
NRBC # BLD: 0 /100 WBCS — SIGNIFICANT CHANGE UP (ref 0–0)
PLATELET # BLD AUTO: 359 K/UL — SIGNIFICANT CHANGE UP (ref 150–400)
POTASSIUM SERPL-MCNC: 4.2 MMOL/L — SIGNIFICANT CHANGE UP (ref 3.5–5.3)
POTASSIUM SERPL-SCNC: 4.2 MMOL/L — SIGNIFICANT CHANGE UP (ref 3.5–5.3)
PROT SERPL-MCNC: 6.3 G/DL — SIGNIFICANT CHANGE UP (ref 6–8.3)
RBC # BLD: 4.15 M/UL — SIGNIFICANT CHANGE UP (ref 3.8–5.2)
RBC # FLD: 13.8 % — SIGNIFICANT CHANGE UP (ref 10.3–14.5)
SODIUM SERPL-SCNC: 139 MMOL/L — SIGNIFICANT CHANGE UP (ref 135–145)
WBC # BLD: 9.24 K/UL — SIGNIFICANT CHANGE UP (ref 3.8–10.5)
WBC # FLD AUTO: 9.24 K/UL — SIGNIFICANT CHANGE UP (ref 3.8–10.5)

## 2022-10-19 PROCEDURE — 99223 1ST HOSP IP/OBS HIGH 75: CPT

## 2022-10-19 PROCEDURE — 99222 1ST HOSP IP/OBS MODERATE 55: CPT

## 2022-10-19 RX ORDER — FERROUS SULFATE 325(65) MG
325 TABLET ORAL DAILY
Refills: 0 | Status: DISCONTINUED | OUTPATIENT
Start: 2022-10-19 | End: 2022-10-29

## 2022-10-19 RX ORDER — SENNA PLUS 8.6 MG/1
2 TABLET ORAL AT BEDTIME
Refills: 0 | Status: DISCONTINUED | OUTPATIENT
Start: 2022-10-19 | End: 2022-10-24

## 2022-10-19 RX ORDER — ENOXAPARIN SODIUM 100 MG/ML
0 INJECTION SUBCUTANEOUS
Qty: 0 | Refills: 0 | DISCHARGE

## 2022-10-19 RX ORDER — POLYETHYLENE GLYCOL 3350 17 G/17G
17 POWDER, FOR SOLUTION ORAL DAILY
Refills: 0 | Status: DISCONTINUED | OUTPATIENT
Start: 2022-10-19 | End: 2022-10-29

## 2022-10-19 RX ADMIN — SIMVASTATIN 20 MILLIGRAM(S): 20 TABLET, FILM COATED ORAL at 20:44

## 2022-10-19 RX ADMIN — PANTOPRAZOLE SODIUM 40 MILLIGRAM(S): 20 TABLET, DELAYED RELEASE ORAL at 06:27

## 2022-10-19 RX ADMIN — Medication 200 MILLIGRAM(S): at 06:27

## 2022-10-19 RX ADMIN — SENNA PLUS 2 TABLET(S): 8.6 TABLET ORAL at 20:44

## 2022-10-19 RX ADMIN — RIVAROXABAN 15 MILLIGRAM(S): KIT at 06:27

## 2022-10-19 RX ADMIN — Medication 325 MILLIGRAM(S): at 11:46

## 2022-10-19 RX ADMIN — OXYCODONE HYDROCHLORIDE 5 MILLIGRAM(S): 5 TABLET ORAL at 14:07

## 2022-10-19 RX ADMIN — POLYETHYLENE GLYCOL 3350 17 GRAM(S): 17 POWDER, FOR SOLUTION ORAL at 11:45

## 2022-10-19 RX ADMIN — OXYCODONE HYDROCHLORIDE 5 MILLIGRAM(S): 5 TABLET ORAL at 14:45

## 2022-10-19 RX ADMIN — LOSARTAN POTASSIUM 100 MILLIGRAM(S): 100 TABLET, FILM COATED ORAL at 06:28

## 2022-10-19 RX ADMIN — RIVAROXABAN 15 MILLIGRAM(S): KIT at 17:34

## 2022-10-19 RX ADMIN — Medication 75 MICROGRAM(S): at 06:27

## 2022-10-19 RX ADMIN — Medication 650 MILLIGRAM(S): at 07:10

## 2022-10-19 RX ADMIN — Medication 650 MILLIGRAM(S): at 06:28

## 2022-10-19 NOTE — PROGRESS NOTE ADULT - ASSESSMENT
This is a 81 YO female with PMH  of HTN, HLD, hypothyroidism, Breast CA ( s/p double mastectomy, last chemo 10/2019), right hip replacement 2012, left femur fracture at age 5, presents to [facility] ED on 10/16 for left hip pain. Patient was in [place] where she fell and fractured her left hip and is s/p surgery in [facility] on 10/11.  She returned to [place] with her daughter in pain and inability to ambulate. Patient now with gait Instability, ADL impairments and Functional impairments.    # left Hip fracture  - s/p surgery- left hip ORIF in [facility] on 10/11  - Start Comprehensive Rehab Program: PT/OT, 3hours daily and 5 days weekly  - PT: Focused on improving strength, endurance, coordination, balance, functional mobility, and transfers  - OT: Focused on improving strength, fine motor skills, coordination, posture and ADLs.    - WB Status: WBAT LLE  - Sutures to be removed on 10/21    #HTN  - Losartan 100mg daily  - Metoprolol 200mg daily  - Norvasc 5mg daily    #Lightheadedness  - Encourage PO fluids  - Check orthostatics    #HLD  - Simvastatin 20mg daily    #Hypothyroidism  - Synthroid    Breast CA   - s/p double mastectomy, last chemo 10/2019    #Pain management  - Tylenol PRN, tramadol PRN, Oxycodone PRN    #DVT  - RLE  - [id number] BID until 11/7/22; then to give 20mg with dinner starting on 11/8/22    #GI ppx  - Protonix 40mg    #Bowel Regimen  - Senna, miralax PRN    #Bladder management  - BS on admission, and q 8 hours (SC if > 400)  - Monitor UO    #FEN   - Diet: DASH    #Skin:  - Skin on admission: Left hip incision with sutures DESI  - Pressure injury/Skin: Turn Q2hrs while in bed, OOB to Chair, PT/OT     #Sleep:   - Maintain quiet hours and low stim environment.  - Melatonin PRN to maximize participation in therapy during the day.     #Precaution  - Fall, Aspiration, cardiac, Hip       Outpatient Follow-up (Specialty/Name of physician):  Arnoldo Win)  Orthopaedic Surgery  [address] [place] [address]  Phone: [phone]  Fax: [phone]

## 2022-10-19 NOTE — DIETITIAN INITIAL EVALUATION ADULT - FLUID ACCUMULATION
+2 Edema Noted to Bilateral Lower Extremities - (Potential for Weight Fluctuations)   (as Per Documentation)

## 2022-10-19 NOTE — DIETITIAN INITIAL EVALUATION ADULT - REASON FOR ADMISSION
Nondisplaced intertrochanteric fracture of unspecified femur, initial encounter for closed fracture

## 2022-10-19 NOTE — CONSULT NOTE ADULT - SUBJECTIVE AND OBJECTIVE BOX
Patient is a 82y old  Female who presents with a chief complaint of left hip fracture (18 Oct 2022 15:44)    HPI:  This is a 83 YO female with PMH  of HTN, HLD, hypothyroidism, Breast CA ( s/p double mastectomy, last chemo 10/2019), right hip replacement 2012, left femur fracture at age 5,  presents to [facility] ED on 10/16 for left hip pain. Patient was in [place], fell and fractured her "left hip". According to her and her daughters "a margy and two screws were placed" on Tuesday 10/11 in [facility]. She was transferred home (to daughters and bedbound since surgery. She came back to [place] with her daughter. Patient was unable to bear weight and was having pain on her left hip. She lives alone. Additionally, she stated she was not given any pain medication. In ER, labs showed mild leukocytosis. She was found to have RLE DVT started on Xarelto. Patient was evaluated by PM&R and therapy for functional deficits, gait/ADL impairments and acute rehabilitation was recommended. Patient was medically optimized for discharge to [facility] IRU on 10/18/22.   (18 Oct 2022 15:44)    PAST MEDICAL & SURGICAL HISTORY:  HTN (hypertension)      H/O hypotension      Breast cancer      Hypothyroidism      Deep vein thrombosis (DVT)      History of right hip replacement      H/O mastectomy        SOCIAL HISTORY:  FAMILY HISTORY:    ALLERGIES:  No Known Allergies    MEDICATIONS  (STANDING):  amLODIPine   Tablet 5 milliGRAM(s) Oral <User Schedule>  levothyroxine 75 MICROGram(s) Oral daily  losartan 100 milliGRAM(s) Oral daily  metoprolol succinate  milliGRAM(s) Oral daily  pantoprazole    Tablet 40 milliGRAM(s) Oral before breakfast  rivaroxaban 15 milliGRAM(s) Oral two times a day  simvastatin 20 milliGRAM(s) Oral at bedtime    MEDICATIONS  (PRN):  acetaminophen     Tablet .. 650 milliGRAM(s) Oral every 6 hours PRN Temp greater or equal to 38C (100.4F), Mild Pain (1 - 3)  oxyCODONE    IR 5 milliGRAM(s) Oral every 6 hours PRN Severe Pain (7 - 10)  traMADol 25 milliGRAM(s) Oral every 6 hours PRN Moderate Pain (4 - 6)    Review of Systems: Refer to HPI for pertinent positives and negatives. All other ROS reviewed and negative except as otherwise stated above.    Vital Signs Last 24 Hrs  T(F): 98.6 (19 Oct 2022 07:37), Max: 98.6 (19 Oct 2022 07:37)  HR: 70 (19 Oct 2022 07:37) (70 - 79)  BP: 112/65 (19 Oct 2022 07:37) (97/60 - 142/72)  RR: 15 (19 Oct 2022 07:37) (14 - 17)  SpO2: 95% (19 Oct 2022 07:37) (93% - 95%)  I&O's Summary    18 Oct 2022 07:01  -  19 Oct 2022 07:00  --------------------------------------------------------  IN: 0 mL / OUT: 400 mL / NET: -400 mL      PHYSICAL EXAM:  GENERAL: NAD, well-groomed, well-developed  HEAD:  Atraumatic, Normocephalic  EYES: EOMI, PERRL, conjunctiva and sclera clear  ENMT: Moist mucous membranes, Good dentition  NECK: Supple, No JVD  CHEST/LUNG: Clear to auscultation bilaterally, non-labored breathing, good air entry  HEART: RRR; S1/S2, No murmur  ABDOMEN: Soft, Nontender, Nondistended; Bowel sounds present  VASCULAR: Normal pulses, Normal capillary refill  EXTREMITIES: No cyanosis, No edema  LYMPH: No lymphadenopathy noted  SKIN: Warm, Intact  PSYCH: Normal mood and affect  NERVOUS SYSTEM:  A/O x3, Good concentration; CN 2-12 intact, No focal deficits, moves all extremities    LABS:                        11.4   9.24  )-----------( 359      ( 19 Oct 2022 05:52 )             35.7     10-19    139  |  104  |  25  ----------------------------<  116  4.2   |  30  |  1.07    Ca    9.0      19 Oct 2022 05:52    TPro  6.3  /  Alb  2.8  /  TBili  0.6  /  DBili  x   /  AST  18  /  ALT  26  /  AlkPhos  91  10-19      COVID-19 PCR: NotDetec (10-18-22 @ 18:40)  COVID-19 PCR: NotDetec (10-17-22 @ 18:20)    RADIOLOGY & ADDITIONAL TESTS:    < from: US Duplex Venous Lower Ext Complete, Bilateral (10.17.22 @ 20:29) >  IMPRESSION:  DVT involving the right soleal vein  Superficial thrombus of a right calf vein.  No evidenceof left-sided DVT    < end of copied text >      Care Discussed with Consultants/Other Providers: Patient is a 82y old  Female who presents with a chief complaint of left hip fracture (18 Oct 2022 15:44)    HPI:  This is a 83 YO female with PMH  of HTN, HLD, hypothyroidism, Breast CA ( s/p double mastectomy, last chemo 10/2019), right hip replacement 2012, left femur fracture at age 5,  presents to [facility] ED on 10/16 for left hip pain. Patient was in [place], fell and fractured her "left hip". According to her and her daughters "a margy and two screws were placed" on Tuesday 10/11 in [facility]. She was transferred home (to daughters and bedbound since surgery. She came back to [place] with her daughter. Patient was unable to bear weight and was having pain on her left hip. She lives alone. Additionally, she stated she was not given any pain medication. In ER, labs showed mild leukocytosis. She was found to have RLE DVT started on Xarelto. Patient was evaluated by PM&R and therapy for functional deficits, gait/ADL impairments and acute rehabilitation was recommended. Patient was medically optimized for discharge to [facility] IRU on 10/18/22.   (18 Oct 2022 15:44)    pt denies having any somatic complaints - no HA, f/c/cp/palpitations/sob/cough/n/v/d/rash.  She endorses feeling tired and lethargic.    PAST MEDICAL & SURGICAL HISTORY:  HTN (hypertension)      H/O hypotension      Breast cancer      Hypothyroidism      Deep vein thrombosis (DVT)      History of right hip replacement      H/O mastectomy        SOCIAL HISTORY: Pt denies tobacco, alcohol, and gwendolyn guse.   FAMILY HISTORY: Mom w/ osteoporosis and DM2    ALLERGIES:  No Known Allergies    MEDICATIONS  (STANDING):  amLODIPine   Tablet 5 milliGRAM(s) Oral <User Schedule>  levothyroxine 75 MICROGram(s) Oral daily  losartan 100 milliGRAM(s) Oral daily  metoprolol succinate  milliGRAM(s) Oral daily  pantoprazole    Tablet 40 milliGRAM(s) Oral before breakfast  rivaroxaban 15 milliGRAM(s) Oral two times a day  simvastatin 20 milliGRAM(s) Oral at bedtime    MEDICATIONS  (PRN):  acetaminophen     Tablet .. 650 milliGRAM(s) Oral every 6 hours PRN Temp greater or equal to 38C (100.4F), Mild Pain (1 - 3)  oxyCODONE    IR 5 milliGRAM(s) Oral every 6 hours PRN Severe Pain (7 - 10)  traMADol 25 milliGRAM(s) Oral every 6 hours PRN Moderate Pain (4 - 6)    Review of Systems: Refer to HPI for pertinent positives and negatives. All other ROS reviewed and negative except as otherwise stated above.    Vital Signs Last 24 Hrs  T(F): 98.6 (19 Oct 2022 07:37), Max: 98.6 (19 Oct 2022 07:37)  HR: 70 (19 Oct 2022 07:37) (70 - 79)  BP: 112/65 (19 Oct 2022 07:37) (97/60 - 142/72)  RR: 15 (19 Oct 2022 07:37) (14 - 17)  SpO2: 95% (19 Oct 2022 07:37) (93% - 95%)  I&O's Summary    18 Oct 2022 07:01  -  19 Oct 2022 07:00  --------------------------------------------------------  IN: 0 mL / OUT: 400 mL / NET: -400 mL      PHYSICAL EXAM:  GENERAL: NAD, well-groomed, well-developed  HEAD:  Atraumatic, Normocephalic  EYES: EOMI, PERRL, conjunctiva and sclera clear  ENMT: Moist mucous membranes, Good dentition  NECK: Supple, No JVD  CHEST/LUNG: Clear to auscultation bilaterally, non-labored breathing, good air entry  HEART: RRR; S1/S2, No murmur  ABDOMEN: Soft, Nontender, Nondistended; Bowel sounds present  VASCULAR: Normal pulses, Normal capillary refill  EXTREMITIES: No cyanosis, No edema  LYMPH: No lymphadenopathy noted  SKIN: Warm, Intact  PSYCH: Normal mood and affect  NERVOUS SYSTEM:  A/O x3, Good concentration; CN 2-12 intact, No focal deficits, moves all extremities    LABS:                        11.4   9.24  )-----------( 359      ( 19 Oct 2022 05:52 )             35.7     10-19    139  |  104  |  25  ----------------------------<  116  4.2   |  30  |  1.07    Ca    9.0      19 Oct 2022 05:52    TPro  6.3  /  Alb  2.8  /  TBili  0.6  /  DBili  x   /  AST  18  /  ALT  26  /  AlkPhos  91  10-19      COVID-19 PCR: NotDetec (10-18-22 @ 18:40)  COVID-19 PCR: NotDetec (10-17-22 @ 18:20)    RADIOLOGY & ADDITIONAL TESTS:    < from: US Duplex Venous Lower Ext Complete, Bilateral (10.17.22 @ 20:29) >  IMPRESSION:  DVT involving the right soleal vein  Superficial thrombus of a right calf vein.  No evidenceof left-sided DVT    < end of copied text >          Care Discussed with Consultants/Other Providers:

## 2022-10-19 NOTE — DIETITIAN INITIAL EVALUATION ADULT - NS FNS DIET ORDER
on DASH-TLC Diet w/ Thin Liquids (IDDSI Level 0)   Education Provided on Need for Increased Fluids/Fiber & DASH-TLC Diet

## 2022-10-19 NOTE — DIETITIAN INITIAL EVALUATION ADULT - ORAL INTAKE PTA/DIET HISTORY
Patient Does Not Follow Diet @Home But Limits Fried [employer]  Consumes 3 Meals a Day   Usually Cooks For Self or Family Bring Her Food From [employer]  Does Take Vitamin/Supplements @Home (MVI)

## 2022-10-19 NOTE — DIETITIAN INITIAL EVALUATION ADULT - PERTINENT MEDS FT
MEDICATIONS  (STANDING):  amLODIPine   Tablet 5 milliGRAM(s) Oral <User Schedule>  ferrous    sulfate 325 milliGRAM(s) Oral daily  levothyroxine 75 MICROGram(s) Oral daily  losartan 100 milliGRAM(s) Oral daily  metoprolol succinate  milliGRAM(s) Oral daily  pantoprazole    Tablet 40 milliGRAM(s) Oral before breakfast  polyethylene glycol 3350 17 Gram(s) Oral daily  rivaroxaban 15 milliGRAM(s) Oral two times a day  senna 2 Tablet(s) Oral at bedtime  simvastatin 20 milliGRAM(s) Oral at bedtime    MEDICATIONS  (PRN):  acetaminophen     Tablet .. 650 milliGRAM(s) Oral every 6 hours PRN Temp greater or equal to 38C (100.4F), Mild Pain (1 - 3)  oxyCODONE    IR 5 milliGRAM(s) Oral every 6 hours PRN Severe Pain (7 - 10)

## 2022-10-19 NOTE — DIETITIAN INITIAL EVALUATION ADULT - EDUCATION DIETARY MODIFICATIONS
Education Provided on Need for Increased Fluids/Fiber & DASH-TLC Diet/(2) meets goals/outcomes/verbalization

## 2022-10-19 NOTE — CONSULT NOTE ADULT - ASSESSMENT
83 YO female HTN, HLD, hypothyroidism, Breast CA ( s/p double mastectomy, last chemo 10/2019), right hip replacement 2012, left femur fracture at age 5; fractured left hip in [place], repaired with "a margy and two screws" in [facility]. Found to have RLE DVT in ER, started on xarelto. Now admitted to [facility].    # Left femur fx, s/p repair in [place] on 10/11  # s/p fall   - dvt ppx  - IS  - pain control  - PT/OT per rehab.  - OP ortho f/u.       # DVT of R.soleal vein  - cont xarelto    # HTN  - cont losartan 100 mg qd    # HLD  - simvastatin    # CKD st. III    # hypothyroidism  - cont levothyroxine 81 YO female HTN, HLD, hypothyroidism, Breast CA ( s/p double mastectomy, last chemo 10/2019), right hip replacement 2012, left femur fracture at age 5; fractured left hip in [place], repaired with "a margy and two screws" in [facility]. Found to have RLE DVT in ER, started on xarelto. Now admitted to [facility].    # Left femur fx, s/p repair in [place] on 10/11  # s/p fall   - dvt ppx  - IS  - pain control  - PT/OT per rehab.  - OP ortho f/u.     # DVT of R.soleal vein  - cont xarelto  - OP Hematology f/u.    # HTN  - cont losartan 100 mg qd    # HLD  - simvastatin    # CKD st. III    # hypothyroidism  - cont levothyroxine 83 YO female HTN, HLD, hypothyroidism, Breast CA ( s/p double mastectomy, last chemo 10/2019), right hip replacement 2012, left femur fracture at age 5; fractured left hip in [place], repaired with "a margy and two screws" in [facility]. Found to have RLE DVT in ER, started on xarelto. Now admitted to [facility].    # Left femur fx, s/p repair in [place] on 10/11, likely due to age related osteoporosis  # s/p fall   - dvt ppx  - IS  - pain control  - PT/OT per rehab.  - OP ortho f/u.   - f/u vitamin D level    # DVT of R.soleal vein  - cont xarelto  - OP Hematology f/u.    # HTN  - cont losartan 100 mg qd, amlodipine 5 mg qd, toprol 200 mg qd    # HLD  - simvastatin    # CKD st. III    # hypothyroidism  - cont levothyroxine    dvt ppx: already on xarelto

## 2022-10-19 NOTE — DIETITIAN INITIAL EVALUATION ADULT - ADD RECOMMEND
1) Monitor Weights, Intake, Tolerance, Skin, POCT & Labwork  2) Education Provided on Need for Increased Fluids/Fiber & DASH-TLC Diet   3) Continue Nutrition Plan of Care

## 2022-10-19 NOTE — DIETITIAN INITIAL EVALUATION ADULT - NSFNSNUTRCHEWSWALLOWFT_GEN_A_CORE
Tolerates Diet Consistency Well  Tolerates Fluid Consistency Well   No Chewing/Swallowing Difficulties  (Per Patient)

## 2022-10-19 NOTE — DIETITIAN INITIAL EVALUATION ADULT - PERTINENT LABORATORY DATA
10-19    139  |  104  |  25<H>  ----------------------------<  116<H>  4.2   |  30  |  1.07    Ca    9.0      19 Oct 2022 05:52    TPro  6.3  /  Alb  2.8<L>  /  TBili  0.6  /  DBili  x   /  AST  18  /  ALT  26  /  AlkPhos  91  10-19

## 2022-10-19 NOTE — PROGRESS NOTE ADULT - SUBJECTIVE AND OBJECTIVE BOX
This is a 83 YO female with PMH  of HTN, HLD, hypothyroidism, Breast CA ( s/p double mastectomy, last chemo 10/2019), right hip replacement 2012, left femur fracture at age 5,  presents to [facility] ED on 10/16 for left hip pain. Patient was in [place], fell and fractured her "left hip". According to her and her daughters "a margy and two screws were placed" on Tuesday 10/11 in [facility]. She was transferred home (to daughters and bedbound since surgery. She came back to [place] with her daughter. Patient was unable to bear weight and was having pain on her left hip. She lives alone. Additionally, she stated she was not given any pain medication. In ER, labs showed mild leukocytosis. She was found to have RLE DVT started on Xarelto. Patient was evaluated by PM&R and therapy for functional deficits, gait/ADL impairments and acute rehabilitation was recommended. Patient was medically optimized for discharge to [facility] IRU on 10/18/22.    Today's Subjective/ROS:  Patient seen and examined in bed this AM.   States that she took Oxycodone for pain and feels slightly lightheaded.  Encouraged to drink water.  Otherwise no other complaints at this time.  She is eager to get better and go back home, as she was independent prior to admission.  Last BM on 10/17      Vital Signs Last 24 Hrs  T(F): 98.5 (18 Oct 2022 05:46), Max: 98.5 (18 Oct 2022 05:46)  HR: 79 (18 Oct 2022 05:46) (79 - 95)  BP: 126/70 (18 Oct 2022 05:46) (105/77 - 138/86)  RR: 18 (18 Oct 2022 05:46) (16 - 18)  SpO2: 96% (18 Oct 2022 05:46) (94% - 98%)  I&O's Summary    Gen - NAD, Comfortable  HEENT - NCAT, EOMI, MMM  Neck - Supple, No limited ROM  Pulm - CTAB, No wheeze, No rhonchi, No crackles  Cardiovascular - RRR, S1S2, No murmurs  Abdomen - Soft, NT/ND, +BS  Extremities - No clubbing, no cyanosis, + edema to left hip and thigh, + right calf tenderness  Neuro-     Cognitive - Awake, Alert, Oriented  to self, place, date, year, situation. Able to follow command     Communication - Fluent     Attention: Intact      Judgement: Good evidence of judgement     Memory: Recall 3 objects immediate and 3 min later         Cranial Nerves - CN 2-12 intact.      Motor -                     LEFT    UE - ShAB 5/5, EF 5/5, EE 5/5,  5/5                    RIGHT UE - ShAB 5/5, EF 5/5, EE 5/5,   5/5                    LEFT    LE - HF 4/5, KE 4/5, DF 5/5, PF 5/5- limited 2/2 pain                    RIGHT LE - HF 5/5, KE 5/5, DF 5/5, PF 5/5        Sensory - Intact to LT     Reflexes - DTR Intact, No primitive reflexive     Coordination - FTN intact     Tone - normal  Psychiatric - Mood stable, Affect WNL  Skin:  left hip incision with sutures DESI      LAB                        11.4   9.24  )-----------( 359      ( 19 Oct 2022 05:52 )             35.7     10-19    139  |  104  |  25<H>  ----------------------------<  116<H>  4.2   |  30  |  1.07    Ca    9.0      19 Oct 2022 05:52    TPro  6.3  /  Alb  2.8<L>  /  TBili  0.6  /  DBili  x   /  AST  18  /  ALT  26  /  AlkPhos  91  10-19    LIVER FUNCTIONS - ( 19 Oct 2022 05:52 )  Alb: 2.8 g/dL / Pro: 6.3 g/dL / ALK PHOS: 91 U/L / ALT: 26 U/L / AST: 18 U/L / GGT: x             MEDICATIONS  (STANDING):  amLODIPine   Tablet 5 milliGRAM(s) Oral <User Schedule>  ferrous    sulfate 325 milliGRAM(s) Oral daily  levothyroxine 75 MICROGram(s) Oral daily  losartan 100 milliGRAM(s) Oral daily  metoprolol succinate  milliGRAM(s) Oral daily  pantoprazole    Tablet 40 milliGRAM(s) Oral before breakfast  rivaroxaban 15 milliGRAM(s) Oral two times a day  simvastatin 20 milliGRAM(s) Oral at bedtime    MEDICATIONS  (PRN):  acetaminophen     Tablet .. 650 milliGRAM(s) Oral every 6 hours PRN Temp greater or equal to 38C (100.4F), Mild Pain (1 - 3)  oxyCODONE    IR 5 milliGRAM(s) Oral every 6 hours PRN Severe Pain (7 - 10)

## 2022-10-19 NOTE — DIETITIAN INITIAL EVALUATION ADULT - FACTORS AFF FOOD INTAKE
States Good PO Intake/Appetite   Denies Recent Changes In Intake (Per Patient)/none/difficulty swallowing

## 2022-10-20 LAB
ALBUMIN SERPL ELPH-MCNC: 2.7 G/DL — LOW (ref 3.3–5)
ALP SERPL-CCNC: 87 U/L — SIGNIFICANT CHANGE UP (ref 40–120)
ALT FLD-CCNC: 26 U/L — SIGNIFICANT CHANGE UP (ref 10–45)
ANION GAP SERPL CALC-SCNC: 9 MMOL/L — SIGNIFICANT CHANGE UP (ref 5–17)
APPEARANCE UR: ABNORMAL
AST SERPL-CCNC: 23 U/L — SIGNIFICANT CHANGE UP (ref 10–40)
BACTERIA # UR AUTO: ABNORMAL /HPF
BASOPHILS # BLD AUTO: 0.05 K/UL — SIGNIFICANT CHANGE UP (ref 0–0.2)
BASOPHILS NFR BLD AUTO: 0.5 % — SIGNIFICANT CHANGE UP (ref 0–2)
BILIRUB SERPL-MCNC: 0.5 MG/DL — SIGNIFICANT CHANGE UP (ref 0.2–1.2)
BILIRUB UR-MCNC: NEGATIVE — SIGNIFICANT CHANGE UP
BUN SERPL-MCNC: 27 MG/DL — HIGH (ref 7–23)
CALCIUM SERPL-MCNC: 9 MG/DL — SIGNIFICANT CHANGE UP (ref 8.4–10.5)
CHLORIDE SERPL-SCNC: 103 MMOL/L — SIGNIFICANT CHANGE UP (ref 96–108)
CO2 SERPL-SCNC: 28 MMOL/L — SIGNIFICANT CHANGE UP (ref 22–31)
COLOR SPEC: ABNORMAL
CREAT SERPL-MCNC: 1.06 MG/DL — SIGNIFICANT CHANGE UP (ref 0.5–1.3)
DIFF PNL FLD: ABNORMAL
EGFR: 53 ML/MIN/1.73M2 — LOW
EOSINOPHIL # BLD AUTO: 0.18 K/UL — SIGNIFICANT CHANGE UP (ref 0–0.5)
EOSINOPHIL NFR BLD AUTO: 1.7 % — SIGNIFICANT CHANGE UP (ref 0–6)
GLUCOSE SERPL-MCNC: 117 MG/DL — HIGH (ref 70–99)
GLUCOSE UR QL: NEGATIVE — SIGNIFICANT CHANGE UP
HCT VFR BLD CALC: 35.4 % — SIGNIFICANT CHANGE UP (ref 34.5–45)
HGB BLD-MCNC: 11.3 G/DL — LOW (ref 11.5–15.5)
IMM GRANULOCYTES NFR BLD AUTO: 2.7 % — HIGH (ref 0–0.9)
KETONES UR-MCNC: NEGATIVE — SIGNIFICANT CHANGE UP
LEUKOCYTE ESTERASE UR-ACNC: ABNORMAL
LYMPHOCYTES # BLD AUTO: 1.8 K/UL — SIGNIFICANT CHANGE UP (ref 1–3.3)
LYMPHOCYTES # BLD AUTO: 17.1 % — SIGNIFICANT CHANGE UP (ref 13–44)
MCHC RBC-ENTMCNC: 27.6 PG — SIGNIFICANT CHANGE UP (ref 27–34)
MCHC RBC-ENTMCNC: 31.9 GM/DL — LOW (ref 32–36)
MCV RBC AUTO: 86.3 FL — SIGNIFICANT CHANGE UP (ref 80–100)
MONOCYTES # BLD AUTO: 0.86 K/UL — SIGNIFICANT CHANGE UP (ref 0–0.9)
MONOCYTES NFR BLD AUTO: 8.2 % — SIGNIFICANT CHANGE UP (ref 2–14)
NEUTROPHILS # BLD AUTO: 7.35 K/UL — SIGNIFICANT CHANGE UP (ref 1.8–7.4)
NEUTROPHILS NFR BLD AUTO: 69.8 % — SIGNIFICANT CHANGE UP (ref 43–77)
NITRITE UR-MCNC: POSITIVE
NRBC # BLD: 0 /100 WBCS — SIGNIFICANT CHANGE UP (ref 0–0)
PH UR: 7 — SIGNIFICANT CHANGE UP (ref 5–8)
PLATELET # BLD AUTO: 357 K/UL — SIGNIFICANT CHANGE UP (ref 150–400)
POTASSIUM SERPL-MCNC: 4.5 MMOL/L — SIGNIFICANT CHANGE UP (ref 3.5–5.3)
POTASSIUM SERPL-SCNC: 4.5 MMOL/L — SIGNIFICANT CHANGE UP (ref 3.5–5.3)
PROT SERPL-MCNC: 6.1 G/DL — SIGNIFICANT CHANGE UP (ref 6–8.3)
PROT UR-MCNC: 100
RBC # BLD: 4.1 M/UL — SIGNIFICANT CHANGE UP (ref 3.8–5.2)
RBC # FLD: 14 % — SIGNIFICANT CHANGE UP (ref 10.3–14.5)
RBC CASTS # UR COMP ASSIST: ABNORMAL /HPF (ref 0–4)
SODIUM SERPL-SCNC: 140 MMOL/L — SIGNIFICANT CHANGE UP (ref 135–145)
SP GR SPEC: 1.01 — SIGNIFICANT CHANGE UP (ref 1.01–1.02)
UROBILINOGEN FLD QL: NEGATIVE — SIGNIFICANT CHANGE UP
VIT D25+D1,25 OH+D1,25 PNL SERPL-MCNC: 28.7 PG/ML — SIGNIFICANT CHANGE UP (ref 19.9–79.3)
WBC # BLD: 10.52 K/UL — HIGH (ref 3.8–10.5)
WBC # FLD AUTO: 10.52 K/UL — HIGH (ref 3.8–10.5)
WBC UR QL: >50 /HPF (ref 0–5)

## 2022-10-20 PROCEDURE — 99233 SBSQ HOSP IP/OBS HIGH 50: CPT

## 2022-10-20 PROCEDURE — 99232 SBSQ HOSP IP/OBS MODERATE 35: CPT

## 2022-10-20 RX ORDER — NYSTATIN CREAM 100000 [USP'U]/G
1 CREAM TOPICAL
Refills: 0 | Status: DISCONTINUED | OUTPATIENT
Start: 2022-10-20 | End: 2022-10-29

## 2022-10-20 RX ADMIN — RIVAROXABAN 15 MILLIGRAM(S): KIT at 17:10

## 2022-10-20 RX ADMIN — SIMVASTATIN 20 MILLIGRAM(S): 20 TABLET, FILM COATED ORAL at 22:43

## 2022-10-20 RX ADMIN — OXYCODONE HYDROCHLORIDE 5 MILLIGRAM(S): 5 TABLET ORAL at 08:46

## 2022-10-20 RX ADMIN — Medication 1 TABLET(S): at 17:10

## 2022-10-20 RX ADMIN — OXYCODONE HYDROCHLORIDE 5 MILLIGRAM(S): 5 TABLET ORAL at 09:00

## 2022-10-20 RX ADMIN — Medication 325 MILLIGRAM(S): at 12:28

## 2022-10-20 RX ADMIN — SENNA PLUS 2 TABLET(S): 8.6 TABLET ORAL at 22:42

## 2022-10-20 RX ADMIN — Medication 75 MICROGRAM(S): at 06:00

## 2022-10-20 RX ADMIN — LOSARTAN POTASSIUM 100 MILLIGRAM(S): 100 TABLET, FILM COATED ORAL at 06:00

## 2022-10-20 RX ADMIN — RIVAROXABAN 15 MILLIGRAM(S): KIT at 06:01

## 2022-10-20 RX ADMIN — Medication 200 MILLIGRAM(S): at 06:01

## 2022-10-20 RX ADMIN — Medication 1 TABLET(S): at 22:44

## 2022-10-20 RX ADMIN — NYSTATIN CREAM 1 APPLICATION(S): 100000 CREAM TOPICAL at 17:10

## 2022-10-20 RX ADMIN — POLYETHYLENE GLYCOL 3350 17 GRAM(S): 17 POWDER, FOR SOLUTION ORAL at 12:28

## 2022-10-20 RX ADMIN — AMLODIPINE BESYLATE 5 MILLIGRAM(S): 2.5 TABLET ORAL at 08:45

## 2022-10-20 RX ADMIN — PANTOPRAZOLE SODIUM 40 MILLIGRAM(S): 20 TABLET, DELAYED RELEASE ORAL at 06:01

## 2022-10-20 NOTE — PROGRESS NOTE ADULT - SUBJECTIVE AND OBJECTIVE BOX
This is a 81 YO female with PMH  of HTN, HLD, hypothyroidism, Breast CA ( s/p double mastectomy, last chemo 10/2019), right hip replacement 2012, left femur fracture at age 5,  presents to [facility] ED on 10/16 for left hip pain. Patient was in [place], fell and fractured her "left hip". According to her and her daughters "a margy and two screws were placed" on Tuesday 10/11 in [facility]. She was transferred home (to daughters and bedbound since surgery. She came back to [place] with her daughter. Patient was unable to bear weight and was having pain on her left hip. She lives alone. Additionally, she stated she was not given any pain medication. In ER, labs showed mild leukocytosis. She was found to have RLE DVT started on Xarelto. Patient was evaluated by PM&R and therapy for functional deficits, gait/ADL impairments and acute rehabilitation was recommended. Patient was medically optimized for discharge to [facility] IRU on 10/18/22.    Today's Subjective/ROS:***       Patient seen and examined in bed this AM.   States that she took Oxycodone for pain and feels slightly lightheaded.  Encouraged to drink water.  Otherwise no other complaints at this time.  She is eager to get better and go back home, as she was independent prior to admission.  Last BM on 10/17    ***  Vital Signs Last 24 Hrs  T(C): 36.9 (19 Oct 2022 20:48), Max: 36.9 (19 Oct 2022 20:48)  T(F): 98.4 (19 Oct 2022 20:48), Max: 98.4 (19 Oct 2022 20:48)  HR: 78 (20 Oct 2022 05:59) (71 - 78)  BP: 129/73 (20 Oct 2022 05:59) (114/64 - 129/73)  BP(mean): --  RR: 14 (19 Oct 2022 20:48) (14 - 14)  SpO2: 93% (19 Oct 2022 20:48) (93% - 93%)        Gen - NAD, Comfortable  HEENT - NCAT, EOMI, MMM  Neck - Supple, No limited ROM  Pulm - CTAB, No wheeze, No rhonchi, No crackles  Cardiovascular - RRR, S1S2, No murmurs  Abdomen - Soft, NT/ND, +BS  Extremities - No clubbing, no cyanosis, + edema to left hip and thigh, + right calf tenderness  Neuro-     Cognitive - Awake, Alert, Oriented  to self, place, date, year, situation. Able to follow command     Communication - Fluent     Attention: Intact      Judgement: Good evidence of judgement     Memory: Recall 3 objects immediate and 3 min later         Cranial Nerves - CN 2-12 intact.      Motor -                     LEFT    UE - ShAB 5/5, EF 5/5, EE 5/5,  5/5                    RIGHT UE - ShAB 5/5, EF 5/5, EE 5/5,   5/5                    LEFT    LE - HF 4/5, KE 4/5, DF 5/5, PF 5/5- limited 2/2 pain                    RIGHT LE - HF 5/5, KE 5/5, DF 5/5, PF 5/5        Sensory - Intact to LT     Reflexes - DTR Intact, No primitive reflexive     Coordination - FTN intact     Tone - normal  Psychiatric - Mood stable, Affect WNL  Skin:  left hip incision with sutures DESI      LAB                        11.3   10.52 )-----------( 357      ( 20 Oct 2022 06:15 )             35.4     10-20    140  |  103  |  27<H>  ----------------------------<  117<H>  4.5   |  28  |  1.06    Ca    9.0      20 Oct 2022 06:15    TPro  6.1  /  Alb  2.7<L>  /  TBili  0.5  /  DBili  x   /  AST  23  /  ALT  26  /  AlkPhos  87  10-20    LIVER FUNCTIONS - ( 20 Oct 2022 06:15 )  Alb: 2.7 g/dL / Pro: 6.1 g/dL / ALK PHOS: 87 U/L / ALT: 26 U/L / AST: 23 U/L / GGT: x               MEDICATIONS  (STANDING):  amLODIPine   Tablet 5 milliGRAM(s) Oral <User Schedule>  ferrous    sulfate 325 milliGRAM(s) Oral daily  levothyroxine 75 MICROGram(s) Oral daily  losartan 100 milliGRAM(s) Oral daily  metoprolol succinate  milliGRAM(s) Oral daily  pantoprazole    Tablet 40 milliGRAM(s) Oral before breakfast  rivaroxaban 15 milliGRAM(s) Oral two times a day  simvastatin 20 milliGRAM(s) Oral at bedtime    MEDICATIONS  (PRN):  acetaminophen     Tablet .. 650 milliGRAM(s) Oral every 6 hours PRN Temp greater or equal to 38C (100.4F), Mild Pain (1 - 3)  oxyCODONE    IR 5 milliGRAM(s) Oral every 6 hours PRN Severe Pain (7 - 10)   This is a 81 YO female with PMH  of HTN, HLD, hypothyroidism, Breast CA ( s/p double mastectomy, last chemo 10/2019), right hip replacement 2012, left femur fracture at age 5,  presents to [facility] ED on 10/16 for left hip pain. Patient was in [place], fell and fractured her "left hip". According to her and her daughters "a margy and two screws were placed" on Tuesday 10/11 in [facility]. She was transferred home (to daughters and bedbound since surgery. She came back to [place] with her daughter. Patient was unable to bear weight and was having pain on her left hip. She lives alone. Additionally, she stated she was not given any pain medication. In ER, labs showed mild leukocytosis. She was found to have RLE DVT started on Xarelto. Patient was evaluated by PM&R and therapy for functional deficits, gait/ADL impairments and acute rehabilitation was recommended. Patient was medically optimized for discharge to [facility] IRU on 10/18/22.    Today's Subjective/ROS:  Patient seen and examined in bed this AM.   States that she slept okay and is unsure of why her room was changed.  Patient stated that she was urinating on herself overnight because she feels like she has urinary urgency and she did not want to bother the staff.  Admits to burning/discomfort when urinating.  Encouraged to drink water, and use of call bell for toileting assistance.  Surgical hip site tender to touch.  Otherwise no other complaints at this time.  Last BM on 10/19      Vital Signs Last 24 Hrs  T(C): 36.9 (19 Oct 2022 20:48), Max: 36.9 (19 Oct 2022 20:48)  T(F): 98.4 (19 Oct 2022 20:48), Max: 98.4 (19 Oct 2022 20:48)  HR: 78 (20 Oct 2022 05:59) (71 - 78)  BP: 129/73 (20 Oct 2022 05:59) (114/64 - 129/73)  BP(mean): --  RR: 16 (20 Oct 2022 08:51) (14 - 16)  SpO2: 95% (20 Oct 2022 08:51) (93% - 95%)        Gen - NAD, Comfortable  HEENT - NCAT, EOMI, MMM  Neck - Supple, No limited ROM  Pulm - CTAB, No wheeze, No rhonchi, No crackles  Cardiovascular - RRR, S1S2, No murmurs  Abdomen - Soft, NT/ND, +BS  Extremities - No clubbing, no cyanosis, + edema to left hip and thigh, + right calf tenderness  Neuro-     Cognitive - Awake, Alert, Oriented  to self, place, date, year, situation. Able to follow command     Communication - Fluent     Attention: Intact      Judgement: Good evidence of judgement     Memory: Recall 3 objects immediate and 3 min later         Cranial Nerves - CN 2-12 intact.      Motor -                     LEFT    UE - ShAB 5/5, EF 5/5, EE 5/5,  5/5                    RIGHT UE - ShAB 5/5, EF 5/5, EE 5/5,   5/5                    LEFT    LE - HF 4/5, KE 4/5, DF 5/5, PF 5/5- limited 2/2 pain                    RIGHT LE - HF 5/5, KE 5/5, DF 5/5, PF 5/5        Sensory - Intact to LT     Reflexes - DTR Intact, No primitive reflexive     Coordination - FTN intact     Tone - normal  Psychiatric - Mood stable, Affect WNL  Skin:  left hip incision with sutures DESI      LAB                        11.3   10.52 )-----------( 357      ( 20 Oct 2022 06:15 )             35.4     10-20    140  |  103  |  27<H>  ----------------------------<  117<H>  4.5   |  28  |  1.06    Ca    9.0      20 Oct 2022 06:15    TPro  6.1  /  Alb  2.7<L>  /  TBili  0.5  /  DBili  x   /  AST  23  /  ALT  26  /  AlkPhos  87  10-20    LIVER FUNCTIONS - ( 20 Oct 2022 06:15 )  Alb: 2.7 g/dL / Pro: 6.1 g/dL / ALK PHOS: 87 U/L / ALT: 26 U/L / AST: 23 U/L / GGT: x               MEDICATIONS  (STANDING):  amLODIPine   Tablet 5 milliGRAM(s) Oral <User Schedule>  ferrous    sulfate 325 milliGRAM(s) Oral daily  levothyroxine 75 MICROGram(s) Oral daily  losartan 100 milliGRAM(s) Oral daily  metoprolol succinate  milliGRAM(s) Oral daily  pantoprazole    Tablet 40 milliGRAM(s) Oral before breakfast  rivaroxaban 15 milliGRAM(s) Oral two times a day  simvastatin 20 milliGRAM(s) Oral at bedtime    MEDICATIONS  (PRN):  acetaminophen     Tablet .. 650 milliGRAM(s) Oral every 6 hours PRN Temp greater or equal to 38C (100.4F), Mild Pain (1 - 3)  oxyCODONE    IR 5 milliGRAM(s) Oral every 6 hours PRN Severe Pain (7 - 10)

## 2022-10-20 NOTE — PROGRESS NOTE ADULT - NS PANP COMMENT GEN_ALL_CORE FT
Patient participating in restorative therapies  Patient complains of mild dysuria and incontinence.  U/A, C&S, PVRs  Repeat CBC   Continue Tylenol for mild pain and Oxycodone for moderate to severe pain  Left hip Xray done 10/17/2022 reviewed  Placed on iron supplementation for anemia. Anemia is probably  the result of loss during surgery  Patient with episode of hypotension. Hospitalist following  Heplock for fluid challenges if needed. Will d/c heplock in 3 days of not  needed

## 2022-10-20 NOTE — PROGRESS NOTE ADULT - ASSESSMENT
81 YO female HTN, HLD, hypothyroidism, Breast CA ( s/p double mastectomy, last chemo 10/2019), right hip replacement 2012, left femur fracture at age 5; fractured left hip in [place], repaired with "a margy and two screws" in [facility]. Found to have RLE DVT in ER, started on xarelto. Now admitted to [facility].    # Left femur fx, s/p repair in [place] on 10/11, likely due to age related osteoporosis  # s/p fall   - dvt ppx  - IS  - pain control  - PT/OT per rehab.  - OP ortho f/u.   - f/u vitamin D level    # Dysuria  - F/u UA, will culture and start abx if positive    # DVT of R.soleal vein  - cont xarelto  - OP Hematology f/u.    # HTN  - cont losartan 100 mg qd, amlodipine 5 mg qd, toprol 200 mg qd    # HLD  - simvastatin    # CKD st. III  - renally dose meds, avoid nsaids, nephrotoxins, contrast.    # hypothyroidism  - cont levothyroxine    dvt ppx: already on xarelto 83 YO female HTN, HLD, hypothyroidism, Breast CA ( s/p double mastectomy, last chemo 10/2019), right hip replacement 2012, left femur fracture at age 5; fractured left hip in [place], repaired with "a margy and two screws" in [facility]. Found to have RLE DVT in ER, started on xarelto. Now admitted to [facility].    # Left femur fx, s/p repair in [place] on 10/11, likely due to age related osteoporosis  # s/p fall   - dvt ppx  - IS  - pain control  - PT/OT per rehab.  - OP ortho f/u.   - f/u vitamin D level    # Dysuria  # mild leukocytosis  - F/u UA, will culture and start abx if positive  ADDENDUM:  - positive UA, will start bactrim and f/u cx.    # DVT of R.soleal vein  - cont xarelto  - OP Hematology f/u.    # HTN  - cont losartan 100 mg qd, amlodipine 5 mg qd, toprol 200 mg qd    # HLD  - simvastatin    # CKD st. III  - renally dose meds, avoid nsaids, nephrotoxins, contrast.    # hypothyroidism  - cont levothyroxine    dvt ppx: already on xarelto

## 2022-10-20 NOTE — PROGRESS NOTE ADULT - SUBJECTIVE AND OBJECTIVE BOX
Patient is a 82y old  Female who presents with a chief complaint of left hip fracture (20 Oct 2022 08:13)      24 HOUR EVENTS:  No overnight events reported.     SUBJECTIVE:  Patient seen and examined at bedside.   Complains of some dysuria and suprapubic tenderness.     ALLERGIES:  No Known Drug Allergies  shellfish (Unknown)    MEDICATIONS  (STANDING):  amLODIPine   Tablet 5 milliGRAM(s) Oral <User Schedule>  ferrous    sulfate 325 milliGRAM(s) Oral daily  levothyroxine 75 MICROGram(s) Oral daily  losartan 100 milliGRAM(s) Oral daily  metoprolol succinate  milliGRAM(s) Oral daily  nystatin Powder 1 Application(s) Topical two times a day  pantoprazole    Tablet 40 milliGRAM(s) Oral before breakfast  polyethylene glycol 3350 17 Gram(s) Oral daily  rivaroxaban 15 milliGRAM(s) Oral two times a day  senna 2 Tablet(s) Oral at bedtime  simvastatin 20 milliGRAM(s) Oral at bedtime    MEDICATIONS  (PRN):  acetaminophen     Tablet .. 650 milliGRAM(s) Oral every 6 hours PRN Temp greater or equal to 38C (100.4F), Mild Pain (1 - 3)  oxyCODONE    IR 5 milliGRAM(s) Oral every 6 hours PRN Severe Pain (7 - 10)    Vital Signs Last 24 Hrs  T(F): 98.4 (19 Oct 2022 20:48), Max: 98.4 (19 Oct 2022 20:48)  HR: 78 (20 Oct 2022 10:15) (71 - 78)  BP: 128/74 (20 Oct 2022 10:15) (114/64 - 129/73)  RR: 16 (20 Oct 2022 08:51) (14 - 16)  SpO2: 95% (20 Oct 2022 08:51) (93% - 95%)  I&O's Summary    19 Oct 2022 07:01  -  20 Oct 2022 07:00  --------------------------------------------------------  IN: 0 mL / OUT: 301 mL / NET: -301 mL      PHYSICAL EXAM:  General: NAD, A/O x 3  ENT: Moist mucous membranes, no thrush  Neck: Supple, No JVD  Lungs: Clear to auscultation bilaterally, good air entry, non-labored breathing  Cardio: RRR, S1/S2, No murmur  Abdomen: Soft, Nontender, Nondistended; Bowel sounds present  Extremities: No calf tenderness, No pitting edema    LABS:                        11.3   10.52 )-----------( 357      ( 20 Oct 2022 06:15 )             35.4     10-20    140  |  103  |  27  ----------------------------<  117  4.5   |  28  |  1.06    Ca    9.0      20 Oct 2022 06:15    TPro  6.1  /  Alb  2.7  /  TBili  0.5  /  DBili  x   /  AST  23  /  ALT  26  /  AlkPhos  87  10-20      COVID-19 PCR: NotDetec (10-18-22 @ 18:40)  COVID-19 PCR: NotDetec (10-17-22 @ 18:20)    RADIOLOGY & ADDITIONAL TESTS:    Care Discussed with Consultants/Other Providers: Dr. Klein and Chely LOVE   Patient is a 82y old  Female who presents with a chief complaint of left hip fracture (20 Oct 2022 08:13)      24 HOUR EVENTS:  No overnight events reported.     SUBJECTIVE:  Patient seen and examined at bedside.   Complains of some dysuria and suprapubic tenderness.     ALLERGIES:  No Known Drug Allergies  shellfish (Unknown)    MEDICATIONS  (STANDING):  amLODIPine   Tablet 5 milliGRAM(s) Oral <User Schedule>  ferrous    sulfate 325 milliGRAM(s) Oral daily  levothyroxine 75 MICROGram(s) Oral daily  losartan 100 milliGRAM(s) Oral daily  metoprolol succinate  milliGRAM(s) Oral daily  nystatin Powder 1 Application(s) Topical two times a day  pantoprazole    Tablet 40 milliGRAM(s) Oral before breakfast  polyethylene glycol 3350 17 Gram(s) Oral daily  rivaroxaban 15 milliGRAM(s) Oral two times a day  senna 2 Tablet(s) Oral at bedtime  simvastatin 20 milliGRAM(s) Oral at bedtime    MEDICATIONS  (PRN):  acetaminophen     Tablet .. 650 milliGRAM(s) Oral every 6 hours PRN Temp greater or equal to 38C (100.4F), Mild Pain (1 - 3)  oxyCODONE    IR 5 milliGRAM(s) Oral every 6 hours PRN Severe Pain (7 - 10)    Vital Signs Last 24 Hrs  T(F): 98.4 (19 Oct 2022 20:48), Max: 98.4 (19 Oct 2022 20:48)  HR: 78 (20 Oct 2022 10:15) (71 - 78)  BP: 128/74 (20 Oct 2022 10:15) (114/64 - 129/73)  RR: 16 (20 Oct 2022 08:51) (14 - 16)  SpO2: 95% (20 Oct 2022 08:51) (93% - 95%)  I&O's Summary    19 Oct 2022 07:01  -  20 Oct 2022 07:00  --------------------------------------------------------  IN: 0 mL / OUT: 301 mL / NET: -301 mL      PHYSICAL EXAM:  General: NAD, A/O x 3  ENT: Moist mucous membranes, no thrush  Neck: Supple, No JVD  Lungs: Clear to auscultation bilaterally, good air entry, non-labored breathing  Cardio: RRR, S1/S2, No murmur  Abdomen: Soft, mild suprapubic tenderness, Nondistended; Bowel sounds present  Extremities: No calf tenderness, No pitting edema    LABS:                        11.3   10.52 )-----------( 357      ( 20 Oct 2022 06:15 )             35.4     10-20    140  |  103  |  27  ----------------------------<  117  4.5   |  28  |  1.06    Ca    9.0      20 Oct 2022 06:15    TPro  6.1  /  Alb  2.7  /  TBili  0.5  /  DBili  x   /  AST  23  /  ALT  26  /  AlkPhos  87  10-20      COVID-19 PCR: NotDetec (10-18-22 @ 18:40)  COVID-19 PCR: NotDetec (10-17-22 @ 18:20)    RADIOLOGY & ADDITIONAL TESTS:    Care Discussed with Consultants/Other Providers: Dr. Klein and Chely LOVE

## 2022-10-20 NOTE — PROGRESS NOTE ADULT - ASSESSMENT
This is a 81 YO female with PMH  of HTN, HLD, hypothyroidism, Breast CA ( s/p double mastectomy, last chemo 10/2019), right hip replacement 2012, left femur fracture at age 5, presents to [facility] ED on 10/16 for left hip pain. Patient was in [place] where she fell and fractured her left hip and is s/p surgery in [facility] on 10/11.  She returned to [place] with her daughter in pain and inability to ambulate. Patient now with gait Instability, ADL impairments and Functional impairments.    # left Hip fracture  - s/p surgery- left hip ORIF in [facility] on 10/11  - Start Comprehensive Rehab Program: PT/OT, 3hours daily and 5 days weekly  - PT: Focused on improving strength, endurance, coordination, balance, functional mobility, and transfers  - OT: Focused on improving strength, fine motor skills, coordination, posture and ADLs.    - WB Status: WBAT LLE  - Sutures to be removed on 10/21    #HTN  - Losartan 100mg daily  - Metoprolol 200mg daily  - Norvasc 5mg daily    #Lightheadedness  - Encourage PO fluids  - Check orthostatics    #HLD  - Simvastatin 20mg daily    #Hypothyroidism  - Synthroid    Breast CA   - s/p double mastectomy, last chemo 10/2019    #Pain management  - Tylenol PRN, tramadol PRN, Oxycodone PRN    #DVT  - RLE  - [id number] BID until 11/7/22; then to give 20mg with dinner starting on 11/8/22    #GI ppx  - Protonix 40mg    #Bowel Regimen  - Senna, miralax PRN    #Bladder management  - BS on admission, and q 8 hours (SC if > 400)  - Monitor UO    #FEN   - Diet: DASH    #Skin:  - Skin on admission: Left hip incision with sutures DESI  - Pressure injury/Skin: Turn Q2hrs while in bed, OOB to Chair, PT/OT     #Sleep:   - Maintain quiet hours and low stim environment.  - Melatonin PRN to maximize participation in therapy during the day.     #Precaution  - Fall, Aspiration, cardiac, Hip       Outpatient Follow-up (Specialty/Name of physician):  Arnoldo Win)  Orthopaedic Surgery  [address] [place] [address]  Phone: [phone]  Fax: [phone] This is a 81 YO female with PMH  of HTN, HLD, hypothyroidism, Breast CA ( s/p double mastectomy, last chemo 10/2019), right hip replacement 2012, left femur fracture at age 5, presents to [facility] ED on 10/16 for left hip pain. Patient was in [place] where she fell and fractured her left hip and is s/p surgery in [facility] on 10/11.  She returned to [place] with her daughter in pain and inability to ambulate. Patient now with gait Instability, ADL impairments and Functional impairments.  -------------------------  # left Hip fracture  - s/p surgery- left hip ORIF in [facility] on 10/11  - Start Comprehensive Rehab Program: PT/OT, 3hours daily and 5 days weekly  - PT: Focused on improving strength, endurance, coordination, balance, functional mobility, and transfers  - OT: Focused on improving strength, fine motor skills, coordination, posture and ADLs.    - WB Status: WBAT LLE  - Sutures to be removed on 10/21    #Leukocytosis  - WBC of 10.52 10/20  - Admits to dysuria  - UA and reflex UCx ordered  - Continue to monitor    #HTN  - Losartan 100mg daily  - Metoprolol 200mg daily  - Norvasc 5mg daily    #Lightheadedness  - Encourage PO fluids  - Check orthostatics    #HLD  - Simvastatin 20mg daily    #Hypothyroidism  - Synthroid    Breast CA   - s/p double mastectomy, last chemo 10/2019    #Pain management  - Tylenol PRN, tramadol PRN, Oxycodone PRN    #DVT  - RLE  - [id number] BID until 11/7/22; then to give 20mg with dinner starting on 11/8/22    #GI ppx  - Protonix 40mg    #Bowel Regimen  - Senna, miralax PRN    #Bladder management  - C/o dysuria. UA and reflex urine culture ordered 10/20.  - Encouraged to drink PO fluids  - BS on admission, and q 8 hours (SC if > 400)  - Monitor UO    #FEN   - Diet: DASH    #Skin:  - Denuded/raw perineum due to urinating on self overnight-> Nystatin powder ordered 10/20  - Skin on admission: Left hip incision with sutures DESI  - Pressure injury/Skin: Turn Q2hrs while in bed, OOB to Chair, PT/OT     #Sleep:   - Maintain quiet hours and low stim environment.  - Melatonin PRN to maximize participation in therapy during the day.     #Precaution  - Fall, Aspiration, cardiac, Hip       Outpatient Follow-up (Specialty/Name of physician):  Arnoldo Win)  Orthopaedic Surgery  [address] [place] [address]  Phone: [phone]  Fax: [phone] This is a 83 YO female with PMH  of HTN, HLD, hypothyroidism, Breast CA ( s/p double mastectomy, last chemo 10/2019), right hip replacement 2012, left femur fracture at age 5, presents to [facility] ED on 10/16 for left hip pain. Patient was in [place] where she fell and fractured her left hip and is s/p surgery in [facility] on 10/11.  She returned to [place] with her daughter in pain and inability to ambulate. Patient now with gait Instability, ADL impairments and Functional impairments.  -------------------------  # left Hip fracture  - s/p surgery- left hip ORIF in [facility] on 10/11  - Start Comprehensive Rehab Program: PT/OT, 3hours daily and 5 days weekly  - PT: Focused on improving strength, endurance, coordination, balance, functional mobility, and transfers  - OT: Focused on improving strength, fine motor skills, coordination, posture and ADLs.    - WB Status: WBAT LLE  - Sutures to be removed on 10/21    #Leukocytosis  - WBC of 10.52 10/20  - Admits to dysuria  - UA and reflex UCx ordered  - Continue to monitor    #HTN  - Losartan 100mg daily  - Metoprolol 200mg daily  - Norvasc 5mg daily    #Lightheadedness  - Encourage PO fluids  - Check orthostatics- negative 10/20    #HLD  - Simvastatin 20mg daily    #Hypothyroidism  - Synthroid    Breast CA   - s/p double mastectomy, last chemo 10/2019    #Pain management  - Tylenol PRN, tramadol PRN, Oxycodone PRN    #DVT  - RLE  - [id number] BID until 11/7/22; then to give 20mg with dinner starting on 11/8/22    #GI ppx  - Protonix 40mg    #Bowel Regimen  - Senna, miralax PRN    #Bladder management  - C/o dysuria. UA and reflex urine culture ordered 10/20.  - Encouraged to drink PO fluids  - BS on admission, and q 8 hours (SC if > 400)  - Monitor UO    #FEN   - Diet: DASH    #Skin:  - Denuded/raw perineum due to urinating on self overnight-> Nystatin powder ordered 10/20  - Skin on admission: Left hip incision with sutures DESI  - Pressure injury/Skin: Turn Q2hrs while in bed, OOB to Chair, PT/OT     #Sleep:   - Maintain quiet hours and low stim environment.  - Melatonin PRN to maximize participation in therapy during the day.     #Precaution  - Fall, Aspiration, cardiac, Hip       Outpatient Follow-up (Specialty/Name of physician):  Arnoldo Win)  Orthopaedic Surgery  [address] [place] [address]  Phone: [phone]  Fax: [phone]

## 2022-10-21 LAB
ALBUMIN SERPL ELPH-MCNC: 2.7 G/DL — LOW (ref 3.3–5)
ALP SERPL-CCNC: 92 U/L — SIGNIFICANT CHANGE UP (ref 40–120)
ALT FLD-CCNC: 25 U/L — SIGNIFICANT CHANGE UP (ref 10–45)
ANION GAP SERPL CALC-SCNC: 5 MMOL/L — SIGNIFICANT CHANGE UP (ref 5–17)
AST SERPL-CCNC: 25 U/L — SIGNIFICANT CHANGE UP (ref 10–40)
BILIRUB SERPL-MCNC: 0.5 MG/DL — SIGNIFICANT CHANGE UP (ref 0.2–1.2)
BUN SERPL-MCNC: 22 MG/DL — SIGNIFICANT CHANGE UP (ref 7–23)
CALCIUM SERPL-MCNC: 9 MG/DL — SIGNIFICANT CHANGE UP (ref 8.4–10.5)
CHLORIDE SERPL-SCNC: 102 MMOL/L — SIGNIFICANT CHANGE UP (ref 96–108)
CO2 SERPL-SCNC: 29 MMOL/L — SIGNIFICANT CHANGE UP (ref 22–31)
CREAT SERPL-MCNC: 1.02 MG/DL — SIGNIFICANT CHANGE UP (ref 0.5–1.3)
CULTURE RESULTS: SIGNIFICANT CHANGE UP
EGFR: 55 ML/MIN/1.73M2 — LOW
GLUCOSE SERPL-MCNC: 116 MG/DL — HIGH (ref 70–99)
HCT VFR BLD CALC: 37.1 % — SIGNIFICANT CHANGE UP (ref 34.5–45)
HGB BLD-MCNC: 12.1 G/DL — SIGNIFICANT CHANGE UP (ref 11.5–15.5)
LACTATE SERPL-SCNC: 1.8 MMOL/L — SIGNIFICANT CHANGE UP (ref 0.7–2)
LACTATE SERPL-SCNC: 4 MMOL/L — CRITICAL HIGH (ref 0.7–2)
MCHC RBC-ENTMCNC: 28 PG — SIGNIFICANT CHANGE UP (ref 27–34)
MCHC RBC-ENTMCNC: 32.6 GM/DL — SIGNIFICANT CHANGE UP (ref 32–36)
MCV RBC AUTO: 85.9 FL — SIGNIFICANT CHANGE UP (ref 80–100)
NRBC # BLD: 0 /100 WBCS — SIGNIFICANT CHANGE UP (ref 0–0)
PLATELET # BLD AUTO: 366 K/UL — SIGNIFICANT CHANGE UP (ref 150–400)
POTASSIUM SERPL-MCNC: 5.1 MMOL/L — SIGNIFICANT CHANGE UP (ref 3.5–5.3)
POTASSIUM SERPL-SCNC: 5.1 MMOL/L — SIGNIFICANT CHANGE UP (ref 3.5–5.3)
PROT SERPL-MCNC: 6.3 G/DL — SIGNIFICANT CHANGE UP (ref 6–8.3)
RBC # BLD: 4.32 M/UL — SIGNIFICANT CHANGE UP (ref 3.8–5.2)
RBC # FLD: 14.2 % — SIGNIFICANT CHANGE UP (ref 10.3–14.5)
SODIUM SERPL-SCNC: 136 MMOL/L — SIGNIFICANT CHANGE UP (ref 135–145)
SPECIMEN SOURCE: SIGNIFICANT CHANGE UP
WBC # BLD: 12.41 K/UL — HIGH (ref 3.8–10.5)
WBC # FLD AUTO: 12.41 K/UL — HIGH (ref 3.8–10.5)

## 2022-10-21 PROCEDURE — 99232 SBSQ HOSP IP/OBS MODERATE 35: CPT

## 2022-10-21 PROCEDURE — 71045 X-RAY EXAM CHEST 1 VIEW: CPT | Mod: 26

## 2022-10-21 PROCEDURE — 99233 SBSQ HOSP IP/OBS HIGH 50: CPT

## 2022-10-21 PROCEDURE — 93306 TTE W/DOPPLER COMPLETE: CPT | Mod: 26

## 2022-10-21 RX ORDER — LANOLIN ALCOHOL/MO/W.PET/CERES
3 CREAM (GRAM) TOPICAL AT BEDTIME
Refills: 0 | Status: DISCONTINUED | OUTPATIENT
Start: 2022-10-21 | End: 2022-10-24

## 2022-10-21 RX ORDER — SODIUM CHLORIDE 9 MG/ML
1000 INJECTION INTRAMUSCULAR; INTRAVENOUS; SUBCUTANEOUS ONCE
Refills: 0 | Status: COMPLETED | OUTPATIENT
Start: 2022-10-21 | End: 2022-10-21

## 2022-10-21 RX ORDER — CEFTRIAXONE 500 MG/1
1000 INJECTION, POWDER, FOR SOLUTION INTRAMUSCULAR; INTRAVENOUS EVERY 24 HOURS
Refills: 0 | Status: COMPLETED | OUTPATIENT
Start: 2022-10-21 | End: 2022-10-23

## 2022-10-21 RX ADMIN — PANTOPRAZOLE SODIUM 40 MILLIGRAM(S): 20 TABLET, DELAYED RELEASE ORAL at 05:22

## 2022-10-21 RX ADMIN — Medication 200 MILLIGRAM(S): at 05:21

## 2022-10-21 RX ADMIN — Medication 325 MILLIGRAM(S): at 12:24

## 2022-10-21 RX ADMIN — Medication 650 MILLIGRAM(S): at 17:00

## 2022-10-21 RX ADMIN — LOSARTAN POTASSIUM 100 MILLIGRAM(S): 100 TABLET, FILM COATED ORAL at 05:22

## 2022-10-21 RX ADMIN — SENNA PLUS 2 TABLET(S): 8.6 TABLET ORAL at 21:30

## 2022-10-21 RX ADMIN — RIVAROXABAN 15 MILLIGRAM(S): KIT at 05:23

## 2022-10-21 RX ADMIN — Medication 1 TABLET(S): at 05:23

## 2022-10-21 RX ADMIN — SIMVASTATIN 20 MILLIGRAM(S): 20 TABLET, FILM COATED ORAL at 21:29

## 2022-10-21 RX ADMIN — Medication 650 MILLIGRAM(S): at 12:24

## 2022-10-21 RX ADMIN — CEFTRIAXONE 100 MILLIGRAM(S): 500 INJECTION, POWDER, FOR SOLUTION INTRAMUSCULAR; INTRAVENOUS at 18:22

## 2022-10-21 RX ADMIN — RIVAROXABAN 15 MILLIGRAM(S): KIT at 18:22

## 2022-10-21 RX ADMIN — NYSTATIN CREAM 1 APPLICATION(S): 100000 CREAM TOPICAL at 19:13

## 2022-10-21 RX ADMIN — SODIUM CHLORIDE 1000 MILLILITER(S): 9 INJECTION INTRAMUSCULAR; INTRAVENOUS; SUBCUTANEOUS at 13:22

## 2022-10-21 RX ADMIN — Medication 3 MILLIGRAM(S): at 21:29

## 2022-10-21 RX ADMIN — Medication 75 MICROGRAM(S): at 05:22

## 2022-10-21 RX ADMIN — NYSTATIN CREAM 1 APPLICATION(S): 100000 CREAM TOPICAL at 05:23

## 2022-10-21 NOTE — PROGRESS NOTE ADULT - ASSESSMENT
This is a 81 YO female with PMH  of HTN, HLD, hypothyroidism, Breast CA ( s/p double mastectomy, last chemo 10/2019), right hip replacement 2012, left femur fracture at age 5, presents to [facility] ED on 10/16 for left hip pain. Patient was in [place] where she fell and fractured her left hip and is s/p surgery in [facility] on 10/11.  She returned to [place] with her daughter in pain and inability to ambulate. Patient now with gait Instability, ADL impairments and Functional impairments.  -------------------------  # left Hip fracture  - s/p surgery- left hip ORIF in [facility] on 10/11  - Start Comprehensive Rehab Program: PT/OT, 3hours daily and 5 days weekly  - PT: Focused on improving strength, endurance, coordination, balance, functional mobility, and transfers  - OT: Focused on improving strength, fine motor skills, coordination, posture and ADLs.    - WB Status: WBAT LLE  - Sutures to be removed on 10/21    #Leukocytosis  - WBC of 10.52 10/20  - Admits to dysuria- UA positive  - Bactrim BID started 10/20 PM  - WBC increased to 12.41 10/21  - Await UCx results  - Hospitalist following  - Continue to monitor    #HTN  - Losartan 100mg daily  - Metoprolol 200mg daily  - Norvasc 5mg daily    #Lightheadedness  - Encourage PO fluids  - Check orthostatics- negative 10/20    #HLD  - Simvastatin 20mg daily    #Hypothyroidism  - Synthroid    Breast CA   - s/p double mastectomy, last chemo 10/2019    #Pain management  - Tylenol PRN, tramadol PRN, Oxycodone PRN    #DVT  - RLE  - [id number] BID until 11/7/22; then to give 20mg with dinner starting on 11/8/22    #GI ppx  - Protonix 40mg    #Bowel Regimen  - Senna, miralax PRN    #Bladder management  - C/o dysuria. UA positive. Await UCx  - Encouraged to drink PO fluids  - BS on admission, and q 8 hours (SC if > 400)  - Monitor UO    #FEN   - Diet: DASH    #Skin:  - Denuded/raw perineum-> Nystatin powder ordered 10/20  - Skin on admission: Left hip incision with sutures DESI  - Pressure injury/Skin: Turn Q2hrs while in bed, OOB to Chair, PT/OT     #Sleep:   - Maintain quiet hours and low stim environment.  - Melatonin PRN to maximize participation in therapy during the day.     #Precaution  - Fall, Aspiration, cardiac, Hip       Outpatient Follow-up (Specialty/Name of physician):  Arnoldo Win)  Orthopaedic Surgery  [address] [place] [address]  Phone: [phone]  Fax: [phone] This is a 83 YO female with PMH  of HTN, HLD, hypothyroidism, Breast CA ( s/p double mastectomy, last chemo 10/2019), right hip replacement 2012, left femur fracture at age 5, presents to [facility] ED on 10/16 for left hip pain. Patient was in [place] where she fell and fractured her left hip and is s/p surgery in [facility] on 10/11.  She returned to [place] with her daughter in pain and inability to ambulate. Patient now with gait Instability, ADL impairments and Functional impairments.  -------------------------  # left Hip fracture  - s/p surgery- left hip ORIF in [facility] on 10/11  - Start Comprehensive Rehab Program: PT/OT, 3hours daily and 5 days weekly  - PT: Focused on improving strength, endurance, coordination, balance, functional mobility, and transfers  - OT: Focused on improving strength, fine motor skills, coordination, posture and ADLs.    - WB Status: WBAT LLE  - Sutures to be removed on 10/21    #Leukocytosis  - WBC of 10.52 10/20  - Admits to dysuria- UA positive  - Bactrim BID started 10/20 PM  - WBC increased to 12.41 10/21  - Await UCx results  - Bld Cx x2, Lactate and CXR ordered  - Hospitalist following  - Continue to monitor    #HTN  - Losartan 100mg daily  - Metoprolol 200mg daily  - Norvasc 5mg daily    #Lightheadedness  - Encourage PO fluids  - Check orthostatics- negative 10/20    #HLD  - Simvastatin 20mg daily    #Hypothyroidism  - Synthroid    Breast CA   - s/p double mastectomy, last chemo 10/2019    #Pain management  - Tylenol PRN, tramadol PRN, Oxycodone PRN    #DVT  - RLE  - [id number] BID until 11/7/22; then to give 20mg with dinner starting on 11/8/22    #GI ppx  - Protonix 40mg    #Bowel Regimen  - Senna, miralax PRN    #Bladder management  - C/o dysuria. UA positive. Await UCx  - Encouraged to drink PO fluids  - BS on admission, and q 8 hours (SC if > 400)  - Monitor UO    #FEN   - Diet: DASH    #Skin:  - Denuded/raw perineum-> Nystatin powder ordered 10/20  - Skin on admission: Left hip incision with sutures DESI  - Pressure injury/Skin: Turn Q2hrs while in bed, OOB to Chair, PT/OT     #Sleep:   - Maintain quiet hours and low stim environment.  - Melatonin PRN to maximize participation in therapy during the day.     #Precaution  - Fall, Aspiration, cardiac, Hip       Outpatient Follow-up (Specialty/Name of physician):  Arnoldo Win)  Orthopaedic Surgery  [address] [place] [address]  Phone: [phone]  Fax: [phone] This is a 83 YO female with PMH  of HTN, HLD, hypothyroidism, Breast CA ( s/p double mastectomy, last chemo 10/2019), right hip replacement 2012, left femur fracture at age 5, presents to [facility] ED on 10/16 for left hip pain. Patient was in [place] where she fell and fractured her left hip and is s/p surgery in [facility] on 10/11.  She returned to [place] with [place] daughter in pain and inability to ambulate. Patient now with gait Instability, ADL impairments and Functional impairments.  -------------------------  # left Hip fracture  - s/p surgery- left hip ORIF in [facility] on 10/11  - Start Comprehensive Rehab Program: PT/OT, 3hours daily and 5 days weekly  - PT: Focused on improving strength, endurance, coordination, balance, functional mobility, and transfers  - OT: Focused on improving strength, fine motor skills, coordination, posture and ADLs.    - WB Status: WBAT LLE  - Sutures to be removed on 10/21    #Leukocytosis  - WBC of 10.52 10/20  - Admits to dysuria- UA positive  - Bactrim BID started 10/20 PM  - WBC increased to 12.41 10/21  - Await UCx results  - Bld Cx x2, Lactate and CXR ordered  - Hospitalist following  - Continue to monitor    #HTN  - Losartan 100mg daily  - Metoprolol 200mg daily  - Norvasc 5mg daily- DCd on 10/20 due to hypotension in PT (sbp 70s)    #Lightheadedness  - Encourage PO fluids  - Check orthostatics- negative 10/20    #HLD  - Simvastatin 20mg daily    #Hypothyroidism  - Synthroid    Breast CA   - s/p double mastectomy, last chemo 10/2019    #Pain management  - Tylenol PRN, tramadol PRN, Oxycodone PRN    #DVT  - RLE  - [id number] BID until 11/7/22; then to give 20mg with dinner starting on 11/8/22    #GI ppx  - Protonix 40mg    #Bowel Regimen  - Senna, miralax PRN    #Bladder management  - C/o dysuria. UA positive. Await UCx  - Encouraged to drink PO fluids  - BS on admission, and q 8 hours (SC if > 400)  - Monitor UO    #FEN   - Diet: DASH    #Skin:  - Denuded/raw perineum-> Nystatin powder ordered 10/20  - Skin on admission: Left hip incision with sutures DESI  - Pressure injury/Skin: Turn Q2hrs while in bed, OOB to Chair, PT/OT     #Sleep:   - Maintain quiet hours and low stim environment.  - Melatonin PRN to maximize participation in therapy during the day.     #Precaution  - Fall, Aspiration, cardiac, Hip       Outpatient Follow-up (Specialty/Name of physician):  Arnoldo Win)  Orthopaedic Surgery  [address] [place] [address]  Phone: [phone]  Fax: [phone] This is a 81 YO female with PMH  of HTN, HLD, hypothyroidism, Breast CA ( s/p double mastectomy, last chemo 10/2019), right hip replacement 2012, left femur fracture at age 5, presents to [facility] ED on 10/16 for left hip pain. Patient was in [place] where she fell and fractured her left hip and is s/p surgery in [facility] on 10/11.  She returned to [place] with her daughter in pain and inability to ambulate. Patient now with gait Instability, ADL impairments and Functional impairments.  -------------------------  # left Hip fracture  - s/p surgery- left hip ORIF in [facility] on 10/11  - Start Comprehensive Rehab Program: PT/OT, 3hours daily and 5 days weekly  - PT: Focused on improving strength, endurance, coordination, balance, functional mobility, and transfers  - OT: Focused on improving strength, fine motor skills, coordination, posture and ADLs.    - WB Status: WBAT LLE  - Sutures to be removed on 10/21    #Leukocytosis  - Afebrile  - WBC of 10.52 10/20  - Admits to dysuria- UA positive  - Bactrim BID started 10/20 PM, now DCd on 10/21  - WBC increased to 12.41 10/21  - Await UCx results  - Bld Cx x2, Lactate and CXR ordered  - Lactate of 4.0  - 1000ml NS bolus  - ABX switched to Ceftriaxone daily for 3 days  - Repeat Lac at 15:00  - ID consult, appreciate recs  - Hospitalist following  - Continue to monitor    #HTN  - Losartan 100mg daily  - Metoprolol 200mg daily  - Norvasc 5mg daily- DCd on 10/20 due to hypotension in PT (sbp 70s)    #Lightheadedness  - Encourage PO fluids  - Check orthostatics- negative 10/20    #HLD  - Simvastatin 20mg daily    #Hypothyroidism  - Synthroid    Breast CA   - s/p double mastectomy, last chemo 10/2019    #Pain management  - Tylenol PRN, tramadol PRN, Oxycodone PRN    #DVT  - RLE  - [id number] BID until 11/7/22; then to give 20mg with dinner starting on 11/8/22    #GI ppx  - Protonix 40mg    #Bowel Regimen  - Senna, miralax PRN    #Bladder management  - C/o dysuria. UA positive. Await UCx  - Encouraged to drink PO fluids  - BS on admission, and q 8 hours (SC if > 400)  - Monitor UO    #FEN   - Diet: DASH    #Skin:  - Denuded/raw perineum-> Nystatin powder ordered 10/20  - Skin on admission: Left hip incision with sutures DESI  - Pressure injury/Skin: Turn Q2hrs while in bed, OOB to Chair, PT/OT     #Sleep:   - Maintain quiet hours and low stim environment.  - Melatonin PRN to maximize participation in therapy during the day.     #Precaution  - Fall, Aspiration, cardiac, Hip       Outpatient Follow-up (Specialty/Name of physician):  Arnoldo Win)  Orthopaedic Surgery  [address] [place] [address]  Phone: [phone]  Fax: [phone] This is a 83 YO female with PMH  of HTN, HLD, hypothyroidism, Breast CA ( s/p double mastectomy, last chemo 10/2019), right hip replacement 2012, left femur fracture at age 5, presents to [facility] ED on 10/16 for left hip pain. Patient was in [place] where she fell and fractured her left hip and is s/p surgery in [facility] on 10/11.  She returned to [place] with [place] in pain and inability to ambulate. Patient now with gait Instability, ADL impairments and Functional impairments.  -------------------------  # left Hip fracture  - s/p surgery- left hip ORIF in [facility] on 10/11  - Start Comprehensive Rehab Program: PT/OT, 3hours daily and 5 days weekly  - PT: Focused on improving strength, endurance, coordination, balance, functional mobility, and transfers  - OT: Focused on improving strength, fine motor skills, coordination, posture and ADLs.    - WB Status: WBAT LLE  - Sutures to be removed on 10/21    #Leukocytosis  - Afebrile  - WBC of 10.52 10/20  - Admits to dysuria- UA positive  - Bactrim BID started 10/20 PM, now DCd on 10/21  - WBC increased to 12.41 10/21  - Await UCx results  - Bld Cx x2, Lactate and CXR ordered  - Lactate of 4.0  - 1000ml NS bolus  - ABX switched to Ceftriaxone daily for 3 days  - Repeat Lac at 15:00  - ID consult, appreciate recs  - Hospitalist following  - Continue to monitor    #HTN  - Losartan 100mg daily  - Metoprolol 200mg daily  - Norvasc 5mg daily- DCd on 10/20 due to hypotension in PT (sbp 70s)    #Lightheadedness  - Encourage PO fluids  - Check orthostatics- negative 10/20    #HLD  - Simvastatin 20mg daily    #Hypothyroidism  - Synthroid    Breast CA   - s/p double mastectomy, last chemo 10/2019    #Pain management  - Tylenol PRN, tramadol PRN, Oxycodone PRN    #DVT  - RLE  - [id number] BID until 11/7/22; then to give 20mg with dinner starting on 11/8/22    #GI ppx  - Protonix 40mg    #Bowel Regimen  - Senna, miralax PRN    #Bladder management  - C/o dysuria. UA positive. Await UCx  - Encouraged to drink PO fluids  - BS on admission, and q 8 hours (SC if > 400)  - Monitor UO    #FEN   - Diet: DASH    #Skin:  - Denuded/raw perineum-> Nystatin powder ordered 10/20  - Skin on admission: Left hip incision with sutures DESI  - Pressure injury/Skin: Turn Q2hrs while in bed, OOB to Chair, PT/OT     #Sleep:   - Maintain quiet hours and low stim environment.  - Melatonin PRN to maximize participation in therapy during the day.     #Precaution  - Fall, Aspiration, cardiac, Hip     ETyler Arana NP spoke with pt daughter Maryam at bedside at 12:00. All questions/concerns addressed.     ----------------------------------------------------------------------  Outpatient Follow-up (Specialty/Name of physician):  Arnoldo Win)  Orthopaedic Surgery  [address] [place] [address]  Phone: [phone]  Fax: [phone]

## 2022-10-21 NOTE — CHART NOTE - NSCHARTNOTEFT_GEN_A_CORE
REHABILITATION DIAGNOSIS:  Nondisplaced intertrochanteric fracture of unspecified femur, initial encounter for closed fracture        COMORBIDITIES/COMPLICATING CONDITIONS IMPACTING REHABILITATION:  HEALTH ISSUES - PROBLEM Dx:  Elevated WBC  hypotension  anemia      PAST MEDICAL & SURGICAL HISTORY:  HTN (Hypertension)      Hyperlipidemia      Breast Cancer      Osteoporosis       (Normal Spontaneous Vaginal Delivery) x 2      Cataract      Radiation      Obesity      HTN (hypertension)      H/O hypotension      Breast cancer      Hypothyroidism      Deep vein thrombosis (DVT)      Breast Cancer- s/p left Breast Lumpectomy  with Radiation therapy( 18 years ago)      Cataract surgery- both eyes ( )      H/O melanoma excision  right forearm       History of hip replacement   - right      H/O mastectomy          Based upon consideration of the patient's impairments, functional status, complicating conditions and any other contributing factors and after information garnered from the assessments of all therapy disciplines involved in treating the patient and other pertinent clinicians:    INTERDISCIPLINARY REHABILITATION INTERVENTIONS:    [ X  ] Transfer Training  [ X ] Therapeutic Exercise  [  X ] Balance/Coordination Exercises  [ X  ] Locomotion training  [  X ] Stairs    [  X ] Functional transfers  [ X  ] Activities of daily living  [   ] Visual Perceptual training    [  X ] Bowel/Bladder program  [ X  ] Pain Management  [  X ] Skin/Wound Care    [   ] Speech/Communication Exercise  [   ] Swallowing Exercises    [   ] Cognitive Exercises  [   ] Cognitive-linguistic Treatment  [   ] Behavioral Program    [   ] Patient/Family Counseling  [   X] Family Training  [  X ] Community Re-entry  [   ] Orthotic Evaluation/Training  [   ] Prosthetic Eval/Training    MEDICAL PROGNOSIS:  ***Good    REHAB POTENTIAL:  ***Good  EXPECTED DAILY THERAPIES:    [ X  ] PT  [ X  ] OT  [  X ] ST    EXPECTED INTENSITY OF PROGRAM:  ***3 hours/day 5 days a week    EXPECTED FREQUENCY OF PROGRAM:  ***3 hours/day 5 days a week    ESTIMATED LOS:  ***14-21 days    ESTIMATED DISPOSITION:  ***Home in the community with daughter    INTERDISCIPLINARY FUNCTIONAL OUTCOMES/GOALS:         Gait/Mobility:200 feet with SC       Transfers: Independent       ADLs: modified independence       Functional Transfers: independent       Medication Management: independent       Communication:Independent       Cognitive: Independent       Nutrition:Independent       Bladder:Independent       Bowel:Independent

## 2022-10-21 NOTE — CONSULT NOTE ADULT - SUBJECTIVE AND OBJECTIVE BOX
HPI:   Patient is a 82y female with a past history of a left femur fracture repaired at age 5, B.L mastectomy for breast cancer treated with post op chemo/RT, completed , RT GREGG, HTN, HLD, and hypothyroidism, who was vacationing for her 81th birthday in [place] when she fell and fractured left hip.She had an ORIF on 10/11, returned to [place] and came to hospital for rehab.Dopplers with a RLE DVT and she was admitted to rehab on 10/18.There were concerns of a UTI, she was given bactrim 10/20.   She had an episode of hypotension yesterday, today has a wbc of 12,000 and she is being approached as if she has a systemic infection and has been placed on CTX.  She denies any respiratory, GI or  symptoms to my exam.  REVIEW OF SYSTEMS:  All other review of systems negative (Comprehensive ROS)    PAST MEDICAL & SURGICAL HISTORY:  HTN (Hypertension)      Hyperlipidemia      Breast Cancer      Osteoporosis       (Normal Spontaneous Vaginal Delivery) x 2      Cataract      Radiation      Obesity      HTN (hypertension)      H/O hypotension      Breast cancer      Hypothyroidism      Deep vein thrombosis (DVT)      Breast Cancer- s/p left Breast Lumpectomy  with Radiation therapy( 18 years ago)      Cataract surgery- both eyes ( )      H/O melanoma excision  right forearm 2011      History of hip replacement   - right      History of right hip replacement      H/O mastectomy          Allergies    No Known Drug Allergies  shellfish (Unknown)    Intolerances        Antimicrobials Day #  :day 1  cefTRIAXone   IVPB 1000 milliGRAM(s) IV Intermittent every 24 hours    Other Medications:  acetaminophen     Tablet .. 650 milliGRAM(s) Oral every 6 hours PRN  ferrous    sulfate 325 milliGRAM(s) Oral daily  levothyroxine 75 MICROGram(s) Oral daily  losartan 100 milliGRAM(s) Oral daily  metoprolol succinate  milliGRAM(s) Oral daily  nystatin Powder 1 Application(s) Topical two times a day  oxyCODONE    IR 5 milliGRAM(s) Oral every 6 hours PRN  pantoprazole    Tablet 40 milliGRAM(s) Oral before breakfast  polyethylene glycol 3350 17 Gram(s) Oral daily  rivaroxaban 15 milliGRAM(s) Oral two times a day  senna 2 Tablet(s) Oral at bedtime  simvastatin 20 milliGRAM(s) Oral at bedtime  sodium chloride 0.9% Bolus 1000 milliLiter(s) IV Bolus once      FAMILY HISTORY:  Family history of lung cancer (Father)    FH: lung cancer (Father)    FH: HTN (hypertension) (Mother)        SOCIAL HISTORY:  Smoking:  x   ETOH: x    Drug Use: x      T(F): 98.1 (10-21-22 @ 08:29), Max: 98.6 (10-20-22 @ 19:48)  HR: 80 (10-21-22 @ 08:29)  BP: 113/61 (10-21-22 @ 08:29)  RR: 14 (10-21-22 @ 08:29)  SpO2: 94% (10-21-22 @ 08:29)  Wt(kg): --    PHYSICAL EXAM:  General: alert, no acute distress  Eyes:  anicteric, no conjunctival injection, no discharge  Oropharynx: no lesions or injection 	  Neck: supple, without adenopathy  Lungs: clear to auscultation  Heart: regular rate and rhythm; no murmur, rubs or gallops  Abdomen: soft, nondistended, nontender, without mass or organomegaly  Skin: left hip incisions C/D/i  Extremities: no clubbing, cyanosis, or edema  Neurologic: alert, oriented, moves all extremities    LAB RESULTS:                        12.1   12.41 )-----------( 366      ( 21 Oct 2022 06:30 )             37.1     10-    136  |  102  |  22  ----------------------------<  116<H>  5.1   |  29  |  1.02    Ca    9.0      21 Oct 2022 06:30    TPro  6.3  /  Alb  2.7<L>  /  TBili  0.5  /  DBili  x   /  AST  25  /  ALT  25  /  AlkPhos  92  10-21    LIVER FUNCTIONS - ( 21 Oct 2022 06:30 )  Alb: 2.7 g/dL / Pro: 6.3 g/dL / ALK PHOS: 92 U/L / ALT: 25 U/L / AST: 25 U/L / GGT: x           Urinalysis Basic - ( 20 Oct 2022 09:49 )    Color: Raiza / Appearance: very cloudy / S.010 / pH: x  Gluc: x / Ketone: Negative  / Bili: Negative / Urobili: Negative   Blood: x / Protein: 100 / Nitrite: Positive   Leuk Esterase: Moderate / RBC: 5-10 /HPF / WBC >50 /HPF   Sq Epi: x / Non Sq Epi: x / Bacteria: Many /HPF        MICROBIOLOGY:  RECENT CULTURES:        RADIOLOGY REVIEWED:  < from: US Duplex Venous Lower Ext Complete, Bilateral (10.17.22 @ 20:29) >    IMPRESSION:  DVT involving the right soleal vein  Superficial thrombus of a right calf vein.  No evidenceof left-sided DVT  Results were discussed with Carlton in the emergency department at 8:35 PM   on 2022    < end of copied text >  < from: Xray Femur 2 Views, Left (10.17.22 @ 16:18) >  INTERPRETATION:  Clinical history: 82-year-old female, rule out fracture.    AP view the pelvis and 2 views of the left hip demonstrate a displaced   left intertrochanteric fracture, patient is post prior ORIF.    IMPRESSION:  Acute left intertrochanteric fracture    --- End of Report ---    < end of copied text >  < from: Xray Chest 1 View- PORTABLE-Routine (Xray Chest 1 View- PORTABLE-Routine .) (10.17.22 @ 16:18) >    IMPRESSION:   No radiographic evidence of active chest disease.    --- End of Report ---    < end of copied text >

## 2022-10-21 NOTE — PROGRESS NOTE ADULT - SUBJECTIVE AND OBJECTIVE BOX
This is a 83 YO female with PMH  of HTN, HLD, hypothyroidism, Breast CA ( s/p double mastectomy, last chemo 10/2019), right hip replacement 2012, left femur fracture at age 5,  presents to [facility] ED on 10/16 for left hip pain. Patient was in [place], fell and fractured her "left hip". According to her and her daughters "a margy and two screws were placed" on Tuesday 10/11 in [facility]. She was transferred home (to daughters and bedbound since surgery. She came back to [place] with her daughter. Patient was unable to bear weight and was having pain on her left hip. She lives alone. Additionally, she stated she was not given any pain medication. In ER, labs showed mild leukocytosis. She was found to have RLE DVT started on Xarelto. Patient was evaluated by PM&R and therapy for functional deficits, gait/ADL impairments and acute rehabilitation was recommended. Patient was medically optimized for discharge to [facility] IRU on 10/18/22.    Today's Subjective/ROS:**      Patient seen and examined in bed this AM.   States that she slept okay and is unsure of why her room was changed.  Patient stated that she was urinating on herself overnight because she feels like she has urinary urgency and she did not want to bother the staff.  Admits to burning/discomfort when urinating.  Encouraged to drink water, and use of call bell for toileting assistance.  Surgical hip site tender to touch.  Otherwise no other complaints at this time.  Last BM on 10/19      Vital Signs Last 24 Hrs  T(C): 37 (20 Oct 2022 19:48), Max: 37 (20 Oct 2022 19:48)  T(F): 98.6 (20 Oct 2022 19:48), Max: 98.6 (20 Oct 2022 19:48)  HR: 70 (21 Oct 2022 05:20) (70 - 78)  BP: 128/72 (21 Oct 2022 05:20) (125/77 - 128/74)  BP(mean): --  RR: 14 (20 Oct 2022 19:48) (14 - 16)  SpO2: 94% (20 Oct 2022 19:48) (94% - 95%)        Gen - NAD, Comfortable  HEENT - NCAT, EOMI, MMM  Neck - Supple, No limited ROM  Pulm - CTAB, No wheeze, No rhonchi, No crackles  Cardiovascular - RRR, S1S2, No murmurs  Abdomen - Soft, NT/ND, +BS  Extremities - No clubbing, no cyanosis, + edema to left hip and thigh, + right calf tenderness  Neuro-     Cognitive - Awake, Alert, Oriented  to self, place, date, year, situation. Able to follow command     Communication - Fluent     Attention: Intact      Judgement: Good evidence of judgement     Memory: Recall 3 objects immediate and 3 min later         Cranial Nerves - CN 2-12 intact.      Motor -                     LEFT    UE - ShAB 5/5, EF 5/5, EE 5/5,  5/5                    RIGHT UE - ShAB 5/5, EF 5/5, EE 5/5,   5/5                    LEFT    LE - HF 4/5, KE 4/5, DF 5/5, PF 5/5- limited 2/2 pain                    RIGHT LE - HF 5/5, KE 5/5, DF 5/5, PF 5/5        Sensory - Intact to LT     Reflexes - DTR Intact, No primitive reflexive     Coordination - FTN intact     Tone - normal  Psychiatric - Mood stable, Affect WNL  Skin:  left hip incision with sutures DESI      LAB                          12.1   12.41 )-----------( 366      ( 21 Oct 2022 06:30 )             37.1                           11.3   10.52 )-----------( 357      ( 20 Oct 2022 06:15 )               35.4     10-20    140  |  103  |  27<H>  ----------------------------<  117<H>  4.5   |  28  |  1.06    Ca    9.0      20 Oct 2022 06:15    TPro  6.1  /  Alb  2.7<L>  /  TBili  0.5  /  DBili  x   /  AST  23  /  ALT  26  /  AlkPhos  87  10-20    LIVER FUNCTIONS - ( 20 Oct 2022 06:15 )  Alb: 2.7 g/dL / Pro: 6.1 g/dL / ALK PHOS: 87 U/L / ALT: 26 U/L / AST: 23 U/L / GGT: x               MEDICATIONS  (STANDING):  ferrous    sulfate 325 milliGRAM(s) Oral daily  levothyroxine 75 MICROGram(s) Oral daily  losartan 100 milliGRAM(s) Oral daily  metoprolol succinate  milliGRAM(s) Oral daily  nystatin Powder 1 Application(s) Topical two times a day  pantoprazole    Tablet 40 milliGRAM(s) Oral before breakfast  polyethylene glycol 3350 17 Gram(s) Oral daily  rivaroxaban 15 milliGRAM(s) Oral two times a day  senna 2 Tablet(s) Oral at bedtime  simvastatin 20 milliGRAM(s) Oral at bedtime  trimethoprim  160 mG/sulfamethoxazole 800 mG 1 Tablet(s) Oral two times a day    MEDICATIONS  (PRN):  acetaminophen     Tablet .. 650 milliGRAM(s) Oral every 6 hours PRN Temp greater or equal to 38C (100.4F), Mild Pain (1 - 3)  oxyCODONE    IR 5 milliGRAM(s) Oral every 6 hours PRN Severe Pain (7 - 10)     This is a 81 YO female with PMH  of HTN, HLD, hypothyroidism, Breast CA ( s/p double mastectomy, last chemo 10/2019), right hip replacement 2012, left femur fracture at age 5,  presents to [facility] ED on 10/16 for left hip pain. Patient was in [place], fell and fractured her "left hip". According to her and her daughters "a margy and two screws were placed" on Tuesday 10/11 in [facility]. She was transferred home (to daughters and bedbound since surgery. She came back to [place] with her daughter. Patient was unable to bear weight and was having pain on her left hip. She lives alone. Additionally, she stated she was not given any pain medication. In ER, labs showed mild leukocytosis. She was found to have RLE DVT started on Xarelto. Patient was evaluated by PM&R and therapy for functional deficits, gait/ADL impairments and acute rehabilitation was recommended. Patient was medically optimized for discharge to [facility] IRU on 10/18/22.    Today's Subjective/ROS:  Patient seen and examined in bed this AM.   States that she slept good last night and is feeling much better this AM.  Dysuria is improving.   Surgical hip site tender to touch. Appears less erythematic.   Otherwise no other complaints at this time.  Last BM on 10/19      Vital Signs Last 24 Hrs  T(C): 36.7 (21 Oct 2022 08:29), Max: 37 (20 Oct 2022 19:48)  T(F): 98.1 (21 Oct 2022 08:29), Max: 98.6 (20 Oct 2022 19:48)  HR: 80 (21 Oct 2022 08:29) (70 - 80)  BP: 113/61 (21 Oct 2022 08:29) (113/61 - 128/74)  BP(mean): --  RR: 14 (21 Oct 2022 08:29) (14 - 14)  SpO2: 94% (21 Oct 2022 08:29) (94% - 94%)      Gen - NAD, Comfortable  HEENT - NCAT, EOMI, MMM  Neck - Supple, No limited ROM  Pulm - CTAB, No wheeze, No rhonchi, + Crackles  Cardiovascular - RRR, S1S2, No murmurs  Abdomen - Soft, NT/ND, +BS  Extremities - No clubbing, no cyanosis, + edema to left hip and thigh, + right calf tenderness  Neuro-     Cognitive - Awake, Alert, Oriented  to self, place, date, year, situation. Able to follow command     Communication - Fluent     Attention: Intact      Judgement: Good evidence of judgement     Memory: Recall 3 objects immediate and 3 min later         Cranial Nerves - CN 2-12 intact.      Motor -                     LEFT    UE - ShAB 5/5, EF 5/5, EE 5/5,  5/5                    RIGHT UE - ShAB 5/5, EF 5/5, EE 5/5,   5/5                    LEFT    LE - HF 4/5, KE 4/5, DF 5/5, PF 5/5- limited 2/2 pain                    RIGHT LE - HF 5/5, KE 5/5, DF 5/5, PF 5/5        Sensory - Intact to LT     Reflexes - DTR Intact, No primitive reflexive     Coordination - FTN intact     Tone - normal  Psychiatric - Mood stable, Affect WNL  Skin:  left hip incision with sutures DESI. Erythema improving. Site TTP.       LAB             12.1   12.41 )-----------( 366      ( 21 Oct 2022 06:30 )             37.1                           11.3   10.52 )-----------( 357      ( 20 Oct 2022 06:15 )               35.4     10-20    140  |  103  |  27<H>  ----------------------------<  117<H>  4.5   |  28  |  1.06    Ca    9.0      20 Oct 2022 06:15    TPro  6.1  /  Alb  2.7<L>  /  TBili  0.5  /  DBili  x   /  AST  23  /  ALT  26  /  AlkPhos  87  10-20    LIVER FUNCTIONS - ( 20 Oct 2022 06:15 )  Alb: 2.7 g/dL / Pro: 6.1 g/dL / ALK PHOS: 87 U/L / ALT: 26 U/L / AST: 23 U/L / GGT: x               MEDICATIONS  (STANDING):  ferrous    sulfate 325 milliGRAM(s) Oral daily  levothyroxine 75 MICROGram(s) Oral daily  losartan 100 milliGRAM(s) Oral daily  metoprolol succinate  milliGRAM(s) Oral daily  nystatin Powder 1 Application(s) Topical two times a day  pantoprazole    Tablet 40 milliGRAM(s) Oral before breakfast  polyethylene glycol 3350 17 Gram(s) Oral daily  rivaroxaban 15 milliGRAM(s) Oral two times a day  senna 2 Tablet(s) Oral at bedtime  simvastatin 20 milliGRAM(s) Oral at bedtime  trimethoprim  160 mG/sulfamethoxazole 800 mG 1 Tablet(s) Oral two times a day    MEDICATIONS  (PRN):  acetaminophen     Tablet .. 650 milliGRAM(s) Oral every 6 hours PRN Temp greater or equal to 38C (100.4F), Mild Pain (1 - 3)  oxyCODONE    IR 5 milliGRAM(s) Oral every 6 hours PRN Severe Pain (7 - 10)     This is a 81 YO female with PMH  of HTN, HLD, hypothyroidism, Breast CA ( s/p double mastectomy, last chemo 10/2019), right hip replacement 2012, left femur fracture at age 5,  presents to [facility] ED on 10/16 for left hip pain. Patient was in [place], fell and fractured her "left hip". According to her and her daughters "a margy and two screws were placed" on Tuesday 10/11 in [facility]. She was transferred home (to daughters and bedbound since surgery. She came back to [place] with her daughter. Patient was unable to bear weight and was having pain on her left hip. She lives alone. Additionally, she stated she was not given any pain medication. In ER, labs showed mild leukocytosis. She was found to have RLE DVT started on Xarelto. Patient was evaluated by PM&R and therapy for functional deficits, gait/ADL impairments and acute rehabilitation was recommended. Patient was medically optimized for discharge to [facility] IRU on 10/18/22.    Today's Subjective/ROS:  Patient seen and examined in bed this AM.   States that she slept good last night and is feeling much better this AM.  Dysuria is improving.   Surgical hip site tender to touch. Appears less erythematic.   Otherwise no other complaints at this time.  Last BM on 10/19    Of note, she had an episode of hypotension after doing activity in PT yesterday (10/20). She was sitting in the chair for a few minutes after activity and stated she did not feel well, her sbp was in the 70s at the time. She was brought back to her room and was experiencing some nausea. She denied any other symptoms such as dizziness, headache, sweating or lightheadedness. She was given PO fluids and encouraged to hydrate. Amlodipine DCd.       Vital Signs Last 24 Hrs  T(C): 36.7 (21 Oct 2022 08:29), Max: 37 (20 Oct 2022 19:48)  T(F): 98.1 (21 Oct 2022 08:29), Max: 98.6 (20 Oct 2022 19:48)  HR: 80 (21 Oct 2022 08:29) (70 - 80)  BP: 113/61 (21 Oct 2022 08:29) (113/61 - 128/74)  BP(mean): --  RR: 14 (21 Oct 2022 08:29) (14 - 14)  SpO2: 94% (21 Oct 2022 08:29) (94% - 94%)      Gen - NAD, Comfortable  HEENT - NCAT, EOMI, MMM  Neck - Supple, No limited ROM  Pulm - CTAB, No wheeze, No rhonchi, + Crackles  Cardiovascular - RRR, S1S2, No murmurs  Abdomen - Soft, NT/ND, +BS  Extremities - No clubbing, no cyanosis, + edema to left hip and thigh, + right calf tenderness  Neuro-     Cognitive - Awake, Alert, Oriented  to self, place, date, year, situation. Able to follow command     Communication - Fluent     Attention: Intact      Judgement: Good evidence of judgement     Memory: Recall 3 objects immediate and 3 min later         Cranial Nerves - CN 2-12 intact.      Motor -                     LEFT    UE - ShAB 5/5, EF 5/5, EE 5/5,  5/5                    RIGHT UE - ShAB 5/5, EF 5/5, EE 5/5,   5/5                    LEFT    LE - HF 4/5, KE 4/5, DF 5/5, PF 5/5- limited 2/2 pain                    RIGHT LE - HF 5/5, KE 5/5, DF 5/5, PF 5/5        Sensory - Intact to LT     Reflexes - DTR Intact, No primitive reflexive     Coordination - FTN intact     Tone - normal  Psychiatric - Mood stable, Affect WNL  Skin:  left hip incision with sutures DESI. Erythema improving. Site TTP.       LAB             12.1   12.41 )-----------( 366      ( 21 Oct 2022 06:30 )             37.1                           11.3   10.52 )-----------( 357      ( 20 Oct 2022 06:15 )               35.4     10-20    140  |  103  |  27<H>  ----------------------------<  117<H>  4.5   |  28  |  1.06    Ca    9.0      20 Oct 2022 06:15    TPro  6.1  /  Alb  2.7<L>  /  TBili  0.5  /  DBili  x   /  AST  23  /  ALT  26  /  AlkPhos  87  10-20    LIVER FUNCTIONS - ( 20 Oct 2022 06:15 )  Alb: 2.7 g/dL / Pro: 6.1 g/dL / ALK PHOS: 87 U/L / ALT: 26 U/L / AST: 23 U/L / GGT: x               MEDICATIONS  (STANDING):  ferrous    sulfate 325 milliGRAM(s) Oral daily  levothyroxine 75 MICROGram(s) Oral daily  losartan 100 milliGRAM(s) Oral daily  metoprolol succinate  milliGRAM(s) Oral daily  nystatin Powder 1 Application(s) Topical two times a day  pantoprazole    Tablet 40 milliGRAM(s) Oral before breakfast  polyethylene glycol 3350 17 Gram(s) Oral daily  rivaroxaban 15 milliGRAM(s) Oral two times a day  senna 2 Tablet(s) Oral at bedtime  simvastatin 20 milliGRAM(s) Oral at bedtime  trimethoprim  160 mG/sulfamethoxazole 800 mG 1 Tablet(s) Oral two times a day    MEDICATIONS  (PRN):  acetaminophen     Tablet .. 650 milliGRAM(s) Oral every 6 hours PRN Temp greater or equal to 38C (100.4F), Mild Pain (1 - 3)  oxyCODONE    IR 5 milliGRAM(s) Oral every 6 hours PRN Severe Pain (7 - 10)

## 2022-10-21 NOTE — PROGRESS NOTE ADULT - SUBJECTIVE AND OBJECTIVE BOX
Patient is a 82y old  Female who presents with a chief complaint of left hip fracture (21 Oct 2022 13:03)      24 HOUR EVENTS:  No overnight events reported.   lactate 4.0, given 1 L NS bolus.     SUBJECTIVE:  Patient seen and examined at bedside. She is having nausea today. per rehab team, she had nausea yesterday prior to abx.   She has been constipated - now feels like she needs to have a bowel movement.  She denies SOB. She denies diarrhea.    ALLERGIES:  No Known Drug Allergies  shellfish (Unknown)    MEDICATIONS  (STANDING):  cefTRIAXone   IVPB 1000 milliGRAM(s) IV Intermittent every 24 hours  ferrous    sulfate 325 milliGRAM(s) Oral daily  levothyroxine 75 MICROGram(s) Oral daily  losartan 100 milliGRAM(s) Oral daily  metoprolol succinate  milliGRAM(s) Oral daily  nystatin Powder 1 Application(s) Topical two times a day  pantoprazole    Tablet 40 milliGRAM(s) Oral before breakfast  polyethylene glycol 3350 17 Gram(s) Oral daily  rivaroxaban 15 milliGRAM(s) Oral two times a day  senna 2 Tablet(s) Oral at bedtime  simvastatin 20 milliGRAM(s) Oral at bedtime  sodium chloride 0.9% Bolus 1000 milliLiter(s) IV Bolus once    MEDICATIONS  (PRN):  acetaminophen     Tablet .. 650 milliGRAM(s) Oral every 6 hours PRN Temp greater or equal to 38C (100.4F), Mild Pain (1 - 3)  oxyCODONE    IR 5 milliGRAM(s) Oral every 6 hours PRN Severe Pain (7 - 10)    Vital Signs Last 24 Hrs  T(F): 98.1 (21 Oct 2022 08:29), Max: 98.6 (20 Oct 2022 19:48)  HR: 80 (21 Oct 2022 08:29) (70 - 80)  BP: 113/61 (21 Oct 2022 08:29) (113/61 - 128/72)  RR: 14 (21 Oct 2022 08:29) (14 - 14)  SpO2: 94% (21 Oct 2022 08:29) (94% - 94%)  I&O's Summary    PHYSICAL EXAM:  General: NAD, A/O  ENT: Moist mucous membranes, no thrush  Neck: Supple, No JVD  Lungs: Clear to auscultation bilaterally, good air entry, non-labored breathing  Cardio: RRR, S1/S2, No murmur  Abdomen: Soft, Nontender, Nondistended; Bowel sounds present  Extremities: No calf tenderness, No pitting edema    LABS:                        12.1   12.41 )-----------( 366      ( 21 Oct 2022 06:30 )             37.1     10-21    136  |  102  |  22  ----------------------------<  116  5.1   |  29  |  1.02    Ca    9.0      21 Oct 2022 06:30    TPro  6.3  /  Alb  2.7  /  TBili  0.5  /  DBili  x   /  AST  25  /  ALT  25  /  AlkPhos  92  10-21            Lactate, Blood: 1.8 mmol/L (10-21 @ 12:19)  Lactate, Blood: 4.0 mmol/L (10-21 @ 09:25)        Urinalysis Basic - ( 20 Oct 2022 09:49 )    Color: Raiza / Appearance: very cloudy / S.010 / pH: x  Gluc: x / Ketone: Negative  / Bili: Negative / Urobili: Negative   Blood: x / Protein: 100 / Nitrite: Positive   Leuk Esterase: Moderate / RBC: 5-10 /HPF / WBC >50 /HPF   Sq Epi: x / Non Sq Epi: x / Bacteria: Many /HPF        COVID-19 PCR: NotDetec (10-18-22 @ 18:40)  COVID-19 PCR: NotDetec (10-17-22 @ 18:20)    RADIOLOGY & ADDITIONAL TESTS:  < from: US Duplex Venous Lower Ext Complete, Bilateral (10.17.22 @ 20:29) >  IMPRESSION:  DVT involving the right soleal vein  Superficial thrombus of a right calf vein.  No evidenceof left-sided DVT  Results were discussed with Carlton in the emergency department at 8:35 PM   on 2022    < end of copied text >        Care Discussed with Consultants/Other Providers:

## 2022-10-21 NOTE — PROGRESS NOTE ADULT - ASSESSMENT
81 YO female HTN, HLD, hypothyroidism, Breast CA ( s/p double mastectomy, last chemo 10/2019), right hip replacement 2012, left femur fracture at age 5; fractured left hip in [place], repaired with "a margy and two screws" in [facility]. Found to have RLE DVT in ER, started on xarelto. Now admitted to [facility].    # Left femur fx, s/p repair in [place] on 10/11, likely due to age related osteoporosis  # s/p fall   - dvt ppx  - recommend Incentive spirometry.  - pain control  - PT/OT per rehab.  - OP ortho f/u.   - vitamin D level 28.7, not deficient.     # Dysuria  # mild leukocytosis, likely due to UTI and DVT  - UA positive, started on bactrim on 10/20, which caused resolution of dysuria. She is having nausea, so will convert to IV ceftriaxone (10/21).   - Narrow abx based on culture sensitivities. Recommend 3 day course to treat UTI. (EOT 10/23)  - BCx obtained by rehab.   - trend WBC, no fevers.    # Elevated lactate  - resolved after pt received 1 L NS bolus.  - ID consulted, cont to monitor pt and continue abx above.    # DVT of R.soleal vein  - cont xarelto  - OP Hematology f/u.    # HTN  - cont losartan 100 mg qd, amlodipine 5 mg qd, toprol 200 mg qd  - echo obtained, no evidence of heart failure, unclear why on HF regimen.     # HLD  - simvastatin    # CKD st. III  - renally dose meds, avoid nsaids, nephrotoxins, contrast.    # hypothyroidism  - cont levothyroxine    dvt ppx: already on xarelto

## 2022-10-21 NOTE — CONSULT NOTE ADULT - ASSESSMENT
Patient is a 82y female with a past history of a left femur fracture repaired at age 5, B.L mastectomy for breast cancer treated with post op chemo/RT, completed 2019, RT GREGG, HTN, HLD, and hypothyroidism, who was vacationing for her 81th birthday in [place] when she fell and fractured left hip.She had an ORIF on 10/11, returned to [place] and came to hospital for rehab.Dopplers with a RLE DVT and she was admitted to rehab on 10/18.There were concerns of a UTI, she was given bactrim 10/20.   She had an episode of hypotension yesterday, today has a wbc of 12,000 and she is being approached as if she has a systemic infection and has been placed on CTX.  She denies any respiratory, GI or  symptoms to my exam.  she looks strikingly non toxic this afternoon with a paucity of symptoms.  Her wbc of 12,000 is being evaluated for secondary infection.  No record of any fever  Suggest:  1.Await blood and urine culture  2.CTX per rehab  3.Follow exam/labs/vitals  4.Antibiotic "time out" in 48 hours  5. rivaroxaban per rehab

## 2022-10-22 LAB
ALBUMIN SERPL ELPH-MCNC: 4.7 G/DL
ALP BLD-CCNC: 104 U/L
ALT SERPL-CCNC: 18 U/L
ANION GAP SERPL CALC-SCNC: 13 MMOL/L
AST SERPL-CCNC: 20 U/L
BILIRUB SERPL-MCNC: 0.5 MG/DL
BUN SERPL-MCNC: 12 MG/DL
CALCIUM SERPL-MCNC: 10.3 MG/DL
CANCER AG27-29 SERPL-ACNC: 11.2 U/ML
CHLORIDE SERPL-SCNC: 104 MMOL/L
CO2 SERPL-SCNC: 26 MMOL/L
CREAT SERPL-MCNC: 0.93 MG/DL
EGFR: 62 ML/MIN/1.73M2
GLUCOSE SERPL-MCNC: 113 MG/DL
MAGNESIUM SERPL-MCNC: 2 MG/DL
POTASSIUM SERPL-SCNC: 5.1 MMOL/L
PROT SERPL-MCNC: 6.7 G/DL
SODIUM SERPL-SCNC: 143 MMOL/L

## 2022-10-22 PROCEDURE — 99231 SBSQ HOSP IP/OBS SF/LOW 25: CPT | Mod: GC

## 2022-10-22 RX ADMIN — SENNA PLUS 2 TABLET(S): 8.6 TABLET ORAL at 21:38

## 2022-10-22 RX ADMIN — RIVAROXABAN 15 MILLIGRAM(S): KIT at 05:19

## 2022-10-22 RX ADMIN — LOSARTAN POTASSIUM 100 MILLIGRAM(S): 100 TABLET, FILM COATED ORAL at 05:19

## 2022-10-22 RX ADMIN — Medication 650 MILLIGRAM(S): at 12:00

## 2022-10-22 RX ADMIN — Medication 200 MILLIGRAM(S): at 05:19

## 2022-10-22 RX ADMIN — PANTOPRAZOLE SODIUM 40 MILLIGRAM(S): 20 TABLET, DELAYED RELEASE ORAL at 06:04

## 2022-10-22 RX ADMIN — NYSTATIN CREAM 1 APPLICATION(S): 100000 CREAM TOPICAL at 05:18

## 2022-10-22 RX ADMIN — Medication 650 MILLIGRAM(S): at 11:12

## 2022-10-22 RX ADMIN — SIMVASTATIN 20 MILLIGRAM(S): 20 TABLET, FILM COATED ORAL at 21:39

## 2022-10-22 RX ADMIN — NYSTATIN CREAM 1 APPLICATION(S): 100000 CREAM TOPICAL at 17:29

## 2022-10-22 RX ADMIN — Medication 75 MICROGRAM(S): at 05:20

## 2022-10-22 RX ADMIN — Medication 3 MILLIGRAM(S): at 21:38

## 2022-10-22 RX ADMIN — CEFTRIAXONE 100 MILLIGRAM(S): 500 INJECTION, POWDER, FOR SOLUTION INTRAMUSCULAR; INTRAVENOUS at 18:25

## 2022-10-22 RX ADMIN — RIVAROXABAN 15 MILLIGRAM(S): KIT at 17:27

## 2022-10-22 RX ADMIN — Medication 325 MILLIGRAM(S): at 11:43

## 2022-10-22 NOTE — PROGRESS NOTE ADULT - SUBJECTIVE AND OBJECTIVE BOX
Cc: Gait dysfunction    HPI: Patient with no new medical issues today.  UA obtained- contaminated specimen- will repeat  no symptoms of infection  Pain controlled, no chest pain, no N/V, no Fevers/Chills. No other new ROS  Has been tolerating rehabilitation program.    MEDICATIONS  (STANDING):  cefTRIAXone   IVPB 1000 milliGRAM(s) IV Intermittent every 24 hours  ferrous    sulfate 325 milliGRAM(s) Oral daily  levothyroxine 75 MICROGram(s) Oral daily  losartan 100 milliGRAM(s) Oral daily  melatonin 3 milliGRAM(s) Oral at bedtime  metoprolol succinate  milliGRAM(s) Oral daily  nystatin Powder 1 Application(s) Topical two times a day  pantoprazole    Tablet 40 milliGRAM(s) Oral before breakfast  polyethylene glycol 3350 17 Gram(s) Oral daily  rivaroxaban 15 milliGRAM(s) Oral two times a day  senna 2 Tablet(s) Oral at bedtime  simvastatin 20 milliGRAM(s) Oral at bedtime    MEDICATIONS  (PRN):  acetaminophen     Tablet .. 650 milliGRAM(s) Oral every 6 hours PRN Temp greater or equal to 38C (100.4F), Mild Pain (1 - 3)  oxyCODONE    IR 5 milliGRAM(s) Oral every 6 hours PRN Severe Pain (7 - 10)      Vital Signs Last 24 Hrs  T(C): 36.7 (22 Oct 2022 07:48), Max: 36.9 (21 Oct 2022 20:25)  T(F): 98 (22 Oct 2022 07:48), Max: 98.4 (21 Oct 2022 20:25)  HR: 72 (22 Oct 2022 07:48) (70 - 82)  BP: 106/69 (22 Oct 2022 07:48) (106/62 - 122/61)  BP(mean): --  RR: 16 (22 Oct 2022 07:48) (16 - 16)  SpO2: 96% (22 Oct 2022 07:48) (95% - 96%)    Parameters below as of 22 Oct 2022 07:48  Patient On (Oxygen Delivery Method): room air        In NAD  HEENT- EOMI  Heart- RRR, S1S2  Lungs- CTA bl.  Abd- + BS, NT  Ext- No calf pain  Neuro- Exam unchanged                          12.1   12.41 )-----------( 366      ( 21 Oct 2022 06:30 )             37.1     10-21    136  |  102  |  22  ----------------------------<  116<H>  5.1   |  29  |  1.02    Ca    9.0      21 Oct 2022 06:30    TPro  6.3  /  Alb  2.7<L>  /  TBili  0.5  /  DBili  x   /  AST  25  /  ALT  25  /  AlkPhos  92  10-21    CAPILLARY BLOOD GLUCOSE            Culture - Blood (collected 21 Oct 2022 09:25)  Source: .Blood Blood-Venous  Preliminary Report (22 Oct 2022 13:02):    No growth to date.    Culture - Blood (collected 21 Oct 2022 09:25)  Source: .Blood Blood-Venous  Preliminary Report (22 Oct 2022 13:02):    No growth to date.    Urine Culture - 48 Hour Hold (collected 20 Oct 2022 15:00)  Source: .Urine  Final Report (21 Oct 2022 22:59):    Culture grew 3 or more types of organisms which indicate collection    contamination; consider recollection only if clinically indicated.              Imp: Patient with diagnosis of gait ab Left hip Fx ORIF  admitted for comprehensive acute rehabilitation.    Plan:  - Continue therapies  Leukocytosis- on CTX- UA+ Urine C&S contaminated- no apparent source- WBC 12k- ? secondary to DVT  - DVT prophylaxis- therapeutic Xarelto  - Skin- Turn q2h, check skin daily  - Continue current medications; patient medically stable.   - Patient is stable to continue current rehabilitation program.

## 2022-10-23 LAB
APPEARANCE UR: CLEAR — SIGNIFICANT CHANGE UP
BACTERIA # UR AUTO: NEGATIVE /HPF — SIGNIFICANT CHANGE UP
BILIRUB UR-MCNC: NEGATIVE — SIGNIFICANT CHANGE UP
COLOR SPEC: YELLOW — SIGNIFICANT CHANGE UP
DIFF PNL FLD: NEGATIVE — SIGNIFICANT CHANGE UP
EPI CELLS # UR: SIGNIFICANT CHANGE UP
GLUCOSE UR QL: NEGATIVE — SIGNIFICANT CHANGE UP
KETONES UR-MCNC: NEGATIVE — SIGNIFICANT CHANGE UP
LEUKOCYTE ESTERASE UR-ACNC: NEGATIVE — SIGNIFICANT CHANGE UP
NITRITE UR-MCNC: NEGATIVE — SIGNIFICANT CHANGE UP
PH UR: 6 — SIGNIFICANT CHANGE UP (ref 5–8)
PROT UR-MCNC: NEGATIVE — SIGNIFICANT CHANGE UP
RBC CASTS # UR COMP ASSIST: NEGATIVE /HPF — SIGNIFICANT CHANGE UP (ref 0–4)
SP GR SPEC: 1.01 — SIGNIFICANT CHANGE UP (ref 1.01–1.02)
UROBILINOGEN FLD QL: NEGATIVE — SIGNIFICANT CHANGE UP
WBC UR QL: NEGATIVE /HPF — SIGNIFICANT CHANGE UP (ref 0–5)

## 2022-10-23 PROCEDURE — 99231 SBSQ HOSP IP/OBS SF/LOW 25: CPT | Mod: GC

## 2022-10-23 PROCEDURE — 99232 SBSQ HOSP IP/OBS MODERATE 35: CPT

## 2022-10-23 RX ADMIN — Medication 200 MILLIGRAM(S): at 05:54

## 2022-10-23 RX ADMIN — PANTOPRAZOLE SODIUM 40 MILLIGRAM(S): 20 TABLET, DELAYED RELEASE ORAL at 05:54

## 2022-10-23 RX ADMIN — LOSARTAN POTASSIUM 100 MILLIGRAM(S): 100 TABLET, FILM COATED ORAL at 05:54

## 2022-10-23 RX ADMIN — SENNA PLUS 2 TABLET(S): 8.6 TABLET ORAL at 20:38

## 2022-10-23 RX ADMIN — CEFTRIAXONE 100 MILLIGRAM(S): 500 INJECTION, POWDER, FOR SOLUTION INTRAMUSCULAR; INTRAVENOUS at 18:16

## 2022-10-23 RX ADMIN — SIMVASTATIN 20 MILLIGRAM(S): 20 TABLET, FILM COATED ORAL at 20:37

## 2022-10-23 RX ADMIN — Medication 325 MILLIGRAM(S): at 12:59

## 2022-10-23 RX ADMIN — POLYETHYLENE GLYCOL 3350 17 GRAM(S): 17 POWDER, FOR SOLUTION ORAL at 12:59

## 2022-10-23 RX ADMIN — Medication 650 MILLIGRAM(S): at 08:29

## 2022-10-23 RX ADMIN — Medication 75 MICROGRAM(S): at 05:53

## 2022-10-23 RX ADMIN — RIVAROXABAN 15 MILLIGRAM(S): KIT at 05:54

## 2022-10-23 RX ADMIN — NYSTATIN CREAM 1 APPLICATION(S): 100000 CREAM TOPICAL at 05:55

## 2022-10-23 RX ADMIN — Medication 3 MILLIGRAM(S): at 20:38

## 2022-10-23 RX ADMIN — Medication 650 MILLIGRAM(S): at 09:18

## 2022-10-23 RX ADMIN — RIVAROXABAN 15 MILLIGRAM(S): KIT at 18:15

## 2022-10-23 RX ADMIN — NYSTATIN CREAM 1 APPLICATION(S): 100000 CREAM TOPICAL at 18:17

## 2022-10-23 NOTE — PROGRESS NOTE ADULT - SUBJECTIVE AND OBJECTIVE BOX
Cc: Gait dysfunction    HPI: Patient with no new medical issues today.  les pain Left hip  no fevers no dysuria  no symptoms of infection  Pain controlled, no chest pain, no N/V, no Fevers/Chills. No other new ROS  Has been tolerating rehabilitation program.    MEDICATIONS  (STANDING):  ferrous    sulfate 325 milliGRAM(s) Oral daily  levothyroxine 75 MICROGram(s) Oral daily  losartan 100 milliGRAM(s) Oral daily  melatonin 3 milliGRAM(s) Oral at bedtime  metoprolol succinate  milliGRAM(s) Oral daily  nystatin Powder 1 Application(s) Topical two times a day  pantoprazole    Tablet 40 milliGRAM(s) Oral before breakfast  polyethylene glycol 3350 17 Gram(s) Oral daily  rivaroxaban 15 milliGRAM(s) Oral two times a day  senna 2 Tablet(s) Oral at bedtime  simvastatin 20 milliGRAM(s) Oral at bedtime    MEDICATIONS  (PRN):  acetaminophen     Tablet .. 650 milliGRAM(s) Oral every 6 hours PRN Temp greater or equal to 38C (100.4F), Mild Pain (1 - 3)  oxyCODONE    IR 5 milliGRAM(s) Oral every 6 hours PRN Severe Pain (7 - 10)              Urinalysis Basic - ( 23 Oct 2022 15:12 )    Color: Yellow / Appearance: Clear / S.010 / pH: x  Gluc: x / Ketone: Negative  / Bili: Negative / Urobili: Negative   Blood: x / Protein: Negative / Nitrite: Negative   Leuk Esterase: Negative / RBC: Negative /HPF / WBC Negative /HPF   Sq Epi: x / Non Sq Epi: Neg.-Few / Bacteria: Negative /HPF        CAPILLARY BLOOD GLUCOSE          Vital Signs Last 24 Hrs  T(C): 36.9 (23 Oct 2022 07:43), Max: 36.9 (22 Oct 2022 22:59)  T(F): 98.4 (23 Oct 2022 07:43), Max: 98.4 (22 Oct 2022 22:59)  HR: 80 (23 Oct 2022 07:43) (79 - 84)  BP: 96/57 (23 Oct 2022 07:43) (96/57 - 121/74)  BP(mean): --  RR: 16 (23 Oct 2022 07:43) (14 - 16)  SpO2: 94% (23 Oct 2022 07:43) (94% - 97%)    Parameters below as of 23 Oct 2022 07:43  Patient On (Oxygen Delivery Method): room air        In NAD  HEENT- EOMI  Heart- RRR, S1S2  Lungs- CTA bl.  Abd- + BS, NT  Ext- No calf pain  Neuro- Exam unchanged                             12.1   12.41 )-----------( 366      ( 21 Oct 2022 06:30 )             37.1     10-21    136  |  102  |  22  ----------------------------<  116<H>  5.1   |  29  |  1.02    Ca    9.0      21 Oct 2022 06:30    TPro  6.3  /  Alb  2.7<L>  /  TBili  0.5  /  DBili  x   /  AST  25  /  ALT  25  /  AlkPhos  92  10-21    CAPILLARY BLOOD GLUCOSE            Culture - Blood (collected 21 Oct 2022 09:25)  Source: .Blood Blood-Venous  Preliminary Report (22 Oct 2022 13:02):    No growth to date.    Culture - Blood (collected 21 Oct 2022 09:25)  Source: .Blood Blood-Venous  Preliminary Report (22 Oct 2022 13:02):    No growth to date.    Urine Culture - 48 Hour Hold (collected 20 Oct 2022 15:00)  Source: .Urine  Final Report (21 Oct 2022 22:59):    Culture grew 3 or more types of organisms which indicate collection    contamination; consider recollection only if clinically indicated.    Repeat urine C&S 10/22 pending                Imp: Patient with diagnosis of gait ab Left hip Fx ORIF  admitted for comprehensive acute rehabilitation.    Plan:  - Continue therapies  Leukocytosis- on CTX- UA+ Urine C&S contaminated- no apparent source- WBC 12k- ? secondary to DVT  repeat UA negative- C&S pending  - DVT prophylaxis- therapeutic Xarelto  - Skin- Turn q2h, check skin daily  - Continue current medications; patient medically stable.   - Patient is stable to continue current rehabilitation program.

## 2022-10-23 NOTE — PROGRESS NOTE ADULT - SUBJECTIVE AND OBJECTIVE BOX
Hospitalist: Bella Givens DO    CHIEF COMPLAINT: Patient is a 82y old  female who presents with a chief complaint of left hip fracture (22 Oct 2022 18:24)      SUBJECTIVE / OVERNIGHT EVENTS: Patient seen and examined. No acute events overnight. Pain well controlled and patient without any complaints.    MEDICATIONS  (STANDING):  cefTRIAXone   IVPB 1000 milliGRAM(s) IV Intermittent every 24 hours  ferrous    sulfate 325 milliGRAM(s) Oral daily  levothyroxine 75 MICROGram(s) Oral daily  losartan 100 milliGRAM(s) Oral daily  melatonin 3 milliGRAM(s) Oral at bedtime  metoprolol succinate  milliGRAM(s) Oral daily  nystatin Powder 1 Application(s) Topical two times a day  pantoprazole    Tablet 40 milliGRAM(s) Oral before breakfast  polyethylene glycol 3350 17 Gram(s) Oral daily  rivaroxaban 15 milliGRAM(s) Oral two times a day  senna 2 Tablet(s) Oral at bedtime  simvastatin 20 milliGRAM(s) Oral at bedtime    MEDICATIONS  (PRN):  acetaminophen     Tablet .. 650 milliGRAM(s) Oral every 6 hours PRN Temp greater or equal to 38C (100.4F), Mild Pain (1 - 3)  oxyCODONE    IR 5 milliGRAM(s) Oral every 6 hours PRN Severe Pain (7 - 10)      VITALS:  T(F): 98.4 (10-23-22 @ 07:43), Max: 98.4 (10-22-22 @ 22:59)  HR: 80 (10-23-22 @ 07:43) (79 - 84)  BP: 96/57 (10-23-22 @ 07:43) (96/57 - 121/74)  RR: 16 (10-23-22 @ 07:43) (14 - 16)  SpO2: 94% (10-23-22 @ 07:43)    Weight (kg): 71.9 (07:43)    REVIEW OF SYSTEMS:  For ROV please refer back to H&P     PHYSICAL EXAM:  General: NAD, A/O  ENT: Moist mucous membranes, no thrush  Neck: Supple, No JVD  Lungs: Clear to auscultation bilaterally, good air entry, non-labored breathing  Cardio: RRR, S1/S2, No murmur  Abdomen: Soft, Nontender, Nondistended; Bowel sounds present  Extremities: No calf tenderness, No pitting edema      RADIOLOGY & ADDITIONAL TESTS:    Imaging Personally Reviewed:    [X] Consultant(s) Notes Reviewed:  [X] Care Discussed with Consultants/Other Providers:

## 2022-10-23 NOTE — PROGRESS NOTE ADULT - ASSESSMENT
83 YO female HTN, HLD, hypothyroidism, Breast CA ( s/p double mastectomy, last chemo 10/2019), right hip replacement 2012, left femur fracture at age 5; fractured left hip in [place], repaired with "a margy and two screws" in [facility]. Found to have RLE DVT in ER, started on xarelto. Now admitted to [facility].    # Left femur fx, s/p repair in [place] on 10/11, likely due to age related osteoporosis  # s/p fall   - dvt ppx  - recommend Incentive spirometry.  - pain control  - PT/OT per rehab.  - OP ortho f/u.   - vitamin D level 28.7, not deficient.     # Dysuria  # mild leukocytosis, likely due to UTI and DVT  - UA positive, started on bactrim on 10/20, which caused resolution of dysuria. She is having nausea, so will convert to IV ceftriaxone (10/21).   - Narrow abx based on culture sensitivities. Recommend 3 day course to treat UTI. (EOT 10/23)  - BCx obtained by rehab.   - trend WBC, no fevers.    # Elevated lactate  - resolved after pt received 1 L NS bolus.  - ID consulted, cont to monitor pt and continue abx above.    # DVT of R.soleal vein  - cont xarelto  - OP Hematology f/u.    # HTN  - cont losartan 100 mg qd, amlodipine 5 mg qd, toprol 200 mg qd  - echo obtained, no evidence of heart failure, unclear why on HF regimen.     # HLD  - simvastatin    # CKD st. III  - renally dose meds, avoid nsaids, nephrotoxins, contrast.    # hypothyroidism  - cont levothyroxine    dvt ppx: already on xarelto

## 2022-10-24 ENCOUNTER — TRANSCRIPTION ENCOUNTER (OUTPATIENT)
Age: 82
End: 2022-10-24

## 2022-10-24 LAB
ALBUMIN SERPL ELPH-MCNC: 3 G/DL — LOW (ref 3.3–5)
ALP SERPL-CCNC: 106 U/L — SIGNIFICANT CHANGE UP (ref 40–120)
ALT FLD-CCNC: 28 U/L — SIGNIFICANT CHANGE UP (ref 10–45)
ANION GAP SERPL CALC-SCNC: 8 MMOL/L — SIGNIFICANT CHANGE UP (ref 5–17)
AST SERPL-CCNC: 20 U/L — SIGNIFICANT CHANGE UP (ref 10–40)
BASOPHILS # BLD AUTO: 0.06 K/UL — SIGNIFICANT CHANGE UP (ref 0–0.2)
BASOPHILS NFR BLD AUTO: 0.7 % — SIGNIFICANT CHANGE UP (ref 0–2)
BILIRUB SERPL-MCNC: 0.4 MG/DL — SIGNIFICANT CHANGE UP (ref 0.2–1.2)
BUN SERPL-MCNC: 23 MG/DL — SIGNIFICANT CHANGE UP (ref 7–23)
CALCIUM SERPL-MCNC: 9.2 MG/DL — SIGNIFICANT CHANGE UP (ref 8.4–10.5)
CHLORIDE SERPL-SCNC: 103 MMOL/L — SIGNIFICANT CHANGE UP (ref 96–108)
CO2 SERPL-SCNC: 27 MMOL/L — SIGNIFICANT CHANGE UP (ref 22–31)
CREAT SERPL-MCNC: 1.07 MG/DL — SIGNIFICANT CHANGE UP (ref 0.5–1.3)
CULTURE RESULTS: SIGNIFICANT CHANGE UP
EGFR: 52 ML/MIN/1.73M2 — LOW
EOSINOPHIL # BLD AUTO: 0.18 K/UL — SIGNIFICANT CHANGE UP (ref 0–0.5)
EOSINOPHIL NFR BLD AUTO: 2.1 % — SIGNIFICANT CHANGE UP (ref 0–6)
GLUCOSE SERPL-MCNC: 126 MG/DL — HIGH (ref 70–99)
HCT VFR BLD CALC: 33.8 % — LOW (ref 34.5–45)
HGB BLD-MCNC: 11.2 G/DL — LOW (ref 11.5–15.5)
IMM GRANULOCYTES NFR BLD AUTO: 1.6 % — HIGH (ref 0–0.9)
LYMPHOCYTES # BLD AUTO: 1.75 K/UL — SIGNIFICANT CHANGE UP (ref 1–3.3)
LYMPHOCYTES # BLD AUTO: 20.3 % — SIGNIFICANT CHANGE UP (ref 13–44)
MCHC RBC-ENTMCNC: 28.1 PG — SIGNIFICANT CHANGE UP (ref 27–34)
MCHC RBC-ENTMCNC: 33.1 GM/DL — SIGNIFICANT CHANGE UP (ref 32–36)
MCV RBC AUTO: 84.9 FL — SIGNIFICANT CHANGE UP (ref 80–100)
MONOCYTES # BLD AUTO: 0.65 K/UL — SIGNIFICANT CHANGE UP (ref 0–0.9)
MONOCYTES NFR BLD AUTO: 7.6 % — SIGNIFICANT CHANGE UP (ref 2–14)
NEUTROPHILS # BLD AUTO: 5.82 K/UL — SIGNIFICANT CHANGE UP (ref 1.8–7.4)
NEUTROPHILS NFR BLD AUTO: 67.7 % — SIGNIFICANT CHANGE UP (ref 43–77)
NRBC # BLD: 0 /100 WBCS — SIGNIFICANT CHANGE UP (ref 0–0)
PLATELET # BLD AUTO: 444 K/UL — HIGH (ref 150–400)
POTASSIUM SERPL-MCNC: 4.3 MMOL/L — SIGNIFICANT CHANGE UP (ref 3.5–5.3)
POTASSIUM SERPL-SCNC: 4.3 MMOL/L — SIGNIFICANT CHANGE UP (ref 3.5–5.3)
PROT SERPL-MCNC: 6.4 G/DL — SIGNIFICANT CHANGE UP (ref 6–8.3)
RBC # BLD: 3.98 M/UL — SIGNIFICANT CHANGE UP (ref 3.8–5.2)
RBC # FLD: 14.1 % — SIGNIFICANT CHANGE UP (ref 10.3–14.5)
SODIUM SERPL-SCNC: 138 MMOL/L — SIGNIFICANT CHANGE UP (ref 135–145)
SPECIMEN SOURCE: SIGNIFICANT CHANGE UP
WBC # BLD: 8.6 K/UL — SIGNIFICANT CHANGE UP (ref 3.8–10.5)
WBC # FLD AUTO: 8.6 K/UL — SIGNIFICANT CHANGE UP (ref 3.8–10.5)

## 2022-10-24 PROCEDURE — 99232 SBSQ HOSP IP/OBS MODERATE 35: CPT | Mod: GC

## 2022-10-24 PROCEDURE — 99232 SBSQ HOSP IP/OBS MODERATE 35: CPT

## 2022-10-24 RX ORDER — SIMVASTATIN 20 MG/1
20 TABLET, FILM COATED ORAL
Refills: 0 | Status: DISCONTINUED | OUTPATIENT
Start: 2022-10-24 | End: 2022-10-29

## 2022-10-24 RX ORDER — LANOLIN ALCOHOL/MO/W.PET/CERES
3 CREAM (GRAM) TOPICAL
Refills: 0 | Status: DISCONTINUED | OUTPATIENT
Start: 2022-10-24 | End: 2022-10-29

## 2022-10-24 RX ORDER — SENNA PLUS 8.6 MG/1
2 TABLET ORAL
Refills: 0 | Status: DISCONTINUED | OUTPATIENT
Start: 2022-10-24 | End: 2022-10-29

## 2022-10-24 RX ADMIN — NYSTATIN CREAM 1 APPLICATION(S): 100000 CREAM TOPICAL at 17:26

## 2022-10-24 RX ADMIN — Medication 1 APPLICATION(S): at 17:25

## 2022-10-24 RX ADMIN — Medication 325 MILLIGRAM(S): at 11:46

## 2022-10-24 RX ADMIN — SENNA PLUS 2 TABLET(S): 8.6 TABLET ORAL at 20:33

## 2022-10-24 RX ADMIN — Medication 75 MICROGRAM(S): at 05:16

## 2022-10-24 RX ADMIN — RIVAROXABAN 15 MILLIGRAM(S): KIT at 05:17

## 2022-10-24 RX ADMIN — Medication 650 MILLIGRAM(S): at 07:20

## 2022-10-24 RX ADMIN — POLYETHYLENE GLYCOL 3350 17 GRAM(S): 17 POWDER, FOR SOLUTION ORAL at 11:46

## 2022-10-24 RX ADMIN — Medication 650 MILLIGRAM(S): at 15:51

## 2022-10-24 RX ADMIN — RIVAROXABAN 15 MILLIGRAM(S): KIT at 17:25

## 2022-10-24 RX ADMIN — Medication 650 MILLIGRAM(S): at 15:21

## 2022-10-24 RX ADMIN — PANTOPRAZOLE SODIUM 40 MILLIGRAM(S): 20 TABLET, DELAYED RELEASE ORAL at 05:17

## 2022-10-24 RX ADMIN — Medication 3 MILLIGRAM(S): at 20:33

## 2022-10-24 RX ADMIN — Medication 200 MILLIGRAM(S): at 05:16

## 2022-10-24 RX ADMIN — SIMVASTATIN 20 MILLIGRAM(S): 20 TABLET, FILM COATED ORAL at 20:33

## 2022-10-24 RX ADMIN — NYSTATIN CREAM 1 APPLICATION(S): 100000 CREAM TOPICAL at 05:17

## 2022-10-24 RX ADMIN — LOSARTAN POTASSIUM 100 MILLIGRAM(S): 100 TABLET, FILM COATED ORAL at 05:16

## 2022-10-24 NOTE — PROGRESS NOTE ADULT - ASSESSMENT
This is a 83 YO female with PMH  of HTN, HLD, hypothyroidism, Breast CA ( s/p double mastectomy, last chemo 10/2019), right hip replacement 2012, left femur fracture at age 5, presents to [facility] ED on 10/16 for left hip pain. Patient was in [place] where she fell and fractured her left hip and is s/p surgery in [facility] on 10/11.  She returned to [place] with her daughter in pain and inability to ambulate. Patient now with gait Instability, ADL impairments and Functional impairments.  -------------------------  # left Hip fracture  - s/p surgery- left hip ORIF in [facility] on 10/11  - Start Comprehensive Rehab Program: PT/OT, 3hours daily and 5 days weekly  - PT: Focused on improving strength, endurance, coordination, balance, functional mobility, and transfers  - OT: Focused on improving strength, fine motor skills, coordination, posture and ADLs.    - WB Status: WBAT LLE  - Sutures to be removed on 10/21    #Leukocytosis  - Afebrile  - WBC of 10.52 10/20  - Admits to dysuria- UA positive  - Bactrim BID started 10/20 PM, now DCd on 10/21  - WBC increased to 12.41 10/21  - Await UCx results  - Bld Cx x2, Lactate and CXR ordered  - Lactate of 4.0  - 1000ml NS bolus  - ABX switched to Ceftriaxone daily for 3 days  - Repeat Lac of 1.8  - ID consulted  - S/p Ceftriaxone for 3 day.  - UCx resulted contaminated  - Repeat UA resulted negative  - WBC 8.60 on 10/24  - Hospitalist following  - Continue to monitor    #HTN  - Losartan 100mg daily  - Metoprolol 200mg daily  - Norvasc 5mg daily- DCd on 10/20 due to hypotension in PT (sbp 70s)    #Lightheadedness  - Encourage PO fluids  - Check orthostatics- negative 10/20    #HLD  - Simvastatin 20mg daily    #Hypothyroidism  - Synthroid    Breast CA   - s/p double mastectomy, last chemo 10/2019    #Pain management  - Tylenol PRN, tramadol PRN, Oxycodone PRN    #DVT  - RLE  - [id number] BID until 11/7/22; then to give 20mg with dinner starting on 11/8/22    #GI ppx  - Protonix 40mg    #Bowel Regimen  - Senna, miralax PRN    #Bladder management  - C/o dysuria. UA positive.   - Encouraged to drink PO fluids  - UCx resulted contamination. Repeat UA resulted negative.  - S/p 3 days of Ceftraixone. WBC of 8.60 on 10/24  - BS on admission, and q 8 hours (SC if > 400)  - Monitor UO    #FEN   - Diet: DASH    #Skin:  - Denuded/raw perineum-> Nystatin powder ordered 10/20  - Skin on admission: Left hip incision with sutures DESI  - Pressure injury/Skin: Turn Q2hrs while in bed, OOB to Chair, PT/OT     #Sleep:   - Maintain quiet hours and low stim environment.  - Melatonin PRN to maximize participation in therapy during the day.     #Precaution  - Fall, Aspiration, cardiac, Hip     ----------------------------------------------------------------------  Outpatient Follow-up (Specialty/Name of physician):  Arnoldo Win)  Orthopaedic Surgery  [address] [place] [address]  Phone: [phone]  Fax: [phone] This is a 83 YO female with PMH  of HTN, HLD, hypothyroidism, Breast CA ( s/p double mastectomy, last chemo 10/2019), right hip replacement 2012, left femur fracture at age 5, presents to [facility] ED on 10/16 for left hip pain. Patient was in [place] where she fell and fractured her left hip and is s/p surgery in [facility] on 10/11.  She returned to [place] with her daughter in pain and inability to ambulate. Patient now with gait Instability, ADL impairments and Functional impairments.  -------------------------  # left Hip fracture  - s/p surgery- left hip ORIF in [facility] on 10/11  - Start Comprehensive Rehab Program: PT/OT, 3hours daily and 5 days weekly  - PT: Focused on improving strength, endurance, coordination, balance, functional mobility, and transfers  - OT: Focused on improving strength, fine motor skills, coordination, posture and ADLs.    - WB Status: WBAT LLE  - Sutures to be removed today on 10/24    #Leukocytosis  - Afebrile  - WBC of 10.52 10/20  - Admits to dysuria- UA positive  - Bactrim BID started 10/20 PM, now DCd on 10/21  - WBC increased to 12.41 10/21  - Await UCx results  - Bld Cx x2, Lactate and CXR ordered  - Lactate of 4.0  - 1000ml NS bolus  - ABX switched to Ceftriaxone daily for 3 days  - Repeat Lac of 1.8  - ID consulted  - S/p Ceftriaxone for 3 day.  - UCx resulted contaminated  - Repeat UA resulted negative  - WBC 8.60 on 10/24  - Hospitalist following  - Continue to monitor    #Perineal pruritis  - Lotrimin cream added     #HTN  - Losartan 100mg daily  - Metoprolol 200mg daily  - Norvasc 5mg daily- DCd on 10/20 due to hypotension in PT (sbp 70s)    #Lightheadedness  - Encourage PO fluids  - Check orthostatics- negative 10/20    #HLD  - Simvastatin 20mg daily    #Hypothyroidism  - Synthroid    Breast CA   - s/p double mastectomy, last chemo 10/2019    #Pain management  - Tylenol PRN, tramadol PRN, Oxycodone PRN    #DVT  - RLE  - [id number] BID until 11/7/22; then to give 20mg with dinner starting on 11/8/22    #GI ppx  - Protonix 40mg    #Bowel Regimen  - Senna, miralax PRN    #Bladder management  - C/o dysuria. UA positive.   - Encouraged to drink PO fluids  - UCx resulted contamination. Repeat UA resulted negative.  - S/p 3 days of Ceftraixone. WBC of 8.60 on 10/24  - BS on admission, and q 8 hours (SC if > 400)  - Monitor UO    #FEN   - Diet: DASH    #Skin:  - Denuded/raw perineum-> Nystatin powder ordered 10/20  - Skin on admission: Left hip incision with sutures DESI  - Pressure injury/Skin: Turn Q2hrs while in bed, OOB to Chair, PT/OT     #Sleep:   - Maintain quiet hours and low stim environment.  - Melatonin PRN to maximize participation in therapy during the day.     #Precaution  - Fall, Aspiration, cardiac, Hip     ----------------------------------------------------------------------  Outpatient Follow-up (Specialty/Name of physician):  Arnoldo Win)  Orthopaedic Surgery  [address] [place] [address]  Phone: [phone]  Fax: [phone] This is a 83 YO female with PMH  of HTN, HLD, hypothyroidism, Breast CA ( s/p double mastectomy, last chemo 10/2019), right hip replacement 2012, left femur fracture at age 5, presents to [facility] ED on 10/16 for left hip pain. Patient was in [place] where she fell and fractured her left hip and is s/p surgery in [facility] on 10/11.  She returned to [place] with her daughter in pain and inability to ambulate. Patient now with gait Instability, ADL impairments and Functional impairments.  -------------------------  # left Hip fracture  - s/p surgery- left hip ORIF in [facility] on 10/11  - Start Comprehensive Rehab Program: PT/OT, 3hours daily and 5 days weekly  - PT: Focused on improving strength, endurance, coordination, balance, functional mobility, and transfers  - OT: Focused on improving strength, fine motor skills, coordination, posture and ADLs.    - WB Status: WBAT LLE  - Sutures removed on 10/24-> pt tolerated the procedure well. Surgical site clean, dry and intact. Proximal surgical site covered with telfa and paper tape.     #Leukocytosis  - Afebrile  - WBC of 10.52 10/20  - Admits to dysuria- UA positive  - Bactrim BID started 10/20 PM, now DCd on 10/21  - WBC increased to 12.41 10/21  - Await UCx results  - Bld Cx x2, Lactate and CXR ordered  - Lactate of 4.0  - 1000ml NS bolus  - ABX switched to Ceftriaxone daily for 3 days  - Repeat Lac of 1.8  - ID consulted  - S/p Ceftriaxone for 3 day.  - UCx resulted contaminated  - Repeat UA resulted negative  - WBC 8.60 on 10/24  - Hospitalist following  - Continue to monitor    #Perineal pruritis  - Lotrimin cream added     #HTN  - Losartan 100mg daily  - Metoprolol 200mg daily  - Norvasc 5mg daily- DCd on 10/20 due to hypotension in PT (sbp 70s)    #Lightheadedness  - Encourage PO fluids  - Check orthostatics- negative 10/20    #HLD  - Simvastatin 20mg daily    #Hypothyroidism  - Synthroid    Breast CA   - s/p double mastectomy, last chemo 10/2019    #Pain management  - Tylenol PRN, tramadol PRN, Oxycodone PRN    #DVT  - RLE  - [id number] BID until 11/7/22; then to give 20mg with dinner starting on 11/8/22    #GI ppx  - Protonix 40mg    #Bowel Regimen  - Senna, miralax PRN    #Bladder management  - C/o dysuria. UA positive.   - Encouraged to drink PO fluids  - UCx resulted contamination. Repeat UA resulted negative.  - S/p 3 days of Ceftraixone. WBC of 8.60 on 10/24  - BS on admission, and q 8 hours (SC if > 400)  - Monitor UO    #FEN   - Diet: DASH    #Skin:  - Denuded/raw perineum-> Nystatin powder ordered 10/20  - Skin on admission: Left hip incision with sutures DESI  - Pressure injury/Skin: Turn Q2hrs while in bed, OOB to Chair, PT/OT     #Sleep:   - Maintain quiet hours and low stim environment.  - Melatonin PRN to maximize participation in therapy during the day.     #Precaution  - Fall, Aspiration, cardiac, Hip     ----------------------------------------------------------------------  Outpatient Follow-up (Specialty/Name of physician):  Arnoldo Win)  Orthopaedic Surgery  [address] [place] [address]  Phone: [phone]  Fax: [phone]

## 2022-10-24 NOTE — DISCHARGE NOTE PROVIDER - HOSPITAL COURSE
HPI:  This is a 83 YO female with PMH  of HTN, HLD, hypothyroidism, Breast CA ( s/p double mastectomy, last chemo 10/2019), right hip replacement 2012, left femur fracture at age 5,  presents to [facility] ED on 10/16 for left hip pain. Patient was in [place], fell and fractured her "left hip". According to her and her daughters "a margy and two screws were placed" on Tuesday 10/11 in [facility]. She was transferred home (to daughters and bedbound since surgery. She came back to [place] with her daughter. Patient was unable to bear weight and was having pain on her left hip. She lives alone. Additionally, she stated she was not given any pain medication. In ER, labs showed mild leukocytosis. She was found to have RLE DVT started on Xarelto. Patient was evaluated by PM&R and therapy for functional deficits, gait/ADL impairments and acute rehabilitation was recommended. Patient was medically optimized for discharge to [facility] IRU on 10/18/22.   (18 Oct 2022 15:44)    Patient was evaluated by PM&R and therapy for gait/ADL impairments and recommended acute rehabilitation. Patient was medically optimized for discharge to [facility] on 10/18/24. Admitted with gait instability, ADL, and functional impairments.     Rehab course significant for intermittent lightheadedness with use of Oxycodone, Incontinent associated dermatitis, and UTI. You were given Oxycodone to relieve your pain but you admitted to lightheadedness post administration. You were encouraged to drink fluids and take the medication with food if necessary. You were noted to have perineal irritation secondary to incontinent associated dermatitis and you were encouraged to call for toileting assistance when needed.  You admitted to dysuria, and a urinalysis was significant for a UTI, your antibiotics were adjusted and your repeat UA was free from infection.     All other medical co-morbidities were stable. Patient tolerated course of inpatient PT/OT/SLP rehab with significant improvements and met rehab goals prior to discharge. Patient was medically cleared on ___ for discharge to home. HPI:  This is a 81 YO female with PMH  of HTN, HLD, hypothyroidism, Breast CA ( s/p double mastectomy, last chemo 10/2019), right hip replacement 2012, left femur fracture at age 5,  presents to [facility] ED on 10/16 for left hip pain. Patient was in [place], fell and fractured her "left hip". According to her and her daughters "a margy and two screws were placed" on Tuesday 10/11 in [facility]. She was transferred home (to daughters and bedbound since surgery. She came back to [place] with her daughter. Patient was unable to bear weight and was having pain on her left hip. She lives alone. Additionally, she stated she was not given any pain medication. In ER, labs showed mild leukocytosis. She was found to have RLE DVT started on Xarelto. Patient was evaluated by PM&R and therapy for functional deficits, gait/ADL impairments and acute rehabilitation was recommended. Patient was medically optimized for discharge to [facility] IRU on 10/18/22.   (18 Oct 2022 15:44)    Patient was evaluated by PM&R and therapy for gait/ADL impairments and recommended acute rehabilitation. Patient was medically optimized for discharge to [facility] on 10/18/24. Admitted with gait instability, ADL, and functional impairments.     Rehab course significant for intermittent lightheadedness with use of Oxycodone, Incontinent associated dermatitis, and UTI. You were given Oxycodone to relieve your pain but you admitted to lightheadedness post administration. You were encouraged to drink fluids and take the medication with food if necessary. You were noted to have perineal irritation secondary to incontinent associated dermatitis and you were encouraged to call for toileting assistance when needed.  You admitted to dysuria, and a urinalysis was significant for a UTI, your antibiotics were adjusted and your repeat UA was free from infection.     All other medical co-morbidities were stable. Patient tolerated course of inpatient PT/OT/SLP rehab with significant improvements and met rehab goals prior to discharge. Patient was medically cleared on 10/29 for discharge to home.

## 2022-10-24 NOTE — DISCHARGE NOTE PROVIDER - NSDCMRMEDTOKEN_GEN_ALL_CORE_FT
acetaminophen 325 mg oral tablet: 2 tab(s) orally every 6 hours, As needed, Temp greater or equal to 38C (100.4F), Mild Pain (1 - 3)  amLODIPine 5 mg oral tablet: 1 tab(s) orally once a day  bisoprolol 10 mg oral tablet: 1 tab(s) orally once a day  bisoprolol 10 mg oral tablet: 1 tab(s) orally once a day  levothyroxine 75 mcg (0.075 mg) oral capsule: 1 cap(s) orally once a day  levothyroxine 75 mcg (0.075 mg) oral tablet: 1 tab(s) orally once a day  losartan 100 mg oral tablet: 1 tab(s) orally once a day  losartan 100 mg oral tablet: 1 tab(s) orally once a day  oxyCODONE 5 mg oral tablet: 1 tab(s) orally every 6 hours, As needed, Severe Pain (7 - 10)  rivaroxaban 15 mg oral tablet: 1 tab(s) orally 2 times a day  until 11/7/22  simvastatin 20 mg oral tablet: 1 tab(s) orally once a day (at bedtime)  simvastatin 20 mg oral tablet: 1 tab(s) orally once a day (at bedtime)  triamterene: 1  orally once a day   acetaminophen 325 mg oral tablet: 2 tab(s) orally every 6 hours, As needed, Temp greater or equal to 38C (100.4F), Mild Pain (1 - 3)  clotrimazole 1% topical cream: 1 application topically 2 times a day  ferrous sulfate 325 mg (65 mg elemental iron) oral tablet: 1 tab(s) orally once a day  levothyroxine 75 mcg (0.075 mg) oral tablet: 1 tab(s) orally once a day  losartan 100 mg oral tablet: 1 tab(s) orally once a day  melatonin 3 mg oral tablet: 1 tab(s) orally once a day (at bedtime)  metoprolol succinate 200 mg oral tablet, extended release: 1 tab(s) orally once a day  nystatin 100,000 units/g topical powder: 1 application topically 2 times a day  pantoprazole 40 mg oral delayed release tablet: 1 tab(s) orally once a day (before a meal)  polyethylene glycol 3350 oral powder for reconstitution: 17 gram(s) orally once a day  rivaroxaban 15 mg oral tablet: 1 tab(s) orally 2 times a day  until 11/7/22  rivaroxaban 20 mg oral tablet: 1 tab(s) orally once a day (before a meal)  START ON 11/8 WITH DINNER  senna leaf extract oral tablet: 2 tab(s) orally once a day (at bedtime)  simvastatin 20 mg oral tablet: 1 tab(s) orally once a day (at bedtime)

## 2022-10-24 NOTE — DISCHARGE NOTE PROVIDER - NSDCCPCAREPLAN_GEN_ALL_CORE_FT
PRINCIPAL DISCHARGE DIAGNOSIS  Diagnosis: Other fracture of left lower leg  Assessment and Plan of Treatment: Fell and fractures her left leg while in [place].  Was operated on - "a margy and 2 screws" placed according to daughter pm 10/11 in [facility].    Transferred home and has been bedbound since surgery.      SECONDARY DISCHARGE DIAGNOSES  Diagnosis: Deep vein thrombosis (DVT)  Assessment and Plan of Treatment: Went to ER and was found to have a blood clot ot right leg.  Started on Rivaroxaban (aka Xarelto).  You will be on 15mg twice daily til 11/7 then start 20mg once daily on 11/8 (around dinner time).  Follow up with Primary care Provider for mangament and medication refill.  You will at some point need repeat duplex to see the status of blood clot

## 2022-10-24 NOTE — DISCHARGE NOTE PROVIDER - CARE PROVIDER_API CALL
Lilly Rush (DO)  PhysicalRehab Medicine  [facility] [facility], [address]th [place] [address]  Phone: [phone]  Fax: [phone]  Follow Up Time: 1 month    PCP,   Phone: (   )    -  Fax: (   )    -  Follow Up Time: 1 week   Lilly Rush (DO)  PhysicalRehab Medicine  [address] [facility], [address]th [place] [address]  Phone: [phone]  Fax: [phone]  Follow Up Time: 1 month    Sebastian Hernandez  [address] [place] [address]  Phone: [phone]  Fax: (   )    -  Follow Up Time: 2 weeks

## 2022-10-24 NOTE — PROGRESS NOTE ADULT - SUBJECTIVE AND OBJECTIVE BOX
CC: f/u for UTI    Patient reports: no complaints, she is voiding well.    REVIEW OF SYSTEMS:  All other review of systems negative (Comprehensive ROS)    Antimicrobials Day #  :off    Other Medications Reviewed    T(F): 98.4 (10-24-22 @ 08:05), Max: 98.4 (10-24-22 @ 08:05)  HR: 84 (10-24-22 @ 08:05)  BP: 93/64 (10-24-22 @ 08:05)  RR: 16 (10-24-22 @ 08:05)  SpO2: 94% (10-24-22 @ 08:05)  Wt(kg): --    PHYSICAL EXAM:  General: alert, no acute distress  Eyes:  anicteric, no conjunctival injection, no discharge  Oropharynx: no lesions or injection 	  Neck: supple, without adenopathy  Lungs: clear to auscultation  Heart: regular rate and rhythm; no murmur, rubs or gallops  Abdomen: soft, nondistended, nontender, without mass or organomegaly  Skin: no lesions, left hip incision C/D/I  Extremities: no clubbing, cyanosis, or edema  Neurologic: alert, oriented, moves all extremities    LAB RESULTS:                        11.2   8.60  )-----------( 444      ( 24 Oct 2022 06:25 )             33.8     10    138  |  103  |  23  ----------------------------<  126<H>  4.3   |  27  |  1.07    Ca    9.2      24 Oct 2022 06:25    TPro  6.4  /  Alb  3.0<L>  /  TBili  0.4  /  DBili  x   /  AST  20  /  ALT  28  /  AlkPhos  106  10-24    LIVER FUNCTIONS - ( 24 Oct 2022 06:25 )  Alb: 3.0 g/dL / Pro: 6.4 g/dL / ALK PHOS: 106 U/L / ALT: 28 U/L / AST: 20 U/L / GGT: x           Urinalysis Basic - ( 23 Oct 2022 15:12 )    Color: Yellow / Appearance: Clear / S.010 / pH: x  Gluc: x / Ketone: Negative  / Bili: Negative / Urobili: Negative   Blood: x / Protein: Negative / Nitrite: Negative   Leuk Esterase: Negative / RBC: Negative /HPF / WBC Negative /HPF   Sq Epi: x / Non Sq Epi: Neg.-Few / Bacteria: Negative /HPF      MICROBIOLOGY:  RECENT CULTURES:  10-22 @ 22:05 Clean Catch Clean Catch (Midstream)     <10,000 CFU/mL Normal Urogenital Marcela      10-21 @ 09:25 .Blood Blood-Venous     No growth to date.      10-20 @ 15:00 .Urine     Culture grew 3 or more types of organisms which indicate collection  contamination; consider recollection only if clinically indicated.          RADIOLOGY REVIEWED:

## 2022-10-24 NOTE — PROGRESS NOTE ADULT - SUBJECTIVE AND OBJECTIVE BOX
This is a 81 YO female with PMH  of HTN, HLD, hypothyroidism, Breast CA ( s/p double mastectomy, last chemo 10/2019), right hip replacement , left femur fracture at age 5,  presents to [facility] ED on 10/16 for left hip pain. Patient was in [place], fell and fractured her "left hip". According to her and her daughters "a margy and two screws were placed" on Tuesday 10/11 in [facility]. She was transferred home (to daughters and bedbound since surgery. She came back to [place] with her daughter. Patient was unable to bear weight and was having pain on her left hip. She lives alone. Additionally, she stated she was not given any pain medication. In ER, labs showed mild leukocytosis. She was found to have RLE DVT started on Xarelto. Patient was evaluated by PM&R and therapy for functional deficits, gait/ADL impairments and acute rehabilitation was recommended. Patient was medically optimized for discharge to [facility] IRU on 10/18/22.    Today's Subjective/ROS: *****      Patient seen and examined in bed this AM.   States that she slept good last night and is feeling much better this AM.  Dysuria is improving.   Surgical hip site tender to touch. Appears less erythematic.   Otherwise no other complaints at this time.  Last BM on 10/19    Of note, she had an episode of hypotension after doing activity in PT yesterday (10/20). She was sitting in the chair for a few minutes after activity and stated she did not feel well, her sbp was in the 70s at the time. She was brought back to her room and was experiencing some nausea. She denied any other symptoms such as dizziness, headache, sweating or lightheadedness. She was given PO fluids and encouraged to hydrate. Amlodipine DCd.       Vital Signs Last 24 Hrs  T(C): 36.7 (21 Oct 2022 08:29), Max: 37 (20 Oct 2022 19:48)  T(F): 98.1 (21 Oct 2022 08:29), Max: 98.6 (20 Oct 2022 19:48)  HR: 80 (21 Oct 2022 08:29) (70 - 80)  BP: 113/61 (21 Oct 2022 08:29) (113/61 - 128/74)  BP(mean): --  RR: 14 (21 Oct 2022 08:29) (14 - 14)  SpO2: 94% (21 Oct 2022 08:29) (94% - 94%)      Gen - NAD, Comfortable  HEENT - NCAT, EOMI, MMM  Neck - Supple, No limited ROM  Pulm - CTAB, No wheeze, No rhonchi, + Crackles  Cardiovascular - RRR, S1S2, No murmurs  Abdomen - Soft, NT/ND, +BS  Extremities - No clubbing, no cyanosis, + edema to left hip and thigh, + right calf tenderness  Neuro-     Cognitive - Awake, Alert, Oriented  to self, place, date, year, situation. Able to follow command     Communication - Fluent     Attention: Intact      Judgement: Good evidence of judgement     Memory: Recall 3 objects immediate and 3 min later         Cranial Nerves - CN 2-12 intact.      Motor -                     LEFT    UE - ShAB 5/5, EF 5/5, EE 5/5,  5/5                    RIGHT UE - ShAB 5/5, EF 5/5, EE 5/5,   5/5                    LEFT    LE - HF 4/5, KE 4/5, DF 5/5, PF 5/5- limited 2/2 pain                    RIGHT LE - HF 5/5, KE 5/5, DF 5/5, PF 5/5        Sensory - Intact to LT     Reflexes - DTR Intact, No primitive reflexive     Coordination - FTN intact     Tone - normal  Psychiatric - Mood stable, Affect WNL  Skin:  left hip incision with sutures DESI. Erythema improving. Site TTP.       LAB                        11.2   8.60  )-----------( 444      ( 24 Oct 2022 06:25 )             33.8         Urinalysis Basic - ( 23 Oct 2022 15:12 )    Color: Yellow / Appearance: Clear / S.010 / pH: x  Gluc: x / Ketone: Negative  / Bili: Negative / Urobili: Negative   Blood: x / Protein: Negative / Nitrite: Negative   Leuk Esterase: Negative / RBC: Negative /HPF / WBC Negative /HPF   Sq Epi: x / Non Sq Epi: Neg.-Few / Bacteria: Negative /HPF        Culture - Blood (collected 21 Oct 2022 09:25)  Source: .Blood Blood-Venous  Preliminary Report (22 Oct 2022 13:02):    No growth to date.    Culture - Blood (collected 21 Oct 2022 09:25)  Source: .Blood Blood-Venous  Preliminary Report (22 Oct 2022 13:02):    No growth to date.        MEDICATIONS  (STANDING):  ferrous    sulfate 325 milliGRAM(s) Oral daily  levothyroxine 75 MICROGram(s) Oral daily  losartan 100 milliGRAM(s) Oral daily  melatonin 3 milliGRAM(s) Oral at bedtime  metoprolol succinate  milliGRAM(s) Oral daily  nystatin Powder 1 Application(s) Topical two times a day  pantoprazole    Tablet 40 milliGRAM(s) Oral before breakfast  polyethylene glycol 3350 17 Gram(s) Oral daily  rivaroxaban 15 milliGRAM(s) Oral two times a day  senna 2 Tablet(s) Oral at bedtime  simvastatin 20 milliGRAM(s) Oral at bedtime    MEDICATIONS  (PRN):  acetaminophen     Tablet .. 650 milliGRAM(s) Oral every 6 hours PRN Temp greater or equal to 38C (100.4F), Mild Pain (1 - 3)  oxyCODONE    IR 5 milliGRAM(s) Oral every 6 hours PRN Severe Pain (7 - 10)       This is a 81 YO female with PMH  of HTN, HLD, hypothyroidism, Breast CA ( s/p double mastectomy, last chemo 10/2019), right hip replacement , left femur fracture at age 5,  presents to [facility] ED on 10/16 for left hip pain. Patient was in [place], fell and fractured her "left hip". According to her and her daughters "a margy and two screws were placed" on Tuesday 10/11 in [facility]. She was transferred home (to daughters and bedbound since surgery. She came back to [place] with her daughter. Patient was unable to bear weight and was having pain on her left hip. She lives alone. Additionally, she stated she was not given any pain medication. In ER, labs showed mild leukocytosis. She was found to have RLE DVT started on Xarelto. Patient was evaluated by PM&R and therapy for functional deficits, gait/ADL impairments and acute rehabilitation was recommended. Patient was medically optimized for discharge to [facility] IRU on 10/18/22.    Today's Subjective/ROS:   Patient seen and examined in  this AM.   States that she had a good weekend and slept well overnight.  Now admitting to perineal itchiness.   Otherwise no other complaints at this time.  Last BM on 10/24.  Sutures to be removed today.      Vital Signs Last 24 Hrs  T(C): 36.9 (24 Oct 2022 08:05), Max: 36.9 (24 Oct 2022 08:05)  T(F): 98.4 (24 Oct 2022 08:05), Max: 98.4 (24 Oct 2022 08:05)  HR: 84 (24 Oct 2022 08:05) (77 - 84)  BP: 93/64 (24 Oct 2022 08:05) (93/64 - 114/68)  BP(mean): --  RR: 16 (24 Oct 2022 08:05) (14 - 16)  SpO2: 94% (24 Oct 2022 08:05) (94% - 98%)      Gen - NAD, Comfortable  HEENT - NCAT, EOMI, MMM  Neck - Supple, No limited ROM  Pulm - No respiratory distress  Cardiovascular - RRR, S1S2, No murmurs  Abdomen - Soft, NT/ND, +BS  Extremities - No clubbing, no cyanosis, + edema to left hip and thigh, + right calf tenderness  Neuro-     Cognitive - Awake, Alert, Oriented  to self, place, date, year, situation. Able to follow command     Communication - Fluent     Attention: Intact      Judgement: Good evidence of judgement     Memory: Recall 3 objects immediate and 3 min later         Cranial Nerves - CN 2-12 intact.      Motor -                     LEFT    UE - ShAB 5/5, EF 5/5, EE 5/5,  5/5                    RIGHT UE - ShAB 5/5, EF 5/5, EE 5/5,   5/5                    LEFT    LE - HF 4/5, KE 4/5, DF 5/5, PF 5/5- limited 2/2 pain                    RIGHT LE - HF 5/5, KE 5/5, DF 5/5, PF 5/5        Sensory - Intact to LT     Reflexes - DTR Intact, No primitive reflexive     Coordination - FTN intact     Tone - normal  Psychiatric - Mood stable, Affect WNL  Skin:  left hip incision with sutures DESI. Erythema improving. Site TTP.       LAB                        11.2   8.60  )-----------( 444      ( 24 Oct 2022 06:25 )             33.8     10-24    138  |  103  |  23  ----------------------------<  126<H>  4.3   |  27  |  1.07    Ca    9.2      24 Oct 2022 06:25    TPro  6.4  /  Alb  3.0<L>  /  TBili  0.4  /  DBili  x   /  AST  20  /  ALT  28  /  AlkPhos  106  10-24    LIVER FUNCTIONS - ( 24 Oct 2022 06:25 )  Alb: 3.0 g/dL / Pro: 6.4 g/dL / ALK PHOS: 106 U/L / ALT: 28 U/L / AST: 20 U/L / GGT: x           Urinalysis Basic - ( 23 Oct 2022 15:12 )    Color: Yellow / Appearance: Clear / S.010 / pH: x  Gluc: x / Ketone: Negative  / Bili: Negative / Urobili: Negative   Blood: x / Protein: Negative / Nitrite: Negative   Leuk Esterase: Negative / RBC: Negative /HPF / WBC Negative /HPF   Sq Epi: x / Non Sq Epi: Neg.-Few / Bacteria: Negative /HPF      Culture - Urine (collected 22 Oct 2022 22:05)  Source: Clean Catch Clean Catch (Midstream)  Final Report (24 Oct 2022 08:11):    <10,000 CFU/mL Normal Urogenital Marcela    Urinalysis Basic - ( 23 Oct 2022 15:12 )    Color: Yellow / Appearance: Clear / S.010 / pH: x  Gluc: x / Ketone: Negative  / Bili: Negative / Urobili: Negative   Blood: x / Protein: Negative / Nitrite: Negative   Leuk Esterase: Negative / RBC: Negative /HPF / WBC Negative /HPF   Sq Epi: x / Non Sq Epi: Neg.-Few / Bacteria: Negative /HPF      Culture - Blood (collected 21 Oct 2022 09:25)  Source: .Blood Blood-Venous  Preliminary Report (22 Oct 2022 13:02):    No growth to date.    Culture - Blood (collected 21 Oct 2022 09:25)  Source: .Blood Blood-Venous  Preliminary Report (22 Oct 2022 13:02):    No growth to date.        MEDICATIONS  (STANDING):  ferrous    sulfate 325 milliGRAM(s) Oral daily  levothyroxine 75 MICROGram(s) Oral daily  losartan 100 milliGRAM(s) Oral daily  melatonin 3 milliGRAM(s) Oral at bedtime  metoprolol succinate  milliGRAM(s) Oral daily  nystatin Powder 1 Application(s) Topical two times a day  pantoprazole    Tablet 40 milliGRAM(s) Oral before breakfast  polyethylene glycol 3350 17 Gram(s) Oral daily  rivaroxaban 15 milliGRAM(s) Oral two times a day  senna 2 Tablet(s) Oral at bedtime  simvastatin 20 milliGRAM(s) Oral at bedtime    MEDICATIONS  (PRN):  acetaminophen     Tablet .. 650 milliGRAM(s) Oral every 6 hours PRN Temp greater or equal to 38C (100.4F), Mild Pain (1 - 3)  oxyCODONE    IR 5 milliGRAM(s) Oral every 6 hours PRN Severe Pain (7 - 10)   This is a 81 YO female with PMH  of HTN, HLD, hypothyroidism, Breast CA ( s/p double mastectomy, last chemo 10/2019), right hip replacement , left femur fracture at age 5,  presents to [facility] ED on 10/16 for left hip pain. Patient was in [place], fell and fractured her "left hip". According to her and her daughters "a margy and two screws were placed" on Tuesday 10/11 in [facility]. She was transferred home (to daughters and bedbound since surgery. She came back to [place] with her daughter. Patient was unable to bear weight and was having pain on her left hip. She lives alone. Additionally, she stated she was not given any pain medication. In ER, labs showed mild leukocytosis. She was found to have RLE DVT started on Xarelto. Patient was evaluated by PM&R and therapy for functional deficits, gait/ADL impairments and acute rehabilitation was recommended. Patient was medically optimized for discharge to [facility] IRU on 10/18/22.    Today's Subjective/ROS:   Patient seen and examined in  this AM.   States that she had a good weekend and slept well overnight.  Now admitting to perineal itchiness.   Otherwise no other complaints at this time.  Last BM on 10/24.  Sutures to be removed today.      Vital Signs Last 24 Hrs  T(C): 36.9 (24 Oct 2022 08:05), Max: 36.9 (24 Oct 2022 08:05)  T(F): 98.4 (24 Oct 2022 08:05), Max: 98.4 (24 Oct 2022 08:05)  HR: 84 (24 Oct 2022 08:05) (77 - 84)  BP: 93/64 (24 Oct 2022 08:05) (93/64 - 114/68)  BP(mean): --  RR: 16 (24 Oct 2022 08:05) (14 - 16)  SpO2: 94% (24 Oct 2022 08:05) (94% - 98%)      Gen - NAD, Comfortable  HEENT - NCAT, EOMI, MMM  Neck - Supple, No limited ROM  Pulm - No respiratory distress  Cardiovascular - RRR, S1S2, No murmurs  Abdomen - Soft, NT/ND, +BS  Extremities - No clubbing, no cyanosis, + edema to left hip and thigh, + right calf tenderness  Neuro-     Cognitive - Awake, Alert, Oriented  to self, place, date, year, situation. Able to follow command     Communication - Fluent     Attention: Intact      Judgement: Good evidence of judgement     Memory: Recall 3 objects immediate and 3 min later         Cranial Nerves - CN 2-12 intact.      Motor -                     LEFT    UE - ShAB 5/5, EF 5/5, EE 5/5,  5/5                    RIGHT UE - ShAB 5/5, EF 5/5, EE 5/5,   5/5                    LEFT    LE - HF 4/5, KE 4/5, DF 5/5, PF 5/5- limited 2/2 pain                    RIGHT LE - HF 5/5, KE 5/5, DF 5/5, PF 5/5        Sensory - Intact to LT     Reflexes - DTR Intact, No primitive reflexive     Coordination - FTN intact     Tone - normal  Psychiatric - Mood stable, Affect WNL  Skin:  left hip incision with sutures DESI. Erythema improving. Site TTP.       LAB                        11.2   8.60  )-----------( 444      ( 24 Oct 2022 06:25 )             33.8     10-24    138  |  103  |  23  ----------------------------<  126<H>  4.3   |  27  |  1.07    Ca    9.2      24 Oct 2022 06:25    TPro  6.4  /  Alb  3.0<L>  /  TBili  0.4  /  DBili  x   /  AST  20  /  ALT  28  /  AlkPhos  106  10-24    LIVER FUNCTIONS - ( 24 Oct 2022 06:25 )  Alb: 3.0 g/dL / Pro: 6.4 g/dL / ALK PHOS: 106 U/L / ALT: 28 U/L / AST: 20 U/L / GGT: x           Urinalysis Basic - ( 23 Oct 2022 15:12 )    Color: Yellow / Appearance: Clear / S.010 / pH: x  Gluc: x / Ketone: Negative  / Bili: Negative / Urobili: Negative   Blood: x / Protein: Negative / Nitrite: Negative   Leuk Esterase: Negative / RBC: Negative /HPF / WBC Negative /HPF   Sq Epi: x / Non Sq Epi: Neg.-Few / Bacteria: Negative /HPF      Culture - Urine (collected 22 Oct 2022 22:05)  Source: Clean Catch Clean Catch (Midstream)  Final Report (24 Oct 2022 08:11):    <10,000 CFU/mL Normal Urogenital Marcela    Urinalysis Basic - ( 23 Oct 2022 15:12 )    Color: Yellow / Appearance: Clear / S.010 / pH: x  Gluc: x / Ketone: Negative  / Bili: Negative / Urobili: Negative   Blood: x / Protein: Negative / Nitrite: Negative   Leuk Esterase: Negative / RBC: Negative /HPF / WBC Negative /HPF   Sq Epi: x / Non Sq Epi: Neg.-Few / Bacteria: Negative /HPF      Culture - Blood (collected 21 Oct 2022 09:25)  Source: .Blood Blood-Venous  Preliminary Report (22 Oct 2022 13:02):    No growth to date.    Culture - Blood (collected 21 Oct 2022 09:25)  Source: .Blood Blood-Venous  Preliminary Report (22 Oct 2022 13:02):    No growth to date.        MEDICATIONS  (STANDING):  ferrous    sulfate 325 milliGRAM(s) Oral daily  levothyroxine 75 MICROGram(s) Oral daily  losartan 100 milliGRAM(s) Oral daily  melatonin 3 milliGRAM(s) Oral at bedtime  metoprolol succinate  milliGRAM(s) Oral daily  nystatin Powder 1 Application(s) Topical two times a day  pantoprazole    Tablet 40 milliGRAM(s) Oral before breakfast  polyethylene glycol 3350 17 Gram(s) Oral daily  rivaroxaban 15 milliGRAM(s) Oral two times a day  senna 2 Tablet(s) Oral at bedtime  simvastatin 20 milliGRAM(s) Oral at bedtime    MEDICATIONS  (PRN):  acetaminophen     Tablet .. 650 milliGRAM(s) Oral every 6 hours PRN Temp greater or equal to 38C (100.4F), Mild Pain (1 - 3)  oxyCODONE    IR 5 milliGRAM(s) Oral every 6 hours PRN Severe Pain (7 - 10)      IDT conference 10/24  TDD: 10/29 to home  Barriers: LLE pain  SW: Lives alone, plans to go home with daughter and then transition to her home  OT: Mod I for ADLs and transfers.   PT: Min A for transfers. Ambulated 75ft with min A, 2 times.   SLP: N/A  Goals: Transfer with CG assist. Mod I with hip kit for LBD.

## 2022-10-24 NOTE — PROGRESS NOTE ADULT - SUBJECTIVE AND OBJECTIVE BOX
Patient is a 82y old  Female who presents with a chief complaint of left hip fracture (24 Oct 2022 10:56)      Patient seen and examined at bedside. No overnight events.    REVIEW OF SYSTEMS:  CONSTITUTIONAL: No fever or chills  GASTROINTESTINAL: No abd pain, nausea, vomiting, or diarrhea    ALLERGIES:  No Known Drug Allergies  shellfish (Unknown)    MEDICATIONS  (STANDING):  clotrimazole 1% Cream 1 Application(s) Topical two times a day  ferrous    sulfate 325 milliGRAM(s) Oral daily  levothyroxine 75 MICROGram(s) Oral daily  losartan 100 milliGRAM(s) Oral daily  melatonin 3 milliGRAM(s) Oral <User Schedule>  metoprolol succinate  milliGRAM(s) Oral daily  nystatin Powder 1 Application(s) Topical two times a day  pantoprazole    Tablet 40 milliGRAM(s) Oral before breakfast  polyethylene glycol 3350 17 Gram(s) Oral daily  rivaroxaban 15 milliGRAM(s) Oral two times a day  senna 2 Tablet(s) Oral <User Schedule>  simvastatin 20 milliGRAM(s) Oral <User Schedule>    MEDICATIONS  (PRN):  acetaminophen     Tablet .. 650 milliGRAM(s) Oral every 6 hours PRN Temp greater or equal to 38C (100.4F), Mild Pain (1 - 3)  oxyCODONE    IR 5 milliGRAM(s) Oral every 6 hours PRN Severe Pain (7 - 10)    Vital Signs Last 24 Hrs  T(F): 98.4 (24 Oct 2022 08:05), Max: 98.4 (24 Oct 2022 08:05)  HR: 84 (24 Oct 2022 08:05) (77 - 84)  BP: 93/64 (24 Oct 2022 08:05) (93/64 - 114/68)  RR: 16 (24 Oct 2022 08:05) (14 - 16)  SpO2: 94% (24 Oct 2022 08:05) (94% - 98%)  I&O's Summary    23 Oct 2022 07:01  -  24 Oct 2022 07:00  --------------------------------------------------------  IN: 240 mL / OUT: 400 mL / NET: -160 mL          PHYSICAL EXAM:  GENERAL: NAD  HEENT:  AT/NC, anicteric, moist mucous membranes, EOMI, PERRL, no lid-lag, conjunctiva and sclera clear  CHEST/LUNG:  CTA b/l, no rales, wheezes, or rhonchi,  normal respiratory effort, no intercostal retractions  HEART:  RRR, S1, S2, no murmurs; no pitting edema  ABDOMEN:  BS+, soft, nontender, nondistended; No HSM  MSK/EXTREMITIES: palpable peripheral pulses, no clubbing or cyanosis  NERVOUS SYSTEM: answers questions and follows commands appropriately A&Ox3 grossly moves all extremities   PSYCH: Appropriate affect, Alert & Awake; Good judgement    LABS: Personally reviewed                        11.2   8.60  )-----------( 444      ( 24 Oct 2022 06:25 )             33.8       10-24    138  |  103  |  23  ----------------------------<  126  4.3   |  27  |  1.07    Ca    9.2      24 Oct 2022 06:25    TPro  6.4  /  Alb  3.0  /  TBili  0.4  /  DBili  x   /  AST  20  /  ALT  28  /  AlkPhos  106  10-24          Lactate, Blood: 1.8 mmol/L (10-21 @ 12:19)                          Urinalysis Basic - ( 23 Oct 2022 15:12 )    Color: Yellow / Appearance: Clear / S.010 / pH: x  Gluc: x / Ketone: Negative  / Bili: Negative / Urobili: Negative   Blood: x / Protein: Negative / Nitrite: Negative   Leuk Esterase: Negative / RBC: Negative /HPF / WBC Negative /HPF   Sq Epi: x / Non Sq Epi: Neg.-Few / Bacteria: Negative /HPF        Culture - Urine (collected 22 Oct 2022 22:05)  Source: Clean Catch Clean Catch (Midstream)  Final Report (24 Oct 2022 08:11):    <10,000 CFU/mL Normal Urogenital Marcela    Culture - Blood (collected 21 Oct 2022 09:25)  Source: .Blood Blood-Venous  Preliminary Report (22 Oct 2022 13:02):    No growth to date.    Culture - Blood (collected 21 Oct 2022 09:25)  Source: .Blood Blood-Venous  Preliminary Report (22 Oct 2022 13:02):    No growth to date.    Urine Culture - 48 Hour Hold (collected 20 Oct 2022 15:00)  Source: .Urine  Final Report (21 Oct 2022 22:59):    Culture grew 3 or more types of organisms which indicate collection    contamination; consider recollection only if clinically indicated.      COVID-19 PCR: NotDetec (10-18-22 @ 18:40)  COVID-19 PCR: NotDetec (10-17-22 @ 18:20)      RADIOLOGY & ADDITIONAL TESTS: Personally reviewed    Medical management discussed with Dr. Rush (physiatry), continue Xarelto for DVT.

## 2022-10-24 NOTE — DISCHARGE NOTE PROVIDER - CARE PROVIDERS DIRECT ADDRESSES
[email or web address]

## 2022-10-24 NOTE — PROGRESS NOTE ADULT - ASSESSMENT
82F with PMH HTN, HLD, hypothyroidism, hx of breast CA (s/p double mastectomy, last chemo 10/2019), s/p left hip fracture in [place] s/p repair. Found to have RLE DVT. Now admitted to [facility] for initiation of multidisciplinary rehab program.     #Left femur fracture, s/p repair in [place] on 10/11  likely due to age related osteoporosis  -Continue comprehensive rehab program   -Continue pain control with Tylenol PRN, Oxycodone PRN  -Continue iron daily    #DVT  -Continue Xarelto    #HTN  -Continue Losartan, Toprol  -Monitor vitals    #HLD  -Continue simvastatin    #CKD Stage 3  -Avoid nephrotoxic medications  -PO hydration encouraged  -Follow up routine BMP    #Hypothyroidism  -Continue Synthroid    #UTI  -Completed course of abx  -Afebrile, leukocytosis resolved  -Lactic acidosis resolved s/p IVF  -Blood cultures x 2 no growth  -ID following, recommendations appreciated    #Prophylactic Measure  -DVT ppx: Xarelto  -GI ppx: Protonix  -Bowel regimen

## 2022-10-24 NOTE — PROGRESS NOTE ADULT - ASSESSMENT
Patient is a 82y female with a past history of a left femur fracture repaired at age 5, B.L mastectomy for breast cancer treated with post op chemo/RT, completed 2019, RT GREGG, HTN, HLD, and hypothyroidism, who was vacationing for her 81th birthday in [place] when she fell and fractured left hip.She had an ORIF on 10/11, returned to [place] and came to hospital for rehab.Dopplers with a RLE DVT and she was admitted to rehab on 10/18.There were concerns of a UTI, she was given bactrim 10/20.   She had an episode of hypotension 10/20,,10/21 had a wbc of 12,000 and she is being approached as if she has a systemic infection and has been placed on CTX.  She denied any respiratory, GI or  symptoms to my exam.  She has completed 3 days of CTX. Her initial urine culture was polymicrobic, repeat negative  She is now off antibiotics and is asymptomatic  Suggest:  1 Follow conservatively  2.No signs of active infection.  3.No additional ID w/u planned, we will stop actively following, please call if additional ID issues arise

## 2022-10-25 LAB — SARS-COV-2 RNA SPEC QL NAA+PROBE: SIGNIFICANT CHANGE UP

## 2022-10-25 PROCEDURE — 99232 SBSQ HOSP IP/OBS MODERATE 35: CPT

## 2022-10-25 RX ORDER — METOPROLOL TARTRATE 50 MG
1 TABLET ORAL
Qty: 30 | Refills: 0
Start: 2022-10-25 | End: 2022-11-23

## 2022-10-25 RX ORDER — FERROUS SULFATE 325(65) MG
1 TABLET ORAL
Qty: 0 | Refills: 0 | DISCHARGE
Start: 2022-10-25

## 2022-10-25 RX ORDER — AMLODIPINE BESYLATE 2.5 MG/1
1 TABLET ORAL
Qty: 0 | Refills: 0 | DISCHARGE

## 2022-10-25 RX ORDER — SIMVASTATIN 20 MG/1
1 TABLET, FILM COATED ORAL
Qty: 30 | Refills: 0
Start: 2022-10-25 | End: 2022-11-23

## 2022-10-25 RX ORDER — SENNA PLUS 8.6 MG/1
2 TABLET ORAL
Qty: 0 | Refills: 0 | DISCHARGE
Start: 2022-10-25

## 2022-10-25 RX ORDER — PANTOPRAZOLE SODIUM 20 MG/1
1 TABLET, DELAYED RELEASE ORAL
Qty: 0 | Refills: 0 | DISCHARGE
Start: 2022-10-25

## 2022-10-25 RX ORDER — LANOLIN ALCOHOL/MO/W.PET/CERES
1 CREAM (GRAM) TOPICAL
Qty: 0 | Refills: 0 | DISCHARGE
Start: 2022-10-25

## 2022-10-25 RX ORDER — SIMVASTATIN 20 MG/1
1 TABLET, FILM COATED ORAL
Qty: 0 | Refills: 0 | DISCHARGE

## 2022-10-25 RX ORDER — RIVAROXABAN 15 MG-20MG
1 KIT ORAL
Qty: 30 | Refills: 0
Start: 2022-10-25 | End: 2022-11-23

## 2022-10-25 RX ORDER — LEVOTHYROXINE SODIUM 125 MCG
1 TABLET ORAL
Qty: 0 | Refills: 0 | DISCHARGE

## 2022-10-25 RX ORDER — LOSARTAN POTASSIUM 100 MG/1
1 TABLET, FILM COATED ORAL
Qty: 0 | Refills: 0 | DISCHARGE

## 2022-10-25 RX ORDER — LOSARTAN POTASSIUM 100 MG/1
1 TABLET, FILM COATED ORAL
Qty: 30 | Refills: 0
Start: 2022-10-25 | End: 2022-11-23

## 2022-10-25 RX ORDER — BISOPROLOL FUMARATE 10 MG/1
1 TABLET, FILM COATED ORAL
Qty: 0 | Refills: 0 | DISCHARGE

## 2022-10-25 RX ORDER — RIVAROXABAN 15 MG-20MG
1 KIT ORAL
Qty: 18 | Refills: 0
Start: 2022-10-25 | End: 2022-11-02

## 2022-10-25 RX ORDER — LEVOTHYROXINE SODIUM 125 MCG
1 TABLET ORAL
Qty: 0 | Refills: 0 | DISCHARGE
Start: 2022-10-25

## 2022-10-25 RX ORDER — POLYETHYLENE GLYCOL 3350 17 G/17G
17 POWDER, FOR SOLUTION ORAL
Qty: 0 | Refills: 0 | DISCHARGE
Start: 2022-10-25

## 2022-10-25 RX ORDER — NYSTATIN CREAM 100000 [USP'U]/G
1 CREAM TOPICAL
Qty: 60 | Refills: 0
Start: 2022-10-25 | End: 2022-11-23

## 2022-10-25 RX ADMIN — PANTOPRAZOLE SODIUM 40 MILLIGRAM(S): 20 TABLET, DELAYED RELEASE ORAL at 05:38

## 2022-10-25 RX ADMIN — RIVAROXABAN 15 MILLIGRAM(S): KIT at 05:33

## 2022-10-25 RX ADMIN — Medication 650 MILLIGRAM(S): at 10:33

## 2022-10-25 RX ADMIN — LOSARTAN POTASSIUM 100 MILLIGRAM(S): 100 TABLET, FILM COATED ORAL at 07:39

## 2022-10-25 RX ADMIN — Medication 75 MICROGRAM(S): at 05:33

## 2022-10-25 RX ADMIN — Medication 3 MILLIGRAM(S): at 20:23

## 2022-10-25 RX ADMIN — NYSTATIN CREAM 1 APPLICATION(S): 100000 CREAM TOPICAL at 05:35

## 2022-10-25 RX ADMIN — Medication 200 MILLIGRAM(S): at 05:33

## 2022-10-25 RX ADMIN — NYSTATIN CREAM 1 APPLICATION(S): 100000 CREAM TOPICAL at 17:41

## 2022-10-25 RX ADMIN — Medication 650 MILLIGRAM(S): at 16:33

## 2022-10-25 RX ADMIN — SENNA PLUS 2 TABLET(S): 8.6 TABLET ORAL at 20:23

## 2022-10-25 RX ADMIN — Medication 650 MILLIGRAM(S): at 17:33

## 2022-10-25 RX ADMIN — SIMVASTATIN 20 MILLIGRAM(S): 20 TABLET, FILM COATED ORAL at 20:23

## 2022-10-25 RX ADMIN — RIVAROXABAN 15 MILLIGRAM(S): KIT at 17:41

## 2022-10-25 RX ADMIN — Medication 1 APPLICATION(S): at 05:36

## 2022-10-25 RX ADMIN — Medication 325 MILLIGRAM(S): at 11:42

## 2022-10-25 RX ADMIN — Medication 650 MILLIGRAM(S): at 11:30

## 2022-10-25 NOTE — PROGRESS NOTE ADULT - ASSESSMENT
This is a 83 YO female with PMH  of HTN, HLD, hypothyroidism, Breast CA ( s/p double mastectomy, last chemo 10/2019), right hip replacement 2012, left femur fracture at age 5, presents to [facility] ED on 10/16 for left hip pain. Patient was in [place] where she fell and fractured her left hip and is s/p surgery in [facility] on 10/11.  She returned to [place] with her daughter in pain and inability to ambulate. Patient now with gait Instability, ADL impairments and Functional impairments.  -------------------------  # left Hip fracture  - s/p surgery- left hip ORIF in [facility] on 10/11  - Start Comprehensive Rehab Program: PT/OT, 3hours daily and 5 days weekly  - PT: Focused on improving strength, endurance, coordination, balance, functional mobility, and transfers  - OT: Focused on improving strength, fine motor skills, coordination, posture and ADLs.    - WB Status: WBAT LLE  - Sutures removed on 10/24-> pt tolerated the procedure well. Surgical site clean, dry and intact. Proximal surgical site covered with telfa and paper tape.     #Leukocytosis  - Afebrile  - WBC of 10.52 10/20  - Admits to dysuria- UA positive  - Bactrim BID started 10/20 PM, now DCd on 10/21  - WBC increased to 12.41 10/21  - Await UCx results  - Bld Cx x2, Lactate and CXR ordered  - Lactate of 4.0  - 1000ml NS bolus  - ABX switched to Ceftriaxone daily for 3 days  - Repeat Lac of 1.8  - ID consulted  - S/p Ceftriaxone for 3 days  - UCx resulted contaminated  - Repeat UA resulted negative  - WBC 8.60 on 10/24  - Hospitalist following  - Continue to monitor    #Perineal pruritis  - Lotrimin cream added     #HTN  - Losartan 100mg daily  - Metoprolol 200mg daily  - Norvasc 5mg daily- DCd on 10/20 due to hypotension in PT (sbp 70s)    #Lightheadedness  - Encourage PO fluids  - Check orthostatics- negative 10/20    #HLD  - Simvastatin 20mg daily    #Hypothyroidism  - Synthroid    Breast CA   - s/p double mastectomy, last chemo 10/2019    #Pain management  - Tylenol PRN, tramadol PRN, Oxycodone PRN    #DVT  - RLE  - [id number] BID until 11/7/22; then to give 20mg with dinner starting on 11/8/22    #GI ppx  - Protonix 40mg    #Bowel Regimen  - Senna, miralax PRN    #Bladder management  - C/o dysuria. UA positive.   - Encouraged to drink PO fluids  - UCx resulted contamination. Repeat UA resulted negative.  - BS on admission, and q 8 hours (SC if > 400)  - Monitor UO    #FEN   - Diet: DASH    #Skin:  - Denuded/raw perineum-> Nystatin powder ordered 10/20  - Skin on admission: Left hip incision with sutures DESI  - Pressure injury/Skin: Turn Q2hrs while in bed, OOB to Chair, PT/OT     #Sleep:   - Maintain quiet hours and low stim environment.  - Melatonin PRN to maximize participation in therapy during the day.     #Precaution  - Fall, Aspiration, cardiac, Hip     ----------------------------------------------------------------------  Outpatient Follow-up (Specialty/Name of physician):  Arnoldo Win)  Orthopaedic Surgery  [address] [place] [address]  Phone: [phone]  Fax: [phone] This is a 81 YO female with PMH  of HTN, HLD, hypothyroidism, Breast CA ( s/p double mastectomy, last chemo 10/2019), right hip replacement 2012, left femur fracture at age 5, presents to [facility] ED on 10/16 for left hip pain. Patient was in [place] where she fell and fractured her left hip and is s/p surgery in [facility] on 10/11.  She returned to [place] with her daughter in pain and inability to ambulate. Patient now with gait Instability, ADL impairments and Functional impairments.  -------------------------  # left Hip fracture  - s/p surgery- left hip ORIF in [facility] on 10/11  - Start Comprehensive Rehab Program: PT/OT, 3hours daily and 5 days weekly  - PT: Focused on improving strength, endurance, coordination, balance, functional mobility, and transfers  - OT: Focused on improving strength, fine motor skills, coordination, posture and ADLs.    - WB Status: WBAT LLE  - Sutures removed on 10/24-> pt tolerated the procedure well. Surgical site clean, dry and intact. Proximal surgical site covered with telfa and paper tape.     #Leukocytosis- resolved  - Afebrile  - WBC of 10.52 10/20  - Admits to dysuria- UA positive  - Bactrim BID started 10/20 PM, now DCd on 10/21  - WBC increased to 12.41 10/21  - Bld Cx x2, Lactate and CXR ordered  - Lactate of 4.0  - 1000ml NS bolus  - ABX switched to Ceftriaxone daily for 3 days  - Repeat Lac of 1.8  - ID consulted  - S/p Ceftriaxone for 3 days  - UCx resulted contaminated  - Repeat UA resulted negative  - WBC 8.60 on 10/24  - Hospitalist following  - Continue to monitor    #Perineal pruritis  - Lotrimin cream added     #HTN  - Losartan 100mg daily  - Metoprolol 200mg daily  - Norvasc 5mg daily- DCd on 10/20 due to hypotension in PT (sbp 70s)    #Lightheadedness  - Encourage PO fluids  - Check orthostatics- negative 10/20    #HLD  - Simvastatin 20mg daily    #Hypothyroidism  - Synthroid    Breast CA   - s/p double mastectomy, last chemo 10/2019    #Pain management  - Tylenol PRN, tramadol PRN, Oxycodone PRN    #DVT  - RLE  - [id number] BID until 11/7/22; then to give 20mg with dinner starting on 11/8/22    #GI ppx  - Protonix 40mg    #Bowel Regimen  - Senna, miralax PRN    #Bladder management  - C/o dysuria. UA positive.   - Encouraged to drink PO fluids  - UCx resulted contamination. Repeat UA resulted negative.  - BS on admission, and q 8 hours (SC if > 400)  - Monitor UO    #FEN   - Diet: DASH    #Skin:  - Denuded/raw perineum-> Nystatin powder ordered 10/20  - Skin on admission: Left hip incision with sutures DESI  - Pressure injury/Skin: Turn Q2hrs while in bed, OOB to Chair, PT/OT     #Sleep:   - Maintain quiet hours and low stim environment.  - Melatonin PRN to maximize participation in therapy during the day.     #Precaution  - Fall, Aspiration, cardiac, Hip     ----------------------------------------------------------------------  Outpatient Follow-up (Specialty/Name of physician):  Arnoldo Win)  Orthopaedic Surgery  [address] [place] [address]  Phone: [phone]  Fax: [phone]

## 2022-10-25 NOTE — PROGRESS NOTE ADULT - SUBJECTIVE AND OBJECTIVE BOX
This is a 83 YO female with PMH  of HTN, HLD, hypothyroidism, Breast CA ( s/p double mastectomy, last chemo 10/2019), right hip replacement , left femur fracture at age 5,  presents to [facility] ED on 10/16 for left hip pain. Patient was in [place], fell and fractured her "left hip". According to her and her daughters "a margy and two screws were placed" on Tuesday 10/11 in [facility]. She was transferred home (to daughters and bedbound since surgery. She came back to [place] with her daughter. Patient was unable to bear weight and was having pain on her left hip. She lives alone. Additionally, she stated she was not given any pain medication. In ER, labs showed mild leukocytosis. She was found to have RLE DVT started on Xarelto. Patient was evaluated by PM&R and therapy for functional deficits, gait/ADL impairments and acute rehabilitation was recommended. Patient was medically optimized for discharge to [facility] IRU on 10/18/22.    Today's Subjective/ROS: **********************      Patient seen and examined in  this AM.   States that she had a good weekend and slept well overnight.  Now admitting to perineal itchiness.   Otherwise no other complaints at this time.  Last BM on 10/24.  Sutures to be removed today.      Vital Signs Last 24 Hrs  T(C): 36.9 (24 Oct 2022 08:05), Max: 36.9 (24 Oct 2022 08:05)  T(F): 98.4 (24 Oct 2022 08:05), Max: 98.4 (24 Oct 2022 08:05)  HR: 84 (24 Oct 2022 08:05) (77 - 84)  BP: 93/64 (24 Oct 2022 08:05) (93/64 - 114/68)  BP(mean): --  RR: 16 (24 Oct 2022 08:05) (14 - 16)  SpO2: 94% (24 Oct 2022 08:05) (94% - 98%)      Gen - NAD, Comfortable  HEENT - NCAT, EOMI, MMM  Neck - Supple, No limited ROM  Pulm - No respiratory distress  Cardiovascular - RRR, S1S2, No murmurs  Abdomen - Soft, NT/ND, +BS  Extremities - No clubbing, no cyanosis, + edema to left hip and thigh, + right calf tenderness  Neuro-     Cognitive - Awake, Alert, Oriented  to self, place, date, year, situation. Able to follow command     Communication - Fluent     Attention: Intact      Judgement: Good evidence of judgement     Memory: Recall 3 objects immediate and 3 min later         Cranial Nerves - CN 2-12 intact.      Motor -                     LEFT    UE - ShAB 5/5, EF 5/5, EE 5/5,  5/5                    RIGHT UE - ShAB 5/5, EF 5/5, EE 5/5,   5/5                    LEFT    LE - HF 4/5, KE 4/5, DF 5/5, PF 5/5- limited 2/2 pain                    RIGHT LE - HF 5/5, KE 5/5, DF 5/5, PF 5/5        Sensory - Intact to LT     Reflexes - DTR Intact, No primitive reflexive     Coordination - FTN intact     Tone - normal  Psychiatric - Mood stable, Affect WNL  Skin:  left hip incision with sutures DESI. Erythema improving. Site TTP.       LAB                        11.2   8.60  )-----------( 444      ( 24 Oct 2022 06:25 )             33.8     10-24    138  |  103  |  23  ----------------------------<  126<H>  4.3   |  27  |  1.07    Ca    9.2      24 Oct 2022 06:25    TPro  6.4  /  Alb  3.0<L>  /  TBili  0.4  /  DBili  x   /  AST  20  /  ALT  28  /  AlkPhos  106  10-24    LIVER FUNCTIONS - ( 24 Oct 2022 06:25 )  Alb: 3.0 g/dL / Pro: 6.4 g/dL / ALK PHOS: 106 U/L / ALT: 28 U/L / AST: 20 U/L / GGT: x           Urinalysis Basic - ( 23 Oct 2022 15:12 )    Color: Yellow / Appearance: Clear / S.010 / pH: x  Gluc: x / Ketone: Negative  / Bili: Negative / Urobili: Negative   Blood: x / Protein: Negative / Nitrite: Negative   Leuk Esterase: Negative / RBC: Negative /HPF / WBC Negative /HPF   Sq Epi: x / Non Sq Epi: Neg.-Few / Bacteria: Negative /HPF      Culture - Urine (collected 22 Oct 2022 22:05)  Source: Clean Catch Clean Catch (Midstream)  Final Report (24 Oct 2022 08:11):    <10,000 CFU/mL Normal Urogenital Marcela    Urinalysis Basic - ( 23 Oct 2022 15:12 )    Color: Yellow / Appearance: Clear / S.010 / pH: x  Gluc: x / Ketone: Negative  / Bili: Negative / Urobili: Negative   Blood: x / Protein: Negative / Nitrite: Negative   Leuk Esterase: Negative / RBC: Negative /HPF / WBC Negative /HPF   Sq Epi: x / Non Sq Epi: Neg.-Few / Bacteria: Negative /HPF      Culture - Blood (collected 21 Oct 2022 09:25)  Source: .Blood Blood-Venous  Preliminary Report (22 Oct 2022 13:02):    No growth to date.    Culture - Blood (collected 21 Oct 2022 09:25)  Source: .Blood Blood-Venous  Preliminary Report (22 Oct 2022 13:02):    No growth to date.        MEDICATIONS  (STANDING):  ferrous    sulfate 325 milliGRAM(s) Oral daily  levothyroxine 75 MICROGram(s) Oral daily  losartan 100 milliGRAM(s) Oral daily  melatonin 3 milliGRAM(s) Oral at bedtime  metoprolol succinate  milliGRAM(s) Oral daily  nystatin Powder 1 Application(s) Topical two times a day  pantoprazole    Tablet 40 milliGRAM(s) Oral before breakfast  polyethylene glycol 3350 17 Gram(s) Oral daily  rivaroxaban 15 milliGRAM(s) Oral two times a day  senna 2 Tablet(s) Oral at bedtime  simvastatin 20 milliGRAM(s) Oral at bedtime    MEDICATIONS  (PRN):  acetaminophen     Tablet .. 650 milliGRAM(s) Oral every 6 hours PRN Temp greater or equal to 38C (100.4F), Mild Pain (1 - 3)  oxyCODONE    IR 5 milliGRAM(s) Oral every 6 hours PRN Severe Pain (7 - 10)      IDT conference 10/24  TDD: 10/29 to home  Barriers: LLE pain  SW: Lives alone, plans to go home with daughter and then transition to her home  OT: Mod I for ADLs and transfers.   PT: Min A for transfers. Ambulated 75ft with min A, 2 times.   SLP: N/A  Goals: Transfer with CG assist. Mod I with hip kit for LBD.  This is a 83 YO female with PMH  of HTN, HLD, hypothyroidism, Breast CA ( s/p double mastectomy, last chemo 10/2019), right hip replacement , left femur fracture at age 5,  presents to [facility] ED on 10/16 for left hip pain. Patient was in [place], fell and fractured her "left hip". According to her and her daughters "a margy and two screws were placed" on Tuesday 10/11 in [facility]. She was transferred home (to daughters and bedbound since surgery. She came back to [place] with her daughter. Patient was unable to bear weight and was having pain on her left hip. She lives alone. Additionally, she stated she was not given any pain medication. In ER, labs showed mild leukocytosis. She was found to have RLE DVT started on Xarelto. Patient was evaluated by PM&R and therapy for functional deficits, gait/ADL impairments and acute rehabilitation was recommended. Patient was medically optimized for discharge to [facility] IRU on 10/18/22.    Today's Subjective/ROS: **********************      Patient seen and examined in  this AM.   States that she had a good weekend and slept well overnight.  Now admitting to perineal itchiness.   Otherwise no other complaints at this time.  Last BM on 10/24.  Sutures to be removed today.      Vital Signs Last 24 Hrs  T(C): 36.9 (25 Oct 2022 07:37), Max: 36.9 (25 Oct 2022 07:37)  T(F): 98.5 (25 Oct 2022 07:37), Max: 98.5 (25 Oct 2022 07:37)  HR: 74 (25 Oct 2022 07:37) (74 - 77)  BP: 114/73 (25 Oct 2022 07:37) (106/69 - 119/72)  BP(mean): --  RR: 16 (25 Oct 2022 07:37) (16 - 16)  SpO2: 94% (25 Oct 2022 07:37) (94% - 94%)      Gen - NAD, Comfortable  HEENT - NCAT, EOMI, MMM  Neck - Supple, No limited ROM  Pulm - No respiratory distress  Cardiovascular - RRR, S1S2, No murmurs  Abdomen - Soft, NT/ND, +BS  Extremities - No clubbing, no cyanosis, + edema to left hip and thigh, + right calf tenderness  Neuro-     Cognitive - Awake, Alert, Oriented  to self, place, date, year, situation. Able to follow command     Communication - Fluent     Attention: Intact      Judgement: Good evidence of judgement     Memory: Recall 3 objects immediate and 3 min later         Cranial Nerves - CN 2-12 intact.      Motor -                     LEFT    UE - ShAB 5/5, EF 5/5, EE 5/5,  5/5                    RIGHT UE - ShAB 5/5, EF 5/5, EE 5/5,   5/5                    LEFT    LE - HF 4/5, KE 4/5, DF 5/5, PF 5/5- limited 2/2 pain                    RIGHT LE - HF 5/5, KE 5/5, DF 5/5, PF 5/5        Sensory - Intact to LT     Reflexes - DTR Intact, No primitive reflexive     Coordination - FTN intact     Tone - normal  Psychiatric - Mood stable, Affect WNL  Skin:  left hip incision with sutures DESI. Erythema improving. Site TTP.       LAB                        11.2   8.60  )-----------( 444      ( 24 Oct 2022 06:25 )             33.8     10-24    138  |  103  |  23  ----------------------------<  126<H>  4.3   |  27  |  1.07    Ca    9.2      24 Oct 2022 06:25    TPro  6.4  /  Alb  3.0<L>  /  TBili  0.4  /  DBili  x   /  AST  20  /  ALT  28  /  AlkPhos  106  10-24    LIVER FUNCTIONS - ( 24 Oct 2022 06:25 )  Alb: 3.0 g/dL / Pro: 6.4 g/dL / ALK PHOS: 106 U/L / ALT: 28 U/L / AST: 20 U/L / GGT: x           Urinalysis Basic - ( 23 Oct 2022 15:12 )    Color: Yellow / Appearance: Clear / S.010 / pH: x  Gluc: x / Ketone: Negative  / Bili: Negative / Urobili: Negative   Blood: x / Protein: Negative / Nitrite: Negative   Leuk Esterase: Negative / RBC: Negative /HPF / WBC Negative /HPF   Sq Epi: x / Non Sq Epi: Neg.-Few / Bacteria: Negative /HPF      Culture - Urine (collected 22 Oct 2022 22:05)  Source: Clean Catch Clean Catch (Midstream)  Final Report (24 Oct 2022 08:11):    <10,000 CFU/mL Normal Urogenital Marcela    Urinalysis Basic - ( 23 Oct 2022 15:12 )    Color: Yellow / Appearance: Clear / S.010 / pH: x  Gluc: x / Ketone: Negative  / Bili: Negative / Urobili: Negative   Blood: x / Protein: Negative / Nitrite: Negative   Leuk Esterase: Negative / RBC: Negative /HPF / WBC Negative /HPF   Sq Epi: x / Non Sq Epi: Neg.-Few / Bacteria: Negative /HPF      Culture - Blood (collected 21 Oct 2022 09:25)  Source: .Blood Blood-Venous  Preliminary Report (22 Oct 2022 13:02):    No growth to date.    Culture - Blood (collected 21 Oct 2022 09:25)  Source: .Blood Blood-Venous  Preliminary Report (22 Oct 2022 13:02):    No growth to date.        MEDICATIONS  (STANDING):  ferrous    sulfate 325 milliGRAM(s) Oral daily  levothyroxine 75 MICROGram(s) Oral daily  losartan 100 milliGRAM(s) Oral daily  melatonin 3 milliGRAM(s) Oral at bedtime  metoprolol succinate  milliGRAM(s) Oral daily  nystatin Powder 1 Application(s) Topical two times a day  pantoprazole    Tablet 40 milliGRAM(s) Oral before breakfast  polyethylene glycol 3350 17 Gram(s) Oral daily  rivaroxaban 15 milliGRAM(s) Oral two times a day  senna 2 Tablet(s) Oral at bedtime  simvastatin 20 milliGRAM(s) Oral at bedtime    MEDICATIONS  (PRN):  acetaminophen     Tablet .. 650 milliGRAM(s) Oral every 6 hours PRN Temp greater or equal to 38C (100.4F), Mild Pain (1 - 3)  oxyCODONE    IR 5 milliGRAM(s) Oral every 6 hours PRN Severe Pain (7 - 10)      IDT conference 10/24  TDD: 10/29 to home  Barriers: LLE pain  SW: Lives alone, plans to go home with daughter and then transition to her home  OT: Mod I for ADLs and transfers.   PT: Min A for transfers. Ambulated 75ft with min A, 2 times.   SLP: N/A  Goals: Transfer with CG assist. Mod I with hip kit for LBD.  This is a 83 YO female with PMH  of HTN, HLD, hypothyroidism, Breast CA ( s/p double mastectomy, last chemo 10/2019), right hip replacement , left femur fracture at age 5,  presents to [facility] ED on 10/16 for left hip pain. Patient was in [place], fell and fractured her "left hip". According to her and her daughters "a margy and two screws were placed" on Tuesday 10/11 in [facility]. She was transferred home (to daughters and bedbound since surgery. She came back to [place] with her daughter. Patient was unable to bear weight and was having pain on her left hip. She lives alone. Additionally, she stated she was not given any pain medication. In ER, labs showed mild leukocytosis. She was found to have RLE DVT started on Xarelto. Patient was evaluated by PM&R and therapy for functional deficits, gait/ADL impairments and acute rehabilitation was recommended. Patient was medically optimized for discharge to [facility] IRU on 10/18/22.    Today's Subjective/ROS:  Patient seen and examined in  this AM during family training.   States that she slept well overnight.    Otherwise no other complaints at this time.  Last BM on 10/24.  Sutures removed on 10/24- tolerated well.       Vital Signs Last 24 Hrs  T(C): 36.9 (25 Oct 2022 07:37), Max: 36.9 (25 Oct 2022 07:37)  T(F): 98.5 (25 Oct 2022 07:37), Max: 98.5 (25 Oct 2022 07:37)  HR: 74 (25 Oct 2022 07:37) (74 - 77)  BP: 114/73 (25 Oct 2022 07:37) (106/69 - 119/72)  BP(mean): --  RR: 16 (25 Oct 2022 07:37) (16 - 16)  SpO2: 94% (25 Oct 2022 07:37) (94% - 94%)      Gen - NAD, Comfortable  HEENT - NCAT, EOMI, MMM  Neck - Supple, No limited ROM  Pulm - No respiratory distress  Cardiovascular - RRR, S1S2, No murmurs  Abdomen - Soft, NT/ND, +BS  Extremities - No clubbing, no cyanosis, + edema to left hip and thigh, + right calf tenderness  Neuro-     Cognitive - Awake, Alert, Oriented  to self, place, date, year, situation. Able to follow command     Communication - Fluent     Attention: Intact      Judgement: Good evidence of judgement     Memory: Recall 3 objects immediate and 3 min later         Cranial Nerves - CN 2-12 intact.      Motor -                     LEFT    UE - ShAB 5/5, EF 5/5, EE 5/5,  5/5                    RIGHT UE - ShAB 5/5, EF 5/5, EE 5/5,   5/5                    LEFT    LE - HF 4/5, KE 4/5, DF 5/5, PF 5/5- limited 2/2 pain                    RIGHT LE - HF 5/5, KE 5/5, DF 5/5, PF 5/5        Sensory - Intact to LT     Reflexes - DTR Intact, No primitive reflexive     Coordination - FTN intact     Tone - normal  Psychiatric - Mood stable, Affect WNL  Skin:  left hip incision DESI. (Sutures removed on 10/24).       LAB                        11.2   8.60  )-----------( 444      ( 24 Oct 2022 06:25 )             33.8     10-24    138  |  103  |  23  ----------------------------<  126<H>  4.3   |  27  |  1.07    Ca    9.2      24 Oct 2022 06:25    TPro  6.4  /  Alb  3.0<L>  /  TBili  0.4  /  DBili  x   /  AST  20  /  ALT  28  /  AlkPhos  106  10-24    LIVER FUNCTIONS - ( 24 Oct 2022 06:25 )  Alb: 3.0 g/dL / Pro: 6.4 g/dL / ALK PHOS: 106 U/L / ALT: 28 U/L / AST: 20 U/L / GGT: x           Urinalysis Basic - ( 23 Oct 2022 15:12 )    Color: Yellow / Appearance: Clear / S.010 / pH: x  Gluc: x / Ketone: Negative  / Bili: Negative / Urobili: Negative   Blood: x / Protein: Negative / Nitrite: Negative   Leuk Esterase: Negative / RBC: Negative /HPF / WBC Negative /HPF   Sq Epi: x / Non Sq Epi: Neg.-Few / Bacteria: Negative /HPF      Culture - Urine (collected 22 Oct 2022 22:05)  Source: Clean Catch Clean Catch (Midstream)  Final Report (24 Oct 2022 08:11):    <10,000 CFU/mL Normal Urogenital Marcela    Urinalysis Basic - ( 23 Oct 2022 15:12 )    Color: Yellow / Appearance: Clear / S.010 / pH: x  Gluc: x / Ketone: Negative  / Bili: Negative / Urobili: Negative   Blood: x / Protein: Negative / Nitrite: Negative   Leuk Esterase: Negative / RBC: Negative /HPF / WBC Negative /HPF   Sq Epi: x / Non Sq Epi: Neg.-Few / Bacteria: Negative /HPF      Culture - Blood (collected 21 Oct 2022 09:25)  Source: .Blood Blood-Venous  Preliminary Report (22 Oct 2022 13:02):    No growth to date.    Culture - Blood (collected 21 Oct 2022 09:25)  Source: .Blood Blood-Venous  Preliminary Report (22 Oct 2022 13:02):    No growth to date.        MEDICATIONS  (STANDING):  ferrous    sulfate 325 milliGRAM(s) Oral daily  levothyroxine 75 MICROGram(s) Oral daily  losartan 100 milliGRAM(s) Oral daily  melatonin 3 milliGRAM(s) Oral at bedtime  metoprolol succinate  milliGRAM(s) Oral daily  nystatin Powder 1 Application(s) Topical two times a day  pantoprazole    Tablet 40 milliGRAM(s) Oral before breakfast  polyethylene glycol 3350 17 Gram(s) Oral daily  rivaroxaban 15 milliGRAM(s) Oral two times a day  senna 2 Tablet(s) Oral at bedtime  simvastatin 20 milliGRAM(s) Oral at bedtime    MEDICATIONS  (PRN):  acetaminophen     Tablet .. 650 milliGRAM(s) Oral every 6 hours PRN Temp greater or equal to 38C (100.4F), Mild Pain (1 - 3)  oxyCODONE    IR 5 milliGRAM(s) Oral every 6 hours PRN Severe Pain (7 - 10)      IDT conference 10/24  TDD: 10/29 to home  Barriers: LLE pain  SW: Lives alone, plans to go home with daughter and then transition to her home  OT: Mod I for ADLs and transfers.   PT: Min A for transfers. Ambulated 75ft with min A, 2 times.   SLP: N/A  Goals: Transfer with CG assist. Mod I with hip kit for LBD.  This is a 83 YO female with PMH  of HTN, HLD, hypothyroidism, Breast CA ( s/p double mastectomy, last chemo 10/2019), right hip replacement , left femur fracture at age 5,  presents to [facility] ED on 10/16 for left hip pain. Patient was in [place], fell and fractured her "left hip". According to her and her daughters "a margy and two screws were placed" on Tuesday 10/11 in [facility]. She was transferred home (to daughters and bedbound since surgery. She came back to [place] with her daughter. Patient was unable to bear weight and was having pain on her left hip. She lives alone. Additionally, she stated she was not given any pain medication. In ER, labs showed mild leukocytosis. She was found to have RLE DVT started on Xarelto. Patient was evaluated by PM&R and therapy for functional deficits, gait/ADL impairments and acute rehabilitation was recommended. Patient was medically optimized for discharge to [facility] IRU on 10/18/22.    Today's Subjective/ROS:  Patient seen and examined in  this AM during family training.   States that she slept well overnight.    Otherwise no other complaints at this time.  Last BM on 10/24.  Sutures removed on 10/24- tolerated well.       Vital Signs Last 24 Hrs  T(C): 36.9 (25 Oct 2022 07:37), Max: 36.9 (25 Oct 2022 07:37)  T(F): 98.5 (25 Oct 2022 07:37), Max: 98.5 (25 Oct 2022 07:37)  HR: 74 (25 Oct 2022 07:37) (74 - 77)  BP: 114/73 (25 Oct 2022 07:37) (106/69 - 119/72)  BP(mean): --  RR: 16 (25 Oct 2022 07:37) (16 - 16)  SpO2: 94% (25 Oct 2022 07:37) (94% - 94%)      Gen - NAD, Comfortable  HEENT - NCAT, EOMI, MMM  Neck - Supple, No limited ROM  Pulm - No respiratory distress  Cardiovascular - RRR, S1S2, No murmurs  Abdomen - Soft, NT/ND, +BS  Extremities - No clubbing, no cyanosis, + edema to left hip and thigh, + right calf tenderness  Neuro-     Cognitive - Awake, Alert, Oriented  to self, place, date, year, situation. Able to follow command     Communication - Fluent     Attention: Intact      Judgement: Good evidence of judgement     Memory: Recall 3 objects immediate and 3 min later         Cranial Nerves - CN 2-12 intact.      Motor -                     LEFT    UE - ShAB 5/5, EF 5/5, EE 5/5,  5/5                    RIGHT UE - ShAB 5/5, EF 5/5, EE 5/5,   5/5                    LEFT    LE - HF 4/5, KE 4/5, DF 5/5, PF 5/5- limited 2/2 pain                    RIGHT LE - HF 5/5, KE 5/5, DF 5/5, PF 5/5        Sensory - Intact to LT     Reflexes - DTR Intact, No primitive reflexive     Coordination - FTN intact     Tone - normal  Psychiatric - Mood stable, Affect WNL  Skin:  left hip incision DESI. (Sutures removed on 10/24).       LAB                        11.2   8.60  )-----------( 444      ( 24 Oct 2022 06:25 )             33.8     10-24    138  |  103  |  23  ----------------------------<  126<H>  4.3   |  27  |  1.07    Ca    9.2      24 Oct 2022 06:25    TPro  6.4  /  Alb  3.0<L>  /  TBili  0.4  /  DBili  x   /  AST  20  /  ALT  28  /  AlkPhos  106  10-24    LIVER FUNCTIONS - ( 24 Oct 2022 06:25 )  Alb: 3.0 g/dL / Pro: 6.4 g/dL / ALK PHOS: 106 U/L / ALT: 28 U/L / AST: 20 U/L / GGT: x           Urinalysis Basic - ( 23 Oct 2022 15:12 )    Color: Yellow / Appearance: Clear / S.010 / pH: x  Gluc: x / Ketone: Negative  / Bili: Negative / Urobili: Negative   Blood: x / Protein: Negative / Nitrite: Negative   Leuk Esterase: Negative / RBC: Negative /HPF / WBC Negative /HPF   Sq Epi: x / Non Sq Epi: Neg.-Few / Bacteria: Negative /HPF      Culture - Urine (collected 22 Oct 2022 22:05)  Source: Clean Catch Clean Catch (Midstream)  Final Report (24 Oct 2022 08:11):    <10,000 CFU/mL Normal Urogenital Marcela    Urinalysis Basic - ( 23 Oct 2022 15:12 )    Color: Yellow / Appearance: Clear / S.010 / pH: x  Gluc: x / Ketone: Negative  / Bili: Negative / Urobili: Negative   Blood: x / Protein: Negative / Nitrite: Negative   Leuk Esterase: Negative / RBC: Negative /HPF / WBC Negative /HPF   Sq Epi: x / Non Sq Epi: Neg.-Few / Bacteria: Negative /HPF      Culture - Blood (collected 21 Oct 2022 09:25)  Source: .Blood Blood-Venous  Preliminary Report (22 Oct 2022 13:02):    No growth to date.    Culture - Blood (collected 21 Oct 2022 09:25)  Source: .Blood Blood-Venous  Preliminary Report (22 Oct 2022 13:02):    No growth to date.      MEDICATIONS  (STANDING):  clotrimazole 1% Cream 1 Application(s) Topical two times a day  ferrous    sulfate 325 milliGRAM(s) Oral daily  levothyroxine 75 MICROGram(s) Oral daily  losartan 100 milliGRAM(s) Oral daily  melatonin 3 milliGRAM(s) Oral <User Schedule>  metoprolol succinate  milliGRAM(s) Oral daily  nystatin Powder 1 Application(s) Topical two times a day  pantoprazole    Tablet 40 milliGRAM(s) Oral before breakfast  polyethylene glycol 3350 17 Gram(s) Oral daily  rivaroxaban 15 milliGRAM(s) Oral two times a day  senna 2 Tablet(s) Oral <User Schedule>  simvastatin 20 milliGRAM(s) Oral <User Schedule>    MEDICATIONS  (PRN):  acetaminophen     Tablet .. 650 milliGRAM(s) Oral every 6 hours PRN Temp greater or equal to 38C (100.4F), Mild Pain (1 - 3)  oxyCODONE    IR 5 milliGRAM(s) Oral every 6 hours PRN Severe Pain (7 - 10)        IDT conference 10/24  TDD: 10/29 to home  Barriers: LLE pain  SW: Lives alone, plans to go home with daughter and then transition to her home  OT: Mod I for ADLs and transfers.   PT: Min A for transfers. Ambulated 75ft with min A, 2 times.   SLP: N/A  Goals: Transfer with CG assist. Mod I with hip kit for LBD.  This is a 83 YO female with PMH  of HTN, HLD, hypothyroidism, Breast CA ( s/p double mastectomy, last chemo 10/2019), right hip replacement , left femur fracture at age 5,  presents to [facility] ED on 10/16 for left hip pain. Patient was in [place], fell and fractured her "left hip". According to her and her daughters "a margy and two screws were placed" on Tuesday 10/11 in [facility]. She was transferred home (to daughters and bedbound since surgery. She came back to [place] with her daughter. Patient was unable to bear weight and was having pain on her left hip. She lives alone. Additionally, she stated she was not given any pain medication. In ER, labs showed mild leukocytosis. She was found to have RLE DVT started on Xarelto. Patient was evaluated by PM&R and therapy for functional deficits, gait/ADL impairments and acute rehabilitation was recommended. Patient was medically optimized for discharge to [facility] IRU on 10/18/22.    Today's Subjective/ROS:  Patient seen and examined in  this AM during family training.   States that she slept well overnight.    Her perineal pruritis has improved with the use of Clotrimazole cream BID.   Otherwise no other complaints at this time.  Last BM on 10/24.  Sutures removed on 10/24- tolerated well.       Vital Signs Last 24 Hrs  T(C): 36.9 (25 Oct 2022 07:37), Max: 36.9 (25 Oct 2022 07:37)  T(F): 98.5 (25 Oct 2022 07:37), Max: 98.5 (25 Oct 2022 07:37)  HR: 74 (25 Oct 2022 07:37) (74 - 77)  BP: 114/73 (25 Oct 2022 07:37) (106/69 - 119/72)  BP(mean): --  RR: 16 (25 Oct 2022 07:37) (16 - 16)  SpO2: 94% (25 Oct 2022 07:37) (94% - 94%)      Gen - NAD, Comfortable  HEENT - NCAT, EOMI, MMM  Neck - Supple, No limited ROM  Pulm - No respiratory distress  Cardiovascular - RRR, S1S2, No murmurs  Abdomen - Soft, NT/ND, +BS  Extremities - No clubbing, no cyanosis, + edema to left hip and thigh, + right calf tenderness  Neuro-     Cognitive - Awake, Alert, Oriented  to self, place, date, year, situation. Able to follow command     Communication - Fluent     Attention: Intact      Judgement: Good evidence of judgement     Memory: Recall 3 objects immediate and 3 min later         Cranial Nerves - CN 2-12 intact.      Motor -                     LEFT    UE - ShAB 5/5, EF 5/5, EE 5/5,  5/5                    RIGHT UE - ShAB 5/5, EF 5/5, EE 5/5,   5/5                    LEFT    LE - HF 4/5, KE 4/5, DF 5/5, PF 5/5- limited 2/2 pain                    RIGHT LE - HF 5/5, KE 5/5, DF 5/5, PF 5/5        Sensory - Intact to LT     Reflexes - DTR Intact, No primitive reflexive     Coordination - FTN intact     Tone - normal  Psychiatric - Mood stable, Affect WNL  Skin:  left hip incision DESI. (Sutures removed on 10/24).       LAB                        11.2   8.60  )-----------( 444      ( 24 Oct 2022 06:25 )             33.8     10-24    138  |  103  |  23  ----------------------------<  126<H>  4.3   |  27  |  1.07    Ca    9.2      24 Oct 2022 06:25    TPro  6.4  /  Alb  3.0<L>  /  TBili  0.4  /  DBili  x   /  AST  20  /  ALT  28  /  AlkPhos  106  10-24    LIVER FUNCTIONS - ( 24 Oct 2022 06:25 )  Alb: 3.0 g/dL / Pro: 6.4 g/dL / ALK PHOS: 106 U/L / ALT: 28 U/L / AST: 20 U/L / GGT: x           Urinalysis Basic - ( 23 Oct 2022 15:12 )    Color: Yellow / Appearance: Clear / S.010 / pH: x  Gluc: x / Ketone: Negative  / Bili: Negative / Urobili: Negative   Blood: x / Protein: Negative / Nitrite: Negative   Leuk Esterase: Negative / RBC: Negative /HPF / WBC Negative /HPF   Sq Epi: x / Non Sq Epi: Neg.-Few / Bacteria: Negative /HPF      Culture - Urine (collected 22 Oct 2022 22:05)  Source: Clean Catch Clean Catch (Midstream)  Final Report (24 Oct 2022 08:11):    <10,000 CFU/mL Normal Urogenital Marcela    Urinalysis Basic - ( 23 Oct 2022 15:12 )    Color: Yellow / Appearance: Clear / S.010 / pH: x  Gluc: x / Ketone: Negative  / Bili: Negative / Urobili: Negative   Blood: x / Protein: Negative / Nitrite: Negative   Leuk Esterase: Negative / RBC: Negative /HPF / WBC Negative /HPF   Sq Epi: x / Non Sq Epi: Neg.-Few / Bacteria: Negative /HPF      Culture - Blood (collected 21 Oct 2022 09:25)  Source: .Blood Blood-Venous  Preliminary Report (22 Oct 2022 13:02):    No growth to date.    Culture - Blood (collected 21 Oct 2022 09:25)  Source: .Blood Blood-Venous  Preliminary Report (22 Oct 2022 13:02):    No growth to date.      MEDICATIONS  (STANDING):  clotrimazole 1% Cream 1 Application(s) Topical two times a day  ferrous    sulfate 325 milliGRAM(s) Oral daily  levothyroxine 75 MICROGram(s) Oral daily  losartan 100 milliGRAM(s) Oral daily  melatonin 3 milliGRAM(s) Oral <User Schedule>  metoprolol succinate  milliGRAM(s) Oral daily  nystatin Powder 1 Application(s) Topical two times a day  pantoprazole    Tablet 40 milliGRAM(s) Oral before breakfast  polyethylene glycol 3350 17 Gram(s) Oral daily  rivaroxaban 15 milliGRAM(s) Oral two times a day  senna 2 Tablet(s) Oral <User Schedule>  simvastatin 20 milliGRAM(s) Oral <User Schedule>    MEDICATIONS  (PRN):  acetaminophen     Tablet .. 650 milliGRAM(s) Oral every 6 hours PRN Temp greater or equal to 38C (100.4F), Mild Pain (1 - 3)  oxyCODONE    IR 5 milliGRAM(s) Oral every 6 hours PRN Severe Pain (7 - 10)        IDT conference 10/24  TDD: 10/29 to home  Barriers: LLE pain  SW: Lives alone, plans to go home with daughter and then transition to her home  OT: Mod I for ADLs and transfers.   PT: Min A for transfers. Ambulated 75ft with min A, 2 times.   SLP: N/A  Goals: Transfer with CG assist. Mod I with hip kit for LBD.

## 2022-10-25 NOTE — CHART NOTE - NSCHARTNOTEFT_GEN_A_CORE
Nutrition Follow Up Note  Hospital Course   (Per Electronic Medical Record)    Source:  Patient [X]  Medical Record [X]      Diet:   DASH-TLC Diet w/ Thin Liquids (IDDSI Level 0)  Tolerates Diet Consistency Well  No Chewing/Swallowing Difficulties  No Recent Nausea, Vomiting, Diarrhea or Constipation (as Per Patient)  Consumes % of Meals (as Per Documentation) - States Fair Intake (Per Patient)  Obtained Food Preferences from Patient  Family Brings in Food from Home     Enteral/Parenteral Nutrition: Not Applicable    Current Weight: 158.5lb on 10/23  Obtain New Weight to Confirm  Obtain Weights Weekly     Pertinent Medications: MEDICATIONS  (STANDING):  clotrimazole 1% Cream 1 Application(s) Topical two times a day  ferrous    sulfate 325 milliGRAM(s) Oral daily  levothyroxine 75 MICROGram(s) Oral daily  losartan 100 milliGRAM(s) Oral daily  melatonin 3 milliGRAM(s) Oral <User Schedule>  metoprolol succinate  milliGRAM(s) Oral daily  nystatin Powder 1 Application(s) Topical two times a day  pantoprazole    Tablet 40 milliGRAM(s) Oral before breakfast  polyethylene glycol 3350 17 Gram(s) Oral daily  rivaroxaban 15 milliGRAM(s) Oral two times a day  senna 2 Tablet(s) Oral <User Schedule>  simvastatin 20 milliGRAM(s) Oral <User Schedule>    MEDICATIONS  (PRN):  acetaminophen     Tablet .. 650 milliGRAM(s) Oral every 6 hours PRN Temp greater or equal to 38C (100.4F), Mild Pain (1 - 3)  oxyCODONE    IR 5 milliGRAM(s) Oral every 6 hours PRN Severe Pain (7 - 10)    Pertinent Labs:  10-24 Na138 mmol/L Glu 126 mg/dL<H> K+ 4.3 mmol/L Cr  1.07 mg/dL BUN 23 mg/dL 10-24 Alb 3.0 g/dL<L>    Skin: No Pressure Ulcers  Multiple Surgical Incisions  (as Per Nursing Flow Sheet)     Edema: None Noted (as Per Documentation)     Last Bowel Movement: on 10/24    Estimated Needs:   [X] No Change Since Previous Assessment    Previous Nutrition Diagnosis:   Obese    Nutrition Diagnosis is [X] Ongoing - Continue Nutrition Plan of Care     New Nutrition Diagnosis: [X] Not Applicable    Interventions:   1. Recommend Continue Nutrition Plan of Care     Monitoring & Evaluation:   [X] Weights   [X] PO Intake   [X] Skin Integrity   [X] Follow Up (Per Protocol)  [X] Tolerance to Diet Prescription   [X] Other: Labs     Registered Dietitian/Nutritionist Remains Available.  Feliciano Ling RDN    Phone# [phone]

## 2022-10-25 NOTE — PROGRESS NOTE ADULT - ASSESSMENT
82F with PMH HTN, HLD, hypothyroidism, hx of breast CA (s/p double mastectomy, last chemo 10/2019), s/p left hip fracture in [place] s/p repair. Found to have RLE DVT. Now admitted to [facility] for initiation of multidisciplinary rehab program.     #Left femur fracture, s/p repair in [place] on 10/11  likely due to age related osteoporosis  -Continue comprehensive rehab program   -Continue pain control with Tylenol PRN, Oxycodone PRN  -Continue iron daily    #DVT  -Continue Xarelto    #HTN  -Continue Losartan, Toprol  -Monitor vitals    #HLD  -Continue simvastatin    #CKD Stage 3  -Avoid nephrotoxic medications  -PO hydration encouraged  -Follow up routine BMP    #Hypothyroidism  -Continue Synthroid    #UTI  -Afebrile, leukocytosis resolved  -Lactic acidosis resolved s/p IVF  -Blood cultures x 2 no growth  -Completed course of abx  -ID following, recommendations appreciated    #Prophylactic Measure  -DVT ppx: Xarelto  -GI ppx: Protonix  -Bowel regimen

## 2022-10-25 NOTE — PROGRESS NOTE ADULT - SUBJECTIVE AND OBJECTIVE BOX
Patient is a 82y old  Female who presents with a chief complaint of left hip fracture (24 Oct 2022 16:28)      Patient seen and examined at bedside. No overnight events.  Participating in therapy. Asking about discharge date, offers no complaints.    REVIEW OF SYSTEMS:  CONSTITUTIONAL: No fever or chills  CARDIOVASCULAR: No chest pain, palpitations    ALLERGIES:  No Known Drug Allergies  shellfish (Unknown)    MEDICATIONS  (STANDING):  clotrimazole 1% Cream 1 Application(s) Topical two times a day  ferrous    sulfate 325 milliGRAM(s) Oral daily  levothyroxine 75 MICROGram(s) Oral daily  losartan 100 milliGRAM(s) Oral daily  melatonin 3 milliGRAM(s) Oral <User Schedule>  metoprolol succinate  milliGRAM(s) Oral daily  nystatin Powder 1 Application(s) Topical two times a day  pantoprazole    Tablet 40 milliGRAM(s) Oral before breakfast  polyethylene glycol 3350 17 Gram(s) Oral daily  rivaroxaban 15 milliGRAM(s) Oral two times a day  senna 2 Tablet(s) Oral <User Schedule>  simvastatin 20 milliGRAM(s) Oral <User Schedule>    MEDICATIONS  (PRN):  acetaminophen     Tablet .. 650 milliGRAM(s) Oral every 6 hours PRN Temp greater or equal to 38C (100.4F), Mild Pain (1 - 3)  oxyCODONE    IR 5 milliGRAM(s) Oral every 6 hours PRN Severe Pain (7 - 10)    Vital Signs Last 24 Hrs  T(F): 98.5 (25 Oct 2022 07:37), Max: 98.5 (25 Oct 2022 07:37)  HR: 74 (25 Oct 2022 07:37) (74 - 77)  BP: 114/73 (25 Oct 2022 07:37) (106/69 - 119/72)  RR: 16 (25 Oct 2022 07:37) (16 - 16)  SpO2: 94% (25 Oct 2022 07:37) (94% - 94%)  I&O's Summary        PHYSICAL EXAM:  GENERAL: NAD  HEENT:  AT/NC, anicteric, moist mucous membranes, EOMI, PERRL, no lid-lag, conjunctiva and sclera clear  CHEST/LUNG:  CTA b/l, no rales, wheezes, or rhonchi,  normal respiratory effort, no intercostal retractions  HEART:  RRR, S1, S2, no murmurs; trace pitting edema left hip / thigh  ABDOMEN:  BS+, soft, nontender, nondistended; No HSM  MSK/EXTREMITIES: palpable peripheral pulses, no clubbing or cyanosis; healing incision c/d/i mild erythema around site  NERVOUS SYSTEM: answers questions and follows commands appropriately A&Ox3 grossly moves all extremities   PSYCH: Appropriate affect, Alert & Awake; Good judgement    LABS: Personally reviewed                        11.2   8.60  )-----------( 444      ( 24 Oct 2022 06:25 )             33.8       10-24    138  |  103  |  23  ----------------------------<  126  4.3   |  27  |  1.07    Ca    9.2      24 Oct 2022 06:25    TPro  6.4  /  Alb  3.0  /  TBili  0.4  /  DBili  x   /  AST  20  /  ALT  28  /  AlkPhos  106  10-24                    Urinalysis Basic - ( 23 Oct 2022 15:12 )    Color: Yellow / Appearance: Clear / S.010 / pH: x  Gluc: x / Ketone: Negative  / Bili: Negative / Urobili: Negative   Blood: x / Protein: Negative / Nitrite: Negative   Leuk Esterase: Negative / RBC: Negative /HPF / WBC Negative /HPF   Sq Epi: x / Non Sq Epi: Neg.-Few / Bacteria: Negative /HPF        Culture - Urine (collected 22 Oct 2022 22:05)  Source: Clean Catch Clean Catch (Midstream)  Final Report (24 Oct 2022 08:11):    <10,000 CFU/mL Normal Urogenital Marcela    Culture - Blood (collected 21 Oct 2022 09:25)  Source: .Blood Blood-Venous  Preliminary Report (22 Oct 2022 13:02):    No growth to date.    Culture - Blood (collected 21 Oct 2022 09:25)  Source: .Blood Blood-Venous  Preliminary Report (22 Oct 2022 13:02):    No growth to date.    Urine Culture - 48 Hour Hold (collected 20 Oct 2022 15:00)  Source: .Urine  Final Report (21 Oct 2022 22:59):    Culture grew 3 or more types of organisms which indicate collection    contamination; consider recollection only if clinically indicated.      COVID-19 PCR: NotDetec (10-18-22 @ 18:40)  COVID-19 PCR: NotDetec (10-17-22 @ 18:20)      RADIOLOGY & ADDITIONAL TESTS: Personally reviewed    Medical management discussed with Dr. Ruhs (physiatry), s/p suture removal 10/24 tolerated well. Continue pain regimen and rehab program.

## 2022-10-26 LAB
CULTURE RESULTS: SIGNIFICANT CHANGE UP
CULTURE RESULTS: SIGNIFICANT CHANGE UP
SPECIMEN SOURCE: SIGNIFICANT CHANGE UP
SPECIMEN SOURCE: SIGNIFICANT CHANGE UP

## 2022-10-26 PROCEDURE — 99232 SBSQ HOSP IP/OBS MODERATE 35: CPT

## 2022-10-26 RX ADMIN — LOSARTAN POTASSIUM 100 MILLIGRAM(S): 100 TABLET, FILM COATED ORAL at 06:21

## 2022-10-26 RX ADMIN — Medication 325 MILLIGRAM(S): at 12:17

## 2022-10-26 RX ADMIN — Medication 650 MILLIGRAM(S): at 12:17

## 2022-10-26 RX ADMIN — SIMVASTATIN 20 MILLIGRAM(S): 20 TABLET, FILM COATED ORAL at 22:07

## 2022-10-26 RX ADMIN — Medication 200 MILLIGRAM(S): at 06:20

## 2022-10-26 RX ADMIN — Medication 1 APPLICATION(S): at 18:54

## 2022-10-26 RX ADMIN — PANTOPRAZOLE SODIUM 40 MILLIGRAM(S): 20 TABLET, DELAYED RELEASE ORAL at 06:21

## 2022-10-26 RX ADMIN — SENNA PLUS 2 TABLET(S): 8.6 TABLET ORAL at 22:07

## 2022-10-26 RX ADMIN — RIVAROXABAN 15 MILLIGRAM(S): KIT at 17:47

## 2022-10-26 RX ADMIN — RIVAROXABAN 15 MILLIGRAM(S): KIT at 06:21

## 2022-10-26 RX ADMIN — NYSTATIN CREAM 1 APPLICATION(S): 100000 CREAM TOPICAL at 22:16

## 2022-10-26 RX ADMIN — Medication 3 MILLIGRAM(S): at 22:07

## 2022-10-26 RX ADMIN — Medication 75 MICROGRAM(S): at 06:21

## 2022-10-26 RX ADMIN — NYSTATIN CREAM 1 APPLICATION(S): 100000 CREAM TOPICAL at 06:22

## 2022-10-26 NOTE — PROGRESS NOTE ADULT - ASSESSMENT
This is a 81 YO female with PMH  of HTN, HLD, hypothyroidism, Breast CA ( s/p double mastectomy, last chemo 10/2019), right hip replacement 2012, left femur fracture at age 5, presents to [facility] ED on 10/16 for left hip pain. Patient was in [place] where she fell and fractured her left hip and is s/p surgery in [facility] on 10/11.  She returned to [place] with her daughter in pain and inability to ambulate. Patient now with gait Instability, ADL impairments and Functional impairments.  -------------------------  # left Hip fracture  - s/p surgery- left hip ORIF in [facility] on 10/11  - Start Comprehensive Rehab Program: PT/OT, 3hours daily and 5 days weekly  - PT: Focused on improving strength, endurance, coordination, balance, functional mobility, and transfers  - OT: Focused on improving strength, fine motor skills, coordination, posture and ADLs.    - WB Status: WBAT LLE  - Sutures removed on 10/24  - Surgical site clean, dry and intact. Proximal surgical site covered with telfa and paper tape.     #Leukocytosis- resolved  - Afebrile  - WBC of 10.52 10/20  - Admits to dysuria- UA positive  - Bactrim BID started 10/20 PM, now DCd on 10/21  - WBC increased to 12.41 10/21  - Bld Cx x2 (10/21 - No growth, prelim) Lactate and CXR (10/21) - stable  - Lactate of 4.0  - 1000ml NS bolus  - ABX switched to Ceftriaxone daily for 3 days  - Repeat Lac of 1.8  - ID consulted  - S/p Ceftriaxone for 3 days  - UCx resulted contaminated; Repeat UA resulted negative  - WBC 8.60 on 10/24    #Perineal pruritis  - Lotrimin cream BID - improved    #HTN  - Losartan 100mg daily  - Metoprolol 200mg daily  - Norvasc 5mg daily- DCd (10/20) d/t orthostasis in PT (sbp 70s) - better now    #Lightheadedness - resolved  - Encourage PO fluids  - Check orthostatics- negative 10/20    #HLD  - Simvastatin 20mg daily    #Hypothyroidism  - Synthroid    Breast CA   - s/p double mastectomy, last chemo 10/2019    #Pain management  - Tylenol PRN, tramadol PRN, Oxycodone PRN    # RLE DVT - 10/17  - [id number] BID until 11/7/22; then to give 20mg with dinner starting on 11/8/22    #GI ppx  - Protonix 40mg    #Bowel Regimen  - Senna, miralax     #Bladder management  - C/o dysuria. UA positive (received abx)  - Encouraged to drink PO fluids  - UCx resulted contamination. Repeat UA resulted negative.  - BS on admission, and q 8 hours (SC if > 400) - PVR <100, dc BS  - toileting program    #Diet:   - Reg/thin - DASH    #Skin:  - Denuded/raw perineum-> Nystatin powder ordered 10/20  - Skin on admission: Left hip incision with sutures DESI  - Pressure injury/Skin: Turn Q2hrs while in bed, OOB to Chair, PT/OT     #Precaution  - Fall, Hip     ----------------------------------------------------------------------  Outpatient Follow-up (Specialty/Name of physician):  Arnoldo Win)  Orthopaedic Surgery  [address] [place] [address]  Phone: [phone]  Fax: [phone]

## 2022-10-26 NOTE — PROGRESS NOTE ADULT - SUBJECTIVE AND OBJECTIVE BOX
This is a 83 YO female with PMH  of HTN, HLD, hypothyroidism, Breast CA ( s/p double mastectomy, last chemo 10/2019), right hip replacement , left femur fracture at age 5,  presents to [facility] ED on 10/16 for left hip pain. Patient was in [place], fell and fractured her "left hip". According to her and her daughters "a margy and two screws were placed" on Tuesday 10/11 in [facility]. She was transferred home (to daughters and bedbound since surgery. She came back to [place] with her daughter. Patient was unable to bear weight and was having pain on her left hip. She lives alone. Additionally, she stated she was not given any pain medication. In ER, labs showed mild leukocytosis. She was found to have RLE DVT started on Xarelto. Patient was evaluated by PM&R and therapy for functional deficits, gait/ADL impairments and acute rehabilitation was recommended. Patient was medically optimized for discharge to [facility] IRU on 10/18/22.    Today's Subjective/ROS:  Patient seen and evaluated while resting in bed   Family training completed with daughter yesterday.  Did car transfer.  Overall feels comfortable with discharge for Saturday  left hip pain manageable on tylenol.  Incision healing well - dressing removed and DESI  Perineal pruritis improved with Clotrimazole cream BID.   Denies fever, chills, CP, SOB, cough, nausea, abd pain, dysuria, frequency.  LBM 10/25    Vital Signs Last 24 Hrs  T(C): 37 (10-25-22 @ 20:44), Max: 37 (10-25-22 @ 20:44)  T(F): 98.6 (10-25-22 @ 20:44), Max: 98.6 (10-25-22 @ 20:44)  HR: 69 (10-26-22 @ 06:18) (69 - 72)  BP: 118/61 (10-26-22 @ 06:18) (116/72 - 118/61)  RR: 16 (10-25-22 @ 20:44) (16 - 16)  SpO2: 95% (10-25-22 @ 20:44) (95% - 95%)    Gen - NAD, Comfortable  HEENT - NCAT, EOMI, MMM  Neck - Supple, No limited ROM  Pulm - No respiratory distress  Cardiovascular - warm and well perfused  Abdomen - Soft, NT/ND   Extremities - No clubbing, no cyanosis, +normal post op/trauma edema to left hip and thigh (improving),   Neuro-     Cognitive - Awake, Alert, Oriented  x 4     Communication - Fluent     Attention: Intact      Judgement: Good evidence of judgement     Cranial Nerves - CN 2-12 intact.      Motor -                     LEFT    UE - ShAB 5/5, EF 5/5, EE 5/5,  5/5                    RIGHT UE - ShAB 5/5, EF 5/5, EE 5/5,   5/5                    LEFT    LE - HF 4/5, KE 4/5, DF 5/5, PF 5/5- limited 2/2 pain                    RIGHT LE - HF 5/5, KE 5/5, DF 5/5, PF 5/5        Sensory - Intact to LT     Tone - normal  Psychiatric - Mood stable, Affect WNL  Skin:  left hip incision without open wound/drainage - DESI    LAB                        11.2   8.60  )-----------( 444      ( 24 Oct 2022 06:25 )             33.8     10-24    138  |  103  |  23  ----------------------------<  126<H>  4.3   |  27  |  1.07    Ca    9.2      24 Oct 2022 06:25    TPro  6.4  /  Alb  3.0<L>  /  TBili  0.4  /  DBili  x   /  AST  20  /  ALT  28  /  AlkPhos  106  10-24    LIVER FUNCTIONS - ( 24 Oct 2022 06:25 )  Alb: 3.0 g/dL / Pro: 6.4 g/dL / ALK PHOS: 106 U/L / ALT: 28 U/L / AST: 20 U/L / GGT: x           Urinalysis Basic - ( 23 Oct 2022 15:12 )  Color: Yellow / Appearance: Clear / S.010 / pH: x  Gluc: x / Ketone: Negative  / Bili: Negative / Urobili: Negative   Blood: x / Protein: Negative / Nitrite: Negative   Leuk Esterase: Negative / RBC: Negative /HPF / WBC Negative /HPF   Sq Epi: x / Non Sq Epi: Neg.-Few / Bacteria: Negative /HPF    Culture - Urine (collected 22 Oct 2022 22:05)  Source: Clean Catch Clean Catch (Midstream)  Final Report (24 Oct 2022 08:11):    <10,000 CFU/mL Normal Urogenital Marcela    MEDICATIONS  (STANDING):  clotrimazole 1% Cream 1 Application(s) Topical two times a day  ferrous    sulfate 325 milliGRAM(s) Oral daily  levothyroxine 75 MICROGram(s) Oral daily  losartan 100 milliGRAM(s) Oral daily  melatonin 3 milliGRAM(s) Oral <User Schedule>  metoprolol succinate  milliGRAM(s) Oral daily  nystatin Powder 1 Application(s) Topical two times a day  pantoprazole    Tablet 40 milliGRAM(s) Oral before breakfast  polyethylene glycol 3350 17 Gram(s) Oral daily  rivaroxaban 15 milliGRAM(s) Oral two times a day  senna 2 Tablet(s) Oral <User Schedule>  simvastatin 20 milliGRAM(s) Oral <User Schedule>    MEDICATIONS  (PRN):  acetaminophen     Tablet .. 650 milliGRAM(s) Oral every 6 hours PRN Temp greater or equal to 38C (100.4F), Mild Pain (1 - 3)      IDT 10/24  TDD: 10/29 to home  Barriers: LLE pain  SW: Lives alone, plans to go home with daughter and then transition to her home  OT: Mod I for ADLs and transfers.   PT: Min A for transfers. Ambulated 75ft with min A, 2 times.   SLP: N/A  Goals: Transfer with CG assist. Mod I with hip kit for LBD.

## 2022-10-26 NOTE — PROGRESS NOTE ADULT - SUBJECTIVE AND OBJECTIVE BOX
Patient is a 82y old  Female who presents with a chief complaint of left hip fracture (25 Oct 2022 08:08)      Patient seen and examined at bedside. No overnight events.  Participating in therapy. Pain well controlled.    REVIEW OF SYSTEMS:  CONSTITUTIONAL: No fever or chills  CARDIOVASCULAR: No chest pain, palpitations  GASTROINTESTINAL: No abd pain, nausea, vomiting, or diarrhea    ALLERGIES:  No Known Drug Allergies  shellfish (Unknown)    MEDICATIONS  (STANDING):  clotrimazole 1% Cream 1 Application(s) Topical two times a day  ferrous    sulfate 325 milliGRAM(s) Oral daily  levothyroxine 75 MICROGram(s) Oral daily  losartan 100 milliGRAM(s) Oral daily  melatonin 3 milliGRAM(s) Oral <User Schedule>  metoprolol succinate  milliGRAM(s) Oral daily  nystatin Powder 1 Application(s) Topical two times a day  pantoprazole    Tablet 40 milliGRAM(s) Oral before breakfast  polyethylene glycol 3350 17 Gram(s) Oral daily  rivaroxaban 15 milliGRAM(s) Oral two times a day  senna 2 Tablet(s) Oral <User Schedule>  simvastatin 20 milliGRAM(s) Oral <User Schedule>    MEDICATIONS  (PRN):  acetaminophen     Tablet .. 650 milliGRAM(s) Oral every 6 hours PRN Temp greater or equal to 38C (100.4F), Mild Pain (1 - 3)    Vital Signs Last 24 Hrs  T(F): 98.6 (25 Oct 2022 20:44), Max: 98.6 (25 Oct 2022 20:44)  HR: 69 (26 Oct 2022 06:18) (69 - 72)  BP: 118/61 (26 Oct 2022 06:18) (116/72 - 118/61)  RR: 16 (25 Oct 2022 20:44) (16 - 16)  SpO2: 95% (25 Oct 2022 20:44) (95% - 95%)  I&O's Summary        PHYSICAL EXAM:  GENERAL: NAD  HEENT:  AT/NC, anicteric, moist mucous membranes, EOMI, PERRL, no lid-lag, conjunctiva and sclera clear  CHEST/LUNG:  CTA b/l, no rales, wheezes, or rhonchi,  normal respiratory effort, no intercostal retractions  HEART:  RRR, S1, S2, no murmurs; trace pitting edema left hip / thigh  ABDOMEN:  BS+, soft, nontender, nondistended; No HSM  MSK/EXTREMITIES: palpable peripheral pulses, no clubbing or cyanosis; healing incision c/d/i   NERVOUS SYSTEM: answers questions and follows commands appropriately A&Ox3 grossly moves all extremities   PSYCH: Appropriate affect, Alert & Awake; Good judgement    LABS: Personally reviewed                        11.2   8.60  )-----------( 444      ( 24 Oct 2022 06:25 )             33.8       10-24    138  |  103  |  23  ----------------------------<  126  4.3   |  27  |  1.07    Ca    9.2      24 Oct 2022 06:25    TPro  6.4  /  Alb  3.0  /  TBili  0.4  /  DBili  x   /  AST  20  /  ALT  28  /  AlkPhos  106  10-24                                  Urinalysis Basic - ( 23 Oct 2022 15:12 )    Color: Yellow / Appearance: Clear / S.010 / pH: x  Gluc: x / Ketone: Negative  / Bili: Negative / Urobili: Negative   Blood: x / Protein: Negative / Nitrite: Negative   Leuk Esterase: Negative / RBC: Negative /HPF / WBC Negative /HPF   Sq Epi: x / Non Sq Epi: Neg.-Few / Bacteria: Negative /HPF        Culture - Urine (collected 22 Oct 2022 22:05)  Source: Clean Catch Clean Catch (Midstream)  Final Report (24 Oct 2022 08:11):    <10,000 CFU/mL Normal Urogenital Marcela    Culture - Blood (collected 21 Oct 2022 09:25)  Source: .Blood Blood-Venous  Preliminary Report (22 Oct 2022 13:02):    No growth to date.    Culture - Blood (collected 21 Oct 2022 09:25)  Source: .Blood Blood-Venous  Preliminary Report (22 Oct 2022 13:02):    No growth to date.    Urine Culture - 48 Hour Hold (collected 20 Oct 2022 15:00)  Source: .Urine  Final Report (21 Oct 2022 22:59):    Culture grew 3 or more types of organisms which indicate collection    contamination; consider recollection only if clinically indicated.      COVID-19 PCR: NotDetec (10-25-22 @ 05:40)  COVID-19 PCR: NotDetec (10-18-22 @ 18:40)  COVID-19 PCR: NotDetec (10-17-22 @ 18:20)      RADIOLOGY & ADDITIONAL TESTS: Personally reviewed    Medical management discussed with Dr. Rush (physiatry), continue current pain regimen and rehab program.

## 2022-10-26 NOTE — PROGRESS NOTE ADULT - ASSESSMENT
82F with PMH HTN, HLD, hypothyroidism, hx of breast CA (s/p double mastectomy, last chemo 10/2019), s/p left hip fracture in [place] s/p repair. Found to have RLE DVT. Now admitted to [facility] for initiation of multidisciplinary rehab program.     #Left femur fracture, s/p repair in [place] on 10/11  likely due to age related osteoporosis  -Continue comprehensive rehab program   -Continue pain control with Tylenol PRN, Oxycodone PRN  -Continue iron daily    #DVT  -Continue Xarelto    #HTN  -Continue Losartan, Toprol  -Monitor vitals    #HLD  -Continue simvastatin    #CKD Stage 3  -Avoid nephrotoxic medications  -PO hydration encouraged  -Follow up routine BMP    #Hypothyroidism  -Continue Synthroid    #UTI  -Afebrile, leukocytosis resolved  -Lactic acidosis resolved s/p IVF  -Blood cultures x 2 no growth  -Completed course of abx  -ID following, recommendations appreciated    #Prophylactic Measure  -DVT ppx: Xarelto  -GI ppx: Protonix  -Bowel regimen

## 2022-10-27 ENCOUNTER — TRANSCRIPTION ENCOUNTER (OUTPATIENT)
Age: 82
End: 2022-10-27

## 2022-10-27 LAB
ALBUMIN SERPL ELPH-MCNC: 3.1 G/DL — LOW (ref 3.3–5)
ALP SERPL-CCNC: 122 U/L — HIGH (ref 40–120)
ALT FLD-CCNC: 18 U/L — SIGNIFICANT CHANGE UP (ref 10–45)
ANION GAP SERPL CALC-SCNC: 4 MMOL/L — LOW (ref 5–17)
AST SERPL-CCNC: 14 U/L — SIGNIFICANT CHANGE UP (ref 10–40)
BASOPHILS # BLD AUTO: 0.05 K/UL — SIGNIFICANT CHANGE UP (ref 0–0.2)
BASOPHILS NFR BLD AUTO: 0.8 % — SIGNIFICANT CHANGE UP (ref 0–2)
BILIRUB SERPL-MCNC: 0.4 MG/DL — SIGNIFICANT CHANGE UP (ref 0.2–1.2)
BUN SERPL-MCNC: 27 MG/DL — HIGH (ref 7–23)
CALCIUM SERPL-MCNC: 9.4 MG/DL — SIGNIFICANT CHANGE UP (ref 8.4–10.5)
CHLORIDE SERPL-SCNC: 109 MMOL/L — HIGH (ref 96–108)
CO2 SERPL-SCNC: 31 MMOL/L — SIGNIFICANT CHANGE UP (ref 22–31)
CREAT SERPL-MCNC: 0.98 MG/DL — SIGNIFICANT CHANGE UP (ref 0.5–1.3)
EGFR: 58 ML/MIN/1.73M2 — LOW
EOSINOPHIL # BLD AUTO: 0.15 K/UL — SIGNIFICANT CHANGE UP (ref 0–0.5)
EOSINOPHIL NFR BLD AUTO: 2.3 % — SIGNIFICANT CHANGE UP (ref 0–6)
GLUCOSE SERPL-MCNC: 117 MG/DL — HIGH (ref 70–99)
HCT VFR BLD CALC: 36.9 % — SIGNIFICANT CHANGE UP (ref 34.5–45)
HGB BLD-MCNC: 11.7 G/DL — SIGNIFICANT CHANGE UP (ref 11.5–15.5)
IMM GRANULOCYTES NFR BLD AUTO: 1.5 % — HIGH (ref 0–0.9)
LYMPHOCYTES # BLD AUTO: 1.74 K/UL — SIGNIFICANT CHANGE UP (ref 1–3.3)
LYMPHOCYTES # BLD AUTO: 26.2 % — SIGNIFICANT CHANGE UP (ref 13–44)
MCHC RBC-ENTMCNC: 27.9 PG — SIGNIFICANT CHANGE UP (ref 27–34)
MCHC RBC-ENTMCNC: 31.7 GM/DL — LOW (ref 32–36)
MCV RBC AUTO: 87.9 FL — SIGNIFICANT CHANGE UP (ref 80–100)
MONOCYTES # BLD AUTO: 0.52 K/UL — SIGNIFICANT CHANGE UP (ref 0–0.9)
MONOCYTES NFR BLD AUTO: 7.8 % — SIGNIFICANT CHANGE UP (ref 2–14)
NEUTROPHILS # BLD AUTO: 4.08 K/UL — SIGNIFICANT CHANGE UP (ref 1.8–7.4)
NEUTROPHILS NFR BLD AUTO: 61.4 % — SIGNIFICANT CHANGE UP (ref 43–77)
NRBC # BLD: 0 /100 WBCS — SIGNIFICANT CHANGE UP (ref 0–0)
PLATELET # BLD AUTO: 424 K/UL — HIGH (ref 150–400)
POTASSIUM SERPL-MCNC: 5.1 MMOL/L — SIGNIFICANT CHANGE UP (ref 3.5–5.3)
POTASSIUM SERPL-SCNC: 5.1 MMOL/L — SIGNIFICANT CHANGE UP (ref 3.5–5.3)
PROT SERPL-MCNC: 6.4 G/DL — SIGNIFICANT CHANGE UP (ref 6–8.3)
RBC # BLD: 4.2 M/UL — SIGNIFICANT CHANGE UP (ref 3.8–5.2)
RBC # FLD: 14.3 % — SIGNIFICANT CHANGE UP (ref 10.3–14.5)
SODIUM SERPL-SCNC: 144 MMOL/L — SIGNIFICANT CHANGE UP (ref 135–145)
WBC # BLD: 6.64 K/UL — SIGNIFICANT CHANGE UP (ref 3.8–10.5)
WBC # FLD AUTO: 6.64 K/UL — SIGNIFICANT CHANGE UP (ref 3.8–10.5)

## 2022-10-27 PROCEDURE — 99232 SBSQ HOSP IP/OBS MODERATE 35: CPT

## 2022-10-27 RX ADMIN — RIVAROXABAN 15 MILLIGRAM(S): KIT at 17:46

## 2022-10-27 RX ADMIN — RIVAROXABAN 15 MILLIGRAM(S): KIT at 05:54

## 2022-10-27 RX ADMIN — SIMVASTATIN 20 MILLIGRAM(S): 20 TABLET, FILM COATED ORAL at 20:45

## 2022-10-27 RX ADMIN — Medication 325 MILLIGRAM(S): at 11:37

## 2022-10-27 RX ADMIN — NYSTATIN CREAM 1 APPLICATION(S): 100000 CREAM TOPICAL at 06:00

## 2022-10-27 RX ADMIN — Medication 200 MILLIGRAM(S): at 05:54

## 2022-10-27 RX ADMIN — Medication 1 APPLICATION(S): at 06:00

## 2022-10-27 RX ADMIN — SENNA PLUS 2 TABLET(S): 8.6 TABLET ORAL at 20:45

## 2022-10-27 RX ADMIN — Medication 3 MILLIGRAM(S): at 20:45

## 2022-10-27 RX ADMIN — Medication 75 MICROGRAM(S): at 05:54

## 2022-10-27 RX ADMIN — PANTOPRAZOLE SODIUM 40 MILLIGRAM(S): 20 TABLET, DELAYED RELEASE ORAL at 05:54

## 2022-10-27 RX ADMIN — Medication 1 APPLICATION(S): at 17:48

## 2022-10-27 RX ADMIN — LOSARTAN POTASSIUM 100 MILLIGRAM(S): 100 TABLET, FILM COATED ORAL at 05:54

## 2022-10-27 RX ADMIN — Medication 650 MILLIGRAM(S): at 11:35

## 2022-10-27 RX ADMIN — NYSTATIN CREAM 1 APPLICATION(S): 100000 CREAM TOPICAL at 17:48

## 2022-10-27 NOTE — DISCHARGE NOTE NURSING/CASE MANAGEMENT/SOCIAL WORK - NSDCPEFALRISK_GEN_ALL_CORE
For information on Fall & Injury Prevention, visit: [email or web address]  [email or web address] OR  https://www.cdc.gov/steadi/patient.html

## 2022-10-27 NOTE — PROGRESS NOTE ADULT - ASSESSMENT
83 y/o F with PMH HTN, HLD, hypothyroidism, hx of breast CA (s/p double mastectomy, last chemo 10/2019), s/p left hip fracture in [place] s/p repair. Found to have RLE DVT. Now admitted to [facility] for initiation of multidisciplinary rehab program.     #Left femur fracture, s/p repair in [place] on 10/11  likely due to age related osteoporosis  -Continue comprehensive rehab program - PT/OT/SLP per rehab team  -Pain management, bowel regimen per rehab  -Continue iron daily    #DVT  -Continue Xarelto 15mg BID until 11/7 then continue 20mg qd with dinner 11/8    #HTN  -Continue Losartan, Toprol    #HLD  -Continue simvastatin    #CKD Stage 3  -Avoid nephrotoxic medications  -PO hydration encouraged    #Hypothyroidism  -Continue Synthroid    #UTI - resolved  -Blood cultures x 2 no growth  -Completed course of abx  -ID appreciated    #DVT ppx - Xarelto  #GI ppx - Protonix

## 2022-10-27 NOTE — PROGRESS NOTE ADULT - ASSESSMENT
81 YO female with PMH  of HTN, HLD, hypothyroidism, Breast CA ( s/p double mastectomy, last chemo 10/2019), right hip replacement 2012, left femur fracture at age 5, presents to [facility] ED on 10/16 for left hip pain. Patient was in [place] where she fell and fractured her left hip and is s/p surgery in [facility] on 10/11.  She returned to [place] with her daughter in pain and inability to ambulate. Patient now with gait Instability, ADL impairments and Functional impairments.  -------------------------  # left Hip fracture  - s/p surgery- left hip ORIF in [facility] on 10/11  - Start Comprehensive Rehab Program: PT/OT, 3hours daily and 5 days weekly  - PT: Focused on improving strength, endurance, coordination, balance, functional mobility, and transfers  - OT: Focused on improving strength, fine motor skills, coordination, posture and ADLs.    - WB Status: WBAT LLE  - Sutures removed on 10/24  - Surgical incisions DESI - clean, dry and intact    #Leukocytosis- resolved  - Afebrile  - WBC of 10.52 10/20  - Admits to dysuria- UA positive  - Bactrim BID started 10/20 PM, now DCd on 10/21  - WBC increased to 12.41 10/21  - Bld Cx x2 (10/21 - No growth, final) Lactate and CXR (10/21) - stable  - Lactate of 4.0  - 1000ml NS bolus  - ABX switched to Ceftriaxone daily for 3 days  - Repeat Lac of 1.8  - ID consulted  - S/p Ceftriaxone for 3 days  - UCx resulted contaminated; Repeat UA resulted negative  - WBC 6.64 on 10/24    #Perineal pruritis  - Lotrimin cream BID - improved    #HTN  - Losartan 100mg daily  - Metoprolol 200mg daily  - Norvasc 5mg daily- DCd (10/20) d/t orthostasis in PT (sbp 70s) - better now    #Lightheadedness - baseline  - Encourage PO fluids  - Check orthostatics- negative 10/20    #HLD  - Simvastatin 20mg daily    #Hypothyroidism  - Synthroid    Breast CA   - s/p double mastectomy, last chemo 10/2019    #Pain management  - Tylenol PRN, tramadol PRN, Oxycodone PRN    # RLE DVT - 10/17  - [id number] BID until 11/7/22; then to give 20mg with dinner starting on 11/8/22    #GI ppx  - Protonix 40mg    #Bowel Regimen  - Senna, miralax     #Bladder management  - C/o dysuria. UA positive (received abx)  - Encouraged to drink PO fluids  - UCx resulted contamination. Repeat UA resulted negative.  - BS on admission, and q 8 hours (SC if > 400) - PVR <100, dc BS  - toileting program    #Diet:   - Reg/thin - DASH    #Skin:  - Denuded/raw perineum-> Nystatin powder ordered 10/20  - Skin on admission: Left hip incision with sutures DESI  - Pressure injury/Skin: Turn Q2hrs while in bed, OOB to Chair, PT/OT     #Precaution  - Fall, Hip     ----------------------------------------------------------------------  Outpatient Follow-up (Specialty/Name of physician):  Arnoldo Win)  Orthopaedic Surgery  [address] [place] [address]  Phone: [phone]  Fax: [phone]

## 2022-10-27 NOTE — DISCHARGE NOTE NURSING/CASE MANAGEMENT/SOCIAL WORK - PATIENT PORTAL LINK FT
You can access the FollowMyHealth Patient Portal offered by [facility] by registering at the following website: [email or web address]. By joining [employer]’s FollowMyHealth portal, you will also be able to view your health information using other applications (apps) compatible with our system.

## 2022-10-27 NOTE — PROGRESS NOTE ADULT - SUBJECTIVE AND OBJECTIVE BOX
This is a 81 YO female with PMH  of HTN, HLD, hypothyroidism, Breast CA ( s/p double mastectomy, last chemo 10/2019), right hip replacement 2012, left femur fracture at age 5,  presents to [facility] ED on 10/16 for left hip pain. Patient was in [place], fell and fractured her "left hip". According to her and her daughters "a margy and two screws were placed" on Tuesday 10/11 in [facility]. She was transferred home (to daughters and bedbound since surgery. She came back to [place] with her daughter. Patient was unable to bear weight and was having pain on her left hip. She lives alone. Additionally, she stated she was not given any pain medication. In ER, labs showed mild leukocytosis. She was found to have RLE DVT started on Xarelto. Patient was evaluated by PM&R and therapy for functional deficits, gait/ADL impairments and acute rehabilitation was recommended. Patient was medically optimized for discharge to [facility] IRU on 10/18/22.    Today's Subjective/ROS:  Patient seen and evaluated while dangled at bedside  Slept well overnight  Mild HA and lightheadedness this morning - states she gets them intermittently at baseline.  Takes tylenol at home which helps  left hip pain tolerable - incisions DESI  Tolerating therapy.  OT staid family training went well with daughter.  Patient is overall CG  Denies fever, chills, CP, SOB, cough, nausea, abd pain, dysuria, frequency.  LBM this morning (10/27)    Vital Signs Last 24 Hrs  T(C): 36.6 (10-27-22 @ 07:44), Max: 36.8 (10-26-22 @ 22:11)  T(F): 97.9 (10-27-22 @ 07:44), Max: 98.3 (10-26-22 @ 22:11)  HR: 65 (10-27-22 @ 07:44) (65 - 70)  BP: 110/67 (10-27-22 @ 07:44) (103/58 - 119/77)  RR: 16 (10-27-22 @ 07:44) (16 - 16)  SpO2: 96% (10-27-22 @ 07:44) (93% - 96%)    Gen - NAD, Comfortable  HEENT - NCAT, EOMI, MMM  Neck - Supple, No limited ROM  Pulm - No respiratory distress  Cardiovascular - warm and well perfused  Abdomen - Soft, NT/ND   Extremities - No clubbing, no cyanosis, +normal post op/trauma edema to left hip and thigh (improving),   Neuro-     Cognitive - Awake, Alert, Oriented  x 4     Communication - Fluent     Cranial Nerves - CN 2-12 intact.      Motor -                     LEFT    UE - ShAB 5/5, EF 5/5, EE 5/5,  5/5                    RIGHT UE - ShAB 5/5, EF 5/5, EE 5/5,   5/5                    LEFT    LE - HF 4/5, KE 4/5, DF 5/5, PF 5/5- limited 2/2 pain                    RIGHT LE - HF 5/5, KE 5/5, DF 5/5, PF 5/5        Sensory - Intact to LT     Tone - normal  Psychiatric - Mood stable, Affect WNL  Skin:  left hip incisions DESI - closed, no drainage    LAB                        11.7   6.64  )-----------( 424      ( 27 Oct 2022 06:14 )             36.9     10-27    144  |  109<H>  |  27<H>  ----------------------------<  117<H>  5.1   |  31  |  0.98    Ca    9.4      27 Oct 2022 06:14    TPro  6.4  /  Alb  3.1<L>  /  TBili  0.4  /  DBili  x   /  AST  14  /  ALT  18  /  AlkPhos  122<H>  10-27    LIVER FUNCTIONS - ( 27 Oct 2022 06:14 )  Alb: 3.1 g/dL / Pro: 6.4 g/dL / ALK PHOS: 122 U/L / ALT: 18 U/L / AST: 14 U/L / GGT: x           MEDICATIONS  (STANDING):  clotrimazole 1% Cream 1 Application(s) Topical two times a day  ferrous    sulfate 325 milliGRAM(s) Oral daily  levothyroxine 75 MICROGram(s) Oral daily  losartan 100 milliGRAM(s) Oral daily  melatonin 3 milliGRAM(s) Oral <User Schedule>  metoprolol succinate  milliGRAM(s) Oral daily  nystatin Powder 1 Application(s) Topical two times a day  pantoprazole    Tablet 40 milliGRAM(s) Oral before breakfast  polyethylene glycol 3350 17 Gram(s) Oral daily  rivaroxaban 15 milliGRAM(s) Oral two times a day  senna 2 Tablet(s) Oral <User Schedule>  simvastatin 20 milliGRAM(s) Oral <User Schedule>    MEDICATIONS  (PRN):  acetaminophen     Tablet .. 650 milliGRAM(s) Oral every 6 hours PRN Temp greater or equal to 38C (100.4F), Mild Pain (1 - 3)    IDT 10/24  TDD: 10/29 to home  Barriers: LLE pain  SW: Lives alone, plans to go home with daughter and then transition to her home  OT: Mod I for ADLs and transfers.   PT: Min A for transfers. Ambulated 75ft with min A, 2 times.   SLP: N/A  Goals: Transfer with CG assist. Mod I with hip kit for LBD.

## 2022-10-27 NOTE — PROGRESS NOTE ADULT - SUBJECTIVE AND OBJECTIVE BOX
Patient is a 82y old  Female who presents with a chief complaint of left hip fracture (26 Oct 2022 10:39)      Patient seen and examined at bedside, stable, NAD. denies headache, fever, chills, cp, sob, n/v, abd pain.    ALLERGIES:  No Known Drug Allergies  shellfish (Unknown)    MEDICATIONS  (STANDING):  clotrimazole 1% Cream 1 Application(s) Topical two times a day  ferrous    sulfate 325 milliGRAM(s) Oral daily  levothyroxine 75 MICROGram(s) Oral daily  losartan 100 milliGRAM(s) Oral daily  melatonin 3 milliGRAM(s) Oral <User Schedule>  metoprolol succinate  milliGRAM(s) Oral daily  nystatin Powder 1 Application(s) Topical two times a day  pantoprazole    Tablet 40 milliGRAM(s) Oral before breakfast  polyethylene glycol 3350 17 Gram(s) Oral daily  rivaroxaban 15 milliGRAM(s) Oral two times a day  senna 2 Tablet(s) Oral <User Schedule>  simvastatin 20 milliGRAM(s) Oral <User Schedule>    MEDICATIONS  (PRN):  acetaminophen     Tablet .. 650 milliGRAM(s) Oral every 6 hours PRN Temp greater or equal to 38C (100.4F), Mild Pain (1 - 3)    Vital Signs Last 24 Hrs  T(F): 97.9 (27 Oct 2022 07:44), Max: 98.3 (26 Oct 2022 22:11)  HR: 65 (27 Oct 2022 07:44) (65 - 70)  BP: 110/67 (27 Oct 2022 07:44) (103/58 - 119/77)  RR: 16 (27 Oct 2022 07:44) (16 - 16)  SpO2: 96% (27 Oct 2022 07:44) (93% - 96%)  I&O's Summary        PHYSICAL EXAM:  General: NAD, A/O x 3  ENT: MMM, no scleral icterus  Neck: Supple, No JVD  Lungs: Clear to auscultation bilaterally, no wheezes, rales, rhonchi  Cardio: RRR, S1/S2  Abdomen: Soft, Nontender, Nondistended; Bowel sounds present  Extremities: No calf tenderness, No pitting edema    LABS:                        11.7   6.64  )-----------( 424      ( 27 Oct 2022 06:14 )             36.9       10-27    144  |  109  |  27  ----------------------------<  117  5.1   |  31  |  0.98    Ca    9.4      27 Oct 2022 06:14    TPro  6.4  /  Alb  3.1  /  TBili  0.4  /  DBili  x   /  AST  14  /  ALT  18  /  AlkPhos  122  10-27                                      Culture - Urine (collected 22 Oct 2022 22:05)  Source: Clean Catch Clean Catch (Midstream)  Final Report (24 Oct 2022 08:11):    <10,000 CFU/mL Normal Urogenital Marcela    Culture - Blood (collected 21 Oct 2022 09:25)  Source: .Blood Blood-Venous  Final Report (26 Oct 2022 13:00):    No Growth Final    Culture - Blood (collected 21 Oct 2022 09:25)  Source: .Blood Blood-Venous  Final Report (26 Oct 2022 13:00):    No Growth Final    Urine Culture - 48 Hour Hold (collected 20 Oct 2022 15:00)  Source: .Urine  Final Report (21 Oct 2022 22:59):    Culture grew 3 or more types of organisms which indicate collection    contamination; consider recollection only if clinically indicated.      COVID-19 PCR: NotDetec (10-25-22 @ 05:40)  COVID-19 PCR: NotDetec (10-18-22 @ 18:40)  COVID-19 PCR: NotDetec (10-17-22 @ 18:20)      RADIOLOGY & ADDITIONAL TESTS: reviewed    Care Discussed with Consultants/Other Providers: yes, rehab

## 2022-10-28 PROCEDURE — 99232 SBSQ HOSP IP/OBS MODERATE 35: CPT

## 2022-10-28 RX ADMIN — NYSTATIN CREAM 1 APPLICATION(S): 100000 CREAM TOPICAL at 06:01

## 2022-10-28 RX ADMIN — RIVAROXABAN 15 MILLIGRAM(S): KIT at 06:01

## 2022-10-28 RX ADMIN — Medication 325 MILLIGRAM(S): at 11:25

## 2022-10-28 RX ADMIN — SENNA PLUS 2 TABLET(S): 8.6 TABLET ORAL at 21:08

## 2022-10-28 RX ADMIN — Medication 650 MILLIGRAM(S): at 18:03

## 2022-10-28 RX ADMIN — Medication 1 APPLICATION(S): at 21:12

## 2022-10-28 RX ADMIN — PANTOPRAZOLE SODIUM 40 MILLIGRAM(S): 20 TABLET, DELAYED RELEASE ORAL at 06:00

## 2022-10-28 RX ADMIN — Medication 75 MICROGRAM(S): at 06:00

## 2022-10-28 RX ADMIN — Medication 1 APPLICATION(S): at 06:00

## 2022-10-28 RX ADMIN — Medication 3 MILLIGRAM(S): at 21:07

## 2022-10-28 RX ADMIN — Medication 200 MILLIGRAM(S): at 06:00

## 2022-10-28 RX ADMIN — SIMVASTATIN 20 MILLIGRAM(S): 20 TABLET, FILM COATED ORAL at 21:08

## 2022-10-28 RX ADMIN — NYSTATIN CREAM 1 APPLICATION(S): 100000 CREAM TOPICAL at 21:08

## 2022-10-28 RX ADMIN — Medication 650 MILLIGRAM(S): at 18:45

## 2022-10-28 RX ADMIN — RIVAROXABAN 15 MILLIGRAM(S): KIT at 17:07

## 2022-10-28 NOTE — PROGRESS NOTE ADULT - NS ATTEND BILL GEN_ALL_CORE
Attending to bill
Attending to bill
PA/NP to bill
Attending to bill

## 2022-10-28 NOTE — PROGRESS NOTE ADULT - NS ATTEND OPT1 GEN_ALL_CORE
I independently performed the documented:
I attest my time as attending is greater than 50% of the total combined time spent on qualifying patient care activities by the PA/NP and attending.

## 2022-10-28 NOTE — PROGRESS NOTE ADULT - NUTRITIONAL ASSESSMENT
Diet, DASH/TLC:   Sodium & Cholesterol Restricted (10-18-22 @ 15:55) [Active]

## 2022-10-28 NOTE — PROGRESS NOTE ADULT - NS ATTEND AMEND GEN_ALL_CORE FT
Gen - NAD, Comfortable  HEENT - NCAT, EOMI, MMM  Neck - Supple, No limited ROM  Pulm - No respiratory distress  Cardiovascular - warm and well perfused  Abdomen - Soft, NT/ND   Extremities - No edema   Neuro-     Cognitive - Awake, Alert, Oriented  x 4     Communication - Fluent     Attention: Intact      Judgement: Good evidence of judgement     Cranial Nerves - CN 2-12 intact.      Motor - unchanged      Sensory - Intact to LT     Tone - normal  Psychiatric - Mood stable, Affect WNL  Skin:  left hip incision intact
Gen - NAD, Comfortable  HEENT - NCAT, EOMI, MMM  Neck - Supple, No limited ROM  Pulm - No respiratory distress  Cardiovascular - warm and well perfused  Abdomen - Soft, NT/ND   Extremities - No clubbing, no cyanosis, +normal post op/trauma edema to left hip and thigh (improving),   Neuro-     Cognitive - Awake, Alert, Oriented  x 4     Communication - Fluent     Cranial Nerves - CN 2-12 intact.      Motor -                     LEFT    UE - ShAB 5/5, EF 5/5, EE 5/5,  5/5                    RIGHT UE - ShAB 5/5, EF 5/5, EE 5/5,   5/5                    LEFT    LE - HF 4/5, KE 4/5, DF 5/5, PF 5/5- limited 2/2 pain                    RIGHT LE - HF 5/5, KE 5/5, DF 5/5, PF 5/5        Sensory - Intact to LT     Tone - normal  Psychiatric - Mood stable, Affect WNL  Skin:  left hip incisions DESI - closed, no drainage
Patient ready for dc tomorrow  Pain control- tylenol  DVT-xarelto  PT/OT  Discussed recovery
Patient slept well last PM  Dysuria is less  Events in the last 24 hours noted  Wbc elevated  Patient appears nontoxic  Blood culture, repeat Cxray, urine culture results pending  ID consult, Hospitalist recommendations appreciated  Patient still of restorative therapy program as tolerated
This is a 81 YO female with PMH  of HTN, HLD, hypothyroidism, Breast CA ( s/p double mastectomy, last chemo 10/2019), right hip replacement 2012, left femur fracture at age 5, presents to [facility] ED on 10/16 for left hip pain. Patient was in [place] where she fell and fractured her left hip and is s/p surgery in [facility] on 10/11.  She returned to [place] with her daughter in pain and inability to ambulate. Patient now with gait Instability, ADL impairments and Functional impairments.  -------------------------  # left Hip fracture  - s/p surgery- left hip ORIF in [facility] on 10/11  - Start Comprehensive Rehab Program: PT/OT, 3hours daily and 5 days weekly  - PT: Focused on improving strength, endurance, coordination, balance, functional mobility, and transfers  - OT: Focused on improving strength, fine motor skills, coordination, posture and ADLs.    - WB Status: WBAT LLE  - leukocytosis improved   vaginal itching-lotrimin cream

## 2022-10-28 NOTE — PROGRESS NOTE ADULT - ASSESSMENT
81 YO female with PMH  of HTN, HLD, hypothyroidism, Breast CA ( s/p double mastectomy, last chemo 10/2019), right hip replacement 2012, left femur fracture at age 5, presents to [facility] ED on 10/16 for left hip pain. Patient was in [place] where she fell and fractured her left hip and is s/p surgery in [facility] on 10/11.  She returned to [place] with her daughter in pain and inability to ambulate. Patient now with gait Instability, ADL impairments and Functional impairments.  -------------------------  # left Hip fracture  - s/p surgery- left hip ORIF in [facility] on 10/11  - Start Comprehensive Rehab Program: PT/OT, 3hours daily and 5 days weekly  - PT: Focused on improving strength, endurance, coordination, balance, functional mobility, and transfers  - OT: Focused on improving strength, fine motor skills, coordination, posture and ADLs.    - WB Status: WBAT LLE  - Sutures removed on 10/24  - Surgical incisions DESI - clean, dry and intact    #Leukocytosis- resolved  - Afebrile  - WBC of 10.52 10/20  - Admits to dysuria- UA positive  - Bactrim BID started 10/20 PM, now DCd on 10/21  - WBC increased to 12.41 10/21  - Bld Cx x2 (10/21 - No growth, final) Lactate and CXR (10/21) - stable  - Lactate of 4.0  - 1000ml NS bolus  - ABX switched to Ceftriaxone daily for 3 days  - Repeat Lac of 1.8  - ID consulted  - S/p Ceftriaxone for 3 days  - UCx resulted contaminated; Repeat UA resulted negative  - WBC 6.64 on 10/27    #Perineal pruritis  - Lotrimin cream BID - improved    #HTN  - Losartan 100mg daily  - Metoprolol 200mg daily  - Norvasc 5mg daily- DCd (10/20) d/t orthostasis in PT (sbp 70s) - better now    #Lightheadedness - baseline  - Encourage PO fluids  - Check orthostatics- negative 10/20    #HLD  - Simvastatin 20mg daily    #Hypothyroidism  - Synthroid    Breast CA   - s/p double mastectomy, last chemo 10/2019    #Pain management  - Tylenol PRN, tramadol PRN, Oxycodone PRN    # RLE DVT - 10/17  - [id number] BID until 11/7/22; then to give 20mg with dinner starting on 11/8/22  - will be followed by PCP outpatient    #GI ppx  - Protonix 40mg    #Bowel Regimen  - Senna, miralax     #Bladder management  - C/o dysuria. UA positive (received abx)  - Encouraged to drink PO fluids  - UCx resulted contamination. Repeat UA resulted negative.  - BS on admission, and q 8 hours (SC if > 400) - PVR <100, dc BS  - toileting program    #Diet:   - Reg/thin - DASH    #Skin:  - Denuded/raw perineum-> Nystatin powder ordered 10/20  - Skin on admission: Left hip incision with sutures DESI  - Pressure injury/Skin: Turn Q2hrs while in bed, OOB to Chair, PT/OT     #Precaution  - Fall, Hip     ----------------------------------------------------------------------  Outpatient Follow-up (Specialty/Name of physician):  Arnoldo Win)  Orthopaedic Surgery  [address] [place] [address]  Phone: [phone]  Fax: [phone]

## 2022-10-28 NOTE — PROGRESS NOTE ADULT - SUBJECTIVE AND OBJECTIVE BOX
81 YO female with PMH  of HTN, HLD, hypothyroidism, Breast CA ( s/p double mastectomy, last chemo 10/2019), right hip replacement 2012, left femur fracture at age 5,  presents to [facility] ED on 10/16 for left hip pain. Patient was in [place], fell and fractured her "left hip". According to her and her daughters "a margy and two screws were placed" on Tuesday 10/11 in [facility]. She was transferred home (to daughters and bedbound since surgery. She came back to [place] with her daughter. Patient was unable to bear weight and was having pain on her left hip. She lives alone. Additionally, she stated she was not given any pain medication. In ER, labs showed mild leukocytosis. She was found to have RLE DVT started on Xarelto. Patient was evaluated by PM&R and therapy for functional deficits, gait/ADL impairments and acute rehabilitation was recommended. Patient was medically optimized for discharge to [facility] IRU on 10/18/22.    Today's Subjective/ROS:  Patient seen and evaluated while resting in bed  Will be discharged to daughter's house.  Patient is aware of her limitations.  States she's doing better with self care tasks but still need some incidental assistance  Reminded that she was found to have RLE DVT and will be on AC and need to follow up with PCP, Dr. Hernandez ([place]). Discussed discharge medications and followups.   left hip pain tolerable - incisions DESI  Denies fever, chills, CP, SOB, cough, nausea, abd pain, dysuria, frequency.  LBM this morning (10/27)  +intermittent (baseline) AM HA/lightheadedness - not currently present    Vital Signs Last 24 Hrs  T(C): 36.6 (10-28-22 @ 08:35), Max: 36.6 (10-28-22 @ 08:35)  T(F): 97.9 (10-28-22 @ 08:35), Max: 97.9 (10-28-22 @ 08:35)  HR: 70 (10-28-22 @ 08:35) (68 - 71)  BP: 130/85 (10-28-22 @ 08:35) (100/60 - 130/85)  RR: 16 (10-28-22 @ 08:35) (16 - 16)  SpO2: 96% (10-28-22 @ 08:35) (93% - 96%)    careGen - NAD, Comfortable  HEENT - NCAT, EOMI, MMM  Neck - Supple, No limited ROM  Pulm - No respiratory distress  Cardiovascular - warm and well perfused  Abdomen - Soft, NT/ND   Extremities - No clubbing, no cyanosis, +normal post op/trauma edema to left hip and thigh (improving),   Neuro-     Cognitive - Awake, Alert, Oriented  x 4     Communication - Fluent     Cranial Nerves - CN 2-12 intact.      Motor -                     LEFT    UE - ShAB 5/5, EF 5/5, EE 5/5,  5/5                    RIGHT UE - ShAB 5/5, EF 5/5, EE 5/5,   5/5                    LEFT    LE - HF 4/5, KE 4/5, DF 5/5, PF 5/5- limited 2/2 pain                    RIGHT LE - HF 5/5, KE 5/5, DF 5/5, PF 5/5        Sensory - Intact to LT     Tone - normal  Psychiatric - Mood stable, Affect WNL  Skin:  left hip incisions DESI - closed, no drainage    LAB                        11.7   6.64  )-----------( 424      ( 27 Oct 2022 06:14 )             36.9     10-27    144  |  109<H>  |  27<H>  ----------------------------<  117<H>  5.1   |  31  |  0.98    Ca    9.4      27 Oct 2022 06:14    TPro  6.4  /  Alb  3.1<L>  /  TBili  0.4  /  DBili  x   /  AST  14  /  ALT  18  /  AlkPhos  122<H>  10-27    LIVER FUNCTIONS - ( 27 Oct 2022 06:14 )  Alb: 3.1 g/dL / Pro: 6.4 g/dL / ALK PHOS: 122 U/L / ALT: 18 U/L / AST: 14 U/L / GGT: x           MEDICATIONS  (STANDING):  clotrimazole 1% Cream 1 Application(s) Topical two times a day  ferrous    sulfate 325 milliGRAM(s) Oral daily  levothyroxine 75 MICROGram(s) Oral daily  losartan 100 milliGRAM(s) Oral daily  melatonin 3 milliGRAM(s) Oral <User Schedule>  metoprolol succinate  milliGRAM(s) Oral daily  nystatin Powder 1 Application(s) Topical two times a day  pantoprazole    Tablet 40 milliGRAM(s) Oral before breakfast  polyethylene glycol 3350 17 Gram(s) Oral daily  rivaroxaban 15 milliGRAM(s) Oral two times a day  senna 2 Tablet(s) Oral <User Schedule>  simvastatin 20 milliGRAM(s) Oral <User Schedule>    MEDICATIONS  (PRN):  acetaminophen     Tablet .. 650 milliGRAM(s) Oral every 6 hours PRN Temp greater or equal to 38C (100.4F), Mild Pain (1 - 3)      IDT 10/24  TDD: 10/29 to home  Barriers: LLE pain  SW: Lives alone, plans to go home with daughter and then transition to her home  OT: Mod I for ADLs and transfers.   PT: Min A for transfers. Ambulated 75ft with min A, 2 times.   SLP: N/A  Goals: Transfer with CG assist. Mod I with hip kit for LBD.

## 2022-10-28 NOTE — PROGRESS NOTE ADULT - SUBJECTIVE AND OBJECTIVE BOX
Patient is a 82y old  Female who presents with a chief complaint of left hip fracture (26 Oct 2022 10:39)      Patient seen and examined at bedside, stable, NAD. denies headache, fever, chills, cp, sob, n/v, abd pain.    ALLERGIES:  No Known Drug Allergies  shellfish (Unknown)    MEDICATIONS  (STANDING):  clotrimazole 1% Cream 1 Application(s) Topical two times a day  ferrous    sulfate 325 milliGRAM(s) Oral daily  levothyroxine 75 MICROGram(s) Oral daily  losartan 100 milliGRAM(s) Oral daily  melatonin 3 milliGRAM(s) Oral <User Schedule>  metoprolol succinate  milliGRAM(s) Oral daily  nystatin Powder 1 Application(s) Topical two times a day  pantoprazole    Tablet 40 milliGRAM(s) Oral before breakfast  polyethylene glycol 3350 17 Gram(s) Oral daily  rivaroxaban 15 milliGRAM(s) Oral two times a day  senna 2 Tablet(s) Oral <User Schedule>  simvastatin 20 milliGRAM(s) Oral <User Schedule>    MEDICATIONS  (PRN):  acetaminophen     Tablet .. 650 milliGRAM(s) Oral every 6 hours PRN Temp greater or equal to 38C (100.4F), Mild Pain (1 - 3)    Vital Signs Last 24 Hrs  T(C): 36.6 (28 Oct 2022 08:35), Max: 36.6 (28 Oct 2022 08:35)  T(F): 97.9 (28 Oct 2022 08:35), Max: 97.9 (28 Oct 2022 08:35)  HR: 70 (28 Oct 2022 08:35) (68 - 71)  BP: 130/85 (28 Oct 2022 08:35) (100/60 - 130/85)  BP(mean): --  RR: 16 (28 Oct 2022 08:35) (16 - 16)  SpO2: 96% (28 Oct 2022 08:35) (93% - 96%)    Parameters below as of 28 Oct 2022 08:35  Patient On (Oxygen Delivery Method): room air          PHYSICAL EXAM:  General: NAD, A/O x 3  ENT: MMM, no scleral icterus  Neck: Supple, No JVD  Lungs: Clear to auscultation bilaterally, no wheezes, rales, rhonchi  Cardio: RRR, S1/S2  Abdomen: Soft, Nontender, Nondistended; Bowel sounds present  Extremities: No calf tenderness, No pitting edema    LABS:                        11.7   6.64  )-----------( 424      ( 27 Oct 2022 06:14 )             36.9       10-27    144  |  109  |  27  ----------------------------<  117  5.1   |  31  |  0.98    Ca    9.4      27 Oct 2022 06:14    TPro  6.4  /  Alb  3.1  /  TBili  0.4  /  DBili  x   /  AST  14  /  ALT  18  /  AlkPhos  122  10-27                                      Culture - Urine (collected 22 Oct 2022 22:05)  Source: Clean Catch Clean Catch (Midstream)  Final Report (24 Oct 2022 08:11):    <10,000 CFU/mL Normal Urogenital Marcela    Culture - Blood (collected 21 Oct 2022 09:25)  Source: .Blood Blood-Venous  Final Report (26 Oct 2022 13:00):    No Growth Final    Culture - Blood (collected 21 Oct 2022 09:25)  Source: .Blood Blood-Venous  Final Report (26 Oct 2022 13:00):    No Growth Final    Urine Culture - 48 Hour Hold (collected 20 Oct 2022 15:00)  Source: .Urine  Final Report (21 Oct 2022 22:59):    Culture grew 3 or more types of organisms which indicate collection    contamination; consider recollection only if clinically indicated.      COVID-19 PCR: NotDetec (10-25-22 @ 05:40)  COVID-19 PCR: NotDetec (10-18-22 @ 18:40)  COVID-19 PCR: NotDetec (10-17-22 @ 18:20)      RADIOLOGY & ADDITIONAL TESTS: reviewed    Care Discussed with Consultants/Other Providers: yes, rehab

## 2022-10-28 NOTE — PROGRESS NOTE ADULT - ASSESSMENT
83 y/o F with PMH HTN, HLD, hypothyroidism, hx of breast CA (s/p double mastectomy, last chemo 10/2019), s/p left hip fracture in [place] s/p repair. Found to have RLE DVT. Now admitted to [facility] for initiation of multidisciplinary rehab program.     #Left femur fracture, s/p repair in [place] on 10/11  likely due to age related osteoporosis  -Continue comprehensive rehab program - PT/OT/SLP per rehab team  -Pain management, bowel regimen per rehab  -Continue iron daily    #DVT  -Continue Xarelto 15mg BID until 11/7 then continue 20mg qd with dinner 11/8    #HTN  -Continue Losartan, Toprol    #HLD  -Continue simvastatin    #CKD Stage 3  -Avoid nephrotoxic medications  -PO hydration encouraged    #Hypothyroidism  -Continue Synthroid    #UTI - resolved  -Blood cultures x 2 no growth  -Completed course of abx  -ID appreciated    #DVT ppx - Xarelto  #GI ppx - Protonix

## 2022-10-29 VITALS
SYSTOLIC BLOOD PRESSURE: 106 MMHG | OXYGEN SATURATION: 96 % | DIASTOLIC BLOOD PRESSURE: 62 MMHG | HEART RATE: 66 BPM | TEMPERATURE: 98 F | RESPIRATION RATE: 17 BRPM

## 2022-10-29 PROCEDURE — 80053 COMPREHEN METABOLIC PANEL: CPT

## 2022-10-29 PROCEDURE — 99232 SBSQ HOSP IP/OBS MODERATE 35: CPT

## 2022-10-29 PROCEDURE — 85027 COMPLETE CBC AUTOMATED: CPT

## 2022-10-29 PROCEDURE — 71045 X-RAY EXAM CHEST 1 VIEW: CPT

## 2022-10-29 PROCEDURE — 97535 SELF CARE MNGMENT TRAINING: CPT

## 2022-10-29 PROCEDURE — 93306 TTE W/DOPPLER COMPLETE: CPT

## 2022-10-29 PROCEDURE — 87086 URINE CULTURE/COLONY COUNT: CPT

## 2022-10-29 PROCEDURE — 85025 COMPLETE CBC W/AUTO DIFF WBC: CPT

## 2022-10-29 PROCEDURE — 82652 VIT D 1 25-DIHYDROXY: CPT

## 2022-10-29 PROCEDURE — U0005: CPT

## 2022-10-29 PROCEDURE — U0003: CPT

## 2022-10-29 PROCEDURE — 97116 GAIT TRAINING THERAPY: CPT

## 2022-10-29 PROCEDURE — 87040 BLOOD CULTURE FOR BACTERIA: CPT

## 2022-10-29 PROCEDURE — 97163 PT EVAL HIGH COMPLEX 45 MIN: CPT

## 2022-10-29 PROCEDURE — 97110 THERAPEUTIC EXERCISES: CPT

## 2022-10-29 PROCEDURE — 87635 SARS-COV-2 COVID-19 AMP PRB: CPT

## 2022-10-29 PROCEDURE — 83605 ASSAY OF LACTIC ACID: CPT

## 2022-10-29 PROCEDURE — 81001 URINALYSIS AUTO W/SCOPE: CPT

## 2022-10-29 PROCEDURE — 97530 THERAPEUTIC ACTIVITIES: CPT

## 2022-10-29 PROCEDURE — 97167 OT EVAL HIGH COMPLEX 60 MIN: CPT

## 2022-10-29 PROCEDURE — 36415 COLL VENOUS BLD VENIPUNCTURE: CPT

## 2022-10-29 RX ADMIN — NYSTATIN CREAM 1 APPLICATION(S): 100000 CREAM TOPICAL at 05:31

## 2022-10-29 RX ADMIN — Medication 325 MILLIGRAM(S): at 11:10

## 2022-10-29 RX ADMIN — Medication 75 MICROGRAM(S): at 05:31

## 2022-10-29 RX ADMIN — PANTOPRAZOLE SODIUM 40 MILLIGRAM(S): 20 TABLET, DELAYED RELEASE ORAL at 05:30

## 2022-10-29 RX ADMIN — Medication 1 APPLICATION(S): at 05:35

## 2022-10-29 RX ADMIN — Medication 200 MILLIGRAM(S): at 05:31

## 2022-10-29 RX ADMIN — RIVAROXABAN 15 MILLIGRAM(S): KIT at 05:30

## 2022-10-29 RX ADMIN — POLYETHYLENE GLYCOL 3350 17 GRAM(S): 17 POWDER, FOR SOLUTION ORAL at 11:10

## 2022-10-29 NOTE — PROGRESS NOTE ADULT - ASSESSMENT
81 y/o F with PMH HTN, HLD, hypothyroidism, hx of breast CA (s/p double mastectomy, last chemo 10/2019), s/p left hip fracture in [place] s/p repair. Found to have RLE DVT. Now admitted to [facility] for initiation of multidisciplinary rehab program.     #Left femur fracture, s/p repair in [place] on 10/11  likely due to age related osteoporosis  -Continue comprehensive rehab program - PT/OT/SLP per rehab team  -Pain management, bowel regimen per rehab  -Continue iron daily    #DVT  -Continue Xarelto 15mg BID until 11/7 then continue 20mg qd with dinner 11/8    #HTN  -Continue Losartan, Toprol    #HLD  -Continue simvastatin    #CKD Stage 3  -Avoid nephrotoxic medications  -PO hydration encouraged    #Hypothyroidism  -Continue Synthroid    #UTI - resolved  -Blood cultures x 2 no growth  -Completed course of abx  -ID appreciated    #DVT ppx - Xarelto  #GI ppx - Protonix 83 y/o F with PMH HTN, HLD, hypothyroidism, hx of breast CA (s/p double mastectomy, last chemo 10/2019), s/p left hip fracture in [place] s/p repair. Found to have RLE DVT. Now admitted to [facility] for initiation of multidisciplinary rehab program.     #Left femur fracture, s/p repair in [place] on 10/11  likely due to age related osteoporosis  -Continue comprehensive rehab program - PT/OT/SLP per rehab team  -Pain management, bowel regimen per rehab  -Continue iron daily    #DVT  -Continue Xarelto 15mg BID until 11/7 then continue 20mg qd with dinner 11/8    #HTN  -Continue Toprol  -Losartan held 10/28, 10/29 for hypotension. DC 10/29    #HLD  -Continue simvastatin    #CKD Stage 3  -Avoid nephrotoxic medications  -PO hydration encouraged    #Hypothyroidism  -Continue Synthroid    #UTI - resolved  -Blood cultures x 2 no growth  -Completed course of abx  -ID appreciated    #DVT ppx - Xarelto  #GI ppx - Protonix

## 2022-10-29 NOTE — PROGRESS NOTE ADULT - SUBJECTIVE AND OBJECTIVE BOX
Patient is a 82y old  Female who presents with a chief complaint of left hip fracture (26 Oct 2022 10:39)      Patient seen and examined at bedside, stable, NAD. denies headache, fever, chills, cp, sob, n/v, abd pain.    ALLERGIES:  No Known Drug Allergies  shellfish (Unknown)    MEDICATIONS  (STANDING):  clotrimazole 1% Cream 1 Application(s) Topical two times a day  ferrous    sulfate 325 milliGRAM(s) Oral daily  levothyroxine 75 MICROGram(s) Oral daily  losartan 100 milliGRAM(s) Oral daily  melatonin 3 milliGRAM(s) Oral <User Schedule>  metoprolol succinate  milliGRAM(s) Oral daily  nystatin Powder 1 Application(s) Topical two times a day  pantoprazole    Tablet 40 milliGRAM(s) Oral before breakfast  polyethylene glycol 3350 17 Gram(s) Oral daily  rivaroxaban 15 milliGRAM(s) Oral two times a day  senna 2 Tablet(s) Oral <User Schedule>  simvastatin 20 milliGRAM(s) Oral <User Schedule>    MEDICATIONS  (PRN):  acetaminophen     Tablet .. 650 milliGRAM(s) Oral every 6 hours PRN Temp greater or equal to 38C (100.4F), Mild Pain (1 - 3)    Vital Signs Last 24 Hrs  T(C): 36.6 (29 Oct 2022 08:23), Max: 36.7 (28 Oct 2022 21:05)  T(F): 97.9 (29 Oct 2022 08:23), Max: 98.1 (28 Oct 2022 21:05)  HR: 66 (29 Oct 2022 08:23) (65 - 68)  BP: 106/62 (29 Oct 2022 08:23) (104/68 - 109/67)  BP(mean): --  RR: 17 (29 Oct 2022 08:23) (16 - 17)  SpO2: 96% (29 Oct 2022 08:23) (96% - 96%)    Parameters below as of 29 Oct 2022 08:23  Patient On (Oxygen Delivery Method): room air          PHYSICAL EXAM:  General: NAD, A/O x 3  ENT: MMM, no scleral icterus  Neck: Supple, No JVD  Lungs: Clear to auscultation bilaterally, no wheezes, rales, rhonchi  Cardio: RRR, S1/S2  Abdomen: Soft, Nontender, Nondistended; Bowel sounds present  Extremities: No calf tenderness, No pitting edema    LABS:                        11.7   6.64  )-----------( 424      ( 27 Oct 2022 06:14 )             36.9       10-27    144  |  109  |  27  ----------------------------<  117  5.1   |  31  |  0.98    Ca    9.4      27 Oct 2022 06:14    TPro  6.4  /  Alb  3.1  /  TBili  0.4  /  DBili  x   /  AST  14  /  ALT  18  /  AlkPhos  122  10-27                                      Culture - Urine (collected 22 Oct 2022 22:05)  Source: Clean Catch Clean Catch (Midstream)  Final Report (24 Oct 2022 08:11):    <10,000 CFU/mL Normal Urogenital Marcela    Culture - Blood (collected 21 Oct 2022 09:25)  Source: .Blood Blood-Venous  Final Report (26 Oct 2022 13:00):    No Growth Final    Culture - Blood (collected 21 Oct 2022 09:25)  Source: .Blood Blood-Venous  Final Report (26 Oct 2022 13:00):    No Growth Final    Urine Culture - 48 Hour Hold (collected 20 Oct 2022 15:00)  Source: .Urine  Final Report (21 Oct 2022 22:59):    Culture grew 3 or more types of organisms which indicate collection    contamination; consider recollection only if clinically indicated.      COVID-19 PCR: NotDetec (10-25-22 @ 05:40)  COVID-19 PCR: NotDetec (10-18-22 @ 18:40)  COVID-19 PCR: NotDetec (10-17-22 @ 18:20)      RADIOLOGY & ADDITIONAL TESTS: reviewed    Care Discussed with Consultants/Other Providers: yes, rehab

## 2022-10-29 NOTE — PROGRESS NOTE ADULT - SUBJECTIVE AND OBJECTIVE BOX
Cc: Gait dysfunction    HPI: Patient with no new medical issues today.  Pain controlled, no chest pain, no N/V, no Fevers/Chills. No other new ROS  Has been tolerating rehabilitation program.    acetaminophen     Tablet .. 650 milliGRAM(s) Oral every 6 hours PRN  clotrimazole 1% Cream 1 Application(s) Topical two times a day  ferrous    sulfate 325 milliGRAM(s) Oral daily  levothyroxine 75 MICROGram(s) Oral daily  losartan 100 milliGRAM(s) Oral daily  melatonin 3 milliGRAM(s) Oral <User Schedule>  metoprolol succinate  milliGRAM(s) Oral daily  nystatin Powder 1 Application(s) Topical two times a day  pantoprazole    Tablet 40 milliGRAM(s) Oral before breakfast  polyethylene glycol 3350 17 Gram(s) Oral daily  rivaroxaban 15 milliGRAM(s) Oral two times a day  senna 2 Tablet(s) Oral <User Schedule>  simvastatin 20 milliGRAM(s) Oral <User Schedule>      T(C): 36.6 (10-29-22 @ 08:23), Max: 36.7 (10-28-22 @ 21:05)  HR: 66 (10-29-22 @ 08:23) (65 - 68)  BP: 106/62 (10-29-22 @ 08:23) (104/68 - 109/67)  RR: 17 (10-29-22 @ 08:23) (16 - 17)  SpO2: 96% (10-29-22 @ 08:23) (96% - 96%)    In NAD  HEENT- EOMI  Heart- No cyanosis  Lungs- No respiratory distress   Abd- + BS, NT  Ext- No calf pain  Neuro- Exam unchanged        Imp: Patient with diagnosis of Left hip fracture admitted for comprehensive acute rehabilitation.    Plan:  - Continue PT/OT/SLP  - DVT prophylaxis - Rivaroxaban   - Skin- Turn q2h, check skin daily  - Continue current medications; patient medically stable.   -Patient feeling well today.  Denies any complaints.  Reports she is ready for discharge and denies any concerns.  -Continue with plan for home/outpatient therapies.

## 2022-10-29 NOTE — PROGRESS NOTE ADULT - PROVIDER SPECIALTY LIST ADULT
Hospitalist
Physiatry
Rehab Medicine
Hospitalist
Physiatry
Rehab Medicine
Hospitalist
Infectious Disease
Rehab Medicine
Hospitalist
Hospitalist
Rehab Medicine
Rehab Medicine

## 2022-10-29 NOTE — PROGRESS NOTE ADULT - REASON FOR ADMISSION
left hip fracture

## 2022-11-07 PROBLEM — E03.9 HYPOTHYROIDISM, UNSPECIFIED: Chronic | Status: ACTIVE | Noted: 2022-10-18

## 2022-11-07 PROBLEM — I10 ESSENTIAL (PRIMARY) HYPERTENSION: Chronic | Status: ACTIVE | Noted: 2022-10-17

## 2022-11-07 PROBLEM — C50.919 MALIGNANT NEOPLASM OF UNSPECIFIED SITE OF UNSPECIFIED FEMALE BREAST: Chronic | Status: ACTIVE | Noted: 2022-10-17

## 2022-11-07 PROBLEM — I82.409 ACUTE EMBOLISM AND THROMBOSIS OF UNSPECIFIED DEEP VEINS OF UNSPECIFIED LOWER EXTREMITY: Chronic | Status: ACTIVE | Noted: 2022-10-18

## 2022-11-07 PROBLEM — Z86.79 PERSONAL HISTORY OF OTHER DISEASES OF THE CIRCULATORY SYSTEM: Chronic | Status: ACTIVE | Noted: 2022-10-17

## 2022-11-08 PROCEDURE — 80053 COMPREHEN METABOLIC PANEL: CPT

## 2022-11-08 PROCEDURE — 73502 X-RAY EXAM HIP UNI 2-3 VIEWS: CPT

## 2022-11-08 PROCEDURE — 80048 BASIC METABOLIC PNL TOTAL CA: CPT

## 2022-11-08 PROCEDURE — 93005 ELECTROCARDIOGRAM TRACING: CPT

## 2022-11-08 PROCEDURE — 96374 THER/PROPH/DIAG INJ IV PUSH: CPT

## 2022-11-08 PROCEDURE — 93970 EXTREMITY STUDY: CPT

## 2022-11-08 PROCEDURE — 36415 COLL VENOUS BLD VENIPUNCTURE: CPT

## 2022-11-08 PROCEDURE — 97162 PT EVAL MOD COMPLEX 30 MIN: CPT

## 2022-11-08 PROCEDURE — 99285 EMERGENCY DEPT VISIT HI MDM: CPT

## 2022-11-08 PROCEDURE — 87635 SARS-COV-2 COVID-19 AMP PRB: CPT

## 2022-11-08 PROCEDURE — 85027 COMPLETE CBC AUTOMATED: CPT

## 2022-11-08 PROCEDURE — 71045 X-RAY EXAM CHEST 1 VIEW: CPT

## 2022-11-08 PROCEDURE — 97166 OT EVAL MOD COMPLEX 45 MIN: CPT

## 2022-11-08 PROCEDURE — 85025 COMPLETE CBC W/AUTO DIFF WBC: CPT

## 2022-11-08 PROCEDURE — 73552 X-RAY EXAM OF FEMUR 2/>: CPT

## 2022-11-14 ENCOUNTER — NON-APPOINTMENT (OUTPATIENT)
Age: 82
End: 2022-11-14

## 2022-11-14 ENCOUNTER — APPOINTMENT (OUTPATIENT)
Dept: ORTHOPEDIC SURGERY | Facility: CLINIC | Age: 82
End: 2022-11-14

## 2022-11-14 VITALS
HEIGHT: 61 IN | BODY MASS INDEX: 28.51 KG/M2 | DIASTOLIC BLOOD PRESSURE: 65 MMHG | SYSTOLIC BLOOD PRESSURE: 160 MMHG | WEIGHT: 151 LBS | HEART RATE: 50 BPM

## 2022-11-14 DIAGNOSIS — M25.559 PAIN IN UNSPECIFIED HIP: ICD-10-CM

## 2022-11-14 PROCEDURE — 73502 X-RAY EXAM HIP UNI 2-3 VIEWS: CPT | Mod: LT

## 2022-11-14 PROCEDURE — 99203 OFFICE O/P NEW LOW 30 MIN: CPT

## 2022-11-14 NOTE — DISCUSSION/SUMMARY
[de-identified] : Left hip intramedullary nail performed 5 weeks ago at an outside facility.  I discussed with the patient and her daughter that she appears to be recovering well from her surgery.  The hardware appears intact and she appears to have some early healing at the fracture site.  Her mobility also appears to be improving.  I would suggest continued physical therapy in order to continue improving the strength and mobility.  In terms of her DVT, I will defer recommendation to her medical doctor for continued anticoagulation management.  I also recommended that her medical doctor should evaluate her bone density given her hip fracture and evaluation needs any treatment for this for long-term.  She should take vitamin D supplementation while her fracture is healing.  I will see her back in 6 weeks for repeat x-ray.  The patient expressed understanding of her diagnosis and treatment plan and all questions were answered.\par \par This note was generated using dragon medical dictation software.  A reasonable effort has been made for proofreading its contents, but typos may still remain.  If there are any questions or points of clarification needed please notify my office.

## 2022-11-14 NOTE — PHYSICAL EXAM
[de-identified] : General: No apparent distress\par Cardiovascular: extremities warm and well-perfused, no cyanosis\par Pulmonary: non labored respirations\par \par Left hip:\par Able to flex hip past 90 degrees with no pain\par Mild pain with internal rotation and external rotation of the hip\par Able to plantarflex and dorsiflex ankle, sensation intact light touch throughout lower extremity\par Swelling about the bilateral ankles with pitting edema [de-identified] : 2 views of the left hip obtained today demonstrate status post left hip intramedullary nail for an apparent intertrochanteric femur fracture.  Compared to prior x-rays taken at [facility] from a few weeks ago, hardware appears intact and similar to before with no apparent complication.  There appears to be minimal callus formation at the fracture site with similar alignment of the fracture to previous x-ray

## 2022-11-14 NOTE — HISTORY OF PRESENT ILLNESS
[de-identified] : SHIN DE LEÓN  is a 82 year year old F who presents for evaluation after left hip intramedullary nail performed in [place] about 5 weeks ago.  She reports that she was in [place] for vacation when she had a fall onto her left hip.  She was taken to the hospital where x-rays demonstrated a left hip fracture.  She underwent a left hip intramedullary nail on 10/11.  Shortly after that she was brought back to [place] where she was staying with her daughter, however she was having significant pain and difficulty with mobility, so she was taken to [facility] where she was admitted for rehab.  She spent about 2 weeks at [facility] and was discharged on 10/29.  Since being home she has been improving her strength and mobility.  Physical therapy has been coming to her house and she has been walking more using a walker.  She does complain of some pain in the left hip which is improving.  Currently she rates the pain at about a 6 out of 10.  Of note when she came to [facility] that she was diagnosed with a DVT in her right leg and she has been on anticoagulation for that since then.

## 2022-11-15 ENCOUNTER — APPOINTMENT (OUTPATIENT)
Dept: ORTHOPEDIC SURGERY | Facility: CLINIC | Age: 82
End: 2022-11-15

## 2022-12-27 ENCOUNTER — APPOINTMENT (OUTPATIENT)
Dept: ORTHOPEDIC SURGERY | Facility: CLINIC | Age: 82
End: 2022-12-27

## 2023-01-01 NOTE — ASSESSMENT
[FreeTextEntry1] : pt to continue on treatment with herceptin and be followed for side effects Pt o continue normal activities and saw Dr. Pike for followup. Pt torepeat echo q 3 months. Pt to RTO in 1 month or sooner if needed. [Curative] : Goals of care discussed with patient: Curative [Palliative Care Plan] : not applicable at this time Direct patient care, as well as:    [x] I reviewed Flowsheets (vital signs, ins and outs documentation) , medications, notes from ER Attending and other Providers  [x] I discussed plan of care with patient/parents at the bedside/medical team (residents, nurse)  [ ] I reviewed laboratory results:    [ ] I reviewed radiology results:  [x ] I discussed results with patient/ family/ caretaker  [ ] I reviewed radiology imaging and the following is my interpretation:  [ ] I spoke with and/or reviewed documentation from the following consultant(s):   [x] Discussed patient during the interdisciplinary care coordination rounds in the afternoon  [x] Patient handoff was completed with hospitalist caring for patient during the next shift.   [x ] I counseled/ educated the patient/ family/ caretaker om the following:  [ ] Care coordination    Plan discussed with parent/guardian, resident physicians, and nurse.

## 2023-01-01 NOTE — DISCHARGE NOTE ADULT - FUNCTIONAL SCREEN CURRENT LEVEL: BATHING, MLM
Mom called requesting to have a refill on her Vit D be sent to the pharmacy  Thank you! (0) independent

## 2023-03-13 ENCOUNTER — APPOINTMENT (OUTPATIENT)
Dept: SURGERY | Facility: CLINIC | Age: 83
End: 2023-03-13
Payer: MEDICARE

## 2023-03-13 PROCEDURE — 99213K: CUSTOM

## 2023-03-17 ENCOUNTER — OUTPATIENT (OUTPATIENT)
Dept: OUTPATIENT SERVICES | Facility: HOSPITAL | Age: 83
LOS: 1 days | Discharge: HOME | End: 2023-03-17

## 2023-03-17 DIAGNOSIS — Z96.649 PRESENCE OF UNSPECIFIED ARTIFICIAL HIP JOINT: Chronic | ICD-10-CM

## 2023-03-17 DIAGNOSIS — Z96.641 PRESENCE OF RIGHT ARTIFICIAL HIP JOINT: Chronic | ICD-10-CM

## 2023-03-17 DIAGNOSIS — Z98.890 OTHER SPECIFIED POSTPROCEDURAL STATES: Chronic | ICD-10-CM

## 2023-03-17 DIAGNOSIS — C50.919 MALIGNANT NEOPLASM OF UNSPECIFIED SITE OF UNSPECIFIED FEMALE BREAST: ICD-10-CM

## 2023-03-17 DIAGNOSIS — Z90.10 ACQUIRED ABSENCE OF UNSPECIFIED BREAST AND NIPPLE: Chronic | ICD-10-CM

## 2023-03-21 ENCOUNTER — APPOINTMENT (OUTPATIENT)
Dept: HEMATOLOGY ONCOLOGY | Facility: CLINIC | Age: 83
End: 2023-03-21
Payer: MEDICARE

## 2023-03-21 ENCOUNTER — RESULT REVIEW (OUTPATIENT)
Age: 83
End: 2023-03-21

## 2023-03-21 VITALS
TEMPERATURE: 97.3 F | SYSTOLIC BLOOD PRESSURE: 195 MMHG | RESPIRATION RATE: 17 BRPM | WEIGHT: 155.53 LBS | DIASTOLIC BLOOD PRESSURE: 82 MMHG | HEART RATE: 61 BPM | OXYGEN SATURATION: 98 % | BODY MASS INDEX: 29.39 KG/M2

## 2023-03-21 DIAGNOSIS — Z79.899 OTHER LONG TERM (CURRENT) DRUG THERAPY: ICD-10-CM

## 2023-03-21 DIAGNOSIS — Z85.3 ENCOUNTER FOR FOLLOW-UP EXAMINATION AFTER COMPLETED TREATMENT FOR MALIGNANT NEOPLASM: ICD-10-CM

## 2023-03-21 DIAGNOSIS — Z08 ENCOUNTER FOR FOLLOW-UP EXAMINATION AFTER COMPLETED TREATMENT FOR MALIGNANT NEOPLASM: ICD-10-CM

## 2023-03-21 LAB
BASOPHILS # BLD AUTO: 0.04 K/UL — SIGNIFICANT CHANGE UP (ref 0–0.2)
BASOPHILS NFR BLD AUTO: 0.5 % — SIGNIFICANT CHANGE UP (ref 0–2)
EOSINOPHIL # BLD AUTO: 0.16 K/UL — SIGNIFICANT CHANGE UP (ref 0–0.5)
EOSINOPHIL NFR BLD AUTO: 2.1 % — SIGNIFICANT CHANGE UP (ref 0–6)
HCT VFR BLD CALC: 45.3 % — HIGH (ref 34.5–45)
HGB BLD-MCNC: 14.6 G/DL — SIGNIFICANT CHANGE UP (ref 11.5–15.5)
IMM GRANULOCYTES NFR BLD AUTO: 0.7 % — SIGNIFICANT CHANGE UP (ref 0–0.9)
LYMPHOCYTES # BLD AUTO: 1.86 K/UL — SIGNIFICANT CHANGE UP (ref 1–3.3)
LYMPHOCYTES # BLD AUTO: 24.9 % — SIGNIFICANT CHANGE UP (ref 13–44)
MCHC RBC-ENTMCNC: 27.9 PG — SIGNIFICANT CHANGE UP (ref 27–34)
MCHC RBC-ENTMCNC: 32.2 G/DL — SIGNIFICANT CHANGE UP (ref 32–36)
MCV RBC AUTO: 86.6 FL — SIGNIFICANT CHANGE UP (ref 80–100)
MONOCYTES # BLD AUTO: 0.51 K/UL — SIGNIFICANT CHANGE UP (ref 0–0.9)
MONOCYTES NFR BLD AUTO: 6.8 % — SIGNIFICANT CHANGE UP (ref 2–14)
NEUTROPHILS # BLD AUTO: 4.85 K/UL — SIGNIFICANT CHANGE UP (ref 1.8–7.4)
NEUTROPHILS NFR BLD AUTO: 65 % — SIGNIFICANT CHANGE UP (ref 43–77)
NRBC # BLD: 0 /100 WBCS — SIGNIFICANT CHANGE UP (ref 0–0)
PLATELET # BLD AUTO: 281 K/UL — SIGNIFICANT CHANGE UP (ref 150–400)
RBC # BLD: 5.23 M/UL — HIGH (ref 3.8–5.2)
RBC # FLD: 13.7 % — SIGNIFICANT CHANGE UP (ref 10.3–14.5)
WBC # BLD: 7.47 K/UL — SIGNIFICANT CHANGE UP (ref 3.8–10.5)
WBC # FLD AUTO: 7.47 K/UL — SIGNIFICANT CHANGE UP (ref 3.8–10.5)

## 2023-03-21 PROCEDURE — 99214 OFFICE O/P EST MOD 30 MIN: CPT

## 2023-03-22 NOTE — ASSESSMENT
[Curative] : Goals of care discussed with patient: Curative [Palliative Care Plan] : not applicable at this time [FreeTextEntry1] : The patient will continue surveillance for her stage III locally advanced breast carcinoma involving the left breast and past showing moderately differentiated invasive ductal carcinoma ER and OK negative HER2 3+ with T3 lesion measuring 5.1 cm and 1 out of 22 lymph nodes involved.  Patient was treated with TCH and completion Herceptin for 1 year.  She has done well and has had no signs or symptoms indicating recurrent disease.  The patient will continue medical, GI and GYN follow-up and testing.  She has also been treated for melanoma and recurrent right facial skin lesion. Patient is s/p Left Hip ORIF 2nd fall while in [place]. She has completed PT. Patient will continue to follow up the Ortho MD as directed.  She will return to the office in 6 months or sooner as needed.

## 2023-03-22 NOTE — HISTORY OF PRESENT ILLNESS
[Disease: _____________________] : Disease: [unfilled] [N: ___] : N[unfilled] [M: ___] : M[unfilled] [AJCC Stage: ____] : AJCC Stage: [unfilled] [de-identified] : This is a 77-year-old woman with history of breast carcinoma. The patient states her history dates back 26 years ago when she was found to have a right breast carcinoma which was resected and did not require further therapy. She states she has been undergoing followup mammograms however her last one was 2 years ago. The patient states in 2018 she noted a mass in the left breast and subsequently had a mammogram and ultrasound revealing a left breast mass. This was biopsied on 2018 and the biopsy was positive for invasive ductal carcinoma. The ER and SC were negative and HER-2/emelina 3+ positive. The patient underwent MRI of the breast on  which revealed  a spiculated mass in the left breast which was the biopsy-proven malignancy along with extensive multicentric disease. The right breast revealed a 4 mm focus. The left axilla revealed a 3 x 1.5 cm lymph node.\par \par On May 1 the patient underwent bilateral mastectomy and left axillary dissection The left breast revealed invasive moderately differentiated ductal carcinoma, apocrine type. The tumor measured 5.1 cm and the Paramount score was 7/9. There was one out of 22 lymph nodes positive for metastases along with extranodal extension measuring 1.5 mm. Lymph-vascular invasion and Derm lympho-vascular invasion were not identified. The tumor was stage T3 N1a.Stage IIIA. 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 The patient chose not to have reconstruction.\par \par The patient has done well postoperatively but does not slight discomfort in the anterior chest in the area of the incisions. Her family history is negative for breast cancer. Father  of lung cancer. 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 [de-identified] : IDC, mod diff, ER and MD neg, Her 3+,1/22 nodes pos. [de-identified] : 5/18-left breast- mod diff IDC, 5.1 cm, T3, 1/22 nodes pos, ER and OH neg, Her2-3+, rx bilat mastectomies no recon. TCHP\par         right breast DCIS [de-identified] : Patient is here for f/u. She had a left hip fracture which required repair while on vacation in [place]. She had PT which helped and she has no more pain. Otherwise, she feels okay. Appetite is good.  She is very active.

## 2023-03-22 NOTE — REVIEW OF SYSTEMS
[Fatigue] : fatigue [Joint Pain] : joint pain [Joint Stiffness] : joint stiffness [Negative] : Allergic/Immunologic [FreeTextEntry9] : s/p left hip fracture, s/p ORIF

## 2023-03-22 NOTE — PHYSICAL EXAM
[Ambulatory and capable of all self care but unable to carry out any work activities] : Status 2- Ambulatory and capable of all self care but unable to carry out any work activities. Up and about more than 50% of waking hours [Normal] : affect appropriate [de-identified] : bilateral mastectomies no reconstruction.

## 2023-03-25 LAB
ALBUMIN SERPL ELPH-MCNC: 4.5 G/DL
ALP BLD-CCNC: 118 U/L
ALT SERPL-CCNC: 17 U/L
ANION GAP SERPL CALC-SCNC: 17 MMOL/L
AST SERPL-CCNC: 17 U/L
BILIRUB SERPL-MCNC: 0.4 MG/DL
BUN SERPL-MCNC: 19 MG/DL
CALCIUM SERPL-MCNC: 10 MG/DL
CANCER AG27-29 SERPL-ACNC: 15.8 U/ML
CHLORIDE SERPL-SCNC: 104 MMOL/L
CO2 SERPL-SCNC: 25 MMOL/L
CREAT SERPL-MCNC: 0.8 MG/DL
EGFR: 74 ML/MIN/1.73M2
GLUCOSE SERPL-MCNC: 99 MG/DL
POTASSIUM SERPL-SCNC: 4.6 MMOL/L
PROT SERPL-MCNC: 6.8 G/DL
SODIUM SERPL-SCNC: 145 MMOL/L

## 2023-04-12 NOTE — H&P PST ADULT - PAIN, FACTORS THAT RELIEVE, PROFILE
[Normal] : mucosa is normal [Midline] : trachea located in midline position [Laryngoscopy Performed] : laryngoscopy was performed, see procedure section for findings [FreeTextEntry1] : Voice is hoarse and raspy. Decreased range, projection and pitch control.  rest/repositioning/medications/exercises/heat

## 2023-04-20 NOTE — ED ADULT TRIAGE NOTE - IDEAL BODY WEIGHT(KG)
04/20/2023  Martinez Teixeira is a 28 y.o., female.      Pre-op Assessment    I have reviewed the Patient Summary Reports.     I have reviewed the Nursing Notes. I have reviewed the NPO Status.   I have reviewed the Medications.     Review of Systems  Anesthesia Hx:   Denies Personal Hx of Anesthesia complications.   Cardiovascular:  Cardiovascular Normal     Hepatic/GI:  Hepatic/GI Normal    Neurological:  Neurology Normal    Endocrine:  Endocrine Normal        Physical Exam  General: Well nourished, Cooperative and Alert    Airway:  Mallampati: II           Anesthesia Plan  Type of Anesthesia, risks & benefits discussed:    Anesthesia Type: Epidural  Intra-op Monitoring Plan: Standard ASA Monitors  Post Op Pain Control Plan: epidural analgesia  Informed Consent: Informed consent signed with the Patient and all parties understand the risks and agree with anesthesia plan.  All questions answered.   ASA Score: 2  Day of Surgery Review of History & Physical: H&P Update referred to the surgeon/provider.    Ready For Surgery From Anesthesia Perspective.     .      
57

## 2023-05-02 ENCOUNTER — APPOINTMENT (OUTPATIENT)
Dept: ORTHOPEDIC SURGERY | Facility: CLINIC | Age: 83
End: 2023-05-02
Payer: MEDICARE

## 2023-05-02 VITALS
HEART RATE: 66 BPM | HEIGHT: 61 IN | SYSTOLIC BLOOD PRESSURE: 182 MMHG | BODY MASS INDEX: 28.32 KG/M2 | WEIGHT: 150 LBS | DIASTOLIC BLOOD PRESSURE: 95 MMHG

## 2023-05-02 DIAGNOSIS — S32.591A OTHER SPECIFIED FRACTURE OF RIGHT PUBIS, INITIAL ENCOUNTER FOR CLOSED FRACTURE: ICD-10-CM

## 2023-05-02 PROCEDURE — 99213 OFFICE O/P EST LOW 20 MIN: CPT

## 2023-05-02 RX ORDER — ACETAMINOPHEN 500 MG/1
500 TABLET ORAL EVERY 8 HOURS
Qty: 42 | Refills: 0 | Status: ACTIVE | COMMUNITY
Start: 2023-05-02 | End: 1900-01-01

## 2023-05-03 NOTE — PHYSICAL EXAM
[de-identified] : General: No apparent distress\par Cardiovascular: extremities warm and well-perfused, no cyanosis\par Pulmonary: non labored respirations\par \par Right hip:\par Able to flex hip past 90 degrees with some pain\par pain with internal rotation and external rotation of the hip\par Able to plantarflex and dorsiflex ankle, sensation intact light touch throughout lower extremity\par Swelling about the bilateral ankles with pitting edema [de-identified] : X-rays of the right hip obtained on 4/28/2023 at an urgent care demonstrate possible cortical irregularity at the medial right superior pubic ramus which may represent fracture or lytic lesion.  CT is recommended for further characterization.  The right hip arthroplasty appears in similar position to prior x-rays

## 2023-05-03 NOTE — HISTORY OF PRESENT ILLNESS
[de-identified] : SHIN DE LEÓN  is a 82 year year old F who presents with right hip pain.  She says that she was trying to open the tear store when she fell onto her right side.  She was taken to an urgent care where x-rays she was initially told were negative, but then she was called and told that she may have a fracture of her pelvis.  She says that since the fall she has had pain in the right groin area and has had difficulty ambulating.  She says that it hurts to move her hip.  She has taken Advil and Tylenol alternating with some relief of the pain.  Prior to this she was improving after having had physical therapy for her left hip, which she says now it does not bother her.  Of note she did have a DVT in her right knee which was treated with Xarelto for a few months and she recently stopped it a few weeks ago.

## 2023-05-03 NOTE — DISCUSSION/SUMMARY
[de-identified] : Right pubic ramus fracture versus lytic lesion.  Discussed with her that in the fall she may have had a pubic ramus fracture, but we are unable to tell if she has an unstable pelvic ring injury.  For this reason I recommend a CT scan to evaluate this area further as well as the posterior aspect of the pelvis.  We discussed that this is likely a stable injury which she may need rehab for in order to continue to mobilize while it heals.  We also discussed that she is at high risk for DVT, so she should start taking aspirin twice a day for DVT prophylaxis.  She will also contact her primary care doctor to see if she needs any other medication given her recent DVT history.  We discussed the warning signs of a DVT and to report to the ER if she has any of them including calf pain, swelling, pain with movement of the ankle.  I will also prescribe her an anti-inflammatory to help with the pain that she is having.  For now she may be weightbearing as tolerated with a walker put in for home physical therapy for her to start as well.  I will see her back once the CT scan is complete.  The patient expressed understanding of her diagnosis and treatment plan and all questions were answered.\par \par This note was generated using dragon medical dictation software.  A reasonable effort has been made for proofreading its contents, but typos may still remain.  If there are any questions or points of clarification needed please notify my office.

## 2023-05-12 ENCOUNTER — OUTPATIENT (OUTPATIENT)
Dept: OUTPATIENT SERVICES | Facility: HOSPITAL | Age: 83
LOS: 1 days | End: 2023-05-12
Payer: MEDICARE

## 2023-05-12 ENCOUNTER — APPOINTMENT (OUTPATIENT)
Dept: CT IMAGING | Facility: CLINIC | Age: 83
End: 2023-05-12
Payer: MEDICARE

## 2023-05-12 ENCOUNTER — RESULT REVIEW (OUTPATIENT)
Age: 83
End: 2023-05-12

## 2023-05-12 DIAGNOSIS — Z96.649 PRESENCE OF UNSPECIFIED ARTIFICIAL HIP JOINT: Chronic | ICD-10-CM

## 2023-05-12 DIAGNOSIS — Z98.890 OTHER SPECIFIED POSTPROCEDURAL STATES: Chronic | ICD-10-CM

## 2023-05-12 DIAGNOSIS — Z90.10 ACQUIRED ABSENCE OF UNSPECIFIED BREAST AND NIPPLE: Chronic | ICD-10-CM

## 2023-05-12 DIAGNOSIS — Z96.641 PRESENCE OF RIGHT ARTIFICIAL HIP JOINT: Chronic | ICD-10-CM

## 2023-05-12 DIAGNOSIS — S32.591A OTHER SPECIFIED FRACTURE OF RIGHT PUBIS, INITIAL ENCOUNTER FOR CLOSED FRACTURE: ICD-10-CM

## 2023-05-12 PROCEDURE — 76376 3D RENDER W/INTRP POSTPROCES: CPT

## 2023-05-12 PROCEDURE — 72192 CT PELVIS W/O DYE: CPT | Mod: 26

## 2023-05-12 PROCEDURE — 76376 3D RENDER W/INTRP POSTPROCES: CPT | Mod: 26

## 2023-05-12 PROCEDURE — 72192 CT PELVIS W/O DYE: CPT

## 2023-07-22 NOTE — H&P ADULT - HISTORY OF PRESENT ILLNESS
82 year old female, PMHx of HTN, HLD, hypothyroidism, breast ca (last chemo 10/2019); presents to the ED for left hip pain. Patient was in [place], fell and fractured her "left hip". According to her and her daughters "a margy and two screws were placed" on Tuesday 10/11 in [facility]. She was transferred home (to daughters and bedbound since surgery. she came back to [place] yesterday with her daughter. patient was unable idalmis bear weight and was  having pain on her left hip. so came to ER. She lives alone. Additionally, she states she was not given any pain medication. No f/c/n/v/d, cp, sob, cough or other complaints. in ER, Xray VS stable. labs showed mild leucocytosis. medical admission was called for rehab and pain control Respiratory

## 2024-03-08 ENCOUNTER — OUTPATIENT (OUTPATIENT)
Dept: OUTPATIENT SERVICES | Facility: HOSPITAL | Age: 84
LOS: 1 days | Discharge: HOME | End: 2024-03-08

## 2024-03-08 DIAGNOSIS — Z98.890 OTHER SPECIFIED POSTPROCEDURAL STATES: Chronic | ICD-10-CM

## 2024-03-08 DIAGNOSIS — C50.919 MALIGNANT NEOPLASM OF UNSPECIFIED SITE OF UNSPECIFIED FEMALE BREAST: ICD-10-CM

## 2024-03-08 DIAGNOSIS — Z96.649 PRESENCE OF UNSPECIFIED ARTIFICIAL HIP JOINT: Chronic | ICD-10-CM

## 2024-03-08 DIAGNOSIS — Z90.10 ACQUIRED ABSENCE OF UNSPECIFIED BREAST AND NIPPLE: Chronic | ICD-10-CM

## 2024-03-08 DIAGNOSIS — Z96.641 PRESENCE OF RIGHT ARTIFICIAL HIP JOINT: Chronic | ICD-10-CM

## 2024-03-19 ENCOUNTER — APPOINTMENT (OUTPATIENT)
Dept: HEMATOLOGY ONCOLOGY | Facility: CLINIC | Age: 84
End: 2024-03-19

## 2024-04-11 ENCOUNTER — NON-APPOINTMENT (OUTPATIENT)
Age: 84
End: 2024-04-11

## 2024-04-12 ENCOUNTER — APPOINTMENT (OUTPATIENT)
Dept: HEMATOLOGY ONCOLOGY | Facility: CLINIC | Age: 84
End: 2024-04-12
Payer: MEDICARE

## 2024-04-12 VITALS
WEIGHT: 161.22 LBS | DIASTOLIC BLOOD PRESSURE: 105 MMHG | HEIGHT: 61 IN | SYSTOLIC BLOOD PRESSURE: 200 MMHG | OXYGEN SATURATION: 96 % | TEMPERATURE: 98.8 F | RESPIRATION RATE: 16 BRPM | HEART RATE: 65 BPM | BODY MASS INDEX: 30.44 KG/M2

## 2024-04-12 PROCEDURE — 99213 OFFICE O/P EST LOW 20 MIN: CPT

## 2024-04-12 NOTE — HISTORY OF PRESENT ILLNESS
[Disease: _____________________] : Disease: [unfilled] [N: ___] : N[unfilled] [M: ___] : M[unfilled] [AJCC Stage: ____] : AJCC Stage: [unfilled] [de-identified] : This is a 77-year-old woman with history of breast carcinoma. The patient states her history dates back 26 years ago when she was found to have a right breast carcinoma which was resected and did not require further therapy. She states she has been undergoing followup mammograms however her last one was 2 years ago. The patient states in 2018 she noted a mass in the left breast and subsequently had a mammogram and ultrasound revealing a left breast mass. This was biopsied on 2018 and the biopsy was positive for invasive ductal carcinoma. The ER and AK were negative and HER-2/emelina 3+ positive. The patient underwent MRI of the breast on  which revealed  a spiculated mass in the left breast which was the biopsy-proven malignancy along with extensive multicentric disease. The right breast revealed a 4 mm focus. The left axilla revealed a 3 x 1.5 cm lymph node.\par  \par  On May 1 the patient underwent bilateral mastectomy and left axillary dissection The left breast revealed invasive moderately differentiated ductal carcinoma, apocrine type. The tumor measured 5.1 cm and the Todd score was 7/9. There was one out of 22 lymph nodes positive for metastases along with extranodal extension measuring 1.5 mm. Lymph-vascular invasion and Derm lympho-vascular invasion were not identified. The tumor was stage T3 N1a.Stage IIIA. 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 The patient chose not to have reconstruction.\par  \par  The patient has done well postoperatively but does not slight discomfort in the anterior chest in the area of the incisions. Her family history is negative for breast cancer. Father  of lung cancer. 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 [de-identified] : IDC, mod diff, ER and MS neg, Her 3+,1/22 nodes pos. [de-identified] : 5/18-left breast- mod diff IDC, 5.1 cm, T3, 1/22 nodes pos, ER and WI neg, Her2-3+, rx bilat mastectomies no recon. TCHP\par          right breast DCIS [de-identified] : Pt had fall in [place] and fx hip,and fell in house and had fx pelvis. Pt has tylenol. for headaches.Pt saw PCP who increased her BP pills.

## 2024-04-12 NOTE — REVIEW OF SYSTEMS
[Diarrhea: Grade 3 - Increase of >=7 stools per day over baseline; incontinence; hospitalization indicated; severe increase in ostomy output compared to baseline; limiting self care ADL] : Diarrhea: Grade 3 - Increase of >=7 stools per day over baseline; incontinence; hospitalization indicated; severe increase in ostomy output compared to baseline; limiting self care ADL [Negative] : Allergic/Immunologic [FreeTextEntry2] : Patient has headaches and vertigo and dizziness and uses meclizine as needed. [de-identified] : has itch and has occ pimples

## 2024-04-12 NOTE — PHYSICAL EXAM
[Restricted in physically strenuous activity but ambulatory and able to carry out work of a light or sedentary nature] : Status 1- Restricted in physically strenuous activity but ambulatory and able to carry out work of a light or sedentary nature, e.g., light house work, office work [Normal] : affect appropriate [de-identified] : bilat mastx no recon

## 2024-04-12 NOTE — ASSESSMENT
[FreeTextEntry1] : The patient continues to do well and currently is 6 years posttreatment for her advanced HER2 positive left breast carcinoma which was stage IIIa treated with bilateral mastectomies and TCHP chemotherapy.  She continues to be monitored and has no signs or symptoms indicating current disease.  He did have a fall in [place] and had fractured femur requiring surgery.  She has also had 1 other fall which was last year but none since that time.  The patient will continue medical, GI, surgical follow-up and testing along with cardiac evaluation for hypertension.  She will return to the office in 1 year or sooner as needed. [Curative] : Goals of care discussed with patient: Curative

## 2024-05-21 ENCOUNTER — NON-APPOINTMENT (OUTPATIENT)
Age: 84
End: 2024-05-21

## 2024-05-21 ENCOUNTER — APPOINTMENT (OUTPATIENT)
Dept: GERIATRICS | Facility: CLINIC | Age: 84
End: 2024-05-21
Payer: MEDICARE

## 2024-05-21 VITALS
HEART RATE: 68 BPM | DIASTOLIC BLOOD PRESSURE: 78 MMHG | WEIGHT: 163 LBS | SYSTOLIC BLOOD PRESSURE: 126 MMHG | BODY MASS INDEX: 30.78 KG/M2 | HEIGHT: 61 IN | TEMPERATURE: 97.3 F | RESPIRATION RATE: 16 BRPM

## 2024-05-21 DIAGNOSIS — E03.9 HYPOTHYROIDISM, UNSPECIFIED: ICD-10-CM

## 2024-05-21 DIAGNOSIS — Z13.820 ENCOUNTER FOR SCREENING FOR OSTEOPOROSIS: ICD-10-CM

## 2024-05-21 DIAGNOSIS — E78.2 MIXED HYPERLIPIDEMIA: ICD-10-CM

## 2024-05-21 DIAGNOSIS — I10 ESSENTIAL (PRIMARY) HYPERTENSION: ICD-10-CM

## 2024-05-21 DIAGNOSIS — R42 DIZZINESS AND GIDDINESS: ICD-10-CM

## 2024-05-21 DIAGNOSIS — C50.919 MALIGNANT NEOPLASM OF UNSPECIFIED SITE OF UNSPECIFIED FEMALE BREAST: ICD-10-CM

## 2024-05-21 DIAGNOSIS — E78.5 HYPERLIPIDEMIA, UNSPECIFIED: ICD-10-CM

## 2024-05-21 DIAGNOSIS — Z86.59 PERSONAL HISTORY OF OTHER MENTAL AND BEHAVIORAL DISORDERS: ICD-10-CM

## 2024-05-21 PROCEDURE — 99205 OFFICE O/P NEW HI 60 MIN: CPT

## 2024-05-21 RX ORDER — ESCITALOPRAM OXALATE 10 MG/1
10 TABLET ORAL DAILY
Qty: 30 | Refills: 1 | Status: DISCONTINUED | COMMUNITY
Start: 2021-09-29 | End: 2024-05-21

## 2024-05-21 RX ORDER — DIPHENOXYLATE HYDROCHLORIDE AND ATROPINE SULFATE 2.5; .025 MG/1; MG/1
2.5-0.025 TABLET ORAL
Qty: 90 | Refills: 0 | Status: DISCONTINUED | COMMUNITY
Start: 2018-06-15 | End: 2024-05-21

## 2024-05-21 RX ORDER — RABEPRAZOLE SODIUM 20 MG/1
20 TABLET, DELAYED RELEASE ORAL
Qty: 90 | Refills: 2 | Status: DISCONTINUED | COMMUNITY
Start: 2022-08-04 | End: 2024-05-21

## 2024-05-21 RX ORDER — DRONABINOL 5 MG/1
5 CAPSULE ORAL 3 TIMES DAILY
Qty: 90 | Refills: 0 | Status: DISCONTINUED | COMMUNITY
Start: 2018-07-06 | End: 2024-05-21

## 2024-05-21 RX ORDER — PANTOPRAZOLE 40 MG/1
40 TABLET, DELAYED RELEASE ORAL
Qty: 90 | Refills: 2 | Status: DISCONTINUED | COMMUNITY
Start: 2021-10-12 | End: 2024-05-21

## 2024-05-21 RX ORDER — LEVOTHYROXINE SODIUM 0.07 MG/1
75 TABLET ORAL
Refills: 0 | Status: ACTIVE | COMMUNITY

## 2024-05-21 RX ORDER — SODIUM PICOSULFATE, MAGNESIUM OXIDE, AND ANHYDROUS CITRIC ACID 10; 3.5; 12 MG/160ML; G/160ML; G/160ML
10-3.5-12 MG-GM LIQUID ORAL TWICE DAILY
Qty: 2 | Refills: 0 | Status: DISCONTINUED | COMMUNITY
Start: 2021-08-06 | End: 2024-05-21

## 2024-05-21 RX ORDER — ASPIRIN ENTERIC COATED TABLETS 81 MG 81 MG/1
81 TABLET, DELAYED RELEASE ORAL TWICE DAILY
Qty: 42 | Refills: 0 | Status: DISCONTINUED | COMMUNITY
Start: 2023-05-02 | End: 2024-05-21

## 2024-05-21 RX ORDER — FUROSEMIDE 40 MG/1
40 TABLET ORAL
Refills: 0 | Status: DISCONTINUED | COMMUNITY
End: 2024-05-21

## 2024-05-21 RX ORDER — PANTOPRAZOLE 40 MG/1
40 TABLET, DELAYED RELEASE ORAL
Qty: 30 | Refills: 5 | Status: DISCONTINUED | COMMUNITY
Start: 2021-08-31 | End: 2024-05-21

## 2024-05-21 RX ORDER — DEXAMETHASONE 4 MG/1
4 TABLET ORAL TWICE DAILY
Qty: 120 | Refills: 1 | Status: DISCONTINUED | COMMUNITY
Start: 2018-06-04 | End: 2024-05-21

## 2024-05-21 RX ORDER — MELOXICAM 7.5 MG/1
7.5 TABLET ORAL DAILY
Qty: 10 | Refills: 0 | Status: DISCONTINUED | COMMUNITY
Start: 2023-05-02 | End: 2024-05-21

## 2024-05-21 RX ORDER — LOSARTAN POTASSIUM 100 MG/1
100 TABLET, FILM COATED ORAL
Refills: 0 | Status: ACTIVE | COMMUNITY

## 2024-05-21 NOTE — PHYSICAL EXAM
[Alert] : alert [No Acute Distress] : in no acute distress [EOMI] : extraocular movements were intact [PERRL] : pupils were equal in size, round, and reactive to light [Normal Appearance] : the appearance of the neck was normal [Supple] : the neck was supple [No Respiratory Distress] : no respiratory distress [No Acc Muscle Use] : no accessory muscle use [Auscultation Breath Sounds / Voice Sounds] : lungs were clear to auscultation bilaterally [Normal S1, S2] : normal S1 and S2 [Heart Rate And Rhythm] : heart rate was normal and rhythm regular [Abdomen Tenderness] : non-tender [Abdomen Soft] : soft [No Spinal Tenderness] : no spinal tenderness [Normal Color / Pigmentation] : normal skin color and pigmentation [No Focal Deficits] : no focal deficits [Oriented To Time, Place, And Person] : oriented to person, place, and time [Normal Affect] : the affect was normal [Normal Insight/Judgment] : insight and judgment were intact [Normal Mood] : the mood was normal [___ +] : bilateral [unfilled]+ pitting edema to the ankles [Normal Gait] : abnormal gait [de-identified] : uses a cane

## 2024-05-21 NOTE — ASSESSMENT
[FreeTextEntry1] : Patient will have records from prior PCP sent over so that I can review blood work that was recently done. I also requested immunization records. Advised to get shingles vaccine.

## 2024-05-21 NOTE — HISTORY OF PRESENT ILLNESS
[Patient reported hearing was normal] : Patient reported hearing was normal [Patient reported vision is normal] : Patient reported vision is normal [Patient reported colon/rectal/cancer screening was normal] : Patient reported colon/rectal cancer screening was normal [Patient reported cervical cancer screening was normal] : Patient reported cervical cancer screening was normal [No falls in past year] : Patient reported no falls in the past year [] : managing medications [Independent] : managing finances [Several Days (1)] : 4.) Feeling tired or having little energy? Several days [Not at All (0)] : 9.) Thoughts that you would be off dead or of hurting yourself in some way? Not at all [DNR] : DNR [DNI] : DNI [FreeTextEntry1] : 82 yo female w/ a hx of R and L sided breast carcinoma (1992 and 2018) last rx: 5 years, htn, hypothyroidism and GERD presents to the office to establish care. Pt reports feeling light headed/dizzy when she first gets up in the morning. She also feels similar symptoms if she moves her head side to side or up or down. She does have a history of vertigo for which she takes meclizine as needed which provides some relief.  No concerns for memory loss. Patient is independent in all ADLs and iADLs. Lives alone but daughter lives close by.   HTN Blood pressure today is well controlled.  Taking losartan 100mg, bisoprolol 10mg and amlodipine 5mg.  HLD Taking simvastatin 20mg daily.  Depression Not taking any medications PHQ9 score: 1  Hypothyroid Taking levothyroxine 75mcg   Need immunization records [TextBox_25] : due, has hx of osteoporosis [TextBox_19] : aged out, normal in the past [FreeTextEntry4] : hx of breast cancer  [FreeTextEntry6] : drives [[id number]] : 1

## 2024-05-21 NOTE — REVIEW OF SYSTEMS
[Fever] : no fever [Chills] : no chills [Loss Of Hearing] : no hearing loss [Heart Rate Is Slow] : the heart rate was not slow [Heart Rate Is Fast] : the heart rate was not fast [Chest Pain] : no chest pain [Palpitations] : no palpitations [Shortness Of Breath] : no shortness of breath [Wheezing] : no wheezing [Cough] : no cough [Abdominal Pain] : no abdominal pain [Constipation] : no constipation [Diarrhea] : no diarrhea [Dysuria] : no dysuria [Confused] : no confusion [Dizziness] : no dizziness [Sleep Disturbances] : no sleep disturbances [Anxiety] : no anxiety [Depression] : no depression

## 2024-06-09 ENCOUNTER — NON-APPOINTMENT (OUTPATIENT)
Age: 84
End: 2024-06-09

## 2024-06-21 ENCOUNTER — INPATIENT (INPATIENT)
Facility: HOSPITAL | Age: 84
LOS: 2 days | Discharge: NURSING, REHAB OR LONG-TERM CARE | DRG: 536 | End: 2024-06-24
Attending: STUDENT IN AN ORGANIZED HEALTH CARE EDUCATION/TRAINING PROGRAM | Admitting: INTERNAL MEDICINE
Payer: MEDICARE

## 2024-06-21 VITALS
RESPIRATION RATE: 16 BRPM | SYSTOLIC BLOOD PRESSURE: 188 MMHG | DIASTOLIC BLOOD PRESSURE: 85 MMHG | TEMPERATURE: 97 F | HEART RATE: 72 BPM | WEIGHT: 160.06 LBS | HEIGHT: 62 IN | OXYGEN SATURATION: 95 %

## 2024-06-21 DIAGNOSIS — Z90.10 ACQUIRED ABSENCE OF UNSPECIFIED BREAST AND NIPPLE: Chronic | ICD-10-CM

## 2024-06-21 DIAGNOSIS — Z98.890 OTHER SPECIFIED POSTPROCEDURAL STATES: Chronic | ICD-10-CM

## 2024-06-21 DIAGNOSIS — T14.8XXA OTHER INJURY OF UNSPECIFIED BODY REGION, INITIAL ENCOUNTER: ICD-10-CM

## 2024-06-21 DIAGNOSIS — Z96.641 PRESENCE OF RIGHT ARTIFICIAL HIP JOINT: Chronic | ICD-10-CM

## 2024-06-21 DIAGNOSIS — Z96.649 PRESENCE OF UNSPECIFIED ARTIFICIAL HIP JOINT: Chronic | ICD-10-CM

## 2024-06-21 LAB
ALBUMIN SERPL ELPH-MCNC: 3.8 G/DL — SIGNIFICANT CHANGE UP (ref 3.3–5)
ALP SERPL-CCNC: 90 U/L — SIGNIFICANT CHANGE UP (ref 40–120)
ALT FLD-CCNC: 16 U/L — SIGNIFICANT CHANGE UP (ref 10–45)
ANION GAP SERPL CALC-SCNC: 7 MMOL/L — SIGNIFICANT CHANGE UP (ref 5–17)
APPEARANCE UR: CLEAR — SIGNIFICANT CHANGE UP
APTT BLD: 27.4 SEC — SIGNIFICANT CHANGE UP (ref 24.5–35.6)
AST SERPL-CCNC: 27 U/L — SIGNIFICANT CHANGE UP (ref 10–40)
BASOPHILS # BLD AUTO: 0.02 K/UL — SIGNIFICANT CHANGE UP (ref 0–0.2)
BASOPHILS NFR BLD AUTO: 0.1 % — SIGNIFICANT CHANGE UP (ref 0–2)
BILIRUB SERPL-MCNC: 0.7 MG/DL — SIGNIFICANT CHANGE UP (ref 0.2–1.2)
BILIRUB UR-MCNC: NEGATIVE — SIGNIFICANT CHANGE UP
BUN SERPL-MCNC: 18 MG/DL — SIGNIFICANT CHANGE UP (ref 7–23)
CALCIUM SERPL-MCNC: 9 MG/DL — SIGNIFICANT CHANGE UP (ref 8.4–10.5)
CHLORIDE SERPL-SCNC: 103 MMOL/L — SIGNIFICANT CHANGE UP (ref 96–108)
CO2 SERPL-SCNC: 28 MMOL/L — SIGNIFICANT CHANGE UP (ref 22–31)
COLOR SPEC: YELLOW — SIGNIFICANT CHANGE UP
CREAT SERPL-MCNC: 0.86 MG/DL — SIGNIFICANT CHANGE UP (ref 0.5–1.3)
DIFF PNL FLD: NEGATIVE — SIGNIFICANT CHANGE UP
EGFR: 67 ML/MIN/1.73M2 — SIGNIFICANT CHANGE UP
EOSINOPHIL # BLD AUTO: 0.03 K/UL — SIGNIFICANT CHANGE UP (ref 0–0.5)
EOSINOPHIL NFR BLD AUTO: 0.2 % — SIGNIFICANT CHANGE UP (ref 0–6)
GLUCOSE SERPL-MCNC: 107 MG/DL — HIGH (ref 70–99)
GLUCOSE UR QL: NEGATIVE MG/DL — SIGNIFICANT CHANGE UP
HCT VFR BLD CALC: 41.7 % — SIGNIFICANT CHANGE UP (ref 34.5–45)
HGB BLD-MCNC: 14 G/DL — SIGNIFICANT CHANGE UP (ref 11.5–15.5)
IMM GRANULOCYTES NFR BLD AUTO: 1.5 % — HIGH (ref 0–0.9)
INR BLD: 0.95 RATIO — SIGNIFICANT CHANGE UP (ref 0.85–1.18)
KETONES UR-MCNC: NEGATIVE MG/DL — SIGNIFICANT CHANGE UP
LEUKOCYTE ESTERASE UR-ACNC: NEGATIVE — SIGNIFICANT CHANGE UP
LYMPHOCYTES # BLD AUTO: 1.1 K/UL — SIGNIFICANT CHANGE UP (ref 1–3.3)
LYMPHOCYTES # BLD AUTO: 8.1 % — LOW (ref 13–44)
MAGNESIUM SERPL-MCNC: 1.8 MG/DL — SIGNIFICANT CHANGE UP (ref 1.6–2.6)
MCHC RBC-ENTMCNC: 29 PG — SIGNIFICANT CHANGE UP (ref 27–34)
MCHC RBC-ENTMCNC: 33.6 GM/DL — SIGNIFICANT CHANGE UP (ref 32–36)
MCV RBC AUTO: 86.3 FL — SIGNIFICANT CHANGE UP (ref 80–100)
MONOCYTES # BLD AUTO: 0.76 K/UL — SIGNIFICANT CHANGE UP (ref 0–0.9)
MONOCYTES NFR BLD AUTO: 5.6 % — SIGNIFICANT CHANGE UP (ref 2–14)
NEUTROPHILS # BLD AUTO: 11.46 K/UL — HIGH (ref 1.8–7.4)
NEUTROPHILS NFR BLD AUTO: 84.5 % — HIGH (ref 43–77)
NITRITE UR-MCNC: NEGATIVE — SIGNIFICANT CHANGE UP
NRBC # BLD: 0 /100 WBCS — SIGNIFICANT CHANGE UP (ref 0–0)
PH UR: 8 — SIGNIFICANT CHANGE UP (ref 5–8)
PLATELET # BLD AUTO: 256 K/UL — SIGNIFICANT CHANGE UP (ref 150–400)
POTASSIUM SERPL-MCNC: 4.4 MMOL/L — SIGNIFICANT CHANGE UP (ref 3.5–5.3)
POTASSIUM SERPL-SCNC: 4.4 MMOL/L — SIGNIFICANT CHANGE UP (ref 3.5–5.3)
PROT SERPL-MCNC: 6.7 G/DL — SIGNIFICANT CHANGE UP (ref 6–8.3)
PROT UR-MCNC: NEGATIVE MG/DL — SIGNIFICANT CHANGE UP
PROTHROM AB SERPL-ACNC: 10.8 SEC — SIGNIFICANT CHANGE UP (ref 9.5–13)
RBC # BLD: 4.83 M/UL — SIGNIFICANT CHANGE UP (ref 3.8–5.2)
RBC # FLD: 13.4 % — SIGNIFICANT CHANGE UP (ref 10.3–14.5)
RBC CASTS # UR COMP ASSIST: 1 /HPF — SIGNIFICANT CHANGE UP (ref 0–4)
SODIUM SERPL-SCNC: 138 MMOL/L — SIGNIFICANT CHANGE UP (ref 135–145)
SP GR SPEC: 1.01 — SIGNIFICANT CHANGE UP (ref 1–1.03)
TROPONIN I, HIGH SENSITIVITY RESULT: 19.5 NG/L — SIGNIFICANT CHANGE UP
TROPONIN I, HIGH SENSITIVITY RESULT: 28.4 NG/L — SIGNIFICANT CHANGE UP
UROBILINOGEN FLD QL: 1 MG/DL — SIGNIFICANT CHANGE UP (ref 0.2–1)
WBC # BLD: 13.57 K/UL — HIGH (ref 3.8–10.5)
WBC # FLD AUTO: 13.57 K/UL — HIGH (ref 3.8–10.5)
WBC UR QL: 1 /HPF — SIGNIFICANT CHANGE UP (ref 0–5)

## 2024-06-21 PROCEDURE — 99285 EMERGENCY DEPT VISIT HI MDM: CPT

## 2024-06-21 PROCEDURE — 73502 X-RAY EXAM HIP UNI 2-3 VIEWS: CPT | Mod: 26,LT

## 2024-06-21 PROCEDURE — 72192 CT PELVIS W/O DYE: CPT | Mod: 26,MC

## 2024-06-21 PROCEDURE — 99223 1ST HOSP IP/OBS HIGH 75: CPT | Mod: GC

## 2024-06-21 PROCEDURE — 93010 ELECTROCARDIOGRAM REPORT: CPT

## 2024-06-21 PROCEDURE — 70450 CT HEAD/BRAIN W/O DYE: CPT | Mod: 26,MC

## 2024-06-21 RX ORDER — METOPROLOL TARTRATE 50 MG
200 TABLET ORAL DAILY
Refills: 0 | Status: DISCONTINUED | OUTPATIENT
Start: 2024-06-21 | End: 2024-06-24

## 2024-06-21 RX ORDER — SENNOSIDES 8.6 MG
2 TABLET ORAL AT BEDTIME
Refills: 0 | Status: DISCONTINUED | OUTPATIENT
Start: 2024-06-21 | End: 2024-06-24

## 2024-06-21 RX ORDER — OXYCODONE AND ACETAMINOPHEN 5; 325 MG/1; MG/1
1 TABLET ORAL EVERY 6 HOURS
Refills: 0 | Status: DISCONTINUED | OUTPATIENT
Start: 2024-06-21 | End: 2024-06-23

## 2024-06-21 RX ORDER — POLYETHYLENE GLYCOL 3350 1 G/G
17 POWDER ORAL DAILY
Refills: 0 | Status: DISCONTINUED | OUTPATIENT
Start: 2024-06-21 | End: 2024-06-24

## 2024-06-21 RX ORDER — SODIUM CHLORIDE 0.9 % (FLUSH) 0.9 %
1000 SYRINGE (ML) INJECTION ONCE
Refills: 0 | Status: COMPLETED | OUTPATIENT
Start: 2024-06-21 | End: 2024-06-21

## 2024-06-21 RX ORDER — AMLODIPINE BESYLATE 2.5 MG/1
5 TABLET ORAL DAILY
Refills: 0 | Status: DISCONTINUED | OUTPATIENT
Start: 2024-06-21 | End: 2024-06-24

## 2024-06-21 RX ORDER — MAGNESIUM, ALUMINUM HYDROXIDE 400-400
30 TABLET,CHEWABLE ORAL EVERY 4 HOURS
Refills: 0 | Status: DISCONTINUED | OUTPATIENT
Start: 2024-06-21 | End: 2024-06-24

## 2024-06-21 RX ORDER — HEPARIN SODIUM 50 [USP'U]/ML
5000 INJECTION, SOLUTION INTRAVENOUS EVERY 8 HOURS
Refills: 0 | Status: DISCONTINUED | OUTPATIENT
Start: 2024-06-21 | End: 2024-06-24

## 2024-06-21 RX ORDER — ACETAMINOPHEN 325 MG
650 TABLET ORAL EVERY 6 HOURS
Refills: 0 | Status: DISCONTINUED | OUTPATIENT
Start: 2024-06-21 | End: 2024-06-23

## 2024-06-21 RX ORDER — ONDANSETRON HYDROCHLORIDE 2 MG/ML
4 INJECTION INTRAMUSCULAR; INTRAVENOUS EVERY 8 HOURS
Refills: 0 | Status: DISCONTINUED | OUTPATIENT
Start: 2024-06-21 | End: 2024-06-24

## 2024-06-21 RX ORDER — SIMVASTATIN 40 MG
20 TABLET ORAL AT BEDTIME
Refills: 0 | Status: DISCONTINUED | OUTPATIENT
Start: 2024-06-21 | End: 2024-06-24

## 2024-06-21 RX ORDER — ACETAMINOPHEN 325 MG
1000 TABLET ORAL ONCE
Refills: 0 | Status: COMPLETED | OUTPATIENT
Start: 2024-06-21 | End: 2024-06-21

## 2024-06-21 RX ORDER — MORPHINE SULFATE 100 MG/1
4 TABLET, EXTENDED RELEASE ORAL ONCE
Refills: 0 | Status: DISCONTINUED | OUTPATIENT
Start: 2024-06-21 | End: 2024-06-21

## 2024-06-21 RX ORDER — LEVOTHYROXINE SODIUM 25 MCG
88 TABLET ORAL DAILY
Refills: 0 | Status: DISCONTINUED | OUTPATIENT
Start: 2024-06-21 | End: 2024-06-24

## 2024-06-21 RX ORDER — LOSARTAN POTASSIUM 100 MG/1
100 TABLET, FILM COATED ORAL DAILY
Refills: 0 | Status: DISCONTINUED | OUTPATIENT
Start: 2024-06-21 | End: 2024-06-24

## 2024-06-21 RX ADMIN — MORPHINE SULFATE 4 MILLIGRAM(S): 100 TABLET, EXTENDED RELEASE ORAL at 20:00

## 2024-06-21 RX ADMIN — Medication 2 TABLET(S): at 23:12

## 2024-06-21 RX ADMIN — Medication 1000 MILLILITER(S): at 18:18

## 2024-06-21 RX ADMIN — Medication 1000 MILLIGRAM(S): at 18:01

## 2024-06-21 RX ADMIN — Medication 400 MILLIGRAM(S): at 17:01

## 2024-06-21 RX ADMIN — MORPHINE SULFATE 4 MILLIGRAM(S): 100 TABLET, EXTENDED RELEASE ORAL at 19:20

## 2024-06-21 RX ADMIN — Medication 1000 MILLILITER(S): at 17:01

## 2024-06-21 RX ADMIN — Medication 20 MILLIGRAM(S): at 23:12

## 2024-06-22 ENCOUNTER — RESULT REVIEW (OUTPATIENT)
Age: 84
End: 2024-06-22

## 2024-06-22 LAB
ALBUMIN SERPL ELPH-MCNC: 3.4 G/DL — SIGNIFICANT CHANGE UP (ref 3.3–5)
ALP SERPL-CCNC: 80 U/L — SIGNIFICANT CHANGE UP (ref 40–120)
ALT FLD-CCNC: 21 U/L — SIGNIFICANT CHANGE UP (ref 10–45)
ANION GAP SERPL CALC-SCNC: 10 MMOL/L — SIGNIFICANT CHANGE UP (ref 5–17)
AST SERPL-CCNC: 16 U/L — SIGNIFICANT CHANGE UP (ref 10–40)
BILIRUB SERPL-MCNC: 0.9 MG/DL — SIGNIFICANT CHANGE UP (ref 0.2–1.2)
BUN SERPL-MCNC: 18 MG/DL — SIGNIFICANT CHANGE UP (ref 7–23)
CALCIUM SERPL-MCNC: 8.6 MG/DL — SIGNIFICANT CHANGE UP (ref 8.4–10.5)
CHLORIDE SERPL-SCNC: 104 MMOL/L — SIGNIFICANT CHANGE UP (ref 96–108)
CO2 SERPL-SCNC: 25 MMOL/L — SIGNIFICANT CHANGE UP (ref 22–31)
CREAT SERPL-MCNC: 0.92 MG/DL — SIGNIFICANT CHANGE UP (ref 0.5–1.3)
EGFR: 62 ML/MIN/1.73M2 — SIGNIFICANT CHANGE UP
GLUCOSE SERPL-MCNC: 126 MG/DL — HIGH (ref 70–99)
HCT VFR BLD CALC: 40.3 % — SIGNIFICANT CHANGE UP (ref 34.5–45)
HGB BLD-MCNC: 13.7 G/DL — SIGNIFICANT CHANGE UP (ref 11.5–15.5)
INR BLD: 0.95 RATIO — SIGNIFICANT CHANGE UP (ref 0.85–1.18)
MCHC RBC-ENTMCNC: 29.1 PG — SIGNIFICANT CHANGE UP (ref 27–34)
MCHC RBC-ENTMCNC: 34 GM/DL — SIGNIFICANT CHANGE UP (ref 32–36)
MCV RBC AUTO: 85.6 FL — SIGNIFICANT CHANGE UP (ref 80–100)
NRBC # BLD: 0 /100 WBCS — SIGNIFICANT CHANGE UP (ref 0–0)
PLATELET # BLD AUTO: 210 K/UL — SIGNIFICANT CHANGE UP (ref 150–400)
POTASSIUM SERPL-MCNC: 3.8 MMOL/L — SIGNIFICANT CHANGE UP (ref 3.5–5.3)
POTASSIUM SERPL-SCNC: 3.8 MMOL/L — SIGNIFICANT CHANGE UP (ref 3.5–5.3)
PROT SERPL-MCNC: 6.3 G/DL — SIGNIFICANT CHANGE UP (ref 6–8.3)
PROTHROM AB SERPL-ACNC: 11.1 SEC — SIGNIFICANT CHANGE UP (ref 9.5–13)
RBC # BLD: 4.71 M/UL — SIGNIFICANT CHANGE UP (ref 3.8–5.2)
RBC # FLD: 13.3 % — SIGNIFICANT CHANGE UP (ref 10.3–14.5)
SODIUM SERPL-SCNC: 139 MMOL/L — SIGNIFICANT CHANGE UP (ref 135–145)
TROPONIN I, HIGH SENSITIVITY RESULT: 10.5 NG/L — SIGNIFICANT CHANGE UP
TSH SERPL-MCNC: 1.71 UIU/ML — SIGNIFICANT CHANGE UP (ref 0.36–3.74)
WBC # BLD: 8.53 K/UL — SIGNIFICANT CHANGE UP (ref 3.8–10.5)
WBC # FLD AUTO: 8.53 K/UL — SIGNIFICANT CHANGE UP (ref 3.8–10.5)

## 2024-06-22 PROCEDURE — 93306 TTE W/DOPPLER COMPLETE: CPT | Mod: 26

## 2024-06-22 PROCEDURE — 99232 SBSQ HOSP IP/OBS MODERATE 35: CPT

## 2024-06-22 PROCEDURE — 93970 EXTREMITY STUDY: CPT | Mod: 26

## 2024-06-22 PROCEDURE — 27197 CLSD TX PELVIC RING FX: CPT | Mod: LT

## 2024-06-22 RX ADMIN — AMLODIPINE BESYLATE 5 MILLIGRAM(S): 2.5 TABLET ORAL at 05:42

## 2024-06-22 RX ADMIN — Medication 20 MILLIGRAM(S): at 22:01

## 2024-06-22 RX ADMIN — HEPARIN SODIUM 5000 UNIT(S): 50 INJECTION, SOLUTION INTRAVENOUS at 14:13

## 2024-06-22 RX ADMIN — Medication 200 MILLIGRAM(S): at 05:42

## 2024-06-22 RX ADMIN — Medication 88 MICROGRAM(S): at 05:42

## 2024-06-22 RX ADMIN — LOSARTAN POTASSIUM 100 MILLIGRAM(S): 100 TABLET, FILM COATED ORAL at 05:42

## 2024-06-22 RX ADMIN — HEPARIN SODIUM 5000 UNIT(S): 50 INJECTION, SOLUTION INTRAVENOUS at 05:42

## 2024-06-22 RX ADMIN — Medication 2 TABLET(S): at 22:01

## 2024-06-22 RX ADMIN — POLYETHYLENE GLYCOL 3350 17 GRAM(S): 1 POWDER ORAL at 11:11

## 2024-06-22 RX ADMIN — HEPARIN SODIUM 5000 UNIT(S): 50 INJECTION, SOLUTION INTRAVENOUS at 22:01

## 2024-06-23 ENCOUNTER — TRANSCRIPTION ENCOUNTER (OUTPATIENT)
Age: 84
End: 2024-06-23

## 2024-06-23 PROCEDURE — 99232 SBSQ HOSP IP/OBS MODERATE 35: CPT

## 2024-06-23 RX ORDER — ACETAMINOPHEN 325 MG
975 TABLET ORAL EVERY 8 HOURS
Refills: 0 | Status: DISCONTINUED | OUTPATIENT
Start: 2024-06-23 | End: 2024-06-24

## 2024-06-23 RX ORDER — OXYCODONE HYDROCHLORIDE 100 MG/5ML
10 SOLUTION ORAL EVERY 6 HOURS
Refills: 0 | Status: DISCONTINUED | OUTPATIENT
Start: 2024-06-23 | End: 2024-06-24

## 2024-06-23 RX ORDER — OXYCODONE HYDROCHLORIDE 100 MG/5ML
5 SOLUTION ORAL EVERY 6 HOURS
Refills: 0 | Status: DISCONTINUED | OUTPATIENT
Start: 2024-06-23 | End: 2024-06-24

## 2024-06-23 RX ADMIN — Medication 975 MILLIGRAM(S): at 23:40

## 2024-06-23 RX ADMIN — Medication 88 MICROGRAM(S): at 05:57

## 2024-06-23 RX ADMIN — Medication 2 TABLET(S): at 22:40

## 2024-06-23 RX ADMIN — AMLODIPINE BESYLATE 5 MILLIGRAM(S): 2.5 TABLET ORAL at 05:57

## 2024-06-23 RX ADMIN — LOSARTAN POTASSIUM 100 MILLIGRAM(S): 100 TABLET, FILM COATED ORAL at 05:57

## 2024-06-23 RX ADMIN — POLYETHYLENE GLYCOL 3350 17 GRAM(S): 1 POWDER ORAL at 12:34

## 2024-06-23 RX ADMIN — Medication 975 MILLIGRAM(S): at 22:40

## 2024-06-23 RX ADMIN — Medication 975 MILLIGRAM(S): at 13:13

## 2024-06-23 RX ADMIN — Medication 200 MILLIGRAM(S): at 05:57

## 2024-06-23 RX ADMIN — HEPARIN SODIUM 5000 UNIT(S): 50 INJECTION, SOLUTION INTRAVENOUS at 13:12

## 2024-06-23 RX ADMIN — Medication 20 MILLIGRAM(S): at 22:40

## 2024-06-23 RX ADMIN — HEPARIN SODIUM 5000 UNIT(S): 50 INJECTION, SOLUTION INTRAVENOUS at 22:40

## 2024-06-23 RX ADMIN — Medication 975 MILLIGRAM(S): at 14:00

## 2024-06-23 RX ADMIN — HEPARIN SODIUM 5000 UNIT(S): 50 INJECTION, SOLUTION INTRAVENOUS at 05:57

## 2024-06-24 ENCOUNTER — TRANSCRIPTION ENCOUNTER (OUTPATIENT)
Age: 84
End: 2024-06-24

## 2024-06-24 ENCOUNTER — INPATIENT (INPATIENT)
Facility: HOSPITAL | Age: 84
LOS: 10 days | Discharge: HOME HEALTH | DRG: 536 | End: 2024-07-05
Attending: PHYSICAL MEDICINE & REHABILITATION | Admitting: PHYSICAL MEDICINE & REHABILITATION
Payer: MEDICARE

## 2024-06-24 VITALS
SYSTOLIC BLOOD PRESSURE: 133 MMHG | DIASTOLIC BLOOD PRESSURE: 78 MMHG | OXYGEN SATURATION: 91 % | TEMPERATURE: 99 F | HEART RATE: 73 BPM | RESPIRATION RATE: 18 BRPM

## 2024-06-24 VITALS
DIASTOLIC BLOOD PRESSURE: 54 MMHG | RESPIRATION RATE: 16 BRPM | HEART RATE: 90 BPM | HEIGHT: 62 IN | OXYGEN SATURATION: 93 % | SYSTOLIC BLOOD PRESSURE: 150 MMHG | WEIGHT: 175.93 LBS | TEMPERATURE: 98 F

## 2024-06-24 DIAGNOSIS — Z98.890 OTHER SPECIFIED POSTPROCEDURAL STATES: Chronic | ICD-10-CM

## 2024-06-24 DIAGNOSIS — Z90.10 ACQUIRED ABSENCE OF UNSPECIFIED BREAST AND NIPPLE: Chronic | ICD-10-CM

## 2024-06-24 DIAGNOSIS — Z96.649 PRESENCE OF UNSPECIFIED ARTIFICIAL HIP JOINT: Chronic | ICD-10-CM

## 2024-06-24 DIAGNOSIS — S32.9XXA FRACTURE OF UNSPECIFIED PARTS OF LUMBOSACRAL SPINE AND PELVIS, INITIAL ENCOUNTER FOR CLOSED FRACTURE: ICD-10-CM

## 2024-06-24 DIAGNOSIS — Z96.641 PRESENCE OF RIGHT ARTIFICIAL HIP JOINT: Chronic | ICD-10-CM

## 2024-06-24 LAB — SARS-COV-2 RNA SPEC QL NAA+PROBE: SIGNIFICANT CHANGE UP

## 2024-06-24 PROCEDURE — 99222 1ST HOSP IP/OBS MODERATE 55: CPT | Mod: GC

## 2024-06-24 PROCEDURE — 96375 TX/PRO/DX INJ NEW DRUG ADDON: CPT

## 2024-06-24 PROCEDURE — 93970 EXTREMITY STUDY: CPT

## 2024-06-24 PROCEDURE — 80053 COMPREHEN METABOLIC PANEL: CPT

## 2024-06-24 PROCEDURE — 99285 EMERGENCY DEPT VISIT HI MDM: CPT

## 2024-06-24 PROCEDURE — 70450 CT HEAD/BRAIN W/O DYE: CPT | Mod: MC

## 2024-06-24 PROCEDURE — 99239 HOSP IP/OBS DSCHRG MGMT >30: CPT

## 2024-06-24 PROCEDURE — 85610 PROTHROMBIN TIME: CPT

## 2024-06-24 PROCEDURE — 72192 CT PELVIS W/O DYE: CPT | Mod: MC

## 2024-06-24 PROCEDURE — 36415 COLL VENOUS BLD VENIPUNCTURE: CPT

## 2024-06-24 PROCEDURE — 83735 ASSAY OF MAGNESIUM: CPT

## 2024-06-24 PROCEDURE — 84443 ASSAY THYROID STIM HORMONE: CPT

## 2024-06-24 PROCEDURE — 97166 OT EVAL MOD COMPLEX 45 MIN: CPT

## 2024-06-24 PROCEDURE — 73502 X-RAY EXAM HIP UNI 2-3 VIEWS: CPT

## 2024-06-24 PROCEDURE — 81001 URINALYSIS AUTO W/SCOPE: CPT

## 2024-06-24 PROCEDURE — 85027 COMPLETE CBC AUTOMATED: CPT

## 2024-06-24 PROCEDURE — 93005 ELECTROCARDIOGRAM TRACING: CPT

## 2024-06-24 PROCEDURE — 93306 TTE W/DOPPLER COMPLETE: CPT

## 2024-06-24 PROCEDURE — 84484 ASSAY OF TROPONIN QUANT: CPT

## 2024-06-24 PROCEDURE — 85730 THROMBOPLASTIN TIME PARTIAL: CPT

## 2024-06-24 PROCEDURE — 96365 THER/PROPH/DIAG IV INF INIT: CPT

## 2024-06-24 PROCEDURE — 85025 COMPLETE CBC W/AUTO DIFF WBC: CPT

## 2024-06-24 RX ORDER — LEVOTHYROXINE SODIUM 25 MCG
1 TABLET ORAL
Refills: 0 | DISCHARGE

## 2024-06-24 RX ORDER — TRAMADOL HYDROCHLORIDE 50 MG/1
50 TABLET, FILM COATED ORAL
Refills: 0 | Status: DISCONTINUED | OUTPATIENT
Start: 2024-06-24 | End: 2024-06-24

## 2024-06-24 RX ORDER — SIMVASTATIN 40 MG
20 TABLET ORAL AT BEDTIME
Refills: 0 | Status: DISCONTINUED | OUTPATIENT
Start: 2024-06-24 | End: 2024-07-05

## 2024-06-24 RX ORDER — METOPROLOL TARTRATE 50 MG
200 TABLET ORAL DAILY
Refills: 0 | Status: DISCONTINUED | OUTPATIENT
Start: 2024-06-24 | End: 2024-07-05

## 2024-06-24 RX ORDER — ACETAMINOPHEN 325 MG
3 TABLET ORAL
Qty: 0 | Refills: 0 | DISCHARGE
Start: 2024-06-24

## 2024-06-24 RX ORDER — LEVOTHYROXINE SODIUM 25 MCG
1 TABLET ORAL
Qty: 0 | Refills: 0 | DISCHARGE
Start: 2024-06-24

## 2024-06-24 RX ORDER — LEVOTHYROXINE SODIUM 25 MCG
88 TABLET ORAL DAILY
Refills: 0 | Status: DISCONTINUED | OUTPATIENT
Start: 2024-06-25 | End: 2024-07-05

## 2024-06-24 RX ORDER — METOPROLOL TARTRATE 50 MG
1 TABLET ORAL
Qty: 0 | Refills: 0 | DISCHARGE
Start: 2024-06-24

## 2024-06-24 RX ORDER — PANTOPRAZOLE SODIUM 40 MG/10ML
40 INJECTION, POWDER, FOR SOLUTION INTRAVENOUS
Refills: 0 | Status: DISCONTINUED | OUTPATIENT
Start: 2024-06-25 | End: 2024-06-27

## 2024-06-24 RX ORDER — SENNOSIDES 8.6 MG
2 TABLET ORAL
Qty: 0 | Refills: 0 | DISCHARGE
Start: 2024-06-24

## 2024-06-24 RX ORDER — TRAMADOL HYDROCHLORIDE 50 MG/1
1 TABLET, FILM COATED ORAL
Qty: 0 | Refills: 0 | DISCHARGE
Start: 2024-06-24

## 2024-06-24 RX ORDER — OXYCODONE HYDROCHLORIDE 100 MG/5ML
5 SOLUTION ORAL EVERY 6 HOURS
Refills: 0 | Status: DISCONTINUED | OUTPATIENT
Start: 2024-06-24 | End: 2024-06-24

## 2024-06-24 RX ORDER — AMLODIPINE BESYLATE 2.5 MG/1
1 TABLET ORAL
Qty: 0 | Refills: 0 | DISCHARGE
Start: 2024-06-24

## 2024-06-24 RX ORDER — POLYETHYLENE GLYCOL 3350 1 G/G
17 POWDER ORAL
Qty: 0 | Refills: 0 | DISCHARGE
Start: 2024-06-24

## 2024-06-24 RX ORDER — SIMVASTATIN 40 MG
1 TABLET ORAL
Qty: 0 | Refills: 0 | DISCHARGE
Start: 2024-06-24

## 2024-06-24 RX ORDER — AMLODIPINE BESYLATE 2.5 MG/1
5 TABLET ORAL DAILY
Refills: 0 | Status: DISCONTINUED | OUTPATIENT
Start: 2024-06-25 | End: 2024-07-03

## 2024-06-24 RX ORDER — AMLODIPINE BESYLATE 2.5 MG/1
1 TABLET ORAL
Refills: 0 | DISCHARGE

## 2024-06-24 RX ORDER — TRAMADOL HYDROCHLORIDE 50 MG/1
50 TABLET, FILM COATED ORAL
Refills: 0 | Status: DISCONTINUED | OUTPATIENT
Start: 2024-06-24 | End: 2024-06-30

## 2024-06-24 RX ORDER — ACETAMINOPHEN 325 MG
975 TABLET ORAL EVERY 8 HOURS
Refills: 0 | Status: DISCONTINUED | OUTPATIENT
Start: 2024-06-24 | End: 2024-07-05

## 2024-06-24 RX ORDER — MAGNESIUM, ALUMINUM HYDROXIDE 400-400
30 TABLET,CHEWABLE ORAL EVERY 4 HOURS
Refills: 0 | Status: DISCONTINUED | OUTPATIENT
Start: 2024-06-24 | End: 2024-07-05

## 2024-06-24 RX ORDER — HEPARIN SODIUM 50 [USP'U]/ML
5000 INJECTION, SOLUTION INTRAVENOUS EVERY 8 HOURS
Refills: 0 | Status: DISCONTINUED | OUTPATIENT
Start: 2024-06-24 | End: 2024-07-05

## 2024-06-24 RX ORDER — POLYETHYLENE GLYCOL 3350 1 G/G
17 POWDER ORAL DAILY
Refills: 0 | Status: DISCONTINUED | OUTPATIENT
Start: 2024-06-25 | End: 2024-07-05

## 2024-06-24 RX ORDER — LOSARTAN POTASSIUM 100 MG/1
100 TABLET, FILM COATED ORAL DAILY
Refills: 0 | Status: DISCONTINUED | OUTPATIENT
Start: 2024-06-25 | End: 2024-07-05

## 2024-06-24 RX ORDER — OXYCODONE HYDROCHLORIDE 100 MG/5ML
1 SOLUTION ORAL
Qty: 0 | Refills: 0 | DISCHARGE
Start: 2024-06-24

## 2024-06-24 RX ORDER — SENNOSIDES 8.6 MG
2 TABLET ORAL AT BEDTIME
Refills: 0 | Status: DISCONTINUED | OUTPATIENT
Start: 2024-06-24 | End: 2024-07-05

## 2024-06-24 RX ADMIN — HEPARIN SODIUM 5000 UNIT(S): 50 INJECTION, SOLUTION INTRAVENOUS at 15:05

## 2024-06-24 RX ADMIN — Medication 975 MILLIGRAM(S): at 23:02

## 2024-06-24 RX ADMIN — Medication 975 MILLIGRAM(S): at 06:40

## 2024-06-24 RX ADMIN — AMLODIPINE BESYLATE 5 MILLIGRAM(S): 2.5 TABLET ORAL at 06:40

## 2024-06-24 RX ADMIN — Medication 975 MILLIGRAM(S): at 14:58

## 2024-06-24 RX ADMIN — Medication 975 MILLIGRAM(S): at 22:32

## 2024-06-24 RX ADMIN — Medication 20 MILLIGRAM(S): at 22:32

## 2024-06-24 RX ADMIN — Medication 975 MILLIGRAM(S): at 07:40

## 2024-06-24 RX ADMIN — HEPARIN SODIUM 5000 UNIT(S): 50 INJECTION, SOLUTION INTRAVENOUS at 06:40

## 2024-06-24 RX ADMIN — Medication 88 MICROGRAM(S): at 06:40

## 2024-06-24 RX ADMIN — Medication 975 MILLIGRAM(S): at 15:30

## 2024-06-24 RX ADMIN — LOSARTAN POTASSIUM 100 MILLIGRAM(S): 100 TABLET, FILM COATED ORAL at 06:40

## 2024-06-24 RX ADMIN — Medication 2 TABLET(S): at 22:32

## 2024-06-24 RX ADMIN — HEPARIN SODIUM 5000 UNIT(S): 50 INJECTION, SOLUTION INTRAVENOUS at 22:32

## 2024-06-24 RX ADMIN — POLYETHYLENE GLYCOL 3350 17 GRAM(S): 1 POWDER ORAL at 11:59

## 2024-06-25 LAB
ALBUMIN SERPL ELPH-MCNC: 3 G/DL — LOW (ref 3.3–5)
ALP SERPL-CCNC: 71 U/L — SIGNIFICANT CHANGE UP (ref 40–120)
ALT FLD-CCNC: 21 U/L — SIGNIFICANT CHANGE UP (ref 10–45)
ANION GAP SERPL CALC-SCNC: 6 MMOL/L — SIGNIFICANT CHANGE UP (ref 5–17)
AST SERPL-CCNC: 14 U/L — SIGNIFICANT CHANGE UP (ref 10–40)
BILIRUB SERPL-MCNC: 0.4 MG/DL — SIGNIFICANT CHANGE UP (ref 0.2–1.2)
BUN SERPL-MCNC: 23 MG/DL — SIGNIFICANT CHANGE UP (ref 7–23)
CALCIUM SERPL-MCNC: 8.9 MG/DL — SIGNIFICANT CHANGE UP (ref 8.4–10.5)
CHLORIDE SERPL-SCNC: 103 MMOL/L — SIGNIFICANT CHANGE UP (ref 96–108)
CO2 SERPL-SCNC: 30 MMOL/L — SIGNIFICANT CHANGE UP (ref 22–31)
CREAT SERPL-MCNC: 0.87 MG/DL — SIGNIFICANT CHANGE UP (ref 0.5–1.3)
EGFR: 67 ML/MIN/1.73M2 — SIGNIFICANT CHANGE UP
GLUCOSE SERPL-MCNC: 110 MG/DL — HIGH (ref 70–99)
HCT VFR BLD CALC: 42.3 % — SIGNIFICANT CHANGE UP (ref 34.5–45)
HGB BLD-MCNC: 14 G/DL — SIGNIFICANT CHANGE UP (ref 11.5–15.5)
MCHC RBC-ENTMCNC: 28.5 PG — SIGNIFICANT CHANGE UP (ref 27–34)
MCHC RBC-ENTMCNC: 33.1 GM/DL — SIGNIFICANT CHANGE UP (ref 32–36)
MCV RBC AUTO: 86.2 FL — SIGNIFICANT CHANGE UP (ref 80–100)
NRBC # BLD: 0 /100 WBCS — SIGNIFICANT CHANGE UP (ref 0–0)
PLATELET # BLD AUTO: 250 K/UL — SIGNIFICANT CHANGE UP (ref 150–400)
POTASSIUM SERPL-MCNC: 3.8 MMOL/L — SIGNIFICANT CHANGE UP (ref 3.5–5.3)
POTASSIUM SERPL-SCNC: 3.8 MMOL/L — SIGNIFICANT CHANGE UP (ref 3.5–5.3)
PROT SERPL-MCNC: 6.4 G/DL — SIGNIFICANT CHANGE UP (ref 6–8.3)
RBC # BLD: 4.91 M/UL — SIGNIFICANT CHANGE UP (ref 3.8–5.2)
RBC # FLD: 13.2 % — SIGNIFICANT CHANGE UP (ref 10.3–14.5)
SODIUM SERPL-SCNC: 139 MMOL/L — SIGNIFICANT CHANGE UP (ref 135–145)
WBC # BLD: 6.45 K/UL — SIGNIFICANT CHANGE UP (ref 3.8–10.5)
WBC # FLD AUTO: 6.45 K/UL — SIGNIFICANT CHANGE UP (ref 3.8–10.5)

## 2024-06-25 PROCEDURE — 99223 1ST HOSP IP/OBS HIGH 75: CPT

## 2024-06-25 PROCEDURE — 99232 SBSQ HOSP IP/OBS MODERATE 35: CPT | Mod: GC

## 2024-06-25 RX ADMIN — Medication 975 MILLIGRAM(S): at 21:40

## 2024-06-25 RX ADMIN — Medication 1 TABLET(S): at 21:40

## 2024-06-25 RX ADMIN — PANTOPRAZOLE SODIUM 40 MILLIGRAM(S): 40 INJECTION, POWDER, FOR SOLUTION INTRAVENOUS at 05:25

## 2024-06-25 RX ADMIN — Medication 200 MILLIGRAM(S): at 05:25

## 2024-06-25 RX ADMIN — AMLODIPINE BESYLATE 5 MILLIGRAM(S): 2.5 TABLET ORAL at 05:26

## 2024-06-25 RX ADMIN — Medication 88 MICROGRAM(S): at 05:25

## 2024-06-25 RX ADMIN — Medication 975 MILLIGRAM(S): at 14:27

## 2024-06-25 RX ADMIN — HEPARIN SODIUM 5000 UNIT(S): 50 INJECTION, SOLUTION INTRAVENOUS at 14:27

## 2024-06-25 RX ADMIN — HEPARIN SODIUM 5000 UNIT(S): 50 INJECTION, SOLUTION INTRAVENOUS at 05:25

## 2024-06-25 RX ADMIN — Medication 975 MILLIGRAM(S): at 22:40

## 2024-06-25 RX ADMIN — LOSARTAN POTASSIUM 100 MILLIGRAM(S): 100 TABLET, FILM COATED ORAL at 05:25

## 2024-06-25 RX ADMIN — Medication 975 MILLIGRAM(S): at 15:27

## 2024-06-25 RX ADMIN — Medication 975 MILLIGRAM(S): at 05:26

## 2024-06-25 RX ADMIN — HEPARIN SODIUM 5000 UNIT(S): 50 INJECTION, SOLUTION INTRAVENOUS at 21:40

## 2024-06-25 RX ADMIN — Medication 20 MILLIGRAM(S): at 21:40

## 2024-06-26 PROCEDURE — 99232 SBSQ HOSP IP/OBS MODERATE 35: CPT

## 2024-06-26 PROCEDURE — 99232 SBSQ HOSP IP/OBS MODERATE 35: CPT | Mod: GC

## 2024-06-26 RX ADMIN — Medication 975 MILLIGRAM(S): at 14:30

## 2024-06-26 RX ADMIN — Medication 975 MILLIGRAM(S): at 22:41

## 2024-06-26 RX ADMIN — POLYETHYLENE GLYCOL 3350 17 GRAM(S): 1 POWDER ORAL at 12:11

## 2024-06-26 RX ADMIN — Medication 975 MILLIGRAM(S): at 13:41

## 2024-06-26 RX ADMIN — HEPARIN SODIUM 5000 UNIT(S): 50 INJECTION, SOLUTION INTRAVENOUS at 13:41

## 2024-06-26 RX ADMIN — Medication 200 MILLIGRAM(S): at 06:44

## 2024-06-26 RX ADMIN — LOSARTAN POTASSIUM 100 MILLIGRAM(S): 100 TABLET, FILM COATED ORAL at 06:45

## 2024-06-26 RX ADMIN — HEPARIN SODIUM 5000 UNIT(S): 50 INJECTION, SOLUTION INTRAVENOUS at 21:40

## 2024-06-26 RX ADMIN — PANTOPRAZOLE SODIUM 40 MILLIGRAM(S): 40 INJECTION, POWDER, FOR SOLUTION INTRAVENOUS at 06:45

## 2024-06-26 RX ADMIN — Medication 20 MILLIGRAM(S): at 21:41

## 2024-06-26 RX ADMIN — HEPARIN SODIUM 5000 UNIT(S): 50 INJECTION, SOLUTION INTRAVENOUS at 06:46

## 2024-06-26 RX ADMIN — Medication 975 MILLIGRAM(S): at 21:41

## 2024-06-26 RX ADMIN — Medication 975 MILLIGRAM(S): at 06:45

## 2024-06-26 RX ADMIN — Medication 975 MILLIGRAM(S): at 07:25

## 2024-06-26 RX ADMIN — Medication 88 MICROGRAM(S): at 06:45

## 2024-06-26 RX ADMIN — AMLODIPINE BESYLATE 5 MILLIGRAM(S): 2.5 TABLET ORAL at 06:45

## 2024-06-27 LAB
ALBUMIN SERPL ELPH-MCNC: 3.3 G/DL — SIGNIFICANT CHANGE UP (ref 3.3–5)
ALP SERPL-CCNC: 81 U/L — SIGNIFICANT CHANGE UP (ref 40–120)
ALT FLD-CCNC: 44 U/L — SIGNIFICANT CHANGE UP (ref 10–45)
ANION GAP SERPL CALC-SCNC: 7 MMOL/L — SIGNIFICANT CHANGE UP (ref 5–17)
AST SERPL-CCNC: 41 U/L — HIGH (ref 10–40)
BASOPHILS # BLD AUTO: 0.03 K/UL — SIGNIFICANT CHANGE UP (ref 0–0.2)
BASOPHILS NFR BLD AUTO: 0.5 % — SIGNIFICANT CHANGE UP (ref 0–2)
BILIRUB SERPL-MCNC: 0.5 MG/DL — SIGNIFICANT CHANGE UP (ref 0.2–1.2)
BUN SERPL-MCNC: 30 MG/DL — HIGH (ref 7–23)
CALCIUM SERPL-MCNC: 9.5 MG/DL — SIGNIFICANT CHANGE UP (ref 8.4–10.5)
CHLORIDE SERPL-SCNC: 105 MMOL/L — SIGNIFICANT CHANGE UP (ref 96–108)
CO2 SERPL-SCNC: 30 MMOL/L — SIGNIFICANT CHANGE UP (ref 22–31)
CREAT SERPL-MCNC: 0.95 MG/DL — SIGNIFICANT CHANGE UP (ref 0.5–1.3)
EGFR: 60 ML/MIN/1.73M2 — SIGNIFICANT CHANGE UP
EOSINOPHIL # BLD AUTO: 0.29 K/UL — SIGNIFICANT CHANGE UP (ref 0–0.5)
EOSINOPHIL NFR BLD AUTO: 5 % — SIGNIFICANT CHANGE UP (ref 0–6)
GLUCOSE SERPL-MCNC: 106 MG/DL — HIGH (ref 70–99)
HCT VFR BLD CALC: 42.5 % — SIGNIFICANT CHANGE UP (ref 34.5–45)
HGB BLD-MCNC: 13.9 G/DL — SIGNIFICANT CHANGE UP (ref 11.5–15.5)
IMM GRANULOCYTES NFR BLD AUTO: 1.4 % — HIGH (ref 0–0.9)
LYMPHOCYTES # BLD AUTO: 1.63 K/UL — SIGNIFICANT CHANGE UP (ref 1–3.3)
LYMPHOCYTES # BLD AUTO: 28.2 % — SIGNIFICANT CHANGE UP (ref 13–44)
MCHC RBC-ENTMCNC: 28.7 PG — SIGNIFICANT CHANGE UP (ref 27–34)
MCHC RBC-ENTMCNC: 32.7 GM/DL — SIGNIFICANT CHANGE UP (ref 32–36)
MCV RBC AUTO: 87.8 FL — SIGNIFICANT CHANGE UP (ref 80–100)
MONOCYTES # BLD AUTO: 0.56 K/UL — SIGNIFICANT CHANGE UP (ref 0–0.9)
MONOCYTES NFR BLD AUTO: 9.7 % — SIGNIFICANT CHANGE UP (ref 2–14)
NEUTROPHILS # BLD AUTO: 3.2 K/UL — SIGNIFICANT CHANGE UP (ref 1.8–7.4)
NEUTROPHILS NFR BLD AUTO: 55.2 % — SIGNIFICANT CHANGE UP (ref 43–77)
NRBC # BLD: 0 /100 WBCS — SIGNIFICANT CHANGE UP (ref 0–0)
PLATELET # BLD AUTO: 287 K/UL — SIGNIFICANT CHANGE UP (ref 150–400)
POTASSIUM SERPL-MCNC: 4.9 MMOL/L — SIGNIFICANT CHANGE UP (ref 3.5–5.3)
POTASSIUM SERPL-SCNC: 4.9 MMOL/L — SIGNIFICANT CHANGE UP (ref 3.5–5.3)
PROT SERPL-MCNC: 6.6 G/DL — SIGNIFICANT CHANGE UP (ref 6–8.3)
RBC # BLD: 4.84 M/UL — SIGNIFICANT CHANGE UP (ref 3.8–5.2)
RBC # FLD: 13.3 % — SIGNIFICANT CHANGE UP (ref 10.3–14.5)
SODIUM SERPL-SCNC: 142 MMOL/L — SIGNIFICANT CHANGE UP (ref 135–145)
WBC # BLD: 5.79 K/UL — SIGNIFICANT CHANGE UP (ref 3.8–10.5)
WBC # FLD AUTO: 5.79 K/UL — SIGNIFICANT CHANGE UP (ref 3.8–10.5)

## 2024-06-27 PROCEDURE — 99232 SBSQ HOSP IP/OBS MODERATE 35: CPT

## 2024-06-27 PROCEDURE — 99232 SBSQ HOSP IP/OBS MODERATE 35: CPT | Mod: GC

## 2024-06-27 RX ORDER — FAMOTIDINE 40 MG
20 TABLET ORAL DAILY
Refills: 0 | Status: DISCONTINUED | OUTPATIENT
Start: 2024-06-28 | End: 2024-07-05

## 2024-06-27 RX ORDER — ONDANSETRON HYDROCHLORIDE 2 MG/ML
4 INJECTION INTRAMUSCULAR; INTRAVENOUS EVERY 8 HOURS
Refills: 0 | Status: DISCONTINUED | OUTPATIENT
Start: 2024-06-27 | End: 2024-07-05

## 2024-06-27 RX ADMIN — Medication 975 MILLIGRAM(S): at 14:24

## 2024-06-27 RX ADMIN — Medication 975 MILLIGRAM(S): at 15:24

## 2024-06-27 RX ADMIN — Medication 20 MILLIGRAM(S): at 21:21

## 2024-06-27 RX ADMIN — HEPARIN SODIUM 5000 UNIT(S): 50 INJECTION, SOLUTION INTRAVENOUS at 06:45

## 2024-06-27 RX ADMIN — PANTOPRAZOLE SODIUM 40 MILLIGRAM(S): 40 INJECTION, POWDER, FOR SOLUTION INTRAVENOUS at 06:44

## 2024-06-27 RX ADMIN — HEPARIN SODIUM 5000 UNIT(S): 50 INJECTION, SOLUTION INTRAVENOUS at 21:22

## 2024-06-27 RX ADMIN — Medication 975 MILLIGRAM(S): at 21:22

## 2024-06-27 RX ADMIN — Medication 975 MILLIGRAM(S): at 07:09

## 2024-06-27 RX ADMIN — HEPARIN SODIUM 5000 UNIT(S): 50 INJECTION, SOLUTION INTRAVENOUS at 14:24

## 2024-06-27 RX ADMIN — Medication 975 MILLIGRAM(S): at 22:22

## 2024-06-27 RX ADMIN — Medication 2 TABLET(S): at 21:22

## 2024-06-27 RX ADMIN — Medication 88 MICROGRAM(S): at 06:44

## 2024-06-27 RX ADMIN — Medication 200 MILLIGRAM(S): at 06:44

## 2024-06-27 RX ADMIN — AMLODIPINE BESYLATE 5 MILLIGRAM(S): 2.5 TABLET ORAL at 06:44

## 2024-06-27 RX ADMIN — Medication 975 MILLIGRAM(S): at 06:44

## 2024-06-27 RX ADMIN — LOSARTAN POTASSIUM 100 MILLIGRAM(S): 100 TABLET, FILM COATED ORAL at 06:44

## 2024-06-28 PROCEDURE — 99231 SBSQ HOSP IP/OBS SF/LOW 25: CPT

## 2024-06-28 PROCEDURE — 99232 SBSQ HOSP IP/OBS MODERATE 35: CPT | Mod: GC

## 2024-06-28 RX ADMIN — HEPARIN SODIUM 5000 UNIT(S): 50 INJECTION, SOLUTION INTRAVENOUS at 14:41

## 2024-06-28 RX ADMIN — Medication 975 MILLIGRAM(S): at 14:41

## 2024-06-28 RX ADMIN — LOSARTAN POTASSIUM 100 MILLIGRAM(S): 100 TABLET, FILM COATED ORAL at 06:00

## 2024-06-28 RX ADMIN — Medication 200 MILLIGRAM(S): at 05:59

## 2024-06-28 RX ADMIN — Medication 20 MILLIGRAM(S): at 22:46

## 2024-06-28 RX ADMIN — AMLODIPINE BESYLATE 5 MILLIGRAM(S): 2.5 TABLET ORAL at 05:59

## 2024-06-28 RX ADMIN — Medication 975 MILLIGRAM(S): at 22:45

## 2024-06-28 RX ADMIN — Medication 975 MILLIGRAM(S): at 15:41

## 2024-06-28 RX ADMIN — Medication 975 MILLIGRAM(S): at 06:00

## 2024-06-28 RX ADMIN — HEPARIN SODIUM 5000 UNIT(S): 50 INJECTION, SOLUTION INTRAVENOUS at 22:45

## 2024-06-28 RX ADMIN — Medication 88 MICROGRAM(S): at 05:59

## 2024-06-28 RX ADMIN — Medication 975 MILLIGRAM(S): at 06:56

## 2024-06-28 RX ADMIN — Medication 20 MILLIGRAM(S): at 12:29

## 2024-06-28 RX ADMIN — HEPARIN SODIUM 5000 UNIT(S): 50 INJECTION, SOLUTION INTRAVENOUS at 05:59

## 2024-06-29 PROCEDURE — 99232 SBSQ HOSP IP/OBS MODERATE 35: CPT

## 2024-06-29 RX ADMIN — HEPARIN SODIUM 5000 UNIT(S): 50 INJECTION, SOLUTION INTRAVENOUS at 06:26

## 2024-06-29 RX ADMIN — Medication 975 MILLIGRAM(S): at 13:05

## 2024-06-29 RX ADMIN — TRAMADOL HYDROCHLORIDE 50 MILLIGRAM(S): 50 TABLET, FILM COATED ORAL at 06:34

## 2024-06-29 RX ADMIN — Medication 200 MILLIGRAM(S): at 06:27

## 2024-06-29 RX ADMIN — Medication 975 MILLIGRAM(S): at 21:20

## 2024-06-29 RX ADMIN — AMLODIPINE BESYLATE 5 MILLIGRAM(S): 2.5 TABLET ORAL at 06:26

## 2024-06-29 RX ADMIN — LOSARTAN POTASSIUM 100 MILLIGRAM(S): 100 TABLET, FILM COATED ORAL at 06:26

## 2024-06-29 RX ADMIN — Medication 975 MILLIGRAM(S): at 06:26

## 2024-06-29 RX ADMIN — Medication 20 MILLIGRAM(S): at 21:20

## 2024-06-29 RX ADMIN — POLYETHYLENE GLYCOL 3350 17 GRAM(S): 1 POWDER ORAL at 13:05

## 2024-06-29 RX ADMIN — Medication 20 MILLIGRAM(S): at 13:05

## 2024-06-29 RX ADMIN — HEPARIN SODIUM 5000 UNIT(S): 50 INJECTION, SOLUTION INTRAVENOUS at 21:25

## 2024-06-29 RX ADMIN — HEPARIN SODIUM 5000 UNIT(S): 50 INJECTION, SOLUTION INTRAVENOUS at 13:04

## 2024-06-29 RX ADMIN — Medication 975 MILLIGRAM(S): at 14:00

## 2024-06-29 RX ADMIN — Medication 88 MICROGRAM(S): at 06:26

## 2024-06-29 RX ADMIN — TRAMADOL HYDROCHLORIDE 50 MILLIGRAM(S): 50 TABLET, FILM COATED ORAL at 07:24

## 2024-06-30 PROCEDURE — 99232 SBSQ HOSP IP/OBS MODERATE 35: CPT

## 2024-06-30 RX ADMIN — HEPARIN SODIUM 5000 UNIT(S): 50 INJECTION, SOLUTION INTRAVENOUS at 21:55

## 2024-06-30 RX ADMIN — Medication 20 MILLIGRAM(S): at 11:50

## 2024-06-30 RX ADMIN — Medication 975 MILLIGRAM(S): at 14:46

## 2024-06-30 RX ADMIN — Medication 88 MICROGRAM(S): at 06:26

## 2024-06-30 RX ADMIN — AMLODIPINE BESYLATE 5 MILLIGRAM(S): 2.5 TABLET ORAL at 06:25

## 2024-06-30 RX ADMIN — Medication 975 MILLIGRAM(S): at 14:45

## 2024-06-30 RX ADMIN — LOSARTAN POTASSIUM 100 MILLIGRAM(S): 100 TABLET, FILM COATED ORAL at 06:26

## 2024-06-30 RX ADMIN — HEPARIN SODIUM 5000 UNIT(S): 50 INJECTION, SOLUTION INTRAVENOUS at 06:25

## 2024-06-30 RX ADMIN — HEPARIN SODIUM 5000 UNIT(S): 50 INJECTION, SOLUTION INTRAVENOUS at 14:45

## 2024-06-30 RX ADMIN — TRAMADOL HYDROCHLORIDE 50 MILLIGRAM(S): 50 TABLET, FILM COATED ORAL at 06:25

## 2024-06-30 RX ADMIN — POLYETHYLENE GLYCOL 3350 17 GRAM(S): 1 POWDER ORAL at 11:51

## 2024-06-30 RX ADMIN — Medication 975 MILLIGRAM(S): at 06:25

## 2024-06-30 RX ADMIN — Medication 2 TABLET(S): at 21:53

## 2024-06-30 RX ADMIN — Medication 200 MILLIGRAM(S): at 06:26

## 2024-06-30 RX ADMIN — Medication 975 MILLIGRAM(S): at 22:15

## 2024-06-30 RX ADMIN — Medication 975 MILLIGRAM(S): at 21:53

## 2024-06-30 RX ADMIN — Medication 20 MILLIGRAM(S): at 21:55

## 2024-07-01 LAB
24R-OH-CALCIDIOL SERPL-MCNC: 37.8 NG/ML — SIGNIFICANT CHANGE UP (ref 30–80)
ALBUMIN SERPL ELPH-MCNC: 3.4 G/DL — SIGNIFICANT CHANGE UP (ref 3.3–5)
ALP SERPL-CCNC: 97 U/L — SIGNIFICANT CHANGE UP (ref 40–120)
ALT FLD-CCNC: 41 U/L — SIGNIFICANT CHANGE UP (ref 10–45)
ANION GAP SERPL CALC-SCNC: 8 MMOL/L — SIGNIFICANT CHANGE UP (ref 5–17)
AST SERPL-CCNC: 21 U/L — SIGNIFICANT CHANGE UP (ref 10–40)
BASOPHILS # BLD AUTO: 0 K/UL — SIGNIFICANT CHANGE UP (ref 0–0.2)
BASOPHILS NFR BLD AUTO: 0 % — SIGNIFICANT CHANGE UP (ref 0–2)
BILIRUB SERPL-MCNC: 0.3 MG/DL — SIGNIFICANT CHANGE UP (ref 0.2–1.2)
BUN SERPL-MCNC: 30 MG/DL — HIGH (ref 7–23)
CALCIUM SERPL-MCNC: 9.5 MG/DL — SIGNIFICANT CHANGE UP (ref 8.4–10.5)
CALCIUM SERPL-MCNC: 9.7 MG/DL — SIGNIFICANT CHANGE UP (ref 8.4–10.5)
CHLORIDE SERPL-SCNC: 103 MMOL/L — SIGNIFICANT CHANGE UP (ref 96–108)
CO2 SERPL-SCNC: 27 MMOL/L — SIGNIFICANT CHANGE UP (ref 22–31)
CREAT SERPL-MCNC: 1.1 MG/DL — SIGNIFICANT CHANGE UP (ref 0.5–1.3)
EGFR: 50 ML/MIN/1.73M2 — LOW
EOSINOPHIL # BLD AUTO: 0.18 K/UL — SIGNIFICANT CHANGE UP (ref 0–0.5)
EOSINOPHIL NFR BLD AUTO: 3 % — SIGNIFICANT CHANGE UP (ref 0–6)
GLUCOSE SERPL-MCNC: 122 MG/DL — HIGH (ref 70–99)
HCT VFR BLD CALC: 42.1 % — SIGNIFICANT CHANGE UP (ref 34.5–45)
HGB BLD-MCNC: 13.9 G/DL — SIGNIFICANT CHANGE UP (ref 11.5–15.5)
LYMPHOCYTES # BLD AUTO: 1.89 K/UL — SIGNIFICANT CHANGE UP (ref 1–3.3)
LYMPHOCYTES # BLD AUTO: 32 % — SIGNIFICANT CHANGE UP (ref 13–44)
MANUAL SMEAR VERIFICATION: SIGNIFICANT CHANGE UP
MCHC RBC-ENTMCNC: 29 PG — SIGNIFICANT CHANGE UP (ref 27–34)
MCHC RBC-ENTMCNC: 33 GM/DL — SIGNIFICANT CHANGE UP (ref 32–36)
MCV RBC AUTO: 87.7 FL — SIGNIFICANT CHANGE UP (ref 80–100)
MONOCYTES # BLD AUTO: 0.41 K/UL — SIGNIFICANT CHANGE UP (ref 0–0.9)
MONOCYTES NFR BLD AUTO: 7 % — SIGNIFICANT CHANGE UP (ref 2–14)
NEUTROPHILS # BLD AUTO: 3.19 K/UL — SIGNIFICANT CHANGE UP (ref 1.8–7.4)
NEUTROPHILS NFR BLD AUTO: 54 % — SIGNIFICANT CHANGE UP (ref 43–77)
NRBC # BLD: 0 /100 WBCS — SIGNIFICANT CHANGE UP (ref 0–0)
PLAT MORPH BLD: NORMAL — SIGNIFICANT CHANGE UP
PLATELET # BLD AUTO: 344 K/UL — SIGNIFICANT CHANGE UP (ref 150–400)
POTASSIUM SERPL-MCNC: 4.2 MMOL/L — SIGNIFICANT CHANGE UP (ref 3.5–5.3)
POTASSIUM SERPL-SCNC: 4.2 MMOL/L — SIGNIFICANT CHANGE UP (ref 3.5–5.3)
PROT SERPL-MCNC: 6.4 G/DL — SIGNIFICANT CHANGE UP (ref 6–8.3)
PTH-INTACT FLD-MCNC: 24 PG/ML — SIGNIFICANT CHANGE UP (ref 15–65)
RBC # BLD: 4.8 M/UL — SIGNIFICANT CHANGE UP (ref 3.8–5.2)
RBC # FLD: 13.6 % — SIGNIFICANT CHANGE UP (ref 10.3–14.5)
RBC BLD AUTO: NORMAL — SIGNIFICANT CHANGE UP
SODIUM SERPL-SCNC: 138 MMOL/L — SIGNIFICANT CHANGE UP (ref 135–145)
VARIANT LYMPHS # BLD: 4 % — SIGNIFICANT CHANGE UP (ref 0–6)
VIT D25+D1,25 OH+D1,25 PNL SERPL-MCNC: 32.6 PG/ML — SIGNIFICANT CHANGE UP (ref 19.9–79.3)
WBC # BLD: 5.91 K/UL — SIGNIFICANT CHANGE UP (ref 3.8–10.5)
WBC # FLD AUTO: 5.91 K/UL — SIGNIFICANT CHANGE UP (ref 3.8–10.5)

## 2024-07-01 PROCEDURE — 99232 SBSQ HOSP IP/OBS MODERATE 35: CPT

## 2024-07-01 PROCEDURE — 99232 SBSQ HOSP IP/OBS MODERATE 35: CPT | Mod: GC

## 2024-07-01 RX ORDER — CLOTRIMAZOLE 1 %
1 CREAM (GRAM) TOPICAL DAILY
Refills: 0 | Status: COMPLETED | OUTPATIENT
Start: 2024-07-01 | End: 2024-07-03

## 2024-07-01 RX ADMIN — LOSARTAN POTASSIUM 100 MILLIGRAM(S): 100 TABLET, FILM COATED ORAL at 05:36

## 2024-07-01 RX ADMIN — POLYETHYLENE GLYCOL 3350 17 GRAM(S): 1 POWDER ORAL at 11:11

## 2024-07-01 RX ADMIN — Medication 975 MILLIGRAM(S): at 15:20

## 2024-07-01 RX ADMIN — Medication 975 MILLIGRAM(S): at 21:30

## 2024-07-01 RX ADMIN — Medication 1 APPLICATORFUL: at 12:35

## 2024-07-01 RX ADMIN — Medication 88 MICROGRAM(S): at 05:36

## 2024-07-01 RX ADMIN — Medication 2 TABLET(S): at 21:31

## 2024-07-01 RX ADMIN — Medication 200 MILLIGRAM(S): at 05:39

## 2024-07-01 RX ADMIN — Medication 975 MILLIGRAM(S): at 22:00

## 2024-07-01 RX ADMIN — HEPARIN SODIUM 5000 UNIT(S): 50 INJECTION, SOLUTION INTRAVENOUS at 05:36

## 2024-07-01 RX ADMIN — HEPARIN SODIUM 5000 UNIT(S): 50 INJECTION, SOLUTION INTRAVENOUS at 14:20

## 2024-07-01 RX ADMIN — Medication 975 MILLIGRAM(S): at 05:39

## 2024-07-01 RX ADMIN — Medication 975 MILLIGRAM(S): at 06:15

## 2024-07-01 RX ADMIN — AMLODIPINE BESYLATE 5 MILLIGRAM(S): 2.5 TABLET ORAL at 05:36

## 2024-07-01 RX ADMIN — Medication 20 MILLIGRAM(S): at 11:10

## 2024-07-01 RX ADMIN — Medication 1 TABLET(S): at 12:36

## 2024-07-01 RX ADMIN — HEPARIN SODIUM 5000 UNIT(S): 50 INJECTION, SOLUTION INTRAVENOUS at 21:30

## 2024-07-01 RX ADMIN — Medication 975 MILLIGRAM(S): at 14:20

## 2024-07-01 RX ADMIN — Medication 20 MILLIGRAM(S): at 21:30

## 2024-07-02 ENCOUNTER — TRANSCRIPTION ENCOUNTER (OUTPATIENT)
Age: 84
End: 2024-07-02

## 2024-07-02 LAB — GLUCOSE BLDC GLUCOMTR-MCNC: 105 MG/DL — HIGH (ref 70–99)

## 2024-07-02 PROCEDURE — 99232 SBSQ HOSP IP/OBS MODERATE 35: CPT | Mod: GC

## 2024-07-02 PROCEDURE — 99232 SBSQ HOSP IP/OBS MODERATE 35: CPT

## 2024-07-02 RX ORDER — LEVOTHYROXINE SODIUM 25 MCG
1 TABLET ORAL
Qty: 0 | Refills: 0 | DISCHARGE
Start: 2024-07-02

## 2024-07-02 RX ORDER — AMLODIPINE BESYLATE 2.5 MG/1
1 TABLET ORAL
Qty: 0 | Refills: 0 | DISCHARGE
Start: 2024-07-02

## 2024-07-02 RX ORDER — SENNOSIDES 8.6 MG
2 TABLET ORAL
Qty: 0 | Refills: 0 | DISCHARGE
Start: 2024-07-02

## 2024-07-02 RX ORDER — ACETAMINOPHEN 325 MG
3 TABLET ORAL
Qty: 0 | Refills: 0 | DISCHARGE
Start: 2024-07-02

## 2024-07-02 RX ORDER — SIMVASTATIN 40 MG
1 TABLET ORAL
Qty: 0 | Refills: 0 | DISCHARGE
Start: 2024-07-02

## 2024-07-02 RX ORDER — LOSARTAN POTASSIUM 100 MG/1
1 TABLET, FILM COATED ORAL
Qty: 0 | Refills: 0 | DISCHARGE
Start: 2024-07-02

## 2024-07-02 RX ORDER — METOPROLOL TARTRATE 50 MG
1 TABLET ORAL
Qty: 0 | Refills: 0 | DISCHARGE
Start: 2024-07-02

## 2024-07-02 RX ORDER — FAMOTIDINE 40 MG
1 TABLET ORAL
Qty: 0 | Refills: 0 | DISCHARGE
Start: 2024-07-02

## 2024-07-02 RX ORDER — POLYETHYLENE GLYCOL 3350 1 G/G
17 POWDER ORAL
Qty: 0 | Refills: 0 | DISCHARGE
Start: 2024-07-02

## 2024-07-02 RX ADMIN — Medication 975 MILLIGRAM(S): at 15:49

## 2024-07-02 RX ADMIN — Medication 200 MILLIGRAM(S): at 05:38

## 2024-07-02 RX ADMIN — HEPARIN SODIUM 5000 UNIT(S): 50 INJECTION, SOLUTION INTRAVENOUS at 21:45

## 2024-07-02 RX ADMIN — Medication 975 MILLIGRAM(S): at 21:45

## 2024-07-02 RX ADMIN — Medication 2 TABLET(S): at 21:45

## 2024-07-02 RX ADMIN — Medication 1 TABLET(S): at 11:10

## 2024-07-02 RX ADMIN — Medication 975 MILLIGRAM(S): at 14:45

## 2024-07-02 RX ADMIN — AMLODIPINE BESYLATE 5 MILLIGRAM(S): 2.5 TABLET ORAL at 05:39

## 2024-07-02 RX ADMIN — Medication 20 MILLIGRAM(S): at 11:10

## 2024-07-02 RX ADMIN — Medication 20 MILLIGRAM(S): at 21:45

## 2024-07-02 RX ADMIN — Medication 975 MILLIGRAM(S): at 22:15

## 2024-07-02 RX ADMIN — Medication 88 MICROGRAM(S): at 05:39

## 2024-07-02 RX ADMIN — HEPARIN SODIUM 5000 UNIT(S): 50 INJECTION, SOLUTION INTRAVENOUS at 14:46

## 2024-07-02 RX ADMIN — HEPARIN SODIUM 5000 UNIT(S): 50 INJECTION, SOLUTION INTRAVENOUS at 05:41

## 2024-07-02 RX ADMIN — LOSARTAN POTASSIUM 100 MILLIGRAM(S): 100 TABLET, FILM COATED ORAL at 05:38

## 2024-07-02 RX ADMIN — Medication 975 MILLIGRAM(S): at 05:38

## 2024-07-02 RX ADMIN — Medication 975 MILLIGRAM(S): at 06:00

## 2024-07-03 ENCOUNTER — TRANSCRIPTION ENCOUNTER (OUTPATIENT)
Age: 84
End: 2024-07-03

## 2024-07-03 LAB
ANION GAP SERPL CALC-SCNC: 7 MMOL/L — SIGNIFICANT CHANGE UP (ref 5–17)
BUN SERPL-MCNC: 33 MG/DL — HIGH (ref 7–23)
CALCIUM SERPL-MCNC: 9.6 MG/DL — SIGNIFICANT CHANGE UP (ref 8.4–10.5)
CHLORIDE SERPL-SCNC: 107 MMOL/L — SIGNIFICANT CHANGE UP (ref 96–108)
CO2 SERPL-SCNC: 28 MMOL/L — SIGNIFICANT CHANGE UP (ref 22–31)
CREAT SERPL-MCNC: 1.12 MG/DL — SIGNIFICANT CHANGE UP (ref 0.5–1.3)
EGFR: 49 ML/MIN/1.73M2 — LOW
GLUCOSE SERPL-MCNC: 104 MG/DL — HIGH (ref 70–99)
POTASSIUM SERPL-MCNC: 4.4 MMOL/L — SIGNIFICANT CHANGE UP (ref 3.5–5.3)
POTASSIUM SERPL-SCNC: 4.4 MMOL/L — SIGNIFICANT CHANGE UP (ref 3.5–5.3)
SODIUM SERPL-SCNC: 142 MMOL/L — SIGNIFICANT CHANGE UP (ref 135–145)

## 2024-07-03 PROCEDURE — 99232 SBSQ HOSP IP/OBS MODERATE 35: CPT

## 2024-07-03 PROCEDURE — 99232 SBSQ HOSP IP/OBS MODERATE 35: CPT | Mod: GC

## 2024-07-03 RX ORDER — LOSARTAN POTASSIUM 100 MG/1
1 TABLET, FILM COATED ORAL
Qty: 30 | Refills: 0
Start: 2024-07-03 | End: 2024-08-01

## 2024-07-03 RX ORDER — AMLODIPINE BESYLATE 2.5 MG/1
2.5 TABLET ORAL DAILY
Refills: 0 | Status: DISCONTINUED | OUTPATIENT
Start: 2024-07-04 | End: 2024-07-04

## 2024-07-03 RX ORDER — FAMOTIDINE 40 MG
1 TABLET ORAL
Qty: 30 | Refills: 0
Start: 2024-07-03 | End: 2024-08-01

## 2024-07-03 RX ORDER — SIMVASTATIN 40 MG
1 TABLET ORAL
Qty: 30 | Refills: 0
Start: 2024-07-03 | End: 2024-08-01

## 2024-07-03 RX ORDER — METOPROLOL TARTRATE 50 MG
1 TABLET ORAL
Qty: 30 | Refills: 0
Start: 2024-07-03 | End: 2024-08-01

## 2024-07-03 RX ORDER — AMLODIPINE BESYLATE 2.5 MG/1
1 TABLET ORAL
Qty: 0 | Refills: 0 | DISCHARGE
Start: 2024-07-03

## 2024-07-03 RX ORDER — LEVOTHYROXINE SODIUM 25 MCG
1 TABLET ORAL
Qty: 30 | Refills: 0
Start: 2024-07-03 | End: 2024-08-01

## 2024-07-03 RX ADMIN — Medication 975 MILLIGRAM(S): at 23:38

## 2024-07-03 RX ADMIN — Medication 1 TABLET(S): at 12:05

## 2024-07-03 RX ADMIN — Medication 975 MILLIGRAM(S): at 14:45

## 2024-07-03 RX ADMIN — LOSARTAN POTASSIUM 100 MILLIGRAM(S): 100 TABLET, FILM COATED ORAL at 05:16

## 2024-07-03 RX ADMIN — Medication 975 MILLIGRAM(S): at 06:00

## 2024-07-03 RX ADMIN — AMLODIPINE BESYLATE 5 MILLIGRAM(S): 2.5 TABLET ORAL at 05:16

## 2024-07-03 RX ADMIN — HEPARIN SODIUM 5000 UNIT(S): 50 INJECTION, SOLUTION INTRAVENOUS at 14:45

## 2024-07-03 RX ADMIN — Medication 88 MICROGRAM(S): at 05:16

## 2024-07-03 RX ADMIN — HEPARIN SODIUM 5000 UNIT(S): 50 INJECTION, SOLUTION INTRAVENOUS at 05:17

## 2024-07-03 RX ADMIN — Medication 975 MILLIGRAM(S): at 15:45

## 2024-07-03 RX ADMIN — Medication 20 MILLIGRAM(S): at 12:06

## 2024-07-03 RX ADMIN — Medication 1 APPLICATORFUL: at 12:05

## 2024-07-03 RX ADMIN — Medication 975 MILLIGRAM(S): at 05:16

## 2024-07-03 RX ADMIN — Medication 975 MILLIGRAM(S): at 22:08

## 2024-07-03 RX ADMIN — Medication 20 MILLIGRAM(S): at 22:08

## 2024-07-03 RX ADMIN — Medication 2 TABLET(S): at 22:08

## 2024-07-03 RX ADMIN — Medication 200 MILLIGRAM(S): at 05:16

## 2024-07-03 RX ADMIN — HEPARIN SODIUM 5000 UNIT(S): 50 INJECTION, SOLUTION INTRAVENOUS at 22:07

## 2024-07-04 PROCEDURE — 99232 SBSQ HOSP IP/OBS MODERATE 35: CPT

## 2024-07-04 RX ORDER — AMLODIPINE BESYLATE 2.5 MG/1
5 TABLET ORAL DAILY
Refills: 0 | Status: DISCONTINUED | OUTPATIENT
Start: 2024-07-05 | End: 2024-07-05

## 2024-07-04 RX ADMIN — Medication 200 MILLIGRAM(S): at 05:25

## 2024-07-04 RX ADMIN — POLYETHYLENE GLYCOL 3350 17 GRAM(S): 1 POWDER ORAL at 12:38

## 2024-07-04 RX ADMIN — Medication 975 MILLIGRAM(S): at 15:35

## 2024-07-04 RX ADMIN — Medication 2 TABLET(S): at 21:11

## 2024-07-04 RX ADMIN — Medication 975 MILLIGRAM(S): at 22:29

## 2024-07-04 RX ADMIN — Medication 975 MILLIGRAM(S): at 06:50

## 2024-07-04 RX ADMIN — Medication 88 MICROGRAM(S): at 05:24

## 2024-07-04 RX ADMIN — Medication 20 MILLIGRAM(S): at 12:38

## 2024-07-04 RX ADMIN — HEPARIN SODIUM 5000 UNIT(S): 50 INJECTION, SOLUTION INTRAVENOUS at 21:11

## 2024-07-04 RX ADMIN — Medication 975 MILLIGRAM(S): at 23:29

## 2024-07-04 RX ADMIN — HEPARIN SODIUM 5000 UNIT(S): 50 INJECTION, SOLUTION INTRAVENOUS at 05:24

## 2024-07-04 RX ADMIN — Medication 975 MILLIGRAM(S): at 15:00

## 2024-07-04 RX ADMIN — Medication 20 MILLIGRAM(S): at 21:11

## 2024-07-04 RX ADMIN — AMLODIPINE BESYLATE 2.5 MILLIGRAM(S): 2.5 TABLET ORAL at 05:23

## 2024-07-04 RX ADMIN — HEPARIN SODIUM 5000 UNIT(S): 50 INJECTION, SOLUTION INTRAVENOUS at 15:03

## 2024-07-04 RX ADMIN — LOSARTAN POTASSIUM 100 MILLIGRAM(S): 100 TABLET, FILM COATED ORAL at 05:24

## 2024-07-04 RX ADMIN — Medication 1 TABLET(S): at 12:38

## 2024-07-04 RX ADMIN — Medication 975 MILLIGRAM(S): at 05:22

## 2024-07-05 VITALS
RESPIRATION RATE: 16 BRPM | OXYGEN SATURATION: 94 % | TEMPERATURE: 98 F | DIASTOLIC BLOOD PRESSURE: 69 MMHG | HEART RATE: 64 BPM | SYSTOLIC BLOOD PRESSURE: 150 MMHG

## 2024-07-05 LAB
ALBUMIN SERPL ELPH-MCNC: 3.4 G/DL — SIGNIFICANT CHANGE UP (ref 3.3–5)
ALP SERPL-CCNC: 131 U/L — HIGH (ref 40–120)
ALT FLD-CCNC: 30 U/L — SIGNIFICANT CHANGE UP (ref 10–45)
ANION GAP SERPL CALC-SCNC: 9 MMOL/L — SIGNIFICANT CHANGE UP (ref 5–17)
AST SERPL-CCNC: 22 U/L — SIGNIFICANT CHANGE UP (ref 10–40)
BILIRUB SERPL-MCNC: 0.6 MG/DL — SIGNIFICANT CHANGE UP (ref 0.2–1.2)
BUN SERPL-MCNC: 32 MG/DL — HIGH (ref 7–23)
CALCIUM SERPL-MCNC: 9.4 MG/DL — SIGNIFICANT CHANGE UP (ref 8.4–10.5)
CHLORIDE SERPL-SCNC: 106 MMOL/L — SIGNIFICANT CHANGE UP (ref 96–108)
CO2 SERPL-SCNC: 26 MMOL/L — SIGNIFICANT CHANGE UP (ref 22–31)
CREAT SERPL-MCNC: 1 MG/DL — SIGNIFICANT CHANGE UP (ref 0.5–1.3)
EGFR: 56 ML/MIN/1.73M2 — LOW
GLUCOSE SERPL-MCNC: 108 MG/DL — HIGH (ref 70–99)
HCT VFR BLD CALC: 41.8 % — SIGNIFICANT CHANGE UP (ref 34.5–45)
HGB BLD-MCNC: 14 G/DL — SIGNIFICANT CHANGE UP (ref 11.5–15.5)
MCHC RBC-ENTMCNC: 29.5 PG — SIGNIFICANT CHANGE UP (ref 27–34)
MCHC RBC-ENTMCNC: 33.5 GM/DL — SIGNIFICANT CHANGE UP (ref 32–36)
MCV RBC AUTO: 88 FL — SIGNIFICANT CHANGE UP (ref 80–100)
NRBC # BLD: 0 /100 WBCS — SIGNIFICANT CHANGE UP (ref 0–0)
PLATELET # BLD AUTO: 343 K/UL — SIGNIFICANT CHANGE UP (ref 150–400)
POTASSIUM SERPL-MCNC: 4.4 MMOL/L — SIGNIFICANT CHANGE UP (ref 3.5–5.3)
POTASSIUM SERPL-SCNC: 4.4 MMOL/L — SIGNIFICANT CHANGE UP (ref 3.5–5.3)
PROT SERPL-MCNC: 6.5 G/DL — SIGNIFICANT CHANGE UP (ref 6–8.3)
RBC # BLD: 4.75 M/UL — SIGNIFICANT CHANGE UP (ref 3.8–5.2)
RBC # FLD: 13.9 % — SIGNIFICANT CHANGE UP (ref 10.3–14.5)
SODIUM SERPL-SCNC: 141 MMOL/L — SIGNIFICANT CHANGE UP (ref 135–145)
WBC # BLD: 6.16 K/UL — SIGNIFICANT CHANGE UP (ref 3.8–10.5)
WBC # FLD AUTO: 6.16 K/UL — SIGNIFICANT CHANGE UP (ref 3.8–10.5)

## 2024-07-05 PROCEDURE — 36415 COLL VENOUS BLD VENIPUNCTURE: CPT

## 2024-07-05 PROCEDURE — 97530 THERAPEUTIC ACTIVITIES: CPT | Mod: GP

## 2024-07-05 PROCEDURE — 80053 COMPREHEN METABOLIC PANEL: CPT

## 2024-07-05 PROCEDURE — 97116 GAIT TRAINING THERAPY: CPT | Mod: GP

## 2024-07-05 PROCEDURE — 82652 VIT D 1 25-DIHYDROXY: CPT

## 2024-07-05 PROCEDURE — 82310 ASSAY OF CALCIUM: CPT

## 2024-07-05 PROCEDURE — 85025 COMPLETE CBC W/AUTO DIFF WBC: CPT

## 2024-07-05 PROCEDURE — 97112 NEUROMUSCULAR REEDUCATION: CPT | Mod: GP

## 2024-07-05 PROCEDURE — 97161 PT EVAL LOW COMPLEX 20 MIN: CPT | Mod: GP

## 2024-07-05 PROCEDURE — 97165 OT EVAL LOW COMPLEX 30 MIN: CPT | Mod: GO

## 2024-07-05 PROCEDURE — 97535 SELF CARE MNGMENT TRAINING: CPT | Mod: GO

## 2024-07-05 PROCEDURE — 99238 HOSP IP/OBS DSCHRG MGMT 30/<: CPT | Mod: GC

## 2024-07-05 PROCEDURE — 80048 BASIC METABOLIC PNL TOTAL CA: CPT

## 2024-07-05 PROCEDURE — 85027 COMPLETE CBC AUTOMATED: CPT

## 2024-07-05 PROCEDURE — 83970 ASSAY OF PARATHORMONE: CPT

## 2024-07-05 PROCEDURE — 82306 VITAMIN D 25 HYDROXY: CPT

## 2024-07-05 PROCEDURE — 87635 SARS-COV-2 COVID-19 AMP PRB: CPT

## 2024-07-05 PROCEDURE — 99232 SBSQ HOSP IP/OBS MODERATE 35: CPT

## 2024-07-05 PROCEDURE — 97110 THERAPEUTIC EXERCISES: CPT | Mod: GO

## 2024-07-05 PROCEDURE — 82962 GLUCOSE BLOOD TEST: CPT

## 2024-07-05 RX ORDER — AMLODIPINE BESYLATE 2.5 MG/1
1 TABLET ORAL
Qty: 30 | Refills: 0
Start: 2024-07-05 | End: 2024-08-03

## 2024-07-05 RX ADMIN — HEPARIN SODIUM 5000 UNIT(S): 50 INJECTION, SOLUTION INTRAVENOUS at 05:38

## 2024-07-05 RX ADMIN — AMLODIPINE BESYLATE 5 MILLIGRAM(S): 2.5 TABLET ORAL at 05:39

## 2024-07-05 RX ADMIN — Medication 88 MICROGRAM(S): at 05:38

## 2024-07-05 RX ADMIN — Medication 1 TABLET(S): at 11:14

## 2024-07-05 RX ADMIN — Medication 200 MILLIGRAM(S): at 05:39

## 2024-07-05 RX ADMIN — Medication 975 MILLIGRAM(S): at 05:38

## 2024-07-05 RX ADMIN — LOSARTAN POTASSIUM 100 MILLIGRAM(S): 100 TABLET, FILM COATED ORAL at 05:38

## 2024-07-05 RX ADMIN — Medication 20 MILLIGRAM(S): at 11:14

## 2024-07-05 RX ADMIN — Medication 975 MILLIGRAM(S): at 06:57

## 2024-07-09 ENCOUNTER — APPOINTMENT (OUTPATIENT)
Dept: GERIATRICS | Facility: CLINIC | Age: 84
End: 2024-07-09

## 2024-07-09 VITALS
DIASTOLIC BLOOD PRESSURE: 58 MMHG | OXYGEN SATURATION: 97 % | RESPIRATION RATE: 16 BRPM | TEMPERATURE: 97.2 F | HEART RATE: 60 BPM | HEIGHT: 61 IN | SYSTOLIC BLOOD PRESSURE: 104 MMHG | BODY MASS INDEX: 29.64 KG/M2 | WEIGHT: 157 LBS

## 2024-07-09 DIAGNOSIS — Z86.59 PERSONAL HISTORY OF OTHER MENTAL AND BEHAVIORAL DISORDERS: ICD-10-CM

## 2024-07-09 DIAGNOSIS — I10 ESSENTIAL (PRIMARY) HYPERTENSION: ICD-10-CM

## 2024-07-09 DIAGNOSIS — G93.89 OTHER SPECIFIED DISORDERS OF BRAIN: ICD-10-CM

## 2024-07-09 DIAGNOSIS — E03.9 HYPOTHYROIDISM, UNSPECIFIED: ICD-10-CM

## 2024-07-09 DIAGNOSIS — Z71.89 OTHER SPECIFIED COUNSELING: ICD-10-CM
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
## 2024-07-09 DIAGNOSIS — R42 DIZZINESS AND GIDDINESS: ICD-10-CM

## 2024-07-09 DIAGNOSIS — E78.5 HYPERLIPIDEMIA, UNSPECIFIED: ICD-10-CM

## 2024-07-09 PROCEDURE — 99214 OFFICE O/P EST MOD 30 MIN: CPT | Mod: 25

## 2024-07-09 PROCEDURE — 99497 ADVNCD CARE PLAN 30 MIN: CPT

## 2024-07-09 RX ORDER — METOPROLOL SUCCINATE 100 MG/1
100 TABLET, EXTENDED RELEASE ORAL
Qty: 90 | Refills: 1 | Status: ACTIVE | COMMUNITY
Start: 2024-07-09

## 2024-08-06 ENCOUNTER — NON-APPOINTMENT (OUTPATIENT)
Age: 84
End: 2024-08-06

## 2024-08-06 ENCOUNTER — APPOINTMENT (OUTPATIENT)
Dept: GERIATRICS | Facility: CLINIC | Age: 84
End: 2024-08-06

## 2024-08-06 PROBLEM — R41.3 MEMORY LOSS: Status: ACTIVE | Noted: 2024-08-06

## 2024-08-06 PROCEDURE — 99214 OFFICE O/P EST MOD 30 MIN: CPT

## 2024-08-06 NOTE — HISTORY OF PRESENT ILLNESS
[Patient reported hearing was normal] : Patient reported hearing was normal [Patient reported vision is normal] : Patient reported vision is normal [Patient reported colon/rectal/cancer screening was normal] : Patient reported colon/rectal cancer screening was normal [Patient reported cervical cancer screening was normal] : Patient reported cervical cancer screening was normal [One fall no injury in past year] : Patient reported one fall in the past year without injury [Full assistance needed] : Assistance needed managing medications [] : Assistance needed managing finances. [FreeTextEntry1] : 84 yo female w/ a hx of R and L sided breast carcinoma (1992 and 2018) last rx: 5 years, htn, hypothyroidism and GERD presents to the office for a follow up visit. At the last visit we cut down metoprolol to 100mg as patients blood pressures were running low and she had a fall at home. Pt presents with her daughter today who helps provide history. She brought in a blood pressure log - most of the blood pressures were on the lower end 96/ systolic. There was only 1 reading of 130 systolic. Patient states she feels very low on energy.  Daughter states patient seems more ocnfused than usual. For example, patient will call daughter in the middle of the night multiple times. Pt states she thinks her memory is very good.  HTN as mentioned above. taking losartan 100mg, metoprolol 100mg and amlodipine 5mg.   HLD Taking simvastatin 20mg daily.  Depression Not taking any medications PHQ9 score: 1  Hypothyroid Taking levothyroxine 75mcg   [TextBox_25] : due, has hx of osteoporosis [TextBox_19] : aged out, normal in the past [FreeTextEntry4] : hx of breast cancer  [FreeTextEntry6] : aged out ,normal in the past

## 2024-08-06 NOTE — REVIEW OF SYSTEMS
[Feeling Tired] : feeling tired [Lower Ext Edema] : lower extremity edema [Fever] : no fever [Chills] : no chills [Heart Rate Is Slow] : the heart rate was not slow [Heart Rate Is Fast] : the heart rate was not fast [Chest Pain] : no chest pain [Shortness Of Breath] : no shortness of breath [Wheezing] : no wheezing [Cough] : no cough [Abdominal Pain] : no abdominal pain [Vomiting] : no vomiting [Constipation] : no constipation [Diarrhea] : no diarrhea [Dysuria] : no dysuria [Confused] : no confusion [Dizziness] : no dizziness [Suicidal] : not suicidal [Anxiety] : no anxiety [Depression] : no depression

## 2024-08-06 NOTE — PHYSICAL EXAM
[Version 1] : Word list version 1 - Banana, Sunrise, Chair [Abnormal Clock Drawn or Refused to Draw Clock (0 points)] : abnormal clock drawn or refused to draw clock (0 points) [1 Word (1 point)] : 1 word (1 point) [1 Point] : 1 point [Alert] : alert [No Acute Distress] : in no acute distress [EOMI] : extraocular movements were intact [Normal Appearance] : the appearance of the neck was normal [Supple] : the neck was supple [No Respiratory Distress] : no respiratory distress [No Acc Muscle Use] : no accessory muscle use [Auscultation Breath Sounds / Voice Sounds] : lungs were clear to auscultation bilaterally [Normal S1, S2] : normal S1 and S2 [Heart Rate And Rhythm] : heart rate was normal and rhythm regular [Abdomen Tenderness] : non-tender [Abdomen Soft] : soft [Normal Color / Pigmentation] : normal skin color and pigmentation [No Focal Deficits] : no focal deficits [Oriented To Time, Place, And Person] : oriented to person, place, and time [Normal Affect] : the affect was normal [Normal Insight/Judgment] : insight and judgment were intact [Normal Mood] : the mood was normal [Normal Gait] : abnormal gait [de-identified] : Presents with walker

## 2024-08-08 ENCOUNTER — APPOINTMENT (OUTPATIENT)
Dept: GERIATRICS | Facility: CLINIC | Age: 84
End: 2024-08-08

## 2024-08-08 PROBLEM — G30.9 ALZHEIMER'S DEMENTIA: Status: ACTIVE | Noted: 2024-08-08

## 2024-08-08 PROCEDURE — 99483 ASSMT & CARE PLN PT COG IMP: CPT

## 2024-08-14 ENCOUNTER — APPOINTMENT (OUTPATIENT)
Dept: PHYSICAL MEDICINE AND REHAB | Facility: CLINIC | Age: 84
End: 2024-08-14
Payer: MEDICARE

## 2024-08-14 VITALS
HEART RATE: 67 BPM | OXYGEN SATURATION: 97 % | SYSTOLIC BLOOD PRESSURE: 152 MMHG | BODY MASS INDEX: 31.53 KG/M2 | HEIGHT: 61 IN | WEIGHT: 167 LBS | DIASTOLIC BLOOD PRESSURE: 75 MMHG | TEMPERATURE: 97.2 F

## 2024-08-14 DIAGNOSIS — R60.0 LOCALIZED EDEMA: ICD-10-CM

## 2024-08-14 DIAGNOSIS — S32.9XXA FRACTURE OF UNSPECIFIED PARTS OF LUMBOSACRAL SPINE AND PELVIS, INITIAL ENCOUNTER FOR CLOSED FRACTURE: ICD-10-CM

## 2024-08-14 PROCEDURE — 99213 OFFICE O/P EST LOW 20 MIN: CPT

## 2024-08-14 NOTE — HISTORY OF PRESENT ILLNESS
[FreeTextEntry1] : 84 y/o female with a PMHx of pituitary tumor, HLD, HTN, breast cancer (s/p double mastectomy and chemo), LEFT hip ORIF in 10/2022 complicated by RLE DVT, right hip ORIF, presented to [facility] on 6/21/24 s/p mechanical fall and left hip pain. Patient reports [place] and fell on her left hip. Denies LOC or head strike. CT pelvis showed left superior and inferior pubic rami fracture. Ortho consulted, no surgical intervention needed, WBAT.  Hospital course significant for LE swelling with known history of DVT, bilateral dopplers were negative. 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 No acute intracranial hemorrhage or other acute intracranial pathology. Recommend outpatient MRI follow up. Patient was evaluated by PM&R and therapy for functional deficits, gait/ADL impairments and acute rehabilitation was recommended. Patient was cleared for discharge to [facility] IRU on 6/24/24. At [facility] rehab patient completed comprehensive PT OT program and reached her rehab goals on 7/5. While at rehab BP and pain regimen adjusted, evaluated by medicine. Cleared for dc home on 7/5. patient now seen on follow up patient reveived VN PT at home- now discontinued- ambulating independently with RW- patient unwilling to progress to one armed device 2nd fear of falling again patient has HHA 8x7 who assists patient with some ADL - showering Sl Left groin pain Seen recently by geriatrics 2nd declining memory- started on Aricept Reports increased swelling L>RLE- was taking diuretics but stopped by PMD

## 2024-08-14 NOTE — SOCIAL HISTORY
[Apartment] : in an apartment [Lives Alone] : Only the patient, ~he/she~ lives alone [] : Patient has a home health aide [Walker] : walker [FreeTextEntry6] : HHA 8x7- assist with some ADL

## 2024-08-14 NOTE — ASSESSMENT
[FreeTextEntry1] : 82 yo female SP fall Left pelvic fracture -doing well ambulates independently with RW- has fear of advancing to one armed device recommend contacting PMD regarding borderline high BP and LE edema- ? restart diuretics Follow up as needed
01-Jun-2022 14:18

## 2024-08-14 NOTE — REVIEW OF SYSTEMS
[Lower Ext Edema] : lower extremity edema [Constipation] : constipation [Incontinence] : incontinence [Muscle Pain] : muscle pain [Negative] : Heme/Lymph

## 2024-08-14 NOTE — PHYSICAL EXAM
[FreeTextEntry1] : General: NAD, Resting Comfortable,                                 HEENT: NC/AT, EOM I,Normal Conjunctivae Cardio: RRR, Normal S1-S2, No M/G/R                             Pulm: No Respiratory Distress,  Lungs CTAB                       Abdomen: ND/NT, Soft, BS+                                               MSK: No joint swelling, Full ROM                                        Ext: 2+ edema LEs L>R, No calf tenderness   Neurological Examination   Cognitive: AAO x 3                                                                        CN II - XII  intact                                                                           Sensory: Intact to light touch,                                                                                       Tone: normal Throughout  Balance: impaired  Motor   LEFT    UE: SF [5/5], EF [5/5], EE [5/5], WE [5/5],  [wnl] RIGHT UE: SF [5/5], EF [5/5], EE [5/5], WE [5/5],  [wnl] LEFT    LE:  HF [5/5], KE [5/5], DF [5/5], EHL [5/5],  PF [5/5] RIGHT LE:  HF [5/5], KE [5/5], DF [5/5], EHL [5/5],  PF [5/5]    Reflex:  intact
Normal rate, regular rhythm.  Heart sounds S1, S2.

## 2024-09-10 ENCOUNTER — APPOINTMENT (OUTPATIENT)
Dept: GERIATRICS | Facility: CLINIC | Age: 84
End: 2024-09-10
Payer: MEDICARE

## 2024-09-10 ENCOUNTER — APPOINTMENT (OUTPATIENT)
Dept: GERIATRICS | Facility: CLINIC | Age: 84
End: 2024-09-10

## 2024-09-10 VITALS
TEMPERATURE: 96 F | BODY MASS INDEX: 30.02 KG/M2 | OXYGEN SATURATION: 97 % | DIASTOLIC BLOOD PRESSURE: 75 MMHG | SYSTOLIC BLOOD PRESSURE: 141 MMHG | HEART RATE: 79 BPM | HEIGHT: 61 IN | RESPIRATION RATE: 16 BRPM | WEIGHT: 159 LBS

## 2024-09-10 DIAGNOSIS — R60.0 LOCALIZED EDEMA: ICD-10-CM

## 2024-09-10 DIAGNOSIS — I10 ESSENTIAL (PRIMARY) HYPERTENSION: ICD-10-CM

## 2024-09-10 DIAGNOSIS — F02.80 ALZHEIMER'S DISEASE, UNSPECIFIED: ICD-10-CM

## 2024-09-10 DIAGNOSIS — Z13.820 ENCOUNTER FOR SCREENING FOR OSTEOPOROSIS: ICD-10-CM

## 2024-09-10 DIAGNOSIS — G30.9 ALZHEIMER'S DISEASE, UNSPECIFIED: ICD-10-CM

## 2024-09-10 DIAGNOSIS — R26.81 UNSTEADINESS ON FEET: ICD-10-CM

## 2024-09-10 PROCEDURE — 99215 OFFICE O/P EST HI 40 MIN: CPT

## 2024-09-11 PROBLEM — R26.81 GAIT INSTABILITY: Status: ACTIVE | Noted: 2024-09-11

## 2024-09-11 NOTE — HISTORY OF PRESENT ILLNESS
[FreeTextEntry1] : 84 yo female w/ a hx of R and L sided breast carcinoma (1992 and 2018) s/p tx, 5 years, HTN, hypothyroidism and GERD who presents here for follow up appointment  Here with daughter Maryam  #HTN - decreased blood pressure medications at last visit to avoid dizziness.  - Notes that she does not have any dizziness or episodes of syncope - in addition, denies chest pain, shortness of breath, visual changes, new headaches, focal neural deficits.   #Dementia - Completed cognitive evaluation at last visit with Dr Joseph that confirmed dx of dementia and patient was started on 5 mg of Donepezil - Patient notes some inital GI upset which has since resolved and patient has been tolerating it well. No current changes in cognition per daughter present.  -Maryam noted that she wanted to discuss with a neurologist regarding additional testing, and a second opinion. In addition she was interested in discussing monoclonal antibody therapy.

## 2024-09-11 NOTE — PHYSICAL EXAM
[Alert] : alert [No Acute Distress] : in no acute distress [Sclera] : the sclera and conjunctiva were normal [EOMI] : extraocular movements were intact [Normal Outer Ear/Nose] : the ears and nose were normal in appearance [Normal Appearance] : the appearance of the neck was normal [Supple] : the neck was supple [No Respiratory Distress] : no respiratory distress [No Acc Muscle Use] : no accessory muscle use [Respiration, Rhythm And Depth] : normal respiratory rhythm and effort [Auscultation Breath Sounds / Voice Sounds] : lungs were clear to auscultation bilaterally [Normal S1, S2] : normal S1 and S2 [Heart Rate And Rhythm] : heart rate was normal and rhythm regular [Bowel Sounds] : normal bowel sounds [Abdomen Tenderness] : non-tender [Abdomen Soft] : soft [No Spinal Tenderness] : no spinal tenderness [Normal Color / Pigmentation] : normal skin color and pigmentation [Normal Turgor] : normal skin turgor [No Focal Deficits] : no focal deficits [Normal Affect] : the affect was normal [Normal Mood] : the mood was normal [de-identified] : 1+ pitting edema to mid shin

## 2024-09-11 NOTE — HISTORY OF PRESENT ILLNESS
[FreeTextEntry1] : 82 yo female w/ a hx of R and L sided breast carcinoma (1992 and 2018) s/p tx, 5 years, HTN, hypothyroidism and GERD who presents here for follow up appointment  Here with daughter Maryam  #HTN - decreased blood pressure medications at last visit to avoid dizziness.  - Notes that she does not have any dizziness or episodes of syncope - in addition, denies chest pain, shortness of breath, visual changes, new headaches, focal neural deficits.   #Dementia - Completed cognitive evaluation at last visit with Dr Joseph that confirmed dx of dementia and patient was started on 5 mg of Donepezil - Patient notes some inital GI upset which has since resolved and patient has been tolerating it well. No current changes in cognition per daughter present.  -Maryam noted that she wanted to discuss with a neurologist regarding additional testing, and a second opinion. In addition she was interested in discussing monoclonal antibody therapy.

## 2024-09-11 NOTE — PHYSICAL EXAM
[Alert] : alert [No Acute Distress] : in no acute distress [Sclera] : the sclera and conjunctiva were normal [EOMI] : extraocular movements were intact [Normal Outer Ear/Nose] : the ears and nose were normal in appearance [Normal Appearance] : the appearance of the neck was normal [Supple] : the neck was supple [No Respiratory Distress] : no respiratory distress [No Acc Muscle Use] : no accessory muscle use [Respiration, Rhythm And Depth] : normal respiratory rhythm and effort [Auscultation Breath Sounds / Voice Sounds] : lungs were clear to auscultation bilaterally [Normal S1, S2] : normal S1 and S2 [Heart Rate And Rhythm] : heart rate was normal and rhythm regular [Bowel Sounds] : normal bowel sounds [Abdomen Tenderness] : non-tender [Abdomen Soft] : soft [No Spinal Tenderness] : no spinal tenderness [Normal Color / Pigmentation] : normal skin color and pigmentation [Normal Turgor] : normal skin turgor [No Focal Deficits] : no focal deficits [Normal Affect] : the affect was normal [Normal Mood] : the mood was normal [de-identified] : 1+ pitting edema to mid shin

## 2024-10-01 ENCOUNTER — OUTPATIENT (OUTPATIENT)
Dept: OUTPATIENT SERVICES | Facility: HOSPITAL | Age: 84
LOS: 1 days | End: 2024-10-01
Payer: MEDICARE

## 2024-10-01 ENCOUNTER — APPOINTMENT (OUTPATIENT)
Dept: RADIOLOGY | Facility: HOSPITAL | Age: 84
End: 2024-10-01
Payer: MEDICARE

## 2024-10-01 DIAGNOSIS — Z90.10 ACQUIRED ABSENCE OF UNSPECIFIED BREAST AND NIPPLE: Chronic | ICD-10-CM

## 2024-10-01 DIAGNOSIS — Z96.649 PRESENCE OF UNSPECIFIED ARTIFICIAL HIP JOINT: Chronic | ICD-10-CM

## 2024-10-01 DIAGNOSIS — Z13.820 ENCOUNTER FOR SCREENING FOR OSTEOPOROSIS: ICD-10-CM

## 2024-10-01 DIAGNOSIS — M81.0 AGE-RELATED OSTEOPOROSIS W/OUT CURRENT PATHOLOGICAL FRACTURE: ICD-10-CM

## 2024-10-01 DIAGNOSIS — Z96.641 PRESENCE OF RIGHT ARTIFICIAL HIP JOINT: Chronic | ICD-10-CM

## 2024-10-01 DIAGNOSIS — Z98.890 OTHER SPECIFIED POSTPROCEDURAL STATES: Chronic | ICD-10-CM

## 2024-10-01 PROCEDURE — 77080 DXA BONE DENSITY AXIAL: CPT | Mod: 26

## 2024-10-01 PROCEDURE — 77080 DXA BONE DENSITY AXIAL: CPT

## 2024-10-08 ENCOUNTER — APPOINTMENT (OUTPATIENT)
Dept: GERIATRICS | Facility: CLINIC | Age: 84
End: 2024-10-08

## 2024-10-10 ENCOUNTER — RX RENEWAL (OUTPATIENT)
Age: 84
End: 2024-10-10

## 2024-10-17 ENCOUNTER — APPOINTMENT (OUTPATIENT)
Dept: ENDOCRINOLOGY | Facility: CLINIC | Age: 84
End: 2024-10-17
Payer: MEDICARE

## 2024-10-17 VITALS
WEIGHT: 155 LBS | HEIGHT: 61 IN | OXYGEN SATURATION: 97 % | HEART RATE: 68 BPM | DIASTOLIC BLOOD PRESSURE: 72 MMHG | SYSTOLIC BLOOD PRESSURE: 120 MMHG | BODY MASS INDEX: 29.27 KG/M2

## 2024-10-17 DIAGNOSIS — M81.0 AGE-RELATED OSTEOPOROSIS W/OUT CURRENT PATHOLOGICAL FRACTURE: ICD-10-CM

## 2024-10-17 PROCEDURE — 99205 OFFICE O/P NEW HI 60 MIN: CPT

## 2024-10-17 PROCEDURE — G2211 COMPLEX E/M VISIT ADD ON: CPT

## 2024-10-17 RX ORDER — DENOSUMAB 60 MG/ML
60 INJECTION SUBCUTANEOUS
Qty: 1 | Refills: 1 | Status: ACTIVE | COMMUNITY
Start: 2024-10-17 | End: 1900-01-01

## 2024-10-22 ENCOUNTER — APPOINTMENT (OUTPATIENT)
Dept: GERIATRICS | Facility: CLINIC | Age: 84
End: 2024-10-22
Payer: MEDICARE

## 2024-10-22 VITALS
SYSTOLIC BLOOD PRESSURE: 137 MMHG | BODY MASS INDEX: 31.15 KG/M2 | RESPIRATION RATE: 16 BRPM | HEART RATE: 81 BPM | TEMPERATURE: 97.1 F | OXYGEN SATURATION: 98 % | HEIGHT: 61 IN | DIASTOLIC BLOOD PRESSURE: 60 MMHG | WEIGHT: 165 LBS

## 2024-10-22 DIAGNOSIS — G30.9 ALZHEIMER'S DISEASE, UNSPECIFIED: ICD-10-CM

## 2024-10-22 DIAGNOSIS — I10 ESSENTIAL (PRIMARY) HYPERTENSION: ICD-10-CM

## 2024-10-22 DIAGNOSIS — F02.80 ALZHEIMER'S DISEASE, UNSPECIFIED: ICD-10-CM

## 2024-10-22 DIAGNOSIS — E78.5 HYPERLIPIDEMIA, UNSPECIFIED: ICD-10-CM

## 2024-10-22 DIAGNOSIS — Z86.59 PERSONAL HISTORY OF OTHER MENTAL AND BEHAVIORAL DISORDERS: ICD-10-CM

## 2024-10-22 DIAGNOSIS — E03.9 HYPOTHYROIDISM, UNSPECIFIED: ICD-10-CM

## 2024-10-22 PROCEDURE — 99214 OFFICE O/P EST MOD 30 MIN: CPT

## 2024-10-31 ENCOUNTER — MED ADMIN CHARGE (OUTPATIENT)
Age: 84
End: 2024-10-31

## 2024-10-31 ENCOUNTER — APPOINTMENT (OUTPATIENT)
Dept: ENDOCRINOLOGY | Facility: CLINIC | Age: 84
End: 2024-10-31
Payer: MEDICARE

## 2024-10-31 DIAGNOSIS — M81.0 AGE-RELATED OSTEOPOROSIS W/OUT CURRENT PATHOLOGICAL FRACTURE: ICD-10-CM

## 2024-10-31 PROCEDURE — 96401 CHEMO ANTI-NEOPL SQ/IM: CPT

## 2024-10-31 RX ORDER — DENOSUMAB 60 MG/ML
60 INJECTION SUBCUTANEOUS
Qty: 1 | Refills: 0 | Status: COMPLETED | OUTPATIENT
Start: 2024-10-29

## 2025-01-14 ENCOUNTER — APPOINTMENT (OUTPATIENT)
Age: 85
End: 2025-01-14
Payer: MEDICARE

## 2025-01-14 VITALS
WEIGHT: 165 LBS | TEMPERATURE: 97.2 F | SYSTOLIC BLOOD PRESSURE: 130 MMHG | OXYGEN SATURATION: 98 % | HEIGHT: 61 IN | HEART RATE: 62 BPM | BODY MASS INDEX: 31.15 KG/M2 | RESPIRATION RATE: 14 BRPM | DIASTOLIC BLOOD PRESSURE: 68 MMHG

## 2025-01-14 DIAGNOSIS — Z86.59 PERSONAL HISTORY OF OTHER MENTAL AND BEHAVIORAL DISORDERS: ICD-10-CM

## 2025-01-14 DIAGNOSIS — R79.89 OTHER SPECIFIED ABNORMAL FINDINGS OF BLOOD CHEMISTRY: ICD-10-CM

## 2025-01-14 DIAGNOSIS — E03.9 HYPOTHYROIDISM, UNSPECIFIED: ICD-10-CM

## 2025-01-14 DIAGNOSIS — F02.80 ALZHEIMER'S DISEASE, UNSPECIFIED: ICD-10-CM

## 2025-01-14 DIAGNOSIS — G30.9 ALZHEIMER'S DISEASE, UNSPECIFIED: ICD-10-CM

## 2025-01-14 DIAGNOSIS — I10 ESSENTIAL (PRIMARY) HYPERTENSION: ICD-10-CM

## 2025-01-14 DIAGNOSIS — E78.5 HYPERLIPIDEMIA, UNSPECIFIED: ICD-10-CM

## 2025-01-14 PROCEDURE — 99214 OFFICE O/P EST MOD 30 MIN: CPT

## 2025-01-14 RX ORDER — MEMANTINE HYDROCHLORIDE 5 MG/1
5 TABLET, FILM COATED ORAL
Qty: 30 | Refills: 0 | Status: ACTIVE | COMMUNITY
Start: 2025-01-14 | End: 1900-01-01

## 2025-02-05 ENCOUNTER — INPATIENT (INPATIENT)
Facility: HOSPITAL | Age: 85
LOS: 4 days | Discharge: HOME | DRG: 603 | End: 2025-02-10
Attending: INTERNAL MEDICINE | Admitting: STUDENT IN AN ORGANIZED HEALTH CARE EDUCATION/TRAINING PROGRAM
Payer: MEDICARE

## 2025-02-05 VITALS
WEIGHT: 152.56 LBS | TEMPERATURE: 98 F | DIASTOLIC BLOOD PRESSURE: 87 MMHG | SYSTOLIC BLOOD PRESSURE: 162 MMHG | RESPIRATION RATE: 18 BRPM | OXYGEN SATURATION: 97 % | HEART RATE: 84 BPM

## 2025-02-05 DIAGNOSIS — Z96.649 PRESENCE OF UNSPECIFIED ARTIFICIAL HIP JOINT: Chronic | ICD-10-CM

## 2025-02-05 DIAGNOSIS — Z96.641 PRESENCE OF RIGHT ARTIFICIAL HIP JOINT: Chronic | ICD-10-CM

## 2025-02-05 DIAGNOSIS — Z90.10 ACQUIRED ABSENCE OF UNSPECIFIED BREAST AND NIPPLE: Chronic | ICD-10-CM

## 2025-02-05 DIAGNOSIS — L03.032 CELLULITIS OF LEFT TOE: ICD-10-CM

## 2025-02-05 DIAGNOSIS — Z98.890 OTHER SPECIFIED POSTPROCEDURAL STATES: Chronic | ICD-10-CM

## 2025-02-05 LAB
ALBUMIN SERPL ELPH-MCNC: 3.3 G/DL — SIGNIFICANT CHANGE UP (ref 3.3–5)
ALP SERPL-CCNC: 78 U/L — SIGNIFICANT CHANGE UP (ref 40–120)
ALT FLD-CCNC: 35 U/L — SIGNIFICANT CHANGE UP (ref 12–78)
ANION GAP SERPL CALC-SCNC: 3 MMOL/L — LOW (ref 5–17)
APTT BLD: 27.1 SEC — SIGNIFICANT CHANGE UP (ref 24.5–35.6)
AST SERPL-CCNC: 14 U/L — LOW (ref 15–37)
BASOPHILS # BLD AUTO: 0.03 K/UL — SIGNIFICANT CHANGE UP (ref 0–0.2)
BASOPHILS NFR BLD AUTO: 0.3 % — SIGNIFICANT CHANGE UP (ref 0–2)
BILIRUB SERPL-MCNC: 0.3 MG/DL — SIGNIFICANT CHANGE UP (ref 0.2–1.2)
BUN SERPL-MCNC: 16 MG/DL — SIGNIFICANT CHANGE UP (ref 7–23)
CALCIUM SERPL-MCNC: 9.1 MG/DL — SIGNIFICANT CHANGE UP (ref 8.5–10.1)
CHLORIDE SERPL-SCNC: 109 MMOL/L — HIGH (ref 96–108)
CO2 SERPL-SCNC: 30 MMOL/L — SIGNIFICANT CHANGE UP (ref 22–31)
CREAT SERPL-MCNC: 0.93 MG/DL — SIGNIFICANT CHANGE UP (ref 0.5–1.3)
EGFR: 61 ML/MIN/1.73M2 — SIGNIFICANT CHANGE UP
EOSINOPHIL # BLD AUTO: 0.16 K/UL — SIGNIFICANT CHANGE UP (ref 0–0.5)
EOSINOPHIL NFR BLD AUTO: 1.7 % — SIGNIFICANT CHANGE UP (ref 0–6)
GLUCOSE SERPL-MCNC: 117 MG/DL — HIGH (ref 70–99)
HCT VFR BLD CALC: 42.4 % — SIGNIFICANT CHANGE UP (ref 34.5–45)
HGB BLD-MCNC: 13.7 G/DL — SIGNIFICANT CHANGE UP (ref 11.5–15.5)
IMM GRANULOCYTES NFR BLD AUTO: 0.7 % — SIGNIFICANT CHANGE UP (ref 0–0.9)
INR BLD: 0.9 RATIO — SIGNIFICANT CHANGE UP (ref 0.85–1.16)
LACTATE SERPL-SCNC: 1.8 MMOL/L — SIGNIFICANT CHANGE UP (ref 0.7–2)
LYMPHOCYTES # BLD AUTO: 1.74 K/UL — SIGNIFICANT CHANGE UP (ref 1–3.3)
LYMPHOCYTES # BLD AUTO: 18.4 % — SIGNIFICANT CHANGE UP (ref 13–44)
MCHC RBC-ENTMCNC: 28.8 PG — SIGNIFICANT CHANGE UP (ref 27–34)
MCHC RBC-ENTMCNC: 32.3 G/DL — SIGNIFICANT CHANGE UP (ref 32–36)
MCV RBC AUTO: 89.3 FL — SIGNIFICANT CHANGE UP (ref 80–100)
MONOCYTES # BLD AUTO: 0.81 K/UL — SIGNIFICANT CHANGE UP (ref 0–0.9)
MONOCYTES NFR BLD AUTO: 8.6 % — SIGNIFICANT CHANGE UP (ref 2–14)
NEUTROPHILS # BLD AUTO: 6.66 K/UL — SIGNIFICANT CHANGE UP (ref 1.8–7.4)
NEUTROPHILS NFR BLD AUTO: 70.3 % — SIGNIFICANT CHANGE UP (ref 43–77)
PLATELET # BLD AUTO: 299 K/UL — SIGNIFICANT CHANGE UP (ref 150–400)
POTASSIUM SERPL-MCNC: 4.2 MMOL/L — SIGNIFICANT CHANGE UP (ref 3.5–5.3)
POTASSIUM SERPL-SCNC: 4.2 MMOL/L — SIGNIFICANT CHANGE UP (ref 3.5–5.3)
PROT SERPL-MCNC: 6.7 GM/DL — SIGNIFICANT CHANGE UP (ref 6–8.3)
PROTHROM AB SERPL-ACNC: 10.4 SEC — SIGNIFICANT CHANGE UP (ref 9.9–13.4)
RBC # BLD: 4.75 M/UL — SIGNIFICANT CHANGE UP (ref 3.8–5.2)
RBC # FLD: 13.2 % — SIGNIFICANT CHANGE UP (ref 10.3–14.5)
SODIUM SERPL-SCNC: 142 MMOL/L — SIGNIFICANT CHANGE UP (ref 135–145)
WBC # BLD: 9.47 K/UL — SIGNIFICANT CHANGE UP (ref 3.8–10.5)
WBC # FLD AUTO: 9.47 K/UL — SIGNIFICANT CHANGE UP (ref 3.8–10.5)

## 2025-02-05 PROCEDURE — 87075 CULTR BACTERIA EXCEPT BLOOD: CPT

## 2025-02-05 PROCEDURE — 93010 ELECTROCARDIOGRAM REPORT: CPT

## 2025-02-05 PROCEDURE — 85027 COMPLETE CBC AUTOMATED: CPT

## 2025-02-05 PROCEDURE — 80053 COMPREHEN METABOLIC PANEL: CPT

## 2025-02-05 PROCEDURE — 97162 PT EVAL MOD COMPLEX 30 MIN: CPT | Mod: GP

## 2025-02-05 PROCEDURE — 97116 GAIT TRAINING THERAPY: CPT | Mod: GP

## 2025-02-05 PROCEDURE — 87077 CULTURE AEROBIC IDENTIFY: CPT

## 2025-02-05 PROCEDURE — 80202 ASSAY OF VANCOMYCIN: CPT

## 2025-02-05 PROCEDURE — 88311 DECALCIFY TISSUE: CPT

## 2025-02-05 PROCEDURE — 87070 CULTURE OTHR SPECIMN AEROBIC: CPT

## 2025-02-05 PROCEDURE — 83735 ASSAY OF MAGNESIUM: CPT

## 2025-02-05 PROCEDURE — 36415 COLL VENOUS BLD VENIPUNCTURE: CPT

## 2025-02-05 PROCEDURE — 73630 X-RAY EXAM OF FOOT: CPT | Mod: 26,LT

## 2025-02-05 PROCEDURE — 86140 C-REACTIVE PROTEIN: CPT

## 2025-02-05 PROCEDURE — 71045 X-RAY EXAM CHEST 1 VIEW: CPT | Mod: 26

## 2025-02-05 PROCEDURE — 73630 X-RAY EXAM OF FOOT: CPT | Mod: LT

## 2025-02-05 PROCEDURE — 87186 SC STD MICRODIL/AGAR DIL: CPT

## 2025-02-05 PROCEDURE — 73718 MRI LOWER EXTREMITY W/O DYE: CPT | Mod: MC,LT

## 2025-02-05 PROCEDURE — 99285 EMERGENCY DEPT VISIT HI MDM: CPT

## 2025-02-05 PROCEDURE — 88305 TISSUE EXAM BY PATHOLOGIST: CPT

## 2025-02-05 PROCEDURE — 81001 URINALYSIS AUTO W/SCOPE: CPT

## 2025-02-05 PROCEDURE — 84100 ASSAY OF PHOSPHORUS: CPT

## 2025-02-05 PROCEDURE — 87102 FUNGUS ISOLATION CULTURE: CPT

## 2025-02-05 PROCEDURE — 88304 TISSUE EXAM BY PATHOLOGIST: CPT

## 2025-02-05 PROCEDURE — 80048 BASIC METABOLIC PNL TOTAL CA: CPT

## 2025-02-05 PROCEDURE — 85652 RBC SED RATE AUTOMATED: CPT

## 2025-02-05 RX ORDER — MEMANTINE HYDROCHLORIDE 7 MG/1
1 CAPSULE, EXTENDED RELEASE ORAL
Refills: 0 | DISCHARGE

## 2025-02-05 RX ORDER — AMLODIPINE BESYLATE 5 MG
1 TABLET ORAL
Refills: 0 | DISCHARGE

## 2025-02-05 RX ORDER — CEFTRIAXONE 250 MG/1
1000 INJECTION, POWDER, FOR SOLUTION INTRAMUSCULAR; INTRAVENOUS ONCE
Refills: 0 | Status: COMPLETED | OUTPATIENT
Start: 2025-02-05 | End: 2025-02-05

## 2025-02-05 RX ORDER — METOPROLOL SUCCINATE 25 MG
1 TABLET, EXTENDED RELEASE 24 HR ORAL
Refills: 0 | DISCHARGE

## 2025-02-05 RX ORDER — DONEPEZIL HYDROCHLORIDE 10 MG/1
1 TABLET, FILM COATED ORAL
Refills: 0 | DISCHARGE

## 2025-02-05 RX ORDER — VANCOMYCIN HYDROCHLORIDE 50 MG/ML
1000 KIT ORAL ONCE
Refills: 0 | Status: COMPLETED | OUTPATIENT
Start: 2025-02-05 | End: 2025-02-05

## 2025-02-05 RX ORDER — DENOSUMAB 120 MG/1.7ML
60 INJECTION SUBCUTANEOUS
Refills: 0 | DISCHARGE

## 2025-02-05 RX ADMIN — VANCOMYCIN HYDROCHLORIDE 250 MILLIGRAM(S): KIT at 21:10

## 2025-02-05 RX ADMIN — CEFTRIAXONE 1000 MILLIGRAM(S): 250 INJECTION, POWDER, FOR SOLUTION INTRAMUSCULAR; INTRAVENOUS at 21:10

## 2025-02-05 NOTE — ED ADULT TRIAGE NOTE - CHIEF COMPLAINT QUOTE
PT presents to er with complaints of being sent in by  for IV abx treatment of left great toe infection, denies fevers.

## 2025-02-05 NOTE — ED STATDOCS - NSICDXPASTSURGICALHX_GEN_ALL_CORE_FT
PAST SURGICAL HISTORY:  Breast Cancer- s/p left Breast Lumpectomy  with Radiation therapy( 18 years ago)     Cataract surgery- both eyes ( 2009)     H/O mastectomy     H/O melanoma excision right forearm 2011    History of hip replacement 2015 - right    History of right hip replacement

## 2025-02-05 NOTE — ED PROVIDER NOTE - PROGRESS NOTE DETAILS
spoke with Dr. Phelps for amdission, she knows we could not look at toe due to pt not letting us JEFF Nagel DO

## 2025-02-05 NOTE — PHARMACOTHERAPY INTERVENTION NOTE - COMMENTS
Medication reconciliation completed.  Patient was unable to provide medication information, spoke to daughter Maryam ([phone]) and they provided current medication list; confirmed with Dr. First Medhoda.  Daughter states that pt takes Donepezil 10mg at night and pt was started on Memantine 5mg in the morning, she is not sure if pt is still taking the Memantine in the morning.

## 2025-02-05 NOTE — ED STATDOCS - PROGRESS NOTE DETAILS
Trip Morales  85 y/o F with PMHx of pituitary tumor, HLD, HTN, breast cancer (s/p double mastectomy and chemo), LEFT hip ORIF in 10/2022 complicated by RLE DVT, right hip ORIF presents to the ED c/o L great toe infection. States she had an abscess underneath her toenail and it tracked down to the bone. Pt now has cellulitis on her L foot. Podiatrist Dr Gamez. Pt had wound culture done. Pt was unable to start abx and was sent to the ED for IV abx. Denies fever, chills. Will send to main.

## 2025-02-05 NOTE — ED ADULT NURSE NOTE - NSFALLRISKINTERV_ED_ALL_ED

## 2025-02-05 NOTE — ED PROVIDER NOTE - CLINICAL SUMMARY MEDICAL DECISION MAKING FREE TEXT BOX
Pt has great toe wrapped. Toe is very painful and pt will not let us remove the wrap.  Pt was offered pain meds, but will only take Tylenol. Pt to be admitted.

## 2025-02-05 NOTE — ED PROVIDER NOTE - PHYSICAL EXAMINATION
Gen:  Well appearing in NAD  Head:  NC/AT  HEENT: pupils perrl,no pharyngeal erythema, uvula midline  Cardiac: S1S2, RRR  Abd: Soft, non tender  Resp: No distress, CTA   musculoskeletal:: Erythema from below the left knee to left great toe, great toe wrapped  Skin: warm and dry as visualized, no rashes  Neuro: GUSTAFSON, aao x 4  Psych:alert, cooperative, appropriate mood and affect for situation

## 2025-02-05 NOTE — ED STATDOCS - NSICDXFAMILYHX_GEN_ALL_CORE_FT
FAMILY HISTORY:  Father  Still living? Unknown  Family history of lung cancer, Age at diagnosis: Age Unknown  FH: lung cancer, Age at diagnosis: Age Unknown    Mother  Still living? No  FH: HTN (hypertension), Age at diagnosis: Age Unknown

## 2025-02-05 NOTE — ED PROVIDER NOTE - OBJECTIVE STATEMENT
85 y/o female with a PMHx of Breast CA s/p double mastectomy, chemotherapy and radiation, pituitary tumor, DVT, hypotension, HLD, hypothyroidism, L hip ORIF 10/2022 complicated by RLE DVT, right hip ORIF and osteoporosis presents to the ED c/o great toe infection. Pt states that she has an abscess underneath her toenail that tracks down to the bone. Pt also has L foot cellulitis. Pt was sent to ED to be started on IV antibiotics by Podiatrist Dr. Gamez. Pt has toe wrapped.

## 2025-02-05 NOTE — ED STATDOCS - NSICDXPASTMEDICALHX_GEN_ALL_CORE_FT
PAST MEDICAL HISTORY:  Breast Cancer     Breast cancer     Cataract     Deep vein thrombosis (DVT)     H/O hypotension     HTN (Hypertension)     HTN (hypertension)     Hyperlipidemia     Hypothyroidism      (Normal Spontaneous Vaginal Delivery) x 2     Obesity     Osteoporosis     Radiation

## 2025-02-05 NOTE — ED ADULT NURSE NOTE - OBJECTIVE STATEMENT
Pt ambulatory to Ed via walker with daughter sent by Podiatrist c/o L great toe infection. States she had an abscess underneath her toenail and it tracked down to the bone. Pt now has cellulitis on her L foot. EKG completed, PIV obtained, labs sent, medicated as ordered, unable to give urine sample as recently voided. xrays completed, plan of care explained with verbalized understanding.

## 2025-02-06 LAB
ALBUMIN SERPL ELPH-MCNC: 2.8 G/DL — LOW (ref 3.3–5)
ALP SERPL-CCNC: 67 U/L — SIGNIFICANT CHANGE UP (ref 40–120)
ALT FLD-CCNC: 25 U/L — SIGNIFICANT CHANGE UP (ref 12–78)
ANION GAP SERPL CALC-SCNC: 3 MMOL/L — LOW (ref 5–17)
APPEARANCE UR: CLEAR — SIGNIFICANT CHANGE UP
AST SERPL-CCNC: 12 U/L — LOW (ref 15–37)
BACTERIA # UR AUTO: NEGATIVE /HPF — SIGNIFICANT CHANGE UP
BILIRUB SERPL-MCNC: 0.4 MG/DL — SIGNIFICANT CHANGE UP (ref 0.2–1.2)
BILIRUB UR-MCNC: NEGATIVE — SIGNIFICANT CHANGE UP
BUN SERPL-MCNC: 12 MG/DL — SIGNIFICANT CHANGE UP (ref 7–23)
CALCIUM SERPL-MCNC: 8.4 MG/DL — LOW (ref 8.5–10.1)
CAST: 0 /LPF — SIGNIFICANT CHANGE UP (ref 0–4)
CHLORIDE SERPL-SCNC: 110 MMOL/L — HIGH (ref 96–108)
CO2 SERPL-SCNC: 28 MMOL/L — SIGNIFICANT CHANGE UP (ref 22–31)
COLOR SPEC: YELLOW — SIGNIFICANT CHANGE UP
CREAT SERPL-MCNC: 0.75 MG/DL — SIGNIFICANT CHANGE UP (ref 0.5–1.3)
CRP SERPL-MCNC: 67.9 MG/ML — HIGH (ref 0–5)
DIFF PNL FLD: NEGATIVE — SIGNIFICANT CHANGE UP
EGFR: 79 ML/MIN/1.73M2 — SIGNIFICANT CHANGE UP
ERYTHROCYTE [SEDIMENTATION RATE] IN BLOOD: 21 MM/HR — HIGH (ref 0–20)
GLUCOSE SERPL-MCNC: 107 MG/DL — HIGH (ref 70–99)
GLUCOSE UR QL: NEGATIVE MG/DL — SIGNIFICANT CHANGE UP
GRAM STN FLD: ABNORMAL
HCT VFR BLD CALC: 38.9 % — SIGNIFICANT CHANGE UP (ref 34.5–45)
HGB BLD-MCNC: 12.5 G/DL — SIGNIFICANT CHANGE UP (ref 11.5–15.5)
KETONES UR-MCNC: NEGATIVE MG/DL — SIGNIFICANT CHANGE UP
LEUKOCYTE ESTERASE UR-ACNC: ABNORMAL
MAGNESIUM SERPL-MCNC: 2 MG/DL — SIGNIFICANT CHANGE UP (ref 1.6–2.6)
MCHC RBC-ENTMCNC: 28.5 PG — SIGNIFICANT CHANGE UP (ref 27–34)
MCHC RBC-ENTMCNC: 32.1 G/DL — SIGNIFICANT CHANGE UP (ref 32–36)
MCV RBC AUTO: 88.6 FL — SIGNIFICANT CHANGE UP (ref 80–100)
NITRITE UR-MCNC: NEGATIVE — SIGNIFICANT CHANGE UP
PH UR: 6 — SIGNIFICANT CHANGE UP (ref 5–8)
PHOSPHATE SERPL-MCNC: 2.5 MG/DL — SIGNIFICANT CHANGE UP (ref 2.5–4.5)
PLATELET # BLD AUTO: 264 K/UL — SIGNIFICANT CHANGE UP (ref 150–400)
POTASSIUM SERPL-MCNC: 3.5 MMOL/L — SIGNIFICANT CHANGE UP (ref 3.5–5.3)
POTASSIUM SERPL-SCNC: 3.5 MMOL/L — SIGNIFICANT CHANGE UP (ref 3.5–5.3)
PROT SERPL-MCNC: 5.7 GM/DL — LOW (ref 6–8.3)
PROT UR-MCNC: NEGATIVE MG/DL — SIGNIFICANT CHANGE UP
RBC # BLD: 4.39 M/UL — SIGNIFICANT CHANGE UP (ref 3.8–5.2)
RBC # FLD: 13.3 % — SIGNIFICANT CHANGE UP (ref 10.3–14.5)
RBC CASTS # UR COMP ASSIST: 0 /HPF — SIGNIFICANT CHANGE UP (ref 0–4)
SODIUM SERPL-SCNC: 141 MMOL/L — SIGNIFICANT CHANGE UP (ref 135–145)
SP GR SPEC: 1.01 — SIGNIFICANT CHANGE UP (ref 1–1.03)
SPECIMEN SOURCE: SIGNIFICANT CHANGE UP
SQUAMOUS # UR AUTO: 4 /HPF — SIGNIFICANT CHANGE UP (ref 0–5)
UROBILINOGEN FLD QL: 0.2 MG/DL — SIGNIFICANT CHANGE UP (ref 0.2–1)
WBC # BLD: 7.97 K/UL — SIGNIFICANT CHANGE UP (ref 3.8–10.5)
WBC # FLD AUTO: 7.97 K/UL — SIGNIFICANT CHANGE UP (ref 3.8–10.5)
WBC UR QL: 1 /HPF — SIGNIFICANT CHANGE UP (ref 0–5)

## 2025-02-06 PROCEDURE — 99222 1ST HOSP IP/OBS MODERATE 55: CPT

## 2025-02-06 RX ORDER — CEFTRIAXONE 250 MG/1
1000 INJECTION, POWDER, FOR SOLUTION INTRAMUSCULAR; INTRAVENOUS EVERY 24 HOURS
Refills: 0 | Status: DISCONTINUED | OUTPATIENT
Start: 2025-02-06 | End: 2025-02-06

## 2025-02-06 RX ORDER — CEFTRIAXONE 250 MG/1
2000 INJECTION, POWDER, FOR SOLUTION INTRAMUSCULAR; INTRAVENOUS EVERY 24 HOURS
Refills: 0 | Status: DISCONTINUED | OUTPATIENT
Start: 2025-02-06 | End: 2025-02-10

## 2025-02-06 RX ORDER — TRAMADOL HYDROCHLORIDE 100 MG/1
50 TABLET, EXTENDED RELEASE ORAL EVERY 6 HOURS
Refills: 0 | Status: DISCONTINUED | OUTPATIENT
Start: 2025-02-06 | End: 2025-02-08

## 2025-02-06 RX ORDER — ACETAMINOPHEN 160 MG/5ML
650 SUSPENSION ORAL EVERY 6 HOURS
Refills: 0 | Status: DISCONTINUED | OUTPATIENT
Start: 2025-02-06 | End: 2025-02-07

## 2025-02-06 RX ORDER — VANCOMYCIN HYDROCHLORIDE 50 MG/ML
1000 KIT ORAL EVERY 12 HOURS
Refills: 0 | Status: DISCONTINUED | OUTPATIENT
Start: 2025-02-06 | End: 2025-02-06

## 2025-02-06 RX ORDER — LEVOTHYROXINE SODIUM 25 UG/1
88 TABLET ORAL DAILY
Refills: 0 | Status: DISCONTINUED | OUTPATIENT
Start: 2025-02-06 | End: 2025-02-10

## 2025-02-06 RX ORDER — LOSARTAN POTASSIUM 100 MG
100 TABLET ORAL DAILY
Refills: 0 | Status: DISCONTINUED | OUTPATIENT
Start: 2025-02-06 | End: 2025-02-10

## 2025-02-06 RX ORDER — HEPARIN SODIUM,PORCINE 10000/ML
5000 VIAL (ML) INJECTION EVERY 12 HOURS
Refills: 0 | Status: DISCONTINUED | OUTPATIENT
Start: 2025-02-06 | End: 2025-02-10

## 2025-02-06 RX ORDER — METOPROLOL SUCCINATE 25 MG
100 TABLET, EXTENDED RELEASE 24 HR ORAL DAILY
Refills: 0 | Status: DISCONTINUED | OUTPATIENT
Start: 2025-02-06 | End: 2025-02-10

## 2025-02-06 RX ORDER — VANCOMYCIN HYDROCHLORIDE 50 MG/ML
750 KIT ORAL EVERY 12 HOURS
Refills: 0 | Status: DISCONTINUED | OUTPATIENT
Start: 2025-02-06 | End: 2025-02-09

## 2025-02-06 RX ORDER — DONEPEZIL HYDROCHLORIDE 10 MG/1
10 TABLET, FILM COATED ORAL AT BEDTIME
Refills: 0 | Status: DISCONTINUED | OUTPATIENT
Start: 2025-02-06 | End: 2025-02-10

## 2025-02-06 RX ORDER — MEMANTINE HYDROCHLORIDE 7 MG/1
5 CAPSULE, EXTENDED RELEASE ORAL DAILY
Refills: 0 | Status: DISCONTINUED | OUTPATIENT
Start: 2025-02-06 | End: 2025-02-10

## 2025-02-06 RX ORDER — TRAMADOL HYDROCHLORIDE 100 MG/1
25 TABLET, EXTENDED RELEASE ORAL EVERY 6 HOURS
Refills: 0 | Status: DISCONTINUED | OUTPATIENT
Start: 2025-02-06 | End: 2025-02-08

## 2025-02-06 RX ORDER — AMLODIPINE BESYLATE 5 MG
2.5 TABLET ORAL DAILY
Refills: 0 | Status: DISCONTINUED | OUTPATIENT
Start: 2025-02-06 | End: 2025-02-10

## 2025-02-06 RX ADMIN — Medication 100 MILLIGRAM(S): at 10:18

## 2025-02-06 RX ADMIN — ACETAMINOPHEN 650 MILLIGRAM(S): 160 SUSPENSION ORAL at 15:00

## 2025-02-06 RX ADMIN — VANCOMYCIN HYDROCHLORIDE 250 MILLIGRAM(S): KIT at 21:21

## 2025-02-06 RX ADMIN — DONEPEZIL HYDROCHLORIDE 10 MILLIGRAM(S): 10 TABLET, FILM COATED ORAL at 21:22

## 2025-02-06 RX ADMIN — ACETAMINOPHEN 650 MILLIGRAM(S): 160 SUSPENSION ORAL at 22:20

## 2025-02-06 RX ADMIN — VANCOMYCIN HYDROCHLORIDE 250 MILLIGRAM(S): KIT at 12:15

## 2025-02-06 RX ADMIN — MEMANTINE HYDROCHLORIDE 5 MILLIGRAM(S): 7 CAPSULE, EXTENDED RELEASE ORAL at 10:18

## 2025-02-06 RX ADMIN — Medication 2.5 MILLIGRAM(S): at 10:18

## 2025-02-06 RX ADMIN — CEFTRIAXONE 2000 MILLIGRAM(S): 250 INJECTION, POWDER, FOR SOLUTION INTRAMUSCULAR; INTRAVENOUS at 12:15

## 2025-02-06 RX ADMIN — LEVOTHYROXINE SODIUM 88 MICROGRAM(S): 25 TABLET ORAL at 05:40

## 2025-02-06 RX ADMIN — ACETAMINOPHEN 650 MILLIGRAM(S): 160 SUSPENSION ORAL at 21:22

## 2025-02-06 RX ADMIN — TRAMADOL HYDROCHLORIDE 25 MILLIGRAM(S): 100 TABLET, EXTENDED RELEASE ORAL at 16:43

## 2025-02-06 RX ADMIN — ACETAMINOPHEN 650 MILLIGRAM(S): 160 SUSPENSION ORAL at 14:44

## 2025-02-06 RX ADMIN — Medication 5000 UNIT(S): at 10:18

## 2025-02-06 RX ADMIN — TRAMADOL HYDROCHLORIDE 25 MILLIGRAM(S): 100 TABLET, EXTENDED RELEASE ORAL at 17:00

## 2025-02-06 RX ADMIN — Medication 5000 UNIT(S): at 21:21

## 2025-02-06 NOTE — CONSULT NOTE ADULT - SUBJECTIVE AND OBJECTIVE BOX
Patient is a 84y old  Female who presents with a chief complaint of cellulitis of L great toe    HPI:  83 y/o Female with h/o Alzheimer dementia, breast CA s/p double mastectomy, chemotherapy and radiation, pituitary tumor, DVT, HLD, hypothyroidism, and osteoporosis was admitted on 2/5/25 after she was sent in by her podiatrist for L great toe infection. Pt states that she has an abscess underneath her toenail that tracks down to the bone. She mentioned that she has been dealing with this L great toe infection for about 1 month and that she was on PO antibiotics without improvement. She was advised to come in for IV abx therapy. In ER she received IV CFTX 1g x 1 and IV vancomycin 1g x 1.    PMH: as above  PSH: as above  Meds: per reconciliation sheet, noted below  MEDICATIONS  (STANDING):  amLODIPine   Tablet 2.5 milliGRAM(s) Oral daily  donepezil 10 milliGRAM(s) Oral at bedtime  heparin   Injectable 5000 Unit(s) SubCutaneous every 12 hours  levothyroxine 88 MICROGram(s) Oral daily  losartan 100 milliGRAM(s) Oral daily  memantine 5 milliGRAM(s) Oral daily  metoprolol succinate  milliGRAM(s) Oral daily    MEDICATIONS  (PRN):  acetaminophen     Tablet .. 650 milliGRAM(s) Oral every 6 hours PRN Temp greater or equal to 38C (100.4F), Mild Pain (1 - 3)    Allergies    shellfish (Unknown)  No Known Drug Allergies    Intolerances      Social: no smoking, no alcohol, no illegal drugs; no recent travel, no exposure to TB  FAMILY HISTORY:  Family history of lung cancer (Father)  FH: lung cancer (Father)  FH: HTN (hypertension) (Mother)    ROS: the patient is mild confused, denies fever, no chills, no HA, no dizziness, no sore throat, no nasal congestion, no blurry vision, no CP, no palpitations, no SOB, no cough, no abdominal pain, no diarrhea, no N/V, no dysuria, no leg pain, no rash, no joint aches, no rectal pain or bleeding, no night sweats, has left toe pain  All other systems reviewed and are negative    Vital Signs Last 24 Hrs  T(C): 36.9 (06 Feb 2025 08:21), Max: 36.9 (06 Feb 2025 08:21)  T(F): 98.5 (06 Feb 2025 08:21), Max: 98.5 (06 Feb 2025 08:21)  HR: 85 (06 Feb 2025 10:16) (69 - 85)  BP: 146/60 (06 Feb 2025 10:16) (133/79 - 162/87)  BP(mean): 92 (05 Feb 2025 22:59) (92 - 110)  RR: 18 (06 Feb 2025 08:21) (17 - 18)  SpO2: 99% (06 Feb 2025 08:21) (97% - 99%)    Parameters below as of 06 Feb 2025 08:21  Patient On (Oxygen Delivery Method): room air    PE:    Constitutional:  No acute distress  HEENT: NC/AT, EOMI, PERRLA, conjunctivae clear; ears and nose atraumatic; pharynx benign  Neck: supple; thyroid not palpable  Back: no tenderness  Respiratory: respiratory effort normal; clear to auscultation  Cardiovascular: S1S2 regular, no murmurs  Abdomen: soft, not tender, not distended, positive BS; no liver or spleen organomegaly  Genitourinary: no suprapubic tenderness  Lymphatic: no LN palpable  Musculoskeletal: no muscle tenderness, no joint swelling or tenderness  Extremities: no pedal edema  Left great toe ulcer, edema, erythema, warmth and tenderness  Neurological/ Psychiatric: Alert, judgement and insight impaired; moving all extremities  Skin: no rashes; no palpable lesions    Labs: all available labs reviewed                        12.5   7.97  )-----------( 264      ( 06 Feb 2025 06:59 )             38.9     02-06    141  |  110[H]  |  12  ----------------------------<  107[H]  3.5   |  28  |  0.75    Ca    8.4[L]      06 Feb 2025 06:59  Phos  2.5     02-06  Mg     2.0     02-06    TPro  5.7[L]  /  Alb  2.8[L]  /  TBili  0.4  /  DBili  x   /  AST  12[L]  /  ALT  25  /  AlkPhos  67  02-06     LIVER FUNCTIONS - ( 06 Feb 2025 06:59 )  Alb: 2.8 g/dL / Pro: 5.7 gm/dL / ALK PHOS: 67 U/L / ALT: 25 U/L / AST: 12 U/L / GGT: x           Urinalysis with Rflx Culture (02-06 @ 00:52)  Urine Appearance: Clear  Protein, Urine: Negative mg/dL    Urine Microscopic-Add On (NC) (02-06 @ 00:52)  White Blood Cell - Urine: 1 /HPF  Red Blood Cell - Urine: 0 /HPF    Urinalysis with Rflx Culture (collected 02-06-25 @ 00:52)    Radiology: all available radiological tests reviewed    < from: US Duplex Venous Lower Ext Complete, Bilateral (06.22.24 @ 14:52) >  No evidence of deep venous thrombosis in either lower extremity.  < end of copied text >    Advanced directives addressed: full resuscitation Patient is a 84y old  Female who presents with a chief complaint of cellulitis of L great toe    HPI:  83 y/o Female with h/o Alzheimer dementia, breast CA s/p double mastectomy, chemotherapy and radiation, pituitary tumor, DVT, HLD, hypothyroidism, and osteoporosis was admitted on 2/5/25 after she was sent in by her podiatrist for L great toe infection. Pt states that she has an abscess underneath her toenail that tracks down to the bone. She mentioned that she has been dealing with this L great toe infection for about 1 month and that she was on PO antibiotics without improvement. She was advised to come in for IV abx therapy. In ER she received IV CFTX 1g x 1 and IV vancomycin 1g x 1.    PMH: as above  PSH: as above  Meds: per reconciliation sheet, noted below  MEDICATIONS  (STANDING):  amLODIPine   Tablet 2.5 milliGRAM(s) Oral daily  donepezil 10 milliGRAM(s) Oral at bedtime  heparin   Injectable 5000 Unit(s) SubCutaneous every 12 hours  levothyroxine 88 MICROGram(s) Oral daily  losartan 100 milliGRAM(s) Oral daily  memantine 5 milliGRAM(s) Oral daily  metoprolol succinate  milliGRAM(s) Oral daily    MEDICATIONS  (PRN):  acetaminophen     Tablet .. 650 milliGRAM(s) Oral every 6 hours PRN Temp greater or equal to 38C (100.4F), Mild Pain (1 - 3)    Allergies    shellfish (Unknown)  No Known Drug Allergies    Intolerances      Social: no smoking, no alcohol, no illegal drugs; no recent travel, no exposure to TB  FAMILY HISTORY:  Family history of lung cancer (Father)  FH: lung cancer (Father)  FH: HTN (hypertension) (Mother)    ROS: the patient is mild confused, denies fever, no chills, no HA, no dizziness, no sore throat, no nasal congestion, no blurry vision, no CP, no palpitations, no SOB, no cough, no abdominal pain, no diarrhea, no N/V, no dysuria, no leg pain, no rash, no joint aches, no rectal pain or bleeding, no night sweats, has left toe pain  All other systems reviewed and are negative    Vital Signs Last 24 Hrs  T(C): 36.9 (06 Feb 2025 08:21), Max: 36.9 (06 Feb 2025 08:21)  T(F): 98.5 (06 Feb 2025 08:21), Max: 98.5 (06 Feb 2025 08:21)  HR: 85 (06 Feb 2025 10:16) (69 - 85)  BP: 146/60 (06 Feb 2025 10:16) (133/79 - 162/87)  BP(mean): 92 (05 Feb 2025 22:59) (92 - 110)  RR: 18 (06 Feb 2025 08:21) (17 - 18)  SpO2: 99% (06 Feb 2025 08:21) (97% - 99%)    Parameters below as of 06 Feb 2025 08:21  Patient On (Oxygen Delivery Method): room air    PE:    Constitutional:  No acute distress  HEENT: NC/AT, EOMI, PERRLA, conjunctivae clear; ears and nose atraumatic; pharynx benign  Neck: supple; thyroid not palpable  Back: no tenderness  Respiratory: respiratory effort normal; clear to auscultation  Cardiovascular: S1S2 regular, no murmurs  Abdomen: soft, not tender, not distended, positive BS; no liver or spleen organomegaly  Genitourinary: no suprapubic tenderness  Lymphatic: no LN palpable  Musculoskeletal: no muscle tenderness, no joint swelling or tenderness  Extremities: no pedal edema  Left great toe ulcer, Left great toe and forefoot edema, erythema, warmth and tenderness  Neurological/ Psychiatric: Alert, judgement and insight impaired; moving all extremities  Skin: no rashes; no palpable lesions    Labs: all available labs reviewed                        12.5   7.97  )-----------( 264      ( 06 Feb 2025 06:59 )             38.9     02-06    141  |  110[H]  |  12  ----------------------------<  107[H]  3.5   |  28  |  0.75    Ca    8.4[L]      06 Feb 2025 06:59  Phos  2.5     02-06  Mg     2.0     02-06    TPro  5.7[L]  /  Alb  2.8[L]  /  TBili  0.4  /  DBili  x   /  AST  12[L]  /  ALT  25  /  AlkPhos  67  02-06     LIVER FUNCTIONS - ( 06 Feb 2025 06:59 )  Alb: 2.8 g/dL / Pro: 5.7 gm/dL / ALK PHOS: 67 U/L / ALT: 25 U/L / AST: 12 U/L / GGT: x           Urinalysis with Rflx Culture (02-06 @ 00:52)  Urine Appearance: Clear  Protein, Urine: Negative mg/dL    Urine Microscopic-Add On (NC) (02-06 @ 00:52)  White Blood Cell - Urine: 1 /HPF  Red Blood Cell - Urine: 0 /HPF    Urinalysis with Rflx Culture (collected 02-06-25 @ 00:52)    Radiology: all available radiological tests reviewed    < from: US Duplex Venous Lower Ext Complete, Bilateral (06.22.24 @ 14:52) >  No evidence of deep venous thrombosis in either lower extremity.  < end of copied text >    Advanced directives addressed: full resuscitation

## 2025-02-06 NOTE — PATIENT PROFILE ADULT - FUNCTIONAL ASSESSMENT - BASIC MOBILITY 6.
1-calculated by average/Not able to assess (calculate score using [facility] averaging method)

## 2025-02-06 NOTE — H&P ADULT - HISTORY OF PRESENT ILLNESS
Patient is an 83 y/o F with a PMH of Alzheimer's dementia, breast CA s/p double mastectomy, chemotherapy and radiation, pituitary tumor, DVT, HLD, hypothyroidism, and osteoporosis presenting to  ED on 2/5/25 sent in by her podiatrist for L great toe infection. Pt states that she has an abscess underneath her toenail that tracks down to the bone.   Pt also has L foot cellulitis. Pt was sent to ED to be started on IV antibiotics.     Upon arrival to the ED, vitals were /87 HR 84 RR 18 T 98.2F and SpO2 of 97% on room air.   Labs unremarkable. She received IV CFTX 1g x 1 and IV vancomycin 1g x 1.  Patient is an 83 y/o F with a PMH of Alzheimer's dementia, breast CA s/p double mastectomy, chemotherapy and radiation, pituitary tumor, DVT, HLD, hypothyroidism, and osteoporosis presenting to  ED on 2/5/25 sent in by her podiatrist for L great toe infection. Pt states that she has an abscess underneath her toenail that tracks down to the bone.   She mentions that she has been dealing with this L great toe infection for about 1 month and that she was on PO antibiotics - though I do not see any prescribed on Dr. Smart.   She says she say her podiatrist the day of admission and was advised to come in.      Upon arrival to the ED, vitals were /87 HR 84 RR 18 T 98.2F and SpO2 of 97% on room air. Labs unremarkable. She received IV CFTX 1g x 1 and IV vancomycin 1g x 1.     Currently, she denies fevers, chills, nausea, vomiting, headache, chest pain or palpitations. Patient's L great toe is wrapped up - she does not want the dressing to be removed.

## 2025-02-06 NOTE — H&P ADULT - NSHPPHYSICALEXAM_GEN_ALL_CORE
Vital Signs Last 24 Hrs  T(C): 36.6 (06 Feb 2025 04:23), Max: 36.8 (05 Feb 2025 18:49)  T(F): 97.9 (06 Feb 2025 04:23), Max: 98.2 (05 Feb 2025 18:49)  HR: 72 (06 Feb 2025 04:23) (69 - 85)  BP: 143/66 (06 Feb 2025 04:23) (133/79 - 162/87)  BP(mean): 92 (05 Feb 2025 22:59) (92 - 110)  RR: 18 (06 Feb 2025 04:23) (17 - 18)  SpO2: 99% (06 Feb 2025 04:23) (97% - 99%)    Parameters below as of 06 Feb 2025 04:23  Patient On (Oxygen Delivery Method): room air    GENERAL: NAD  HEAD:  Atraumatic  EYES: EOMI  ENT: Moist mucous membranes  NECK: Supple, No JVD  CHEST/LUNG: Unlabored respirations  HEART: Regular rate and rhythm  ABDOMEN: Soft, Nontender  EXTREMITIES: L great toe wrapped - no erythema, warmth, or tenderness in the surrounding b/l LE  NERVOUS SYSTEM:  Alert & Oriented X3, speech clear. No deficits   SKIN: No rashes or lesions

## 2025-02-06 NOTE — CONSULT NOTE ADULT - ASSESSMENT
85 y/o Female with h/o Alzheimer dementia, breast CA s/p double mastectomy, chemotherapy and radiation, pituitary tumor, DVT, HLD, hypothyroidism, and osteoporosis was admitted on 2/5/25 after she was sent in by her podiatrist for L great toe infection. Pt states that she has an abscess underneath her toenail that tracks down to the bone. She mentioned that she has been dealing with this L great toe infection for about 1 month and that she was on PO antibiotics without improvement. She was advised to come in for IV abx therapy. In ER she received IV CFTX 1g x 1 and IV vancomycin 1g x 1.    #Left great toe cellulitis with probable underlying acute on chronic OM  #Alzheimer disease  -foot examination performed   -concern for infection with multiresistant organisms is raised since she failed outpatient oral abx therapy. Prior cultures reviewed. An epidemiologic assessment was performed. The risk of the microorganism spread to family members, healthcare staff is low. Standard isolation in place at present time. Will reconsider isolation measures according to infection control policy, further culture results and other new information. The patient will be monitored closely, cultures collected as appropriate. Broad spectrum abx therapy was started.  -start vancomycin 750 mg IV q12h and ceftriaxone 2 gm IV qd  -reason for abx use and side effects reviewed with patient; monitor BMP and vancomycin trough levels   -podiatry evaluation   -consider MRI left foot  -local wound care  -old chart reviewed to assess prior cultures  -monitor temps  -f/u CBC  -supportive care  2. Other issues:   -care per medicine    
A: 84yr old female seen for the following:   -Lt hallux full thickness wound in the setting of OM; infected  -Lt foot pain  -Difficulty ambulating    P:   Chart reviewed and Patient evaluated;  Discussed diagnosis and treatment with patient  X-rays reviewed : on wet read showing possible pathological fracture of the distal phalanx of the Lt hallux  MRI Lt foot ordered  Pt with full thickness wound to the Lt hallux which probes to bone along with surrounding erythema and edema  Wound flush with normal saline  Wound cx obtained bedside (2/6/25)  WC: betadine DSD to the Lt hallux  WBAT to the Lt hallux  Continue with antibiotics as per ID  All additional care per Med appreciated  Patient demonstrated verbal understanding of all interventions and tolerated interventions well without any complications.   Podiatry will follow while in house    Case D/W attending Dr. Garcia

## 2025-02-06 NOTE — CONSULT NOTE ADULT - SUBJECTIVE AND OBJECTIVE BOX
Date of consult: 2025    HPI: Patient is an 83 y/o F with a PMH of Alzheimer's dementia, breast CA s/p double mastectomy, chemotherapy and radiation, pituitary tumor, DVT, HLD, hypothyroidism, and osteoporosis presenting to  ED on 25 sent in by her podiatrist for L great toe infection. Pt states that she has an abscess underneath her toenail that tracks down to the bone. She says she say her podiatrist the day of admission and was advised to come in.  Upon arrival to the ED, vitals were /87 HR 84 RR 18 T 98.2F and SpO2 of 97% on room air. Labs unremarkable. She received IV CFTX 1g x 1 and IV vancomycin 1g x 1. Currently, she denies fevers, chills, nausea, vomiting, headache, chest pain or palpitations. Patient's L great toe is wrapped up - she does not want the dressing to be removed.  (2025 03:17)  Podiatry consulted for left hallux infection. Information obtained from pt and pt daughter Maryam. Pain to the hallux started about a month back and the pain has been progressively getting worst especially since Monday. Pt was seen by outside podiatry twice this week and was recc to go to ED due to the wound probing to bone and suspicion of OM along with worsened pain.    PMH: HTN (Hypertension)    HTN (Hypertension)    Hyperlipidemia    Breast Cancer    Osteoporosis     (Normal Spontaneous Vaginal Delivery) x 2    Cataract    Radiation    Obesity    No pertinent past medical history    HTN (hypertension)    H/O hypotension    Breast cancer    Hypothyroidism    Deep vein thrombosis (DVT)      PSH:Breast Cancer- s/p left Breast Lumpectomy  with Radiation therapy( 18 years ago)    Cataract surgery- both eyes ( )    H/O melanoma excision    History of hip replacement    History of right hip replacement    H/O mastectomy        Allergies:shellfish (Unknown)  No Known Drug Allergies      Labs:                        12.   7.97  )-----------( 264      ( 2025 06:59 )             38.9   02-06    141  |  110[H]  |  12  ----------------------------<  107[H]  3.5   |  28  |  0.75    Ca    8.4[L]      2025 06:59  Phos  2.5     02-06  Mg     2.0     02-06    TPro  5.7[L]  /  Alb  2.8[L]  /  TBili  0.4  /  DBili  x   /  AST  12[L]  /  ALT  25  /  AlkPhos  67  02-06  Vital Signs Last 24 Hrs  T(C): 36.9 (2025 08:21), Max: 36.9 (2025 08:21)  T(F): 98.5 (2025 08:21), Max: 98.5 (2025 08:21)  HR: 85 (2025 10:16) (69 - 85)  BP: 146/60 (2025 10:16) (133/79 - 162/87)  BP(mean): 92 (2025 22:59) (92 - 110)  RR: 18 (2025 08:21) (17 - 18)  SpO2: 99% (2025 08:21) (97% - 99%)    Parameters below as of 2025 08:21  Patient On (Oxygen Delivery Method): room air    REVIEW OF SYSTEMS:    CONSTITUTIONAL: No weakness, fevers or chills  EYES: No visual changes  RESPIRATORY: No cough, wheezing; No shortness of breath  CARDIOVASCULAR: No chest pain or palpitations  GASTROINTESTINAL: No abdominal or epigastric pain. No nausea, vomiting; No diarrhea or constipation.   GENITOURINARY: No dysuria, frequency or hematuria  NEUROLOGICAL: No numbness or weakness  SKIN: See physical examination.  All other review of systems is negative unless indicated above    Physical Exam:   Constitutional: NAD, alert;  Lower Extremity Focus  Derm:  Skin warm, dry and supple bilateral.    Full thickness ulceration to the distal tip of the Lt hallux, aprox 1cm x 1cmx0.7cm; Wound probes to bone, periwound edema and erythema extending upto the 1st MTPJ  Vascular: Dorsalis Pedis and Posterior Tibial pulses Palpable  Neuro: Protective sensation intact to the level of the digits bilateral.  MSK: Muscle strength 5/5 all major muscle groups bilateral. +TTP to the Lt hallux

## 2025-02-06 NOTE — H&P ADULT - ASSESSMENT
#Cellulitis of     - F/u X-ray of L foot   - F/u blood cultures   #HTN   - Continue losartan 100 mg QD   - Continue metoprolol succinate 100 mg QD  - Continue amlodipine 2.5 mg QD    #Hypothyroidism   - Continue Synthroid 88 mcg QD    #Alzheimer dementia   - Continue donepezil 10 mg at bedtime  - Continue memantine 5 mg QD    #DVT ppx   - Heparin SubQ #Cellulitis of L great toe   - s/p IV CFTX and vancomycin in the ED - holding off on additional antibiotics as I was not able to examine the infected region   - F/u ESR and CRP  - F/u X-ray of L foot   - F/u blood cultures   - F/u ID consult     #HTN   - Continue losartan 100 mg QD   - Continue metoprolol succinate 100 mg QD  - Continue amlodipine 2.5 mg QD    #Hypothyroidism   - Continue Synthroid 88 mcg QD    #Alzheimer dementia   - Continue donepezil 10 mg at bedtime  - Continue memantine 5 mg QD    #DVT ppx   - Heparin SubQ

## 2025-02-06 NOTE — PATIENT PROFILE ADULT - FUNCTIONAL ASSESSMENT - DAILY ACTIVITY 4.
2 = A lot of assistance Adbry Counseling: I discussed with the patient the risks of tralokinumab including but not limited to eye infection and irritation, cold sores, injection site reactions, worsening of asthma, allergic reactions and increased risk of parasitic infection.  Live vaccines should be avoided while taking tralokinumab. The patient understands that monitoring is required and they must alert us or the primary physician if symptoms of infection or other concerning signs are noted.

## 2025-02-06 NOTE — ED ADULT NURSE REASSESSMENT NOTE - NS ED NURSE REASSESS COMMENT FT1
Pt received from MICHAELA Bravo at 0030. Pt in NAD, VSS, A+Ox4. Resting in ED stretcher at this time. Denies current chest pain, sob, N/V. Endorsing mild pain to L great toe where cellulitis is present. Awaiting bed assignment and further orders. Pt SS to bathroom without difficulty to void. Pt aware of current plan of care. Fall and safety precautions maintained. Call bell is within reach. All questions answered.

## 2025-02-07 LAB — VANCOMYCIN TROUGH SERPL-MCNC: 12.1 UG/ML — SIGNIFICANT CHANGE UP (ref 10–20)

## 2025-02-07 PROCEDURE — 99233 SBSQ HOSP IP/OBS HIGH 50: CPT

## 2025-02-07 PROCEDURE — 73718 MRI LOWER EXTREMITY W/O DYE: CPT | Mod: 26,LT

## 2025-02-07 RX ORDER — ACETAMINOPHEN 160 MG/5ML
1000 SUSPENSION ORAL EVERY 6 HOURS
Refills: 0 | Status: DISCONTINUED | OUTPATIENT
Start: 2025-02-07 | End: 2025-02-10

## 2025-02-07 RX ADMIN — Medication 100 MILLIGRAM(S): at 09:38

## 2025-02-07 RX ADMIN — LEVOTHYROXINE SODIUM 88 MICROGRAM(S): 25 TABLET ORAL at 06:12

## 2025-02-07 RX ADMIN — VANCOMYCIN HYDROCHLORIDE 250 MILLIGRAM(S): KIT at 21:36

## 2025-02-07 RX ADMIN — CEFTRIAXONE 2000 MILLIGRAM(S): 250 INJECTION, POWDER, FOR SOLUTION INTRAMUSCULAR; INTRAVENOUS at 10:03

## 2025-02-07 RX ADMIN — TRAMADOL HYDROCHLORIDE 25 MILLIGRAM(S): 100 TABLET, EXTENDED RELEASE ORAL at 16:43

## 2025-02-07 RX ADMIN — Medication 2.5 MILLIGRAM(S): at 09:38

## 2025-02-07 RX ADMIN — Medication 20 MILLIGRAM(S): at 16:36

## 2025-02-07 RX ADMIN — TRAMADOL HYDROCHLORIDE 25 MILLIGRAM(S): 100 TABLET, EXTENDED RELEASE ORAL at 09:53

## 2025-02-07 RX ADMIN — DONEPEZIL HYDROCHLORIDE 10 MILLIGRAM(S): 10 TABLET, FILM COATED ORAL at 21:36

## 2025-02-07 RX ADMIN — MEMANTINE HYDROCHLORIDE 5 MILLIGRAM(S): 7 CAPSULE, EXTENDED RELEASE ORAL at 09:38

## 2025-02-07 RX ADMIN — Medication 5000 UNIT(S): at 09:38

## 2025-02-07 RX ADMIN — VANCOMYCIN HYDROCHLORIDE 250 MILLIGRAM(S): KIT at 09:38

## 2025-02-07 RX ADMIN — Medication 5000 UNIT(S): at 21:36

## 2025-02-07 RX ADMIN — ACETAMINOPHEN 650 MILLIGRAM(S): 160 SUSPENSION ORAL at 07:05

## 2025-02-07 RX ADMIN — ACETAMINOPHEN 650 MILLIGRAM(S): 160 SUSPENSION ORAL at 06:16

## 2025-02-07 RX ADMIN — ACETAMINOPHEN 1000 MILLIGRAM(S): 160 SUSPENSION ORAL at 23:06

## 2025-02-08 ENCOUNTER — RESULT REVIEW (OUTPATIENT)
Age: 85
End: 2025-02-08

## 2025-02-08 LAB
-  CEFTAROLINE: SIGNIFICANT CHANGE UP
-  CLINDAMYCIN: SIGNIFICANT CHANGE UP
-  ERYTHROMYCIN: SIGNIFICANT CHANGE UP
-  GENTAMICIN: SIGNIFICANT CHANGE UP
-  OXACILLIN: SIGNIFICANT CHANGE UP
-  PENICILLIN: SIGNIFICANT CHANGE UP
-  RIFAMPIN: SIGNIFICANT CHANGE UP
-  TETRACYCLINE: SIGNIFICANT CHANGE UP
-  TRIMETHOPRIM/SULFAMETHOXAZOLE: SIGNIFICANT CHANGE UP
-  VANCOMYCIN: SIGNIFICANT CHANGE UP
GRAM STN FLD: ABNORMAL
HCT VFR BLD CALC: 41.7 % — SIGNIFICANT CHANGE UP (ref 34.5–45)
HGB BLD-MCNC: 13.2 G/DL — SIGNIFICANT CHANGE UP (ref 11.5–15.5)
MCHC RBC-ENTMCNC: 28.3 PG — SIGNIFICANT CHANGE UP (ref 27–34)
MCHC RBC-ENTMCNC: 31.7 G/DL — LOW (ref 32–36)
MCV RBC AUTO: 89.3 FL — SIGNIFICANT CHANGE UP (ref 80–100)
METHOD TYPE: SIGNIFICANT CHANGE UP
PLATELET # BLD AUTO: 250 K/UL — SIGNIFICANT CHANGE UP (ref 150–400)
RBC # BLD: 4.67 M/UL — SIGNIFICANT CHANGE UP (ref 3.8–5.2)
RBC # FLD: 13.3 % — SIGNIFICANT CHANGE UP (ref 10.3–14.5)
SPECIMEN SOURCE: SIGNIFICANT CHANGE UP
WBC # BLD: 8.67 K/UL — SIGNIFICANT CHANGE UP (ref 3.8–10.5)
WBC # FLD AUTO: 8.67 K/UL — SIGNIFICANT CHANGE UP (ref 3.8–10.5)

## 2025-02-08 PROCEDURE — 99233 SBSQ HOSP IP/OBS HIGH 50: CPT

## 2025-02-08 PROCEDURE — 88304 TISSUE EXAM BY PATHOLOGIST: CPT | Mod: 26

## 2025-02-08 PROCEDURE — 73630 X-RAY EXAM OF FOOT: CPT | Mod: 26,LT

## 2025-02-08 PROCEDURE — 88305 TISSUE EXAM BY PATHOLOGIST: CPT | Mod: 26

## 2025-02-08 PROCEDURE — 88311 DECALCIFY TISSUE: CPT | Mod: 26

## 2025-02-08 RX ORDER — OXYCODONE HYDROCHLORIDE 30 MG/1
5 TABLET ORAL EVERY 4 HOURS
Refills: 0 | Status: DISCONTINUED | OUTPATIENT
Start: 2025-02-08 | End: 2025-02-10

## 2025-02-08 RX ORDER — FENTANYL CITRATE 50 UG/ML
50 INJECTION INTRAMUSCULAR; INTRAVENOUS
Refills: 0 | Status: DISCONTINUED | OUTPATIENT
Start: 2025-02-08 | End: 2025-02-08

## 2025-02-08 RX ORDER — OXYCODONE HYDROCHLORIDE AND ACETAMINOPHEN 5; 325 MG/1; MG/1
2 TABLET ORAL ONCE
Refills: 0 | Status: DISCONTINUED | OUTPATIENT
Start: 2025-02-08 | End: 2025-02-08

## 2025-02-08 RX ORDER — MORPHINE SULFATE 60 MG/1
2 TABLET, FILM COATED, EXTENDED RELEASE ORAL EVERY 4 HOURS
Refills: 0 | Status: DISCONTINUED | OUTPATIENT
Start: 2025-02-08 | End: 2025-02-10

## 2025-02-08 RX ORDER — FENTANYL CITRATE 50 UG/ML
25 INJECTION INTRAMUSCULAR; INTRAVENOUS
Refills: 0 | Status: DISCONTINUED | OUTPATIENT
Start: 2025-02-08 | End: 2025-02-08

## 2025-02-08 RX ORDER — ONDANSETRON 4 MG/1
4 TABLET, ORALLY DISINTEGRATING ORAL ONCE
Refills: 0 | Status: DISCONTINUED | OUTPATIENT
Start: 2025-02-08 | End: 2025-02-08

## 2025-02-08 RX ORDER — OXYCODONE HYDROCHLORIDE AND ACETAMINOPHEN 5; 325 MG/1; MG/1
1 TABLET ORAL ONCE
Refills: 0 | Status: DISCONTINUED | OUTPATIENT
Start: 2025-02-08 | End: 2025-02-08

## 2025-02-08 RX ORDER — SODIUM CHLORIDE 9 G/ML
1000 INJECTION, SOLUTION INTRAVENOUS
Refills: 0 | Status: DISCONTINUED | OUTPATIENT
Start: 2025-02-08 | End: 2025-02-08

## 2025-02-08 RX ADMIN — ACETAMINOPHEN 1000 MILLIGRAM(S): 160 SUSPENSION ORAL at 17:36

## 2025-02-08 RX ADMIN — CEFTRIAXONE 2000 MILLIGRAM(S): 250 INJECTION, POWDER, FOR SOLUTION INTRAMUSCULAR; INTRAVENOUS at 11:36

## 2025-02-08 RX ADMIN — ACETAMINOPHEN 1000 MILLIGRAM(S): 160 SUSPENSION ORAL at 06:00

## 2025-02-08 RX ADMIN — ACETAMINOPHEN 1000 MILLIGRAM(S): 160 SUSPENSION ORAL at 11:34

## 2025-02-08 RX ADMIN — ACETAMINOPHEN 1000 MILLIGRAM(S): 160 SUSPENSION ORAL at 23:18

## 2025-02-08 RX ADMIN — Medication 2.5 MILLIGRAM(S): at 11:36

## 2025-02-08 RX ADMIN — ACETAMINOPHEN 1000 MILLIGRAM(S): 160 SUSPENSION ORAL at 06:48

## 2025-02-08 RX ADMIN — MORPHINE SULFATE 2 MILLIGRAM(S): 60 TABLET, FILM COATED, EXTENDED RELEASE ORAL at 14:52

## 2025-02-08 RX ADMIN — TRAMADOL HYDROCHLORIDE 50 MILLIGRAM(S): 100 TABLET, EXTENDED RELEASE ORAL at 12:55

## 2025-02-08 RX ADMIN — Medication 100 MILLIGRAM(S): at 11:35

## 2025-02-08 RX ADMIN — VANCOMYCIN HYDROCHLORIDE 250 MILLIGRAM(S): KIT at 11:37

## 2025-02-08 RX ADMIN — MEMANTINE HYDROCHLORIDE 5 MILLIGRAM(S): 7 CAPSULE, EXTENDED RELEASE ORAL at 11:35

## 2025-02-08 RX ADMIN — VANCOMYCIN HYDROCHLORIDE 250 MILLIGRAM(S): KIT at 22:37

## 2025-02-08 RX ADMIN — ACETAMINOPHEN 1000 MILLIGRAM(S): 160 SUSPENSION ORAL at 00:04

## 2025-02-08 RX ADMIN — Medication 5000 UNIT(S): at 22:38

## 2025-02-08 RX ADMIN — LEVOTHYROXINE SODIUM 88 MICROGRAM(S): 25 TABLET ORAL at 06:00

## 2025-02-08 RX ADMIN — Medication 20 MILLIGRAM(S): at 11:34

## 2025-02-08 RX ADMIN — DONEPEZIL HYDROCHLORIDE 10 MILLIGRAM(S): 10 TABLET, FILM COATED ORAL at 22:37

## 2025-02-08 NOTE — BRIEF OPERATIVE NOTE - NSICDXBRIEFPROCEDURE_GEN_ALL_CORE_FT
PROCEDURES:  Amputation of toe of left foot 08-Feb-2025 09:23:55 Left hallux Amputation including removal of lauren and soft tissue Dioni Flood

## 2025-02-08 NOTE — BRIEF OPERATIVE NOTE - OPERATION/FINDINGS
soft tissue pus around distal hallucal phalanx, Amputation of distal phalanx, pulse lavaged, Low concern for residual infection soft tissue pus around distal hallucal phalanx, distal phalanx amputated, Low concern for residual infection

## 2025-02-09 LAB
ANION GAP SERPL CALC-SCNC: 4 MMOL/L — LOW (ref 5–17)
BUN SERPL-MCNC: 16 MG/DL — SIGNIFICANT CHANGE UP (ref 7–23)
CALCIUM SERPL-MCNC: 8.3 MG/DL — LOW (ref 8.5–10.1)
CHLORIDE SERPL-SCNC: 106 MMOL/L — SIGNIFICANT CHANGE UP (ref 96–108)
CO2 SERPL-SCNC: 29 MMOL/L — SIGNIFICANT CHANGE UP (ref 22–31)
CREAT SERPL-MCNC: 0.79 MG/DL — SIGNIFICANT CHANGE UP (ref 0.5–1.3)
EGFR: 74 ML/MIN/1.73M2 — SIGNIFICANT CHANGE UP
GLUCOSE SERPL-MCNC: 109 MG/DL — HIGH (ref 70–99)
HCT VFR BLD CALC: 41.3 % — SIGNIFICANT CHANGE UP (ref 34.5–45)
HGB BLD-MCNC: 13.3 G/DL — SIGNIFICANT CHANGE UP (ref 11.5–15.5)
MCHC RBC-ENTMCNC: 28.5 PG — SIGNIFICANT CHANGE UP (ref 27–34)
MCHC RBC-ENTMCNC: 32.2 G/DL — SIGNIFICANT CHANGE UP (ref 32–36)
MCV RBC AUTO: 88.4 FL — SIGNIFICANT CHANGE UP (ref 80–100)
PLATELET # BLD AUTO: 357 K/UL — SIGNIFICANT CHANGE UP (ref 150–400)
POTASSIUM SERPL-MCNC: 4 MMOL/L — SIGNIFICANT CHANGE UP (ref 3.5–5.3)
POTASSIUM SERPL-SCNC: 4 MMOL/L — SIGNIFICANT CHANGE UP (ref 3.5–5.3)
RBC # BLD: 4.67 M/UL — SIGNIFICANT CHANGE UP (ref 3.8–5.2)
RBC # FLD: 13.4 % — SIGNIFICANT CHANGE UP (ref 10.3–14.5)
SODIUM SERPL-SCNC: 139 MMOL/L — SIGNIFICANT CHANGE UP (ref 135–145)
WBC # BLD: 8.22 K/UL — SIGNIFICANT CHANGE UP (ref 3.8–10.5)
WBC # FLD AUTO: 8.22 K/UL — SIGNIFICANT CHANGE UP (ref 3.8–10.5)

## 2025-02-09 PROCEDURE — 99233 SBSQ HOSP IP/OBS HIGH 50: CPT

## 2025-02-09 RX ADMIN — Medication 5000 UNIT(S): at 22:47

## 2025-02-09 RX ADMIN — LEVOTHYROXINE SODIUM 88 MICROGRAM(S): 25 TABLET ORAL at 05:38

## 2025-02-09 RX ADMIN — ACETAMINOPHEN 1000 MILLIGRAM(S): 160 SUSPENSION ORAL at 17:29

## 2025-02-09 RX ADMIN — Medication 20 MILLIGRAM(S): at 10:24

## 2025-02-09 RX ADMIN — ACETAMINOPHEN 1000 MILLIGRAM(S): 160 SUSPENSION ORAL at 05:39

## 2025-02-09 RX ADMIN — Medication 100 MILLIGRAM(S): at 10:24

## 2025-02-09 RX ADMIN — ACETAMINOPHEN 1000 MILLIGRAM(S): 160 SUSPENSION ORAL at 11:52

## 2025-02-09 RX ADMIN — Medication 5000 UNIT(S): at 10:46

## 2025-02-09 RX ADMIN — VANCOMYCIN HYDROCHLORIDE 250 MILLIGRAM(S): KIT at 10:24

## 2025-02-09 RX ADMIN — CEFTRIAXONE 2000 MILLIGRAM(S): 250 INJECTION, POWDER, FOR SOLUTION INTRAMUSCULAR; INTRAVENOUS at 10:24

## 2025-02-09 RX ADMIN — MEMANTINE HYDROCHLORIDE 5 MILLIGRAM(S): 7 CAPSULE, EXTENDED RELEASE ORAL at 10:24

## 2025-02-09 RX ADMIN — Medication 2.5 MILLIGRAM(S): at 10:24

## 2025-02-09 RX ADMIN — DONEPEZIL HYDROCHLORIDE 10 MILLIGRAM(S): 10 TABLET, FILM COATED ORAL at 22:47

## 2025-02-09 NOTE — PROVIDER CONTACT NOTE (CRITICAL VALUE NOTIFICATION) - RECOMMENDATIONS
As per ID MD. Howard- plan is to continue with Ceftriaxone and eventually switch to Ceftin PO x 10 days

## 2025-02-09 NOTE — PROVIDER CONTACT NOTE (CRITICAL VALUE NOTIFICATION) - SITUATION
abscess  culture  collected on 2/6/ 25 showing  no polymorphonuclear cells  seen in low power field. rare gram positive cocci in clusters per oil field.
Lab called with critical results for abscess culture collected 02/08/2025- few staphylococcus aureus.

## 2025-02-10 ENCOUNTER — TRANSCRIPTION ENCOUNTER (OUTPATIENT)
Age: 85
End: 2025-02-10

## 2025-02-10 VITALS
OXYGEN SATURATION: 96 % | DIASTOLIC BLOOD PRESSURE: 62 MMHG | SYSTOLIC BLOOD PRESSURE: 124 MMHG | TEMPERATURE: 98 F | RESPIRATION RATE: 17 BRPM | HEART RATE: 61 BPM

## 2025-02-10 LAB
-  CLINDAMYCIN: SIGNIFICANT CHANGE UP
-  ERYTHROMYCIN: SIGNIFICANT CHANGE UP
-  GENTAMICIN: SIGNIFICANT CHANGE UP
-  OXACILLIN: SIGNIFICANT CHANGE UP
-  PENICILLIN: SIGNIFICANT CHANGE UP
-  RIFAMPIN: SIGNIFICANT CHANGE UP
-  TETRACYCLINE: SIGNIFICANT CHANGE UP
-  TRIMETHOPRIM/SULFAMETHOXAZOLE: SIGNIFICANT CHANGE UP
-  VANCOMYCIN: SIGNIFICANT CHANGE UP
ANION GAP SERPL CALC-SCNC: 2 MMOL/L — LOW (ref 5–17)
BUN SERPL-MCNC: 17 MG/DL — SIGNIFICANT CHANGE UP (ref 7–23)
CALCIUM SERPL-MCNC: 9 MG/DL — SIGNIFICANT CHANGE UP (ref 8.5–10.1)
CHLORIDE SERPL-SCNC: 106 MMOL/L — SIGNIFICANT CHANGE UP (ref 96–108)
CO2 SERPL-SCNC: 28 MMOL/L — SIGNIFICANT CHANGE UP (ref 22–31)
CREAT SERPL-MCNC: 0.78 MG/DL — SIGNIFICANT CHANGE UP (ref 0.5–1.3)
EGFR: 75 ML/MIN/1.73M2 — SIGNIFICANT CHANGE UP
GLUCOSE SERPL-MCNC: 119 MG/DL — HIGH (ref 70–99)
HCT VFR BLD CALC: 41.7 % — SIGNIFICANT CHANGE UP (ref 34.5–45)
HGB BLD-MCNC: 13.4 G/DL — SIGNIFICANT CHANGE UP (ref 11.5–15.5)
MCHC RBC-ENTMCNC: 28.6 PG — SIGNIFICANT CHANGE UP (ref 27–34)
MCHC RBC-ENTMCNC: 32.1 G/DL — SIGNIFICANT CHANGE UP (ref 32–36)
MCV RBC AUTO: 89.1 FL — SIGNIFICANT CHANGE UP (ref 80–100)
METHOD TYPE: SIGNIFICANT CHANGE UP
PLATELET # BLD AUTO: 390 K/UL — SIGNIFICANT CHANGE UP (ref 150–400)
POTASSIUM SERPL-MCNC: 4.2 MMOL/L — SIGNIFICANT CHANGE UP (ref 3.5–5.3)
POTASSIUM SERPL-SCNC: 4.2 MMOL/L — SIGNIFICANT CHANGE UP (ref 3.5–5.3)
RBC # BLD: 4.68 M/UL — SIGNIFICANT CHANGE UP (ref 3.8–5.2)
RBC # FLD: 13.5 % — SIGNIFICANT CHANGE UP (ref 10.3–14.5)
SODIUM SERPL-SCNC: 136 MMOL/L — SIGNIFICANT CHANGE UP (ref 135–145)
WBC # BLD: 7.9 K/UL — SIGNIFICANT CHANGE UP (ref 3.8–10.5)
WBC # FLD AUTO: 7.9 K/UL — SIGNIFICANT CHANGE UP (ref 3.8–10.5)

## 2025-02-10 PROCEDURE — 99239 HOSP IP/OBS DSCHRG MGMT >30: CPT

## 2025-02-10 RX ORDER — CEFUROXIME AXETIL 500 MG
1 TABLET ORAL
Qty: 20 | Refills: 0
Start: 2025-02-10 | End: 2025-02-19

## 2025-02-10 RX ADMIN — LEVOTHYROXINE SODIUM 88 MICROGRAM(S): 25 TABLET ORAL at 06:03

## 2025-02-10 RX ADMIN — ACETAMINOPHEN 1000 MILLIGRAM(S): 160 SUSPENSION ORAL at 12:00

## 2025-02-10 RX ADMIN — ACETAMINOPHEN 1000 MILLIGRAM(S): 160 SUSPENSION ORAL at 01:55

## 2025-02-10 RX ADMIN — Medication 100 MILLIGRAM(S): at 09:17

## 2025-02-10 RX ADMIN — CEFTRIAXONE 2000 MILLIGRAM(S): 250 INJECTION, POWDER, FOR SOLUTION INTRAMUSCULAR; INTRAVENOUS at 09:18

## 2025-02-10 RX ADMIN — Medication 2.5 MILLIGRAM(S): at 09:17

## 2025-02-10 RX ADMIN — ACETAMINOPHEN 1000 MILLIGRAM(S): 160 SUSPENSION ORAL at 06:02

## 2025-02-10 RX ADMIN — Medication 5000 UNIT(S): at 09:17

## 2025-02-10 RX ADMIN — ACETAMINOPHEN 1000 MILLIGRAM(S): 160 SUSPENSION ORAL at 07:00

## 2025-02-10 RX ADMIN — ACETAMINOPHEN 1000 MILLIGRAM(S): 160 SUSPENSION ORAL at 11:38

## 2025-02-10 RX ADMIN — MEMANTINE HYDROCHLORIDE 5 MILLIGRAM(S): 7 CAPSULE, EXTENDED RELEASE ORAL at 09:23

## 2025-02-10 RX ADMIN — ACETAMINOPHEN 1000 MILLIGRAM(S): 160 SUSPENSION ORAL at 01:25

## 2025-02-10 RX ADMIN — Medication 20 MILLIGRAM(S): at 09:17

## 2025-02-10 NOTE — DISCHARGE NOTE NURSING/CASE MANAGEMENT/SOCIAL WORK - PATIENT PORTAL LINK FT
You can access the FollowMyHealth Patient Portal offered by [facility] by registering at the following website: [email or web address]. By joining [employer]’s FollowMyHealth portal, you will also be able to view your health information using other applications (apps) compatible with our system.

## 2025-02-10 NOTE — DISCHARGE NOTE PROVIDER - PROVIDER TOKENS
PROVIDER:[TOKEN:[[id number]],FOLLOWUP:[1 week],ESTABLISHEDPATIENT:[T]],PROVIDER:[TOKEN:[[id number]],FOLLOWUP:[1 week],ESTABLISHEDPATIENT:[T]]

## 2025-02-10 NOTE — PROGRESS NOTE ADULT - REASON FOR ADMISSION
cellulitis of L great toe

## 2025-02-10 NOTE — DISCHARGE NOTE PROVIDER - CARE PROVIDER_API CALL
Sebastian Hernandez  [id number], [place] [address]  Phone: [phone]  Fax: [phone]  Established Patient  Follow Up Time: 1 week    Angelo Gamez  Podiatric Medicine and Surgery  [address] [place] [id number]  Phone: [phone]  Fax: [phone]  Established Patient  Follow Up Time: 1 week

## 2025-02-10 NOTE — DISCHARGE NOTE PROVIDER - NSDCCAREPROVSEEN_GEN_ALL_CORE_FT
Mary, Zaid Rojas, Dioni Garcia, Ana Lomas, Lyn Phelps, Pati Lawrence, Mark Kennedy, Alejandrina Orta, Eduard

## 2025-02-10 NOTE — DISCHARGE NOTE PROVIDER - NSDCMRMEDTOKEN_GEN_ALL_CORE_FT
amLODIPine 2.5 mg oral tablet: 1 tab(s) orally once a day  cefuroxime 500 mg oral tablet: 1 tab(s) orally 2 times a day  donepezil 10 mg oral tablet: 1 tab(s) orally once a day (at bedtime)  furosemide 20 mg oral tablet: prescribed for pt 2/3 for 10 days, pt has not started at home  levothyroxine 88 mcg (0.088 mg) oral tablet: 1 tab(s) orally once a day  losartan 100 mg oral tablet: 1 tab(s) orally once a day  memantine 5 mg oral tablet: 1 tab(s) orally once a day ***daughter not sure if pt is still taking in the morning***  metoprolol succinate 100 mg oral tablet, extended release: 1 tab(s) orally once a day  Prolia 60 mg/mL subcutaneous solution: 60 milligram(s) subcutaneously every 6 months

## 2025-02-10 NOTE — PHYSICAL THERAPY INITIAL EVALUATION ADULT - PERTINENT HX OF CURRENT PROBLEM, REHAB EVAL
85 y/o Female with h/o Alzheimer dementia, breast CA s/p double mastectomy, chemotherapy and radiation, pituitary tumor, DVT, HLD, hypothyroidism, and osteoporosis was admitted on 2/5/25 after she was sent in by her podiatrist for L great toe infection.     Admitted for acute osteomyelitis of the distal phalanx of the L great toe.     Now POD#2 s/p L hallux amputation

## 2025-02-10 NOTE — DISCHARGE NOTE NURSING/CASE MANAGEMENT/SOCIAL WORK - FINANCIAL ASSISTANCE
[facility] provides services at a reduced cost to those who are determined to be eligible through [facility]’s financial assistance program. Information regarding [facility]’s financial assistance program can be found by going to [email or web address] or by calling [phone].

## 2025-02-10 NOTE — PHYSICAL THERAPY INITIAL EVALUATION ADULT - ACTIVE RANGE OF MOTION EXAMINATION, REHAB EVAL
except B ankle Dorsiflexion limited to roughly neutral. Minimal ROM past neutral./no Active ROM deficits were identified

## 2025-02-10 NOTE — PROGRESS NOTE ADULT - ASSESSMENT
83 y/o Female with h/o Alzheimer dementia, breast CA s/p double mastectomy, chemotherapy and radiation, pituitary tumor, DVT, HLD, hypothyroidism, and osteoporosis was admitted on 2/5/25 after she was sent in by her podiatrist for L great toe infection. Pt states that she has an abscess underneath her toenail that tracks down to the bone. She mentioned that she has been dealing with this L great toe infection for about 1 month and that she was on PO antibiotics without improvement. She was advised to come in for IV abx therapy. In ER she received IV CFTX 1g x 1 and IV vancomycin 1g x 1.    #Left great toe cellulitis with probable underlying acute on chronic OM with GPC in clusters on GS  #Alzheimer disease  #Breast Ca in remission   -foot examination performed   -BC x 2 and wound culture collected  -on vancomycin 750 mg IV q12h and ceftriaxone 2 gm IV qd # 2  -tolerating abx well so far; no side effects noted  -obtain vancomycin trough level   -podiatry evaluation appreciated - may need toe amputation  -MRI left foot pending  -local wound care  -continue abx coverage  -f/u cultures  -monitor temps  -f/u CBC  -supportive care  2. Other issues:   -care per medicine    Clinical team may change from intravenous to oral antibiotics when the following criteria are met:   1. Patient is clinically improving/stable       a)	Improved signs and symptoms of infection from initial presentation       b)	Afebrile for 24 hours       c)	Leukocytosis trending towards normal range   2. Patient is tolerating oral intake   3. Initial/repeat blood cultures are negative     When above criteria met OR on date, may change iv antibiotics to an oral agent  Cannot advise changing to oral antibiotic therapy until culture sensitivity is available.    
85 y/o Female with h/o Alzheimer dementia, breast CA s/p double mastectomy, chemotherapy and radiation, pituitary tumor, DVT, HLD, hypothyroidism, and osteoporosis was admitted on 2/5/25 after she was sent in by her podiatrist for L great toe infection.    #Acute Osteomyelitis of the Distal Phalanx of the Left Great Toe  #Cellulitis of the Left Foot  Left Foot XR with Swelling around the left great toe noted. MRI left foot notable for  focal soft tissue ulceration at the great toe distally with underlying marrow signal consistent with osteomyelitis within the distal phalanx of the great toe.   -Pain regimen as ordered  -F/u Blood Cx with NGTD, Wound Cx noted  -ID on board; Cont on IV CFTX and vancomycin  -Podiatry on board; s/p Left Hallux amputation on 2/8    #HTN   #Hx of HFpEF (Grade 1 DD)  - Continue losartan 100 mg QD   - Continue metoprolol succinate 100 mg QD  - Continue amlodipine 2.5 mg QD  - Continue Lasix 20 mg qd    #Hypothyroidism   - Continue Synthroid 88 mcg QD    #Alzheimer dementia   - Continue donepezil 10 mg at bedtime  - Continue memantine 5 mg QD    #Hx of Breast CA s/p double mastectomy, chemotherapy and radiation  #Hx of Pituitary tumor,   - Follow up as an outpatient    #DVT ppx   - Heparin SubQ    #Communication  Daughter, Maryam updated at the bedside on 2/7, 2/8.    Dispo: Anticipate D/C in 1-2 days pending podiatry/ID clearance, PT eval.
85 y/o Female with h/o Alzheimer dementia, breast CA s/p double mastectomy, chemotherapy and radiation, pituitary tumor, DVT, HLD, hypothyroidism, and osteoporosis was admitted on 2/5/25 after she was sent in by her podiatrist for L great toe infection. Pt states that she has an abscess underneath her toenail that tracks down to the bone. She mentioned that she has been dealing with this L great toe infection for about 1 month and that she was on PO antibiotics without improvement. She was advised to come in for IV abx therapy. In ER she received IV CFTX 1g x 1 and IV vancomycin 1g x 1.    #Left great toe cellulitis with probable underlying acute on chronic OM with STAU  #Alzheimer disease  #Breast Ca in remission   -foot examination performed   -BC x 2 and wound culture noted  -on vancomycin 750 mg IV q12h and ceftriaxone 2 gm IV qd # 4  -tolerating abx well so far; no side effects noted  -vancomycin trough level is therapeutic  -podiatry evaluation appreciated - s/p amputation --> reported that all infected bone was removed  -MRI left foot noted  -local wound care  -d/c vancomycin  -continue abx coverage with ceftriaxone for now; plan to change to ceftin 500 mg PO q12h for 10 more days  -f/u with podiatry as outpatient   -f/u cultures  -monitor temps  -f/u CBC  -supportive care  2. Other issues:   -care per medicine    Clinical team may change from intravenous to oral antibiotics when the following criteria are met:   1. Patient is clinically improving/stable       a)	Improved signs and symptoms of infection from initial presentation       b)	Afebrile for 24 hours       c)	Leukocytosis trending towards normal range   2. Patient is tolerating oral intake   3. Initial/repeat blood cultures are negative     When above criteria met may change iv antibiotics to an oral ceftin as above     
A: 84yr old female seen for the following:   -Lt hallux full thickness wound in the setting of OM; infected  -Lt foot pain  -Difficulty ambulating    P:   Chart reviewed and Patient evaluated;  Discussed diagnosis and treatment with patient  X-rays reviewed : on wet read showing possible pathological fracture of the distal phalanx of the Lt hallux  MRI Lt foot ordered; still pending  Pt with full thickness wound to the Lt hallux which probes to bone along with surrounding erythema and edema  2/7: upon examination today wound to the Lt hallux with purulent drainage along with severe pain. Discussed surgical option of amputation of hallux versus debridement of the wound pending MRI, Pt agreeable to surgical approach. Discussed risks and benefits of surgical procedure.  Please medically optimize and clear pt for OR intervention of Left hallux amputation versus debridement pending MRI on 2/8/25  Wound flush with normal saline  Wound cx obtained bedside (2/6/25)  WC: xeroform, betadine DSD to the Lt hallux  WBAT to the Lt hallux  Continue with antibiotics as per ID  All additional care per Med appreciated  Patient demonstrated verbal understanding of all interventions and tolerated interventions well without any complications.   Podiatry will follow while in house    Case D/W attending Dr. Garcia    
85 y/o Female with h/o Alzheimer dementia, breast CA s/p double mastectomy, chemotherapy and radiation, pituitary tumor, DVT, HLD, hypothyroidism, and osteoporosis was admitted on 2/5/25 after she was sent in by her podiatrist for L great toe infection.    #Cellulitis of L great toe   #Probable underlying Acute on Chronic Osteomyelitis  Left Foot XR with Swelling around the left great toe noted.  -F/u Blood Cx with NGTD  -Cont on IV CFTX and vancomycin  -Follow up MRI foot  -Follow up ID and Podiatry recs following imaging   -Patient medically optimized for the OR (RCRI Score: 1--Hx of CHF).     #HTN   #Hx of HFpEF (Grade 1 DD)  - Continue losartan 100 mg QD   - Continue metoprolol succinate 100 mg QD  - Continue amlodipine 2.5 mg QD  - Continue Lasix 20 mg qd    #Hypothyroidism   - Continue Synthroid 88 mcg QD    #Alzheimer dementia   - Continue donepezil 10 mg at bedtime  - Continue memantine 5 mg QD    #Hx of Breast CA s/p double mastectomy, chemotherapy and radiation  #Hx of Pituitary tumor,   - Follow up as an outpatient    #DVT ppx   - Heparin SubQ    #Communication  Daughter, Maryam updated at the bedside on 2/7.    Dispo: Anticipate D/C in 2-4 days pending MRI of the Foot and further recs by Podiatry/ID. 
A: 84yr old female seen for the following:   -Lt hallux full thickness wound in the setting of OM >> s/p Left Hallux Amputation (DOS 2/9/25)  -Lt foot pain  -Difficulty ambulating    P:   Chart reviewed and Patient evaluated;  POD2 s/p Left Hallux Amputation (DOS 2/9/25). Sutures intact, no signs of wound dehiscence, no hematoma, no signs of ischemia, + elizabeth-wound erythema is noted, pain improving.   OR findings with low concern for residual infection and viability  Will f/u OR cultures, growing S aureus  ID recc appreciated   WC: xeroform DSD to the Lt hallux  WBAT to the Lt hallux  No further acute podiatric surgical intervention warranted at this time. Pt to follow up with Dr Garcia as outpatient within one week after dc.   Continue with antibiotics as per ID  All additional care per Med appreciated   Podiatry will follow while in house    Case D/W attending Dr. Garcia      
A: 84yr old female seen for the following:   -Lt hallux full thickness wound in the setting of OM; infected  -Lt foot pain  -Difficulty ambulating    P:   Chart reviewed and Patient evaluated;  Discussed diagnosis and treatment with patient  Pt is afebrile, No Leukocytosis, WBC 8.22  X-rays reviewed : on wet read showing possible pathological fracture of the distal phalanx of the Lt hallux  MRI Lt foot ordered; still pending  Pt is s/p Left Hallux Amputation (Dos 2/9/25). Suture stiches are intact, no signs of wound dehiscence, no hematoma, no signs of ischemia, + elizabeth-wound erythema is noted. pain is improved  ID recc appreciated   Wound cx obtained bedside (2/6/25)  WC: xeroform DSD to the Lt hallux  WBAT to the Lt hallux  Continue with antibiotics as per ID  All additional care per Med appreciated  Patient demonstrated verbal understanding of all interventions and tolerated interventions well without any complications.   No further acute podiatric intervention warranted at this time. Pt to follow Dr Garcia as outpatient within one week after discharge  Podiatry will follow while in house    Case D/W attending Dr. Garcia        Wound care orders for Left Hallux to be done Every other day  1. Gently remove the old dressing  2. Apply xeroform, cover with clean 4x4 gauze and wrap with Jasson or kerlix  3. Apply mild compression using Ace bandage  Thank you.  
83 y/o Female with h/o Alzheimer dementia, breast CA s/p double mastectomy, chemotherapy and radiation, pituitary tumor, DVT, HLD, hypothyroidism, and osteoporosis was admitted on 2/5/25 after she was sent in by her podiatrist for L great toe infection. Pt states that she has an abscess underneath her toenail that tracks down to the bone. She mentioned that she has been dealing with this L great toe infection for about 1 month and that she was on PO antibiotics without improvement. She was advised to come in for IV abx therapy. In ER she received IV CFTX 1g x 1 and IV vancomycin 1g x 1.    #Left great toe cellulitis with probable underlying acute on chronic OM with STAU  #Alzheimer disease  #Breast Ca in remission   -foot examination performed   -BC x 2 and wound culture noted  -on vancomycin 750 mg IV q12h and ceftriaxone 2 gm IV qd # 3  -tolerating abx well so far; no side effects noted  -vancomycin trough level is therapeutic  -podiatry evaluation appreciated - s/p amputation --> reported that all infected bone wsaremoved  -MRI left foot noted  -local wound care  -continue abx coverage; plan to change to oral abx therapy if option available   -f/u cultures  -monitor temps  -f/u CBC  -supportive care  2. Other issues:   -care per medicine    Clinical team may change from intravenous to oral antibiotics when the following criteria are met:   1. Patient is clinically improving/stable       a)	Improved signs and symptoms of infection from initial presentation       b)	Afebrile for 24 hours       c)	Leukocytosis trending towards normal range   2. Patient is tolerating oral intake   3. Initial/repeat blood cultures are negative     When above criteria met may change iv antibiotics to an oral agent  Cannot advise changing to oral antibiotic therapy until culture sensitivity is available.    
A/P:    #Cellulitis of L great toe   -on IV CFTX and vancomycin  -follow MRI foot  -follow ID and Podiatry consults     #HTN   - Continue losartan 100 mg QD   - Continue metoprolol succinate 100 mg QD  - Continue amlodipine 2.5 mg QD    #Hypothyroidism   - Continue Synthroid 88 mcg QD    #Alzheimer dementia   - Continue donepezil 10 mg at bedtime  - Continue memantine 5 mg QD    #DVT ppx   - Heparin SubQ    
85 y/o Female with h/o Alzheimer dementia, breast CA s/p double mastectomy, chemotherapy and radiation, pituitary tumor, DVT, HLD, hypothyroidism, and osteoporosis was admitted on 2/5/25 after she was sent in by her podiatrist for L great toe infection.    #Acute Osteomyelitis of the Distal Phalanx of the Left Great Toe  #Cellulitis of the Left Foot  Left Foot XR with Swelling around the left great toe noted. MRI left foot notable for  focal soft tissue ulceration at the great toe distally with underlying marrow signal consistent with osteomyelitis within the distal phalanx of the great toe.   -F/u Blood Cx with NGTD  -ID on board; Cont on IV CFTX and vancomycin  -Podiatry on board; to the OR for Left Hallux amputation today    #HTN   #Hx of HFpEF (Grade 1 DD)  - Continue losartan 100 mg QD   - Continue metoprolol succinate 100 mg QD  - Continue amlodipine 2.5 mg QD  - Continue Lasix 20 mg qd    #Hypothyroidism   - Continue Synthroid 88 mcg QD    #Alzheimer dementia   - Continue donepezil 10 mg at bedtime  - Continue memantine 5 mg QD    #Hx of Breast CA s/p double mastectomy, chemotherapy and radiation  #Hx of Pituitary tumor,   - Follow up as an outpatient    #DVT ppx   - Heparin SubQ    #Communication  Daughter, Maryam updated at the bedside on 2/7.    Dispo: Anticipate D/C in 2-3 days pending podiatry intervention and further recs.

## 2025-02-10 NOTE — DISCHARGE NOTE PROVIDER - ATTENDING DISCHARGE PHYSICAL EXAMINATION:
Care Due:                  Date            Visit Type   Department     Provider  --------------------------------------------------------------------------------                                EP -                              PRIMARY      [facility]    Priscilla Pruettdestinee  Last Visit: 08-      CARE ([facility])   MEDICINE       Anger                              EP -                              PRIMARY      [facility]    Priscilla Pruettdestinee  Next Visit: 01-      CARE ([facility])   MEDICINE       Anger                                                            Last  Test          Frequency    Reason                     Performed    Due Date  --------------------------------------------------------------------------------    Lipid Panel.  12 months..  rosuvastatin.............  12- 12-    [employer] Embedded Care Due Messages. Reference number: [id number].   9/25/2024 10:27:34 AM CDT  
VITALS:  T(F): 98.5 (02-10-25 @ 07:40), Max: 98.7 (02-09-25 @ 20:55)  HR: 61 (02-10-25 @ 07:40) (61 - 66)  BP: 124/62 (02-10-25 @ 07:40) (120/67 - 143/83)  RR: 17 (02-10-25 @ 07:40) (16 - 17)  SpO2: 96% (02-10-25 @ 07:40) (96% - 96%)  Wt(kg): --    I&O's Summary    09 Feb 2025 07:01  -  10 Feb 2025 07:00  --------------------------------------------------------  IN: 120 mL / OUT: 450 mL / NET: -330 mL    10 Feb 2025 07:01  -  10 Feb 2025 12:46  --------------------------------------------------------  IN: 450 mL / OUT: 300 mL / NET: 150 mL        CAPILLARY BLOOD GLUCOSE          PHYSICAL EXAM:  Gen: No acute distress  HEENT:  Normocephalic/Atraumatic  CV:  +S1, +S2, regular, no murmurs or rubs  RESP:   lungs clear to auscultation bilaterally, no wheezing, rales, rhonchi  GI:  abdomen soft, non-tender, non-distended  EXT:  Left foot covered in surgical dressing. C/D/I

## 2025-02-10 NOTE — PROGRESS NOTE ADULT - SUBJECTIVE AND OBJECTIVE BOX
2/10/25: Pt was seen by podiatry team. Pt was resting comfortably bedside, NAD.    PMH: HTN (Hypertension)    HTN (Hypertension)    Hyperlipidemia    Breast Cancer    Osteoporosis     (Normal Spontaneous Vaginal Delivery) x 2    Cataract    Radiation    Obesity    No pertinent past medical history    HTN (hypertension)    H/O hypotension    Breast cancer    Hypothyroidism    Deep vein thrombosis (DVT)      PSH:Breast Cancer- s/p left Breast Lumpectomy  with Radiation therapy( 18 years ago)    Cataract surgery- both eyes ( )    H/O melanoma excision    History of hip replacement    History of right hip replacement    H/O mastectomy        Allergies:shellfish (Unknown)  No Known Drug Allergies      Labs:                                   13.4   7.90  )-----------( 390      ( 10 Feb 2025 06:58 )             41.7     02-10    136  |  106  |  17  ----------------------------<  119[H]  4.2   |  28  |  0.78    Ca    9.0      10 Feb 2025 06:58      Vital Signs Last 24 Hrs  T(C): 36.9 (10 Feb 2025 07:40), Max: 37.1 (2025 20:55)  T(F): 98.5 (10 Feb 2025 07:40), Max: 98.7 (2025 20:55)  HR: 61 (10 Feb 2025 07:40) (61 - 66)  BP: 124/62 (10 Feb 2025 07:40) (120/67 - 143/83)  BP(mean): --  RR: 17 (10 Feb 2025 07:40) (16 - 17)  SpO2: 96% (10 Feb 2025 07:40) (96% - 96%)    Parameters below as of 10 Feb 2025 07:40  Patient On (Oxygen Delivery Method): room air            REVIEW OF SYSTEMS:    CONSTITUTIONAL: No weakness, fevers or chills  EYES: No visual changes  RESPIRATORY: No cough, wheezing; No shortness of breath  CARDIOVASCULAR: No chest pain or palpitations  GASTROINTESTINAL: No abdominal or epigastric pain. No nausea, vomiting; No diarrhea or constipation.   GENITOURINARY: No dysuria, frequency or hematuria  NEUROLOGICAL: No numbness or weakness  SKIN: See physical examination.  All other review of systems is negative unless indicated above    Physical Exam:   Constitutional: NAD, alert;  Lower Extremity Focus  Derm:  Skin warm, dry and supple bilateral.    Left Foot: Suture stiches are intact, no signs of wound dehiscence, no hematoma, no signs of ischemia, + elizabeth-wound erythema is noted.  Vascular: Dorsalis Pedis and Posterior Tibial pulses Palpable  Neuro: Protective sensation intact to the level of the digits bilateral.  MSK: Muscle strength 5/5 all major muscle groups bilateral. +TTP to the Lt hallux      < from: Xray Foot AP + Lateral + Oblique, Left (25 @ 20:45) >  Left foot. 3 views.    There is marked deformity around the second MTP joint there is mild   degeneration at the first MTP joint. No fracture or bone destruction.   Swelling around the great toe noted.    IMPRESSION: Swelling around the left great toe. Chest unremarkable.    < end of copied text >  
HOSPITALIST ATTENDING PROGRESS NOTE    Chart and meds reviewed.      Subjective:  Patient seen and examined at the bedside. Reports that her pain is controlled at this time. Denies any fevers, chills, or any N/V/D.    All other systems reviewed and found to be negative with the exception of what has been described above.    MEDICATIONS  (STANDING):  amLODIPine   Tablet 2.5 milliGRAM(s) Oral daily  cefTRIAXone Injectable. 2000 milliGRAM(s) IV Push every 24 hours  donepezil 10 milliGRAM(s) Oral at bedtime  heparin   Injectable 5000 Unit(s) SubCutaneous every 12 hours  levothyroxine 88 MICROGram(s) Oral daily  losartan 100 milliGRAM(s) Oral daily  memantine 5 milliGRAM(s) Oral daily  metoprolol succinate  milliGRAM(s) Oral daily  vancomycin  IVPB 750 milliGRAM(s) IV Intermittent every 12 hours    MEDICATIONS  (PRN):  acetaminophen     Tablet .. 650 milliGRAM(s) Oral every 6 hours PRN Temp greater or equal to 38C (100.4F), Mild Pain (1 - 3)  traMADol 25 milliGRAM(s) Oral every 6 hours PRN Moderate Pain (4 - 6)  traMADol 50 milliGRAM(s) Oral every 6 hours PRN Severe Pain (7 - 10)      PHYSICAL EXAM:  Gen: No acute distress  HEENT:  Normocephalic/Atraumatic  CV:  +S1, +S2, regular, no murmurs or rubs  RESP:   lungs clear to auscultation bilaterally, no wheezing, rales, rhonchi  GI:  abdomen soft, non-tender, non-distended  EXT:  Left 1st Great Toe covered in surgical dressing.  NEURO:  No focal neurological deficits    LABS:                            12.5   7.97  )-----------( 264      ( 06 Feb 2025 06:59 )             38.9     02-06    141  |  110[H]  |  12  ----------------------------<  107[H]  3.5   |  28  |  0.75    Ca    8.4[L]      06 Feb 2025 06:59  Phos  2.5     02-06  Mg     2.0     02-06    TPro  5.7[L]  /  Alb  2.8[L]  /  TBili  0.4  /  DBili  x   /  AST  12[L]  /  ALT  25  /  AlkPhos  67  02-06        LIVER FUNCTIONS - ( 06 Feb 2025 06:59 )  Alb: 2.8 g/dL / Pro: 5.7 gm/dL / ALK PHOS: 67 U/L / ALT: 25 U/L / AST: 12 U/L / GGT: x           PT/INR - ( 05 Feb 2025 20:58 )   PT: 10.4 sec;   INR: 0.90 ratio         PTT - ( 05 Feb 2025 20:58 )  PTT:27.1 sec  Urinalysis Basic - ( 06 Feb 2025 06:59 )    Color: x / Appearance: x / SG: x / pH: x  Gluc: 107 mg/dL / Ketone: x  / Bili: x / Urobili: x   Blood: x / Protein: x / Nitrite: x   Leuk Esterase: x / RBC: x / WBC x   Sq Epi: x / Non Sq Epi: x / Bacteria: x              CULTURES:      Additional results/Imaging, I have personally reviewed:    Telemetry, personally reviewed:
25: Pt seen by podiatry team with attending present. NAD, resting bedside. Continues with pain to the Lt hallux.    PMH: HTN (Hypertension)    HTN (Hypertension)    Hyperlipidemia    Breast Cancer    Osteoporosis     (Normal Spontaneous Vaginal Delivery) x 2    Cataract    Radiation    Obesity    No pertinent past medical history    HTN (hypertension)    H/O hypotension    Breast cancer    Hypothyroidism    Deep vein thrombosis (DVT)      PSH:Breast Cancer- s/p left Breast Lumpectomy  with Radiation therapy( 18 years ago)    Cataract surgery- both eyes ( )    H/O melanoma excision    History of hip replacement    History of right hip replacement    H/O mastectomy        Allergies:shellfish (Unknown)  No Known Drug Allergies      Labs:                        12.5   7.97  )-----------( 264      ( 2025 06:59 )             38.9   02-06    141  |  110[H]  |  12  ----------------------------<  107[H]  3.5   |  28  |  0.75    Ca    8.4[L]      2025 06:59  Phos  2.5     02-06  Mg     2.0     02-06    TPro  5.7[L]  /  Alb  2.8[L]  /  TBili  0.4  /  DBili  x   /  AST  12[L]  /  ALT  25  /  AlkPhos  67  02-06  Vital Signs Last 24 Hrs  T(C): 36.9 (2025 07:59), Max: 36.9 (2025 16:10)  T(F): 98.5 (2025 07:59), Max: 98.5 (2025 16:10)  HR: 66 (2025 07:59) (66 - 85)  BP: 135/87 (2025 07:59) (122/65 - 146/60)  BP(mean): --  RR: 18 (2025 07:59) (17 - 18)  SpO2: 97% (2025 07:59) (95% - 99%)    Parameters below as of 2025 07:59  Patient On (Oxygen Delivery Method): room air    REVIEW OF SYSTEMS:    CONSTITUTIONAL: No weakness, fevers or chills  EYES: No visual changes  RESPIRATORY: No cough, wheezing; No shortness of breath  CARDIOVASCULAR: No chest pain or palpitations  GASTROINTESTINAL: No abdominal or epigastric pain. No nausea, vomiting; No diarrhea or constipation.   GENITOURINARY: No dysuria, frequency or hematuria  NEUROLOGICAL: No numbness or weakness  SKIN: See physical examination.  All other review of systems is negative unless indicated above    Physical Exam:   Constitutional: NAD, alert;  Lower Extremity Focus  Derm:  Skin warm, dry and supple bilateral.    Full thickness ulceration to the distal tip of the Lt hallux, aprox 1cm x 1cmx0.7cm; Wound probes to bone, periwound edema and erythema extending upto the 1st MTPJ  Vascular: Dorsalis Pedis and Posterior Tibial pulses Palpable  Neuro: Protective sensation intact to the level of the digits bilateral.  MSK: Muscle strength 5/5 all major muscle groups bilateral. +TTP to the Lt hallux      < from: Xray Foot AP + Lateral + Oblique, Left (05.25 @ 20:45) >  Left foot. 3 views.    There is marked deformity around the second MTP joint there is mild   degeneration at the first MTP joint. No fracture or bone destruction.   Swelling around the great toe noted.    IMPRESSION: Swelling around the left great toe. Chest unremarkable.    < end of copied text >  
Date of service: 02-08-25 @ 15:20    Lying in bed in NAD  Events noted  s/p toe amputation  Low grade fever    ROS: no fever or chills; denies dizziness, no HA, no SOB or cough, no abdominal pain, no diarrhea or constipation; no dysuria, no legs pain, no rashes    MEDICATIONS  (STANDING):  acetaminophen     Tablet .. 1000 milliGRAM(s) Oral every 6 hours  amLODIPine   Tablet 2.5 milliGRAM(s) Oral daily  cefTRIAXone Injectable. 2000 milliGRAM(s) IV Push every 24 hours  donepezil 10 milliGRAM(s) Oral at bedtime  furosemide    Tablet 20 milliGRAM(s) Oral daily  heparin   Injectable 5000 Unit(s) SubCutaneous every 12 hours  levothyroxine 88 MICROGram(s) Oral daily  losartan 100 milliGRAM(s) Oral daily  memantine 5 milliGRAM(s) Oral daily  metoprolol succinate  milliGRAM(s) Oral daily  vancomycin  IVPB 750 milliGRAM(s) IV Intermittent every 12 hours    Vital Signs Last 24 Hrs  T(C): 36.4 (08 Feb 2025 11:21), Max: 37.2 (08 Feb 2025 00:00)  T(F): 97.5 (08 Feb 2025 11:21), Max: 99 (08 Feb 2025 00:00)  HR: 58 (08 Feb 2025 11:21) (49 - 82)  BP: 158/67 (08 Feb 2025 11:21) (119/59 - 158/67)  BP(mean): 92 (08 Feb 2025 00:00) (92 - 92)  RR: 16 (08 Feb 2025 11:21) (14 - 18)  SpO2: 97% (08 Feb 2025 11:21) (94% - 100%)    Parameters below as of 08 Feb 2025 11:21  Patient On (Oxygen Delivery Method): room air     Physical exam:    Constitutional:  No acute distress  HEENT: NC/AT, EOMI, PERRLA, conjunctivae clear; ears and nose atraumatic; pharynx benign  Neck: supple; thyroid not palpable  Back: no tenderness  Respiratory: respiratory effort normal; clear to auscultation  Cardiovascular: S1S2 regular, no murmurs  Abdomen: soft, not tender, not distended, positive BS; no liver or spleen organomegaly  Genitourinary: no suprapubic tenderness  Lymphatic: no LN palpable  Musculoskeletal: no muscle tenderness, no joint swelling or tenderness  Extremities: no pedal edema  Left great toe ulcer with drainage, Left great toe amputation - dressed  Neurological/ Psychiatric: Alert, judgement and insight impaired; moving all extremities  Skin: no rashes; no palpable lesions    Labs: reviewed                        13.2   8.67  )-----------( 250      ( 08 Feb 2025 07:06 )             41.7     02-08    140  |  107  |  11  ----------------------------<  107[H]  3.7   |  29  |  0.88    Ca    9.0      08 Feb 2025 09:50    Vancomycin Level, Trough: 12.1 ug/mL (02-07 @ 21:05)    C-Reactive Protein: 67.9 mg/mL (02-06-25 @ 06:59)                        12.5   7.97  )-----------( 264      ( 06 Feb 2025 06:59 )             38.9     02-06    141  |  110[H]  |  12  ----------------------------<  107[H]  3.5   |  28  |  0.75    Ca    8.4[L]      06 Feb 2025 06:59  Phos  2.5     02-06  Mg     2.0     02-06    TPro  5.7[L]  /  Alb  2.8[L]  /  TBili  0.4  /  DBili  x   /  AST  12[L]  /  ALT  25  /  AlkPhos  67  02-06     LIVER FUNCTIONS - ( 06 Feb 2025 06:59 )  Alb: 2.8 g/dL / Pro: 5.7 gm/dL / ALK PHOS: 67 U/L / ALT: 25 U/L / AST: 12 U/L / GGT: x           Urinalysis with Rflx Culture (02-06 @ 00:52)  Urine Appearance: Clear  Protein, Urine: Negative mg/dL  Urine Microscopic-Add On (NC) (02-06 @ 00:52)  White Blood Cell - Urine: 1 /HPF  Red Blood Cell - Urine: 0 /HPF    Culture - Abscess with Gram Stain (collected 06 Feb 2025 14:09)  Source: .Abscess  Gram Stain (06 Feb 2025 21:58):    No polymorphonuclear cells seen per low power field    Rare Gram Positive Cocci in Clusters per oil power field  Preliminary Report (07 Feb 2025 19:56):    Moderate Staphylococcus aureus    Urinalysis with Rflx Culture (collected 06 Feb 2025 00:52)    Culture - Blood (collected 05 Feb 2025 20:58)  Source: .Blood BLOOD  Preliminary Report (08 Feb 2025 02:02):    No growth at 48 Hours    Culture - Blood (collected 05 Feb 2025 20:58)  Source: .Blood BLOOD  Preliminary Report (08 Feb 2025 02:02):    No growth at 48 Hours    Radiology: all available radiological tests reviewed    < from: US Duplex Venous Lower Ext Complete, Bilateral (06.22.24 @ 14:52) >  No evidence of deep venous thrombosis in either lower extremity.  < end of copied text >    < from: MR Foot No Cont, Left (02.07.25 @ 14:12) >  MRI of the left foot demonstrates focal soft tissue ulceration at the great toe distally with underlying marrow signalconsistent with   osteomyelitis within the distal phalanx of the great toe. Trace effusion at the great toe IP joint for which septic joint cannot be excluded.   Recommend correlation with patient's symptomatology and clinical picture.  Subcutaneous edema at the dorsal surface of the forefoot which can be seen in the setting of cellulitis or bland edema. There is no abscess.  < end of copied text >    Advanced directives addressed: full resuscitation
HOSPITALIST ATTENDING PROGRESS NOTE    Chart and meds reviewed.      Subjective:  Patient seen and examined at the bedside. LILLY. MRI Left Foot     All other systems reviewed and found to be negative with the exception of what has been described above.    MEDICATIONS  (STANDING):  amLODIPine   Tablet 2.5 milliGRAM(s) Oral daily  cefTRIAXone Injectable. 2000 milliGRAM(s) IV Push every 24 hours  donepezil 10 milliGRAM(s) Oral at bedtime  heparin   Injectable 5000 Unit(s) SubCutaneous every 12 hours  levothyroxine 88 MICROGram(s) Oral daily  losartan 100 milliGRAM(s) Oral daily  memantine 5 milliGRAM(s) Oral daily  metoprolol succinate  milliGRAM(s) Oral daily  vancomycin  IVPB 750 milliGRAM(s) IV Intermittent every 12 hours    MEDICATIONS  (PRN):  acetaminophen     Tablet .. 650 milliGRAM(s) Oral every 6 hours PRN Temp greater or equal to 38C (100.4F), Mild Pain (1 - 3)  traMADol 25 milliGRAM(s) Oral every 6 hours PRN Moderate Pain (4 - 6)  traMADol 50 milliGRAM(s) Oral every 6 hours PRN Severe Pain (7 - 10)      PHYSICAL EXAM:  Gen: No acute distress  HEENT:  Normocephalic/Atraumatic  CV:  +S1, +S2, regular, no murmurs or rubs  RESP:   lungs clear to auscultation bilaterally, no wheezing, rales, rhonchi  GI:  abdomen soft, non-tender, non-distended  EXT:  Left 1st Great Toe covered in surgical dressing.  NEURO:  No focal neurological deficits    LABS:                            12.5   7.97  )-----------( 264      ( 06 Feb 2025 06:59 )             38.9     02-06    141  |  110[H]  |  12  ----------------------------<  107[H]  3.5   |  28  |  0.75    Ca    8.4[L]      06 Feb 2025 06:59  Phos  2.5     02-06  Mg     2.0     02-06    TPro  5.7[L]  /  Alb  2.8[L]  /  TBili  0.4  /  DBili  x   /  AST  12[L]  /  ALT  25  /  AlkPhos  67  02-06        LIVER FUNCTIONS - ( 06 Feb 2025 06:59 )  Alb: 2.8 g/dL / Pro: 5.7 gm/dL / ALK PHOS: 67 U/L / ALT: 25 U/L / AST: 12 U/L / GGT: x           PT/INR - ( 05 Feb 2025 20:58 )   PT: 10.4 sec;   INR: 0.90 ratio         PTT - ( 05 Feb 2025 20:58 )  PTT:27.1 sec  Urinalysis Basic - ( 06 Feb 2025 06:59 )    Color: x / Appearance: x / SG: x / pH: x  Gluc: 107 mg/dL / Ketone: x  / Bili: x / Urobili: x   Blood: x / Protein: x / Nitrite: x   Leuk Esterase: x / RBC: x / WBC x   Sq Epi: x / Non Sq Epi: x / Bacteria: x              CULTURES:      Additional results/Imaging, I have personally reviewed:    Telemetry, personally reviewed:
patient is seen and examined. Chart is reviewed. admitted for toe cellulitis.     Gen: No fever, chills, weakness  ENT: No visual changes or throat pain  Neck: No pain or stiffness  Respiratory: No cough or wheezing  Cardiovascular: No chest pain or palpitations  Gastrointestinal: No abdominal pain, nausea, vomiting, constipation, or diarrhea  Hematologic: No easy bleeding or bruising  Neurologic: No numbness or focal weakness  Psych: No depression or insomnia  Skin: No rash or itching      MEDICATIONS  (STANDING):  amLODIPine   Tablet 2.5 milliGRAM(s) Oral daily  cefTRIAXone Injectable. 2000 milliGRAM(s) IV Push every 24 hours  donepezil 10 milliGRAM(s) Oral at bedtime  heparin   Injectable 5000 Unit(s) SubCutaneous every 12 hours  levothyroxine 88 MICROGram(s) Oral daily  losartan 100 milliGRAM(s) Oral daily  memantine 5 milliGRAM(s) Oral daily  metoprolol succinate  milliGRAM(s) Oral daily  vancomycin  IVPB 750 milliGRAM(s) IV Intermittent every 12 hours    MEDICATIONS  (PRN):  acetaminophen     Tablet .. 650 milliGRAM(s) Oral every 6 hours PRN Temp greater or equal to 38C (100.4F), Mild Pain (1 - 3)  traMADol 25 milliGRAM(s) Oral every 6 hours PRN Moderate Pain (4 - 6)  traMADol 50 milliGRAM(s) Oral every 6 hours PRN Severe Pain (7 - 10)      Vital Signs Last 24 Hrs  T(C): 36.9 (06 Feb 2025 08:21), Max: 36.9 (06 Feb 2025 08:21)  T(F): 98.5 (06 Feb 2025 08:21), Max: 98.5 (06 Feb 2025 08:21)  HR: 85 (06 Feb 2025 10:16) (69 - 85)  BP: 146/60 (06 Feb 2025 10:16) (133/79 - 162/87)  BP(mean): 92 (05 Feb 2025 22:59) (92 - 110)  RR: 18 (06 Feb 2025 08:21) (17 - 18)  SpO2: 99% (06 Feb 2025 08:21) (97% - 99%)    Parameters below as of 06 Feb 2025 08:21  Patient On (Oxygen Delivery Method): room air        PHYSICAL EXAM:  GENERAL: NAD, lying in bed comfortably  HEAD:  Atraumatic, Normocephalic  EYES: conjunctiva and sclera clear  ENT: Moist mucous membranes  NECK: Supple, No JVD  CHEST/LUNG: Clear to auscultation bilaterally; No rales, rhonchi, wheezing. Unlabored respirations  HEART: Regular rate and rhythm; No murmurs  ABDOMEN: Bowel sounds present; Soft, Nontender, Nondistended.   EXTREMITIES:  L great toe wrapped - no erythema, warmth, or tenderness in the surrounding b/l LE  NERVOUS SYSTEM:  Alert & Oriented X3, speech clear. No deficits   MSK: FROM all 4 extremities, full and equal strength          LABS:                          12.5   7.97  )-----------( 264      ( 06 Feb 2025 06:59 )             38.9     06 Feb 2025 06:59    141    |  110    |  12     ----------------------------<  107    3.5     |  28     |  0.75     Ca    8.4        06 Feb 2025 06:59  Phos  2.5       06 Feb 2025 06:59  Mg     2.0       06 Feb 2025 06:59    TPro  5.7    /  Alb  2.8    /  TBili  0.4    /  DBili  x      /  AST  12     /  ALT  25     /  AlkPhos  67     06 Feb 2025 06:59    LIVER FUNCTIONS - ( 06 Feb 2025 06:59 )  Alb: 2.8 g/dL / Pro: 5.7 gm/dL / ALK PHOS: 67 U/L / ALT: 25 U/L / AST: 12 U/L / GGT: x           PT/INR - ( 05 Feb 2025 20:58 )   PT: 10.4 sec;   INR: 0.90 ratio         PTT - ( 05 Feb 2025 20:58 )  PTT:27.1 sec  CAPILLARY BLOOD GLUCOSE            Urinalysis Basic - ( 06 Feb 2025 06:59 )    Color: x / Appearance: x / SG: x / pH: x  Gluc: 107 mg/dL / Ketone: x  / Bili: x / Urobili: x   Blood: x / Protein: x / Nitrite: x   Leuk Esterase: x / RBC: x / WBC x   Sq Epi: x / Non Sq Epi: x / Bacteria: x        RADIOLOGY:        
25: Pt was seen by podiatry team. Pt was resting comfortably bedside, No Acute Distress. Pt is PoD1  s/p Left Hallux Amputation (Dos 25). Pain has improved significantly compared to pre-op.     PMH: HTN (Hypertension)    HTN (Hypertension)    Hyperlipidemia    Breast Cancer    Osteoporosis     (Normal Spontaneous Vaginal Delivery) x 2    Cataract    Radiation    Obesity    No pertinent past medical history    HTN (hypertension)    H/O hypotension    Breast cancer    Hypothyroidism    Deep vein thrombosis (DVT)      PSH:Breast Cancer- s/p left Breast Lumpectomy  with Radiation therapy( 18 years ago)    Cataract surgery- both eyes ( )    H/O melanoma excision    History of hip replacement    History of right hip replacement    H/O mastectomy        Allergies:shellfish (Unknown)  No Known Drug Allergies      Labs:                        13.3   8.22  )-----------( 357      ( 2025 06:10 )             41.3            -    139  |  106  |  16  ----------------------------<  109[H]  4.0   |  29  |  0.79    Ca    8.3[L]      2025 06:10       Vital Signs Last 24 Hrs  T(C): 36.6 (2025 07:12), Max: 36.7 (2025 21:00)  T(F): 97.9 (2025 07:12), Max: 98.1 (2025 21:00)  HR: 67 (2025 07:12) (64 - 70)  BP: 122/63 (2025 07:12) (122/63 - 140/68)  BP(mean): --  RR: 18 (2025 07:12) (17 - 18)  SpO2: 95% (2025 07:12) (95% - 96%)    Parameters below as of 2025 07:12  Patient On (Oxygen Delivery Method): room air        REVIEW OF SYSTEMS:    CONSTITUTIONAL: No weakness, fevers or chills  EYES: No visual changes  RESPIRATORY: No cough, wheezing; No shortness of breath  CARDIOVASCULAR: No chest pain or palpitations  GASTROINTESTINAL: No abdominal or epigastric pain. No nausea, vomiting; No diarrhea or constipation.   GENITOURINARY: No dysuria, frequency or hematuria  NEUROLOGICAL: No numbness or weakness  SKIN: See physical examination.  All other review of systems is negative unless indicated above    Physical Exam:   Constitutional: NAD, alert;  Lower Extremity Focus  Derm:  Skin warm, dry and supple bilateral.    Left Foot: Suture stiches are intact, no signs of wound dehiscence, no hematoma, no signs of ischemia, + elizabeth-wound erythema is noted.  Vascular: Dorsalis Pedis and Posterior Tibial pulses Palpable  Neuro: Protective sensation intact to the level of the digits bilateral.  MSK: Muscle strength 5/5 all major muscle groups bilateral. +TTP to the Lt hallux      < from: Xray Foot AP + Lateral + Oblique, Left (05 @ 20:45) >  Left foot. 3 views.    There is marked deformity around the second MTP joint there is mild   degeneration at the first MTP joint. No fracture or bone destruction.   Swelling around the great toe noted.    IMPRESSION: Swelling around the left great toe. Chest unremarkable.    < end of copied text >  
Date of service: 02-07-25 @ 15:08    Lying in bed in NAD  Has left great toe pain  No fever  Has toe erythema and drainage  Reported with new bacteria in wound culture    ROS: no fever or chills; denies dizziness, no HA, no SOB or cough, no abdominal pain, no diarrhea or constipation; no dysuria, no legs pain, no rashes    MEDICATIONS  (STANDING):  acetaminophen     Tablet .. 1000 milliGRAM(s) Oral every 6 hours  amLODIPine   Tablet 2.5 milliGRAM(s) Oral daily  cefTRIAXone Injectable. 2000 milliGRAM(s) IV Push every 24 hours  donepezil 10 milliGRAM(s) Oral at bedtime  furosemide    Tablet 20 milliGRAM(s) Oral daily  heparin   Injectable 5000 Unit(s) SubCutaneous every 12 hours  levothyroxine 88 MICROGram(s) Oral daily  losartan 100 milliGRAM(s) Oral daily  memantine 5 milliGRAM(s) Oral daily  metoprolol succinate  milliGRAM(s) Oral daily  vancomycin  IVPB 750 milliGRAM(s) IV Intermittent every 12 hours    Vital Signs Last 24 Hrs  T(C): 36.9 (07 Feb 2025 07:59), Max: 36.9 (06 Feb 2025 16:10)  T(F): 98.5 (07 Feb 2025 07:59), Max: 98.5 (06 Feb 2025 16:10)  HR: 66 (07 Feb 2025 07:59) (66 - 84)  BP: 135/87 (07 Feb 2025 07:59) (122/65 - 135/87)  BP(mean): --  RR: 18 (07 Feb 2025 07:59) (17 - 18)  SpO2: 97% (07 Feb 2025 07:59) (95% - 99%)    Parameters below as of 07 Feb 2025 07:59  Patient On (Oxygen Delivery Method): room air     Physical exam:    Constitutional:  No acute distress  HEENT: NC/AT, EOMI, PERRLA, conjunctivae clear; ears and nose atraumatic; pharynx benign  Neck: supple; thyroid not palpable  Back: no tenderness  Respiratory: respiratory effort normal; clear to auscultation  Cardiovascular: S1S2 regular, no murmurs  Abdomen: soft, not tender, not distended, positive BS; no liver or spleen organomegaly  Genitourinary: no suprapubic tenderness  Lymphatic: no LN palpable  Musculoskeletal: no muscle tenderness, no joint swelling or tenderness  Extremities: no pedal edema  Left great toe ulcer with drainage, Left great toe and forefoot edema, erythema, warmth and tenderness  Neurological/ Psychiatric: Alert, judgement and insight impaired; moving all extremities  Skin: no rashes; no palpable lesions    Labs: all available labs reviewed                        12.5   7.97  )-----------( 264      ( 06 Feb 2025 06:59 )             38.9     02-06    141  |  110[H]  |  12  ----------------------------<  107[H]  3.5   |  28  |  0.75    Ca    8.4[L]      06 Feb 2025 06:59  Phos  2.5     02-06  Mg     2.0     02-06    TPro  5.7[L]  /  Alb  2.8[L]  /  TBili  0.4  /  DBili  x   /  AST  12[L]  /  ALT  25  /  AlkPhos  67  02-06     LIVER FUNCTIONS - ( 06 Feb 2025 06:59 )  Alb: 2.8 g/dL / Pro: 5.7 gm/dL / ALK PHOS: 67 U/L / ALT: 25 U/L / AST: 12 U/L / GGT: x           Urinalysis with Rflx Culture (02-06 @ 00:52)  Urine Appearance: Clear  Protein, Urine: Negative mg/dL  Urine Microscopic-Add On (NC) (02-06 @ 00:52)  White Blood Cell - Urine: 1 /HPF  Red Blood Cell - Urine: 0 /HPF    Culture - Abscess with Gram Stain (collected 06 Feb 2025 14:09)  Source: .Abscess  Gram Stain (06 Feb 2025 21:58):    No polymorphonuclear cells seen per low power field    Rare Gram Positive Cocci in Clusters per oil power field    Culture - Blood (collected 05 Feb 2025 20:58)  Source: .Blood BLOOD  Preliminary Report (07 Feb 2025 02:02):    No growth at 24 hours    Culture - Blood (collected 05 Feb 2025 20:58)  Source: .Blood BLOOD  Preliminary Report (07 Feb 2025 02:02):    No growth at 24 hours    Radiology: all available radiological tests reviewed    < from: US Duplex Venous Lower Ext Complete, Bilateral (06.22.24 @ 14:52) >  No evidence of deep venous thrombosis in either lower extremity.  < end of copied text >    Advanced directives addressed: full resuscitation
Date of service: 02-09-25 @ 14:45    Lying in bed in NAD  Has left foot tenderness  No fever    ROS: no fever or chills; limited     MEDICATIONS  (STANDING):  acetaminophen     Tablet .. 1000 milliGRAM(s) Oral every 6 hours  amLODIPine   Tablet 2.5 milliGRAM(s) Oral daily  cefTRIAXone Injectable. 2000 milliGRAM(s) IV Push every 24 hours  donepezil 10 milliGRAM(s) Oral at bedtime  furosemide    Tablet 20 milliGRAM(s) Oral daily  heparin   Injectable 5000 Unit(s) SubCutaneous every 12 hours  levothyroxine 88 MICROGram(s) Oral daily  losartan 100 milliGRAM(s) Oral daily  memantine 5 milliGRAM(s) Oral daily  metoprolol succinate  milliGRAM(s) Oral daily  vancomycin  IVPB 750 milliGRAM(s) IV Intermittent every 12 hours    Vital Signs Last 24 Hrs  T(C): 36.6 (09 Feb 2025 07:12), Max: 36.7 (08 Feb 2025 21:00)  T(F): 97.9 (09 Feb 2025 07:12), Max: 98.1 (08 Feb 2025 21:00)  HR: 67 (09 Feb 2025 07:12) (64 - 70)  BP: 122/63 (09 Feb 2025 07:12) (122/63 - 140/68)  BP(mean): --  RR: 18 (09 Feb 2025 07:12) (17 - 18)  SpO2: 95% (09 Feb 2025 07:12) (95% - 96%)    Parameters below as of 09 Feb 2025 07:12  Patient On (Oxygen Delivery Method): room air         Physical exam:    Constitutional:  No acute distress  HEENT: NC/AT, EOMI, PERRLA, conjunctivae clear; ears and nose atraumatic; pharynx benign  Neck: supple; thyroid not palpable  Back: no tenderness  Respiratory: respiratory effort normal; clear to auscultation  Cardiovascular: S1S2 regular, no murmurs  Abdomen: soft, not tender, not distended, positive BS; no liver or spleen organomegaly  Genitourinary: no suprapubic tenderness  Lymphatic: no LN palpable  Musculoskeletal: no muscle tenderness, no joint swelling or tenderness  Extremities: no pedal edema  Left great toe ulcer with drainage, Left great toe amputation site - dry  Neurological/ Psychiatric: Alert, judgement and insight impaired; moving all extremities  Skin: no rashes; no palpable lesions    Labs: reviewed                        13.3   8.22  )-----------( 357      ( 09 Feb 2025 06:10 )             41.3     02-09    139  |  106  |  16  ----------------------------<  109[H]  4.0   |  29  |  0.79    Ca    8.3[L]      09 Feb 2025 06:10    Vancomycin Level, Trough: 12.1 ug/mL (02-07 @ 21:05)  C-Reactive Protein: 67.9 mg/mL (02-06-25 @ 06:59)                        13.2   8.67  )-----------( 250      ( 08 Feb 2025 07:06 )             41.7     02-08    140  |  107  |  11  ----------------------------<  107[H]  3.7   |  29  |  0.88    Ca    9.0      08 Feb 2025 09:50    Vancomycin Level, Trough: 12.1 ug/mL (02-07 @ 21:05)    C-Reactive Protein: 67.9 mg/mL (02-06-25 @ 06:59)                        12.5   7.97  )-----------( 264      ( 06 Feb 2025 06:59 )             38.9     02-06    141  |  110[H]  |  12  ----------------------------<  107[H]  3.5   |  28  |  0.75    Ca    8.4[L]      06 Feb 2025 06:59  Phos  2.5     02-06  Mg     2.0     02-06    TPro  5.7[L]  /  Alb  2.8[L]  /  TBili  0.4  /  DBili  x   /  AST  12[L]  /  ALT  25  /  AlkPhos  67  02-06     LIVER FUNCTIONS - ( 06 Feb 2025 06:59 )  Alb: 2.8 g/dL / Pro: 5.7 gm/dL / ALK PHOS: 67 U/L / ALT: 25 U/L / AST: 12 U/L / GGT: x           Urinalysis with Rflx Culture (02-06 @ 00:52)  Urine Appearance: Clear  Protein, Urine: Negative mg/dL  Urine Microscopic-Add On (NC) (02-06 @ 00:52)  White Blood Cell - Urine: 1 /HPF  Red Blood Cell - Urine: 0 /HPF    Culture - Fungal, Other (collected 08 Feb 2025 08:17)  Source: .Other  Preliminary Report (09 Feb 2025 11:30):    No growth    Culture - Abscess with Gram Stain (collected 08 Feb 2025 08:17)  Source: .Abscess  Gram Stain (08 Feb 2025 23:57):    Rare polymorphonuclear leukocytes per low power field    Rare Gram Positive Cocci in Clusters per oil power field  Preliminary Report (09 Feb 2025 14:06):    Few Staphylococcus aureus    Culture - Abscess with Gram Stain (collected 06 Feb 2025 14:09)  Source: .Abscess  Gram Stain (06 Feb 2025 21:58):    No polymorphonuclear cells seen per low power field    Rare Gram Positive Cocci in Clusters per oil power field  Preliminary Report (07 Feb 2025 19:56):    Moderate Staphylococcus aureus  Organism: Staphylococcus aureus (08 Feb 2025 16:40)  Organism: Staphylococcus aureus (08 Feb 2025 16:40)      Method Type: LAUREL      -  Ceftaroline: S <=0.5      -  Clindamycin: S <=0.25      -  Erythromycin: R >4      -  Gentamicin: S <=4 Should not be used as monotherapy      -  Oxacillin: S 0.5 Oxacillin predicts susceptibility for dicloxacillin, methicillin, and nafcillin      -  Penicillin: R >2      -  Rifampin: S <=1 Should not be used as monotherapy      -  Tetracycline: S <=4      -  Trimethoprim/Sulfamethoxazole: S <=0.5/9.5      -  Vancomycin: S 1    Urinalysis with Rflx Culture (collected 06 Feb 2025 00:52)    Culture - Blood (collected 05 Feb 2025 20:58)  Source: .Blood BLOOD  Preliminary Report (09 Feb 2025 02:01):    No growth at 72 Hours    Culture - Blood (collected 05 Feb 2025 20:58)  Source: .Blood BLOOD  Preliminary Report (09 Feb 2025 02:01):    No growth at 72 Hours    Radiology: all available radiological tests reviewed    < from: US Duplex Venous Lower Ext Complete, Bilateral (06.22.24 @ 14:52) >  No evidence of deep venous thrombosis in either lower extremity.  < end of copied text >    < from: MR Foot No Cont, Left (02.07.25 @ 14:12) >  MRI of the left foot demonstrates focal soft tissue ulceration at the great toe distally with underlying marrow signalconsistent with   osteomyelitis within the distal phalanx of the great toe. Trace effusion at the great toe IP joint for which septic joint cannot be excluded.   Recommend correlation with patient's symptomatology and clinical picture.  Subcutaneous edema at the dorsal surface of the forefoot which can be seen in the setting of cellulitis or bland edema. There is no abscess.  < end of copied text >    Advanced directives addressed: full resuscitation
HOSPITALIST ATTENDING PROGRESS NOTE    Chart and meds reviewed.      Subjective:  Patient seen and examined at the bedside. States that her pain is controlled at this time. Does not offer any acute complaints at this time.    All other systems reviewed and found to be negative with the exception of what has been described above.    MEDICATIONS  (STANDING):  amLODIPine   Tablet 2.5 milliGRAM(s) Oral daily  cefTRIAXone Injectable. 2000 milliGRAM(s) IV Push every 24 hours  donepezil 10 milliGRAM(s) Oral at bedtime  heparin   Injectable 5000 Unit(s) SubCutaneous every 12 hours  levothyroxine 88 MICROGram(s) Oral daily  losartan 100 milliGRAM(s) Oral daily  memantine 5 milliGRAM(s) Oral daily  metoprolol succinate  milliGRAM(s) Oral daily  vancomycin  IVPB 750 milliGRAM(s) IV Intermittent every 12 hours    MEDICATIONS  (PRN):  acetaminophen     Tablet .. 650 milliGRAM(s) Oral every 6 hours PRN Temp greater or equal to 38C (100.4F), Mild Pain (1 - 3)  traMADol 25 milliGRAM(s) Oral every 6 hours PRN Moderate Pain (4 - 6)  traMADol 50 milliGRAM(s) Oral every 6 hours PRN Severe Pain (7 - 10)      PHYSICAL EXAM:  Gen: No acute distress  HEENT:  Normocephalic/Atraumatic  CV:  +S1, +S2, regular, no murmurs or rubs  RESP:   lungs clear to auscultation bilaterally, no wheezing, rales, rhonchi  GI:  abdomen soft, non-tender, non-distended  EXT:  Left foot covered in surgical dressing.  NEURO:  No focal neurological deficits    LABS:                            12.5   7.97  )-----------( 264      ( 06 Feb 2025 06:59 )             38.9     02-06    141  |  110[H]  |  12  ----------------------------<  107[H]  3.5   |  28  |  0.75    Ca    8.4[L]      06 Feb 2025 06:59  Phos  2.5     02-06  Mg     2.0     02-06    TPro  5.7[L]  /  Alb  2.8[L]  /  TBili  0.4  /  DBili  x   /  AST  12[L]  /  ALT  25  /  AlkPhos  67  02-06        LIVER FUNCTIONS - ( 06 Feb 2025 06:59 )  Alb: 2.8 g/dL / Pro: 5.7 gm/dL / ALK PHOS: 67 U/L / ALT: 25 U/L / AST: 12 U/L / GGT: x           PT/INR - ( 05 Feb 2025 20:58 )   PT: 10.4 sec;   INR: 0.90 ratio         PTT - ( 05 Feb 2025 20:58 )  PTT:27.1 sec  Urinalysis Basic - ( 06 Feb 2025 06:59 )    Color: x / Appearance: x / SG: x / pH: x  Gluc: 107 mg/dL / Ketone: x  / Bili: x / Urobili: x   Blood: x / Protein: x / Nitrite: x   Leuk Esterase: x / RBC: x / WBC x   Sq Epi: x / Non Sq Epi: x / Bacteria: x              CULTURES:      Additional results/Imaging, I have personally reviewed:    Telemetry, personally reviewed:

## 2025-02-10 NOTE — PHYSICAL THERAPY INITIAL EVALUATION ADULT - ADDITIONAL COMMENTS
Patient resides in a home with 0 ANNY alone and was independent in all aspects of mobility and ADLs with use of a 4WW. Patient has a private aide that visits 3x/week for 6 hours at a time 9am-3pm.

## 2025-02-10 NOTE — PROGRESS NOTE ADULT - PROVIDER SPECIALTY LIST ADULT
Hospitalist
Infectious Disease
Podiatry
Infectious Disease
Infectious Disease
Podiatry
Hospitalist
Podiatry
Hospitalist
Hospitalist

## 2025-02-10 NOTE — PHYSICAL THERAPY INITIAL EVALUATION ADULT - GENERAL OBSERVATIONS, REHAB EVAL
The Pt was received on 5S in bed supine, calm, cooperative, and eager to participate with PT, +L surgical shoe. Pt responded well to functional mobility trng and ambulation tx. Required CGA for mobility and ambulation 60ft with RW support and L surgical shoe. Assisted into bedside chair and adjusted for comfort, +alarm. The Pt was in NAD at end of tx.  Call bell, tray, and phone in place and within reach. NSG made aware.

## 2025-02-10 NOTE — PHYSICAL THERAPY INITIAL EVALUATION ADULT - BALANCE DISTURBANCE, SYSTEM IMPAIRMENT CONTRIBUTE, REHAB EVAL
OCCUPATIONAL THERAPY PROGRESS NOTE  Group Time:  [id number]  Attendance: The patient attended 3/4 group. Participation:  The patient participated with minimal elaboration in the activity. Attention:  The patient was able to focus on the activity. Interaction:  The patient acknowledges others or responds to questions,  with no spontaneous interaction. Minimal interaction. Stated his grandfather (?)had heard from his fiance and Javier Alpha think she was lying\": Gaudencio Saavedra Says this with flat affect and no trace of relief, recognition. musculoskeletal

## 2025-02-10 NOTE — DISCHARGE NOTE PROVIDER - NSDCFUSCHEDAPPT_GEN_ALL_CORE_FT
Gerson Joseph  [facility] Physician [facility]  GERIATRICS [address]  Scheduled Appointment: 03/11/2025    Bryon Sun  [facility] Physician [facility]  [facility]  Scheduled Appointment: 04/07/2025    Mesfin Machado  [facility] Physician [place] [id number] [facility]  Scheduled Appointment: 05/01/2025

## 2025-02-10 NOTE — DISCHARGE NOTE PROVIDER - NSDCCPCAREPLAN_GEN_ALL_CORE_FT
PRINCIPAL DISCHARGE DIAGNOSIS  Diagnosis: Cellulitis of great toe of left foot  Assessment and Plan of Treatment:

## 2025-02-10 NOTE — DISCHARGE NOTE PROVIDER - HOSPITAL COURSE
85 y/o Female with h/o Alzheimer dementia, breast CA s/p double mastectomy, chemotherapy and radiation, pituitary tumor, DVT, HLD, hypothyroidism, and osteoporosis was admitted on 2/5/25 after she was sent in by her podiatrist for L great toe infection.    #Acute Osteomyelitis of the Distal Phalanx of the Left Great Toe  #Cellulitis of the Left Foot  Left Foot XR with Swelling around the left great toe noted. MRI left foot notable for  focal soft tissue ulceration at the great toe distally with underlying marrow signal consistent with osteomyelitis within the distal phalanx of the great toe.   -Pain regimen with Tylenol at home (as per patient's request)  -F/u Blood Cx with NGTD, Wound Cx noted (MSSA)  -ID on board; s/p IV CFTX and vancomycin-->Will transition to Ceftin 500 mg BID x 10 days  -Podiatry on board; s/p Left Hallux amputation on 2/8-->F/u as outpt    #HTN   #Hx of HFpEF (Grade 1 DD)  - Continue losartan 100 mg QD   - Continue metoprolol succinate 100 mg QD  - Continue amlodipine 2.5 mg QD  - Continue Lasix 20 mg qd    #Hypothyroidism   - Continue Synthroid 88 mcg QD    #Alzheimer dementia   - Continue donepezil 10 mg at bedtime  - Continue memantine 5 mg QD    #Hx of Breast CA s/p double mastectomy, chemotherapy and radiation  #Hx of Pituitary tumor,   - Follow up as an outpatient

## 2025-02-10 NOTE — PHYSICAL THERAPY INITIAL EVALUATION ADULT - DID THE PATIENT HAVE SURGERY?
Amputation of toe of left foot. Left hallux Amputation including removal of lauren and soft tissue./yes

## 2025-02-11 LAB
CULTURE RESULTS: ABNORMAL
CULTURE RESULTS: SIGNIFICANT CHANGE UP
CULTURE RESULTS: SIGNIFICANT CHANGE UP
ORGANISM # SPEC MICROSCOPIC CNT: ABNORMAL
ORGANISM # SPEC MICROSCOPIC CNT: SIGNIFICANT CHANGE UP
SPECIMEN SOURCE: SIGNIFICANT CHANGE UP

## 2025-02-13 DIAGNOSIS — M81.0 AGE-RELATED OSTEOPOROSIS WITHOUT CURRENT PATHOLOGICAL FRACTURE: ICD-10-CM

## 2025-02-13 DIAGNOSIS — E03.9 HYPOTHYROIDISM, UNSPECIFIED: ICD-10-CM

## 2025-02-13 DIAGNOSIS — B95.61 METHICILLIN SUSCEPTIBLE STAPHYLOCOCCUS AUREUS INFECTION AS THE CAUSE OF DISEASES CLASSIFIED ELSEWHERE: ICD-10-CM

## 2025-02-13 DIAGNOSIS — E78.5 HYPERLIPIDEMIA, UNSPECIFIED: ICD-10-CM

## 2025-02-13 DIAGNOSIS — G30.9 ALZHEIMER'S DISEASE, UNSPECIFIED: ICD-10-CM

## 2025-02-13 DIAGNOSIS — L03.032 CELLULITIS OF LEFT TOE: ICD-10-CM

## 2025-02-13 DIAGNOSIS — Z90.13 ACQUIRED ABSENCE OF BILATERAL BREASTS AND NIPPLES: ICD-10-CM

## 2025-02-13 DIAGNOSIS — Z85.3 PERSONAL HISTORY OF MALIGNANT NEOPLASM OF BREAST: ICD-10-CM

## 2025-02-13 DIAGNOSIS — F02.80 DEMENTIA IN OTHER DISEASES CLASSIFIED ELSEWHERE, UNSPECIFIED SEVERITY, WITHOUT BEHAVIORAL DISTURBANCE, PSYCHOTIC DISTURBANCE, MOOD DISTURBANCE, AND ANXIETY: ICD-10-CM

## 2025-02-13 DIAGNOSIS — Z92.3 PERSONAL HISTORY OF IRRADIATION: ICD-10-CM

## 2025-02-13 DIAGNOSIS — Z86.718 PERSONAL HISTORY OF OTHER VENOUS THROMBOSIS AND EMBOLISM: ICD-10-CM

## 2025-02-13 DIAGNOSIS — Z96.641 PRESENCE OF RIGHT ARTIFICIAL HIP JOINT: ICD-10-CM

## 2025-02-13 DIAGNOSIS — I11.0 HYPERTENSIVE HEART DISEASE WITH HEART FAILURE: ICD-10-CM

## 2025-02-13 DIAGNOSIS — I50.32 CHRONIC DIASTOLIC (CONGESTIVE) HEART FAILURE: ICD-10-CM

## 2025-02-13 DIAGNOSIS — Z79.890 HORMONE REPLACEMENT THERAPY: ICD-10-CM

## 2025-02-13 DIAGNOSIS — Z92.21 PERSONAL HISTORY OF ANTINEOPLASTIC CHEMOTHERAPY: ICD-10-CM

## 2025-02-13 DIAGNOSIS — M86.172 OTHER ACUTE OSTEOMYELITIS, LEFT ANKLE AND FOOT: ICD-10-CM

## 2025-02-13 LAB
CULTURE RESULTS: ABNORMAL
ORGANISM # SPEC MICROSCOPIC CNT: ABNORMAL
ORGANISM # SPEC MICROSCOPIC CNT: SIGNIFICANT CHANGE UP
SPECIMEN SOURCE: SIGNIFICANT CHANGE UP
SURGICAL PATHOLOGY STUDY: SIGNIFICANT CHANGE UP

## 2025-03-08 LAB
CULTURE RESULTS: SIGNIFICANT CHANGE UP
SPECIMEN SOURCE: SIGNIFICANT CHANGE UP

## 2025-03-11 ENCOUNTER — APPOINTMENT (OUTPATIENT)
Age: 85
End: 2025-03-11

## 2025-04-02 NOTE — PROVIDER CONTACT NOTE (OTHER) - SITUATION
04/04/25    Dr. Hamlet Cuellar,    RE: Ranjan Maiercher         1958    You have received this letter because your patient is enrolled in Ambulatory Care Management and below is the patient's completed Plan of Care. I am closing his care management case due to program complete. If you have any questions regarding this patient or care plan, please contact me at [phone].    Care Plan: Prostate cancer        Problem: Prostate cancer         Goal: Ranjan will manage his prostate cancer within the next 7 months by following treatment plan and approspriately accessing care, evidenced by no avoidable ED visits. Completed 4/4/2025   Start Date: 9/6/2024   Expected End Date: 4/4/2025   Recent Progress: On track   Priority: One (1)   Barriers: Condition Knowledge/Health Literacy/Learning Need   Note:    9/6/24:  Goal initiated and discussed with Ranjan who agrees with care plan and the goals in place. Ranjan had 9/3/24 prostate biopsy and was diagnosed with prostate cancer. Ranjan has urinary frequency. Mailed ranjan educational materials from Epic on Prostate Cancer: Making a Treatment Plan. Ranjan will read the educational materials and follow-up with Urologist 9/16/24 regarding treatment options. Care Manager will assess Ranjan's condition and progress at next follow-up.   9/27/24: Ranjan has urinary frequency and fatigue. No dysuria or other urinary symptoms. Ranjan followed-up with Urologist 9/16/24. Rajnan verbalized understanding of treatment plan. Ranjan will have 10/2/24 PSMA PET Scan, establish care with Radiation Oncologist 10/9/24, and follow-up with Urologist 10/22/24. Ranjan verbalized understanding of the educational materials care manager mailed from Epic: Prostate Cancer: Making a Treatment Plan. Ranjan will keep his PET Scan and Radiation Oncologist appointments and continue to follow treatment plan. Care Manager will assess Ranjan's condition and progress at next follow-up.   10/22/24: Ranjan has  some abdominal discomfort, fatigue, weakness, and urinary frequency. Ranjan had 10/2/24 PSMA PET CT. Per 10/9/24 Radiation Oncologist note: \"Based on his diagnostic work-up thus far he has high-risk, pelvic node positive prostate cancer per NCCN/AUA criteria.\" Ranjan agreed to proceed with radiation therapy. Ranjan verbalized understanding of this treatment plan. Ranjan followed-up with Urologist this morning and received his first Firmagon injection; he will receive injections monthly for 6 months. Ranjan will establish care with Medical Oncologist this afternoon. Ranjan will have next Firmagon injection 11/25/24.  Educated Ranjan on recommendation to get follow a healthy/balanced diet, get adequate rest, april well hydrated, and try to avoid stress. Ranjan verbalized understanding. Ranjan will continue to follow treatment plan. Care Manager will assess Ranjan's condition and progress at next follow-up.   11/12/24: Ranjan followed-up [facility] Medical Oncologist and Medical Oncologist clinic [facility]; he verbalized understanding of treatment plan. Ranjan started oral chemotherapy and Prednisone one week ago. Monthly Firmagon injections continue. He has fatigue and weakness since starting the medication.Good appetite and no gastrointestinal symptoms. Ranjan verbalized understanding of potential side effects of chemotherapy and when/where to access care. Rnajan has contact information for the Cancer Navigator nurse. Ranjan will follow-up with Radiation Oncologist 12/5/04. Sent Ranjan educational materials from [employer] on Cancer: Managing fatigue. Ranjan will read the educational materials and continue to follow treatment plan. Care Manager will assess Ranjna's condition and progress at next follow-up.   12/5/24:  Prostate cancer spread to lymph nodes of lower pelvis. 12/3/24 labs - Prostate Specific Antigen 0.89 (decreased from 66.80 in August).  Ranjan experiencing fatigue; he has been very busy with a lot of appointments. Ranjan also has rash on  his back; managed with Clindamycin ointment. Ranjan followed-up with PCP, Medical Oncologist and Radiation Oncologist; he verbalized understanding of treatment plan (At least 6 months of Firmagon treatments with nursing staff before we transition to 3-month Zoladex injections. Start Flomax. Take Zytiga, 4 pills once daily and Prednisone 5 mg daily. shot by urology  x 2 years; monthly now then change to every 3 months. After starting therapy,  about 2 months later- start radiation- MONDAY-FRIDAY X 6 WEEK. See Neurology for leg weakness. Call office for any concerns). Ranjan is medication compliant.  Ranjan verbalized understanding of the educational materials care manager sent from Epic on Cancer: Managing fatigue. Ranjan will continue to follow treatment plan. Care Manager will assess Ranjan's condition and progress at next follow-up.  12/26/24: Ranjan continues to be compliant with his treatment (Hormone Therapy injections, oral chemotherapy and radiation treatments). Ranjan has lower back and generalized achiness (5/10 lower back pain) and experiences testicle pain for one week following Hormone Therapy injections (3/10 testicle pain); pain managed with ibuprofen. Ranjan also continues to experience fatigue. Ranjan is getting adequate sleep and has a good appetite. Ranjan will follow-up with Radiation Oncologist 12/30/24 and continue to follow treatment plan. Care Manager will assess Ranjan's condition and progress at next follow-up.  1/17/25: Ranjan continues to be compliant with his treatment. Ranjan this week has been rough for Ranjan with increase gas affecting his radiation treatments. Ranjan experienced diarrhea, then constipation from taking anti-diarrheal medication, and now increased gas. Educated Ranjan on how to manage/prevent gas (eat smaller meals more frequently. Avoid carbonated drinks, gum, hard candy, coffee. Eat more fiber, but increase it slowly so digestive system can adjust. Soluble fiber like beans, nuts,  EKG completed, machine read "V1 and V2 tremor" and seeds can cause more gas than insoluble fiber, like wheat bran and vegetables. Being physically active helps digestive system. Try peppermint  or chamomile tea. Discuss option of over-the counter remedies with Oncologist); Ranjan verbalized understanding. Ranjan continues to experience intermittent episodes testicle pain that last about 1 minute and then resolve. Ranjan is concerned about being able to afford Zytiga due to his co-pay increased this year. Educated Ranjan on using [employer] to find Zytiga savings. Ranjan will not drink coffee or eat/drink foods that cause gas before his radiation treatments, will follow-up with Radiation Oncologist 1/20/25 regarding his symptoms, and follow treatment plan. Care Manager will assess Ranjan's condition and progress at next follow-up.  2/13/25: Ranjan completed radiation treatments. He continues to experince intermittent 4-5/10 testicle pain and fatigue. Ranjan followed-up with Oncologist; he verbalized understanding of treatment plan. Ranjan will continue to take Zytiga and receive firmagon injections for two years. Educated Ranjan on option to contact [employer] Zytiga cost support options program [phone] and follow-up with Oncologist clinic regarding his zytiga co-pay increased and is too expensive for him; Ranjan verbalized understanding and will follow-up. Ranjan will follow-up with Radiation Oncologist 2/27/25 and continue to follow treatment plan. Care Manager will assess Ranjan's condition and progress at next follow-up.  3/6/25: Ranjan has intermittent hot flashes and continues to experience fatigue. He followed-up with Radiation Oncologist 2/27/25; he verbalized understanding of treatment plan. Ranjan continues to receive monthly Firmagon injections; next injection scheduled for 3/24/25. He will follow-up with Oncologist in April and Urologist in May. Pharmacy assisted Ranjan with getting $0 co-pay for Zytiga. Ranjan will continue to follow treatment plan. Care Manager  will assess Ranjan's condition and progress at next follow-up.  4/4/25: Ranjan understands how to manage his symptoms of fatigue and when/where to access care. Ranjan continues to follow-up with his Oncologist as recommended. Care plan completed.        Care Plan: Chronic Obstructive Pulmonary Diasease (COPD)        Problem: Chronic Obstructive Pulmonary Diasease (COPD)         Goal: Ranjan will manage his COPD within the next 7 months by following treatment plan, utilizing Action Plan, and continuing to work on smokling cessation, evidenced by no avoidable ED visits. Completed 4/4/2025   Start Date: 9/6/2024   Expected End Date: 4/4/2025   Recent Progress: On track   Priority: Two (2)   Barriers: Condition Knowledge/Health Literacy/Learning Need   Note:    9/6/24: Goal initiated and discussed with Ranjan who agrees with care plan and the goals in place.Ranjan has shortness of breath with moderate exertion; he is out-of-breath when he climbs his 15 stairs at home. Ranjan also experiences chronic cough, chronic increased mucus, and fatigue/weakness. Ranjan is medication compliant (spiriva inhaler). Ranjan smokes 1 pack of cigarettes/day; he wants to quit smoking and is compliant with medication (Chantix). Ranjan was doing well on Chantix/cutting back on smoking, until the recent stress of his Prostate biopsy/cancer diagnosis. Mailed Ranjan educational materials from [employer] on  COPD. Ranjan will read the educational materials and continues to follow treatment plan. Care Manager will assess Ranjan's condition and progress at next follow-up.   9/27/24: Symptoms unchanged. Ranjan continues to be medication compliant. Ranjan continues to be medication compliant. Ranjan verbalized understanding of of the educational materials care manager mailed from [employer]: COPD Flare-Up. Ranjan will continue to follow treatment plan. Care Manager will assess Ranjan's condition and progress at next follow-up.   10/22/24: Ranjan continues to have shortness of  breath with moderate exertion and chronic productive cough. No exacerbations. Ranjan continues to be medication compliant. Ranjan continues to work on smoking cessation; he continues to be compliant with Chantix. Yeni has decreased his smoking to about 1/2 pack/day. Ranjan will continue to work on smoking cessation and follow treatment plan. Care Manager will assess Ranjan's condition and progress at next follow-up.   11/12/24: Symptoms unchanged. No exacerbations. Ranjan continues to be medication compliant. Ranjan is smoking about 1 pack of cigarettes/day. Ranjan is having difficulty quitting smoking due to the stress of having cancer. Ranjan verbalized understanding of the recommendation to continue to work-on developing healthy coping skills to manage his stress and avoid smoking. Educated Ranjan on recommendations to get recommended vaccines that help prevent respiratory infections that exacerbate COPD (COVID, Flu, Pneumonia). Ranjan verbalized understanding. Ranjan will read the educational materials, get recommended vaccines, and continue to follow treatment plan. Care Manager will assess Ranjan's condition and progress at next follow-up.   12/5/24: Symptoms unchanged. No exacerbations. Ranjan continues to be medication compliant. Ranjan received COVID, Flu, and Pneumonia vaccines at 12 /2/24 PCP visit. Ranjan will continue to follow treatment plan. Care Manager will assess aRnjan's condition and progress at next follow-up.   12/26/24: Symptoms unchanged. Ranjan continues to work on smoking cessation; he is medication compliant.  1/17/25: Ranjan has some congestion, no shortness of breath or other respiratory symptoms. Ranjan continues to be medication compliant. Ranjan has some congestion, no shortness of breath or other respiratory symptoms. Ranjan continues to be medication compliant. Ranjan quit smoking; it has been 1 week since he last smoked. Ranjan will continue to follow treatment plan. Care Manager will assess Ranjan's  condition and progress at next follow-up.   2/13/25: No shortness of breath or other respiratory concerns. Ranjan continues to be medication compliant. Ranjan continues to not smoke; his last cigarette was over 1 month ago. Ranjan will continue to follow treatment plan. Care Manager will assess Ranjan's condition and progress at next follow-up.   3/6/25: Ranjan has cold symptoms (head/nasal congestion). No shortness of breath or exacerbation symptoms. Ranjan will monitor his symptoms and contact PCP if symptoms do not improve or worsen. Ranjan will follow-up with PCP in April. Ranjan will continue to follow treatment plan. Care Manager will assess Ranjan's condition and progress at next follow-up.  4/4/25: Ranjan has cold symptoms; no COPD exacerbation. Ranjan understands how to manage his COPD, red flag signs/symptoms and when/where to access care. Care plan completed.        Care Plan: Risk for falls        Problem: Risk for falls         Goal: Ranjan will manage his Risk for falls within the next 7 months by following PCP/Specialist treatment plan and utilizing Fall Prevention and Community Resources (Homemaker service), evidenced by no falls or avoidable ED visits. Completed 4/4/2025   Start Date: 9/6/2024   Expected End Date: 4/4/2025   Recent Progress: On track   Priority: Three (3)   Barriers: Condition Knowledge/Health Literacy/Learning Need   Note:    9/6/24: Goal initiated and discussed with Ranjan who agrees with care plan and the goals in place. Ranjan had 4 falls since May. Ranjan has problems with balance due to neuropathy symptoms in his feet. Ranjan also experiences weakness. Ranjan uses a walker and a wheelchair he borrowed from a friend. Ranjan needs a wheelchair. Care Manager confirmed PCP ordered a wheelchair yesterday. Followed-up on wheelchair order; spoke with Olivia at [facility] who confirmed order received but not yet approved by Utilization Management; Olivia will follow-up to ensure delivery is  set-up once referral approved. Mailed Ranjan educational materials from [facility] on Fall Prevention. Ranjan will read the educational materials and utilize Fall Prevention techniques. Care Manager will assess Ranjan's condition and progress at next follow-up.   9/27/24: Ranjan continues to have problems with balance due to neuropathy symptoms in his feet  and weakness.Ranjan received/is using the wheelchair PCP ordered. No falls. Ranjan verbalized understanding of the educational materials care manager mailed from [facility] on Fall Prevention. Ranjan rescheduled 10/9/24 Neurologist visit to 4/16/25. Ranjan will continue to follow treatment plan and use fall prevention techniques. Care Manager will assess Ranjan's condition and progress at next follow-up.  10/22/24: No falls. Ranjan continues to have neuropathy symptoms in his feet, weakness, and fatigue. No falls. Ranjan continues to use fall prevention techniques to avoid falls, including using assistive devices and taking his time/not rushing. Care Manager will assess Ranjan's condition and progress at next follow-up.  11/12/24: Difficulty walking secondary to ataxia; he continues to use a wheelchair. No falls. Ranjan will follow-up with Neurologist 11/25/24. Rajnan does not want to start Physical Therapy at this time due to too much going on with cancer treatment. Ranjan met with [facility]; he was approved for Homemaker service 3 days/week and Meals-On-Wheels. Meals-On-Wheels started one week ago. Ranjan will follow-up with [facility]  for Homemaker service start date. Ranjan will continue to use fall prevention techniques. Care Manager will assess Ranjan's condition and progress at next follow-up.  12/5/24: Ranjan has leg weakness with difficulty walking. No falls. Ranjan is waiting for Homemaker services to start. Ranjan has leg weakness with difficulty walking. No falls. Ranjan is waiting for Homemaker services to start. Neurologist appointment scheduled for 12/24/24;  clinic left Ranjan a message regarding rescheduling the visit., he will follow-up. Ranjan will follow Neurologist treatment plan and use fall prevention. Care Manager will assess Ranjan's condition and progress at next follow-up.  12/26/24: Ranjan continues to have difficulty walking due to leg weakness. No Falls. Ranjan followed-up with Neurologist; he verbalized understanding of treatment plan (MRI Brain and EMG/Nerve Conduction Study; future). Currently, Ranjan is prioritizing his cancer treatment; he will schedule MRI Brain and follow-up for EMG when he has more availability in his schedule. Ranjan is in the process of his girlfriend becoming his paid Homemaker. Ranjan will continue to use Fall prevention. Care Manager will assess Ranjan's condition and progress at next follow-up.  1/17/25: No falls. Ranjan understands Neurologist recommendation for MRI brain; he is not ready to schedule MRI. Ranjan's best friend will be his paid caregiver; she completed caregiver orientation. Ranjan will continue to use Fall prevention. Care Manager will assess Ranjan's condition and progress at next follow-up.  2/13/25: No falls. Ranjan experiences fatigue. He is walking with walker for regular exercise; he is able to walk to his mailbox and back. Ranjan does not want to schedule MRI brain at this time; it is on his to-do list. Ranjan's best friend will be his paid caregiver; she completed caregiver orientation and is in process of completing the required on-line courses. Ranjan will follow-up Neurologist 3/12/25, follow treatment plan, continue to use Fall prevention. Care Manager will assess Ranjan's condition and progress at next follow-up.  3/6/25: No falls. Ranjan continues to use assistive devices (wheelchair and cane). He is walking with cane to get regular exercise and increase his strength. Ranjan's goal is to not need the wheelchair. Ranjan does not want to schedule MRI brain at this time; it is on his to-do list. He will follow-up with  Neurologist 3/12/25. Ranjan's friend continues to work-on completing the on-line courses to become his paid caregiver. Ranjan will follow-up with Neurologist, follow treatment plan and continue to use fall prevention. Care Manager will assess Ranjan's condition and progress at next follow-up.   4/4/25: No falls. Ranjan's walking is improving, he feels more steady. He continues to use assistive devices. Ranjan understands fall prevention.       Care Plan: Health Maintenance/Access to Care        Problem: Health Maintenance/Access to Care         Goal: Ranjan will demonstrate ability to independently follow wellness plan of care within the next 7 months by adhering to provider follow-up recommendations, utilizing Access to Care Plan, and completing Care Gaps. Completed 4/4/2025   Start Date: 9/6/2024   Expected End Date: 4/3/2025   Recent Progress: On track   Priority: Four (4)   Barriers: Condition Knowledge/Health Literacy/Learning Need   Note:    9/6/24: Goal initiated and discussed with Ranjan who agrees with care plan and the goals in place. No ED visits or hospitalizations since 1970's. Ranjan followed-up with PCP 8/30/24, next visit scheduled for December. Per Epic, Ranjan has the following Care Gaps: Colorectal cancer screening, Abdominal Aortic Aneurysm screening, and vaccines (Flu, COVID, Pneumonia, Shingles and DTaP/Tdap/Td. Mailed Ranjan the Same-Day Access . Ranjan will read the Same-Day Access  and follow PCP treatment plan. Care Manager will assess Ranjan's health maintenance/access to care and address Care Gaps at next follow-up.  9/27/24:  No ED visits or inpatient admissions. Ranjan verbalized understanding of the Same-Day Access  care manager mailed. Ranjan can teach back when to access PCP, Urgent Care, ED. Ranjan verbalized understanding fo purpose of regular Wellness Visits with PCP/Specialists as recommended. Ranjan is scheduled to follow-up with PCP in December. Educated Ranjan on his care  gaps (vaccines). Ranjan verbalized understanding of how to get vaccines at PCP clinic/Pharmacy. Ranjan plans to get Flu and COVID vaccines soon. Ranjan will continue to follow PCP treatment plan. Care Manager will assess Ranjan's health maintenance/access to care and progress at next follow-up.   10/22/24: No ED visits or inpatient admissions. Ranjan plans to get Flu and COVID vaccines. Reviewed upcoming appointments with Ranjan. Ranjan confirmed he will keep his upcoming appointments.  Ranjan will have 11/7/24 Colonoscopy and continue to follow PCP treatment plan. Care Manager will assess Ranjan's health maintenance/access to care and progress at next. follow-up.   11/12/24: No ED visits or hospitalizations. Ranjan is following-up with PCP/specialists as recommended. Ranjan cancelled 11/7/24 Colonoscopy due to starting chemotherapy and too weak and fatigued. Ranjan will reschedule Colonoscopy when he is feeling better. Educated Ranjan on increased risk of infection due to immune system compromised due to chemotherapy and recommendations to get recommended vaccines that help prevent respiratory infections that exacerbate COPD (COVID, Flu, Pneumonia) and recommendations for handwashing. Also, discussed shingles vaccine. Ranjan plans to get recommended vaccines. Ranjan will follow-up with PCP 12/2/24 and continue to follow treatment plan. Care Manager will assess Ranjan's health maintenance/access to care and progress at next follow-up.   12/5/24: No ED visits or hospitalizations. Ranjan followed-up with PCP 12/2/24; he verbalized understanding of treatment plan. Ranjan received COVID, Flu, Pneumonia vaccines and will get Shingles vaccine soon. Ranjan will reschedule colonoscopy when he is not so busy with appointments for his cancer. Ranjan will continue to follow PCP treatment plan. Care Manager will assess Ranjan's health maintenance/access to care and progress at next follow-up.   12/26/24: No ED visits or hospitalizations. Reviewed  with Ranjan his appointments that need to be scheduled (CT Chest Calcium Scoring, MRI Brain, Colonoscopy). Currently, Ranjan is prioritizing his cancer treatment and will schedule his other appointments at a later date/when he has more availability in his schedule. Ranjan is following -up with his specialists as recommended and is scheduled to follow-up with PCP in April. Shingles vaccine not discussed at this follow-up due to Ranjan wanted to focus on his Prostate Cancer. Ranjan will continue to follow PCP/Specialist treatment plans and appropriately access care. Care Manager will assess Ranjan's health maintenance/access to care and progress at next follow-up.   1/17/25: No ED visits or hospitalizations. Reminded Ranjan of his appointments that need to be scheduled (CT Chest Calcium Scoring, MRI Brain, Colonoscopy). Currently, Ranjan is prioritizing his cancer treatment and will schedule his other appointments at a later date/when he has more availability in his schedule. Ranjan continues to follow-up with PCP/Specialists as recommended.  Ranjan will continue to follow PCP/Specialist treatment plans and appropriately access care. Care Manager will assess Ranjan's health maintenance/access to care and progress at next follow-up.   2/13/25: No ED visits or hospitalizations. Reminded Ranjan of his appointments that need to be scheduled (CT Chest Calcium Scoring, MRI Brain, Colonoscopy); He declined scheduling them at this time, they are on his to-do list. Ranjan will follow-up with PCP in April. Ranjan will continue to follow PCP/Specialist treatment plans and appropriately access care. Care Manager will assess Ranjan's health maintenance/access to care and progress at next follow-up.   3/6/25: No ED visits or hospitalizations. Reminded Ranjan of his appointments that need to be scheduled (CT Chest Calcium Scoring, MRI Brain, Colonoscopy); He declined scheduling them at this time, they are on his to-do list. Reviewed care gaps:  Annual Medicare Advantage Wellness Visit, Abdominal Aortic Aneurysm AAA Screening, Colorectal cancer screening, and vaccines (Shingles and DTaP/Tdap/Td) and reinforced education on how Ranjan can obtain/stay current with recommended health screenings and vaccines; he verbalized understanding. Ranjan agreed to referral to the Health Outreach team for assistance with scheduling Medicare Advntage Wellness Visit; referral sent. Ranjan will follow-up with PCP 4/2/25, follow PCP/Specialist treatment plans, and appropriately access care. Care Manager will assess Ranjan's health maintenance/access to care and progress at next follow-up.   4/4/25: No ED visits or hospitalizations. Reminded Ranjan of his appointments that need to be scheduled (CT Chest Calcium Scoring, MRI Brain, Colonoscopy); he declined scheduling at this time, they are on his to-do list. Ranjan will have Medicare Advantage Wellness visits 8/9/25. Ranjan understands when/where to access care and how to address his care gaps. Care plan completed.            Sincerely,    Kaia Abdi RN  Outpatient?Care?Manager?   [facility] [place]